# Patient Record
Sex: FEMALE | Race: BLACK OR AFRICAN AMERICAN | NOT HISPANIC OR LATINO | Employment: FULL TIME | URBAN - METROPOLITAN AREA
[De-identification: names, ages, dates, MRNs, and addresses within clinical notes are randomized per-mention and may not be internally consistent; named-entity substitution may affect disease eponyms.]

---

## 2018-03-22 ENCOUNTER — OFFICE VISIT (OUTPATIENT)
Dept: OBSTETRICS AND GYNECOLOGY | Facility: CLINIC | Age: 38
End: 2018-03-22
Attending: OBSTETRICS & GYNECOLOGY
Payer: COMMERCIAL

## 2018-03-22 VITALS
SYSTOLIC BLOOD PRESSURE: 120 MMHG | BODY MASS INDEX: 28.15 KG/M2 | WEIGHT: 164.88 LBS | DIASTOLIC BLOOD PRESSURE: 86 MMHG | HEIGHT: 64 IN

## 2018-03-22 DIAGNOSIS — Z01.419 VISIT FOR GYNECOLOGIC EXAMINATION: Primary | ICD-10-CM

## 2018-03-22 DIAGNOSIS — Z30.41 ENCOUNTER FOR SURVEILLANCE OF CONTRACEPTIVE PILLS: ICD-10-CM

## 2018-03-22 PROCEDURE — 88175 CYTOPATH C/V AUTO FLUID REDO: CPT

## 2018-03-22 PROCEDURE — 99385 PREV VISIT NEW AGE 18-39: CPT | Mod: S$GLB,,, | Performed by: OBSTETRICS & GYNECOLOGY

## 2018-03-22 PROCEDURE — 99999 PR PBB SHADOW E&M-NEW PATIENT-LVL III: CPT | Mod: PBBFAC,,, | Performed by: OBSTETRICS & GYNECOLOGY

## 2018-03-22 PROCEDURE — 87624 HPV HI-RISK TYP POOLED RSLT: CPT

## 2018-03-22 RX ORDER — SPIRONOLACTONE 25 MG/1
50 TABLET ORAL DAILY
Refills: 2 | COMMUNITY
Start: 2018-01-08

## 2018-03-22 RX ORDER — NORETHINDRONE ACETATE AND ETHINYL ESTRADIOL 1MG-20(24)
1 KIT ORAL DAILY
Qty: 30 TABLET | Refills: 12 | Status: SHIPPED | OUTPATIENT
Start: 2018-03-22

## 2018-03-22 RX ORDER — CLOTRIMAZOLE AND BETAMETHASONE DIPROPIONATE 10; .64 MG/G; MG/G
CREAM TOPICAL
COMMUNITY
Start: 2018-03-22

## 2018-03-22 RX ORDER — NORETHINDRONE ACETATE AND ETHINYL ESTRADIOL 1MG-20(24)
1 KIT ORAL DAILY
Refills: 0 | COMMUNITY
Start: 2018-01-25 | End: 2018-03-22 | Stop reason: SDUPTHER

## 2018-03-22 RX ORDER — GABAPENTIN 300 MG/1
CAPSULE ORAL
Refills: 6 | COMMUNITY
Start: 2018-01-06

## 2018-03-22 RX ORDER — DOXYCYCLINE HYCLATE 100 MG
TABLET ORAL
COMMUNITY
Start: 2018-03-22

## 2018-03-22 NOTE — PROGRESS NOTES
"CC: Well woman exam    Yanira Lerma is a 37 y.o. female  presents for a well woman exam.  She has no issues, problems, or complaints.    Past Medical History:   Diagnosis Date    Abnormal Pap smear 10 years ago    colpo normal    Hidradenitis        Past Surgical History:   Procedure Laterality Date    BREAST SURGERY      Breast Reduction     SECTION  '02, '09    x4    DILATION AND CURETTAGE OF UTERUS  '01    after SAB       OB History    Para Term  AB Living   5 3 3   1 3   SAB TAB Ectopic Multiple Live Births   1       3      # Outcome Date GA Lbr Gerard/2nd Weight Sex Delivery Anes PTL Lv   5 Term    2.863 kg (6 lb 5 oz) M CS-LTranv Spinal N IVY   4 Term    3.289 kg (7 lb 4 oz) F CS-LTranv EPI Y IVY      Birth Comments: FTP, fetal distress, polyhydramnios   3 SAB               Birth Comments: with D&C   2 Term      CS-LTranv   IVY   1       CS-LTranv             Family History   Problem Relation Age of Onset    Hypertension Father     Breast cancer Neg Hx     Colon cancer Neg Hx     Ovarian cancer Neg Hx        Social History   Substance Use Topics    Smoking status: Never Smoker    Smokeless tobacco: Never Used    Alcohol use No       /86   Ht 5' 4" (1.626 m)   Wt 74.8 kg (164 lb 14.5 oz)   LMP  (LMP Unknown)   Breastfeeding? No Comment: Birth control  BMI 28.31 kg/m²     ROS:  GENERAL: Denies weight gain or weight loss. Feeling well overall.   SKIN: Denies rash or lesions.   HEAD: Denies head injury or headache.   NODES: Denies enlarged lymph nodes.   CHEST: Denies chest pain or shortness of breath.   CARDIOVASCULAR: Denies palpitations or left sided chest pain.   ABDOMEN: No abdominal pain, constipation, diarrhea, nausea, vomiting or rectal bleeding.   URINARY: No frequency, dysuria, hematuria, or burning on urination.  REPRODUCTIVE: See HPI.   BREASTS: The patient performs breast self-examination and denies pain, lumps, or nipple discharge. "   HEMATOLOGIC: No easy bruisability or excessive bleeding.  MUSCULOSKELETAL: Denies joint pain or swelling.   NEUROLOGIC: Denies syncope or weakness.   PSYCHIATRIC: Denies depression, anxiety or mood swings.    Physical Exam:    APPEARANCE: Well nourished, well developed, in no acute distress.  AFFECT: WNL, alert and oriented x 3  SKIN: No acne or hirsutism  NECK: Neck symmetric without masses or thyromegaly  NODES: No inguinal, cervical, axillary, or femoral lymph node enlargement  CHEST: Good respiratory effect  ABDOMEN: Soft.  No tenderness or masses.  No hepatosplenomegaly.  No hernias.  BREASTS: Symmetrical, no skin changes or visible lesions.  No palpable masses, nipple discharge bilaterally.  PELVIC: Normal external genitalia without lesions.  Normal hair distribution.  Adequate perineal body, normal urethral meatus.  Vagina moist and well rugated without lesions or discharge.  Cervix pink, without lesions, discharge or tenderness.  No significant cystocele or rectocele.  Bimanual exam shows uterus to be normal size, regular, mobile and nontender.  Adnexa without masses or tenderness.    EXTREMITIES: No edema.    ASSESSMENT AND PLAN  1. Visit for gynecologic examination  Liquid-based pap smear, screening    HPV High Risk Genotypes, PCR   2. Encounter for surveillance of contraceptive pills  BLISOVI 24 FE 1 mg-20 mcg (24)/75 mg (4) per tablet       Patient was counseled today on A.C.S. Pap guidelines and recommendations for yearly pelvic exams, pap smears every 5 years with HPV co-testing, and monthly self breast exams; to see her PCP for other health maintenance.     Follow-up in about 1 year (around 3/22/2019).

## 2018-03-27 LAB
HPV16 AG SPEC QL: NEGATIVE
HPV16+18+H RISK 12 DNA CVX-IMP: NEGATIVE
HPV18 DNA SPEC QL NAA+PROBE: NEGATIVE

## 2019-05-21 ENCOUNTER — TELEPHONE (OUTPATIENT)
Dept: OBSTETRICS AND GYNECOLOGY | Facility: CLINIC | Age: 39
End: 2019-05-21

## 2019-05-21 NOTE — TELEPHONE ENCOUNTER
----- Message from Holly Umanzor sent at 5/21/2019 10:26 AM CDT -----  Name of Who is Calling:CRISTÓBAL COLLINS [[id number]]      What is the request in detail: Pt states she will be 30 weeks on Friday and wants to transfer care.      Can the clinic reply by [facility]:  Yes       What Number to Call Back if not in [facility]: [phone]

## 2019-05-21 NOTE — TELEPHONE ENCOUNTER
I spoke to the pt and informed her that we do not accept patient 's after 28 weeks of pregnancy. Pt was given the number to call the [facility] to see if she can be seen. Pt lives in [facility] and is normally seen by a [facility] doctor . 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  Patient will call and see if she can be seen by [facility].

## 2019-05-22 ENCOUNTER — TELEPHONE (OUTPATIENT)
Dept: OBSTETRICS AND GYNECOLOGY | Facility: CLINIC | Age: 39
End: 2019-05-22

## 2019-05-22 NOTE — TELEPHONE ENCOUNTER
I spoke to the pt and scheduled her an appointment to come in and be seen for her transfer care from [place].

## 2019-05-22 NOTE — TELEPHONE ENCOUNTER
I left a message for the pt to call the office back at [phone] to schedule her an appt for ob transfer of care.

## 2019-05-22 NOTE — TELEPHONE ENCOUNTER
----- Message from Shyann Schulz sent at 5/22/2019 12:15 PM CDT -----  Contact: CRISTÓBAL COLLINS [[id number]]  Name of Who is Calling:CRISTÓBAL COLLINS [[id number]]    What is the request in detail: Patient returned a call from the office, please call her back.       Can the clinic reply by [facility]:no      What [facility]: [phone]

## 2020-02-01 ENCOUNTER — HOSPITAL ENCOUNTER (EMERGENCY)
Facility: HOSPITAL | Age: 40
Discharge: HOME | End: 2020-02-01
Attending: EMERGENCY MEDICINE
Payer: MEDICAID

## 2020-02-01 VITALS
BODY MASS INDEX: 28.12 KG/M2 | HEART RATE: 64 BPM | TEMPERATURE: 98 F | OXYGEN SATURATION: 99 % | RESPIRATION RATE: 16 BRPM | SYSTOLIC BLOOD PRESSURE: 142 MMHG | WEIGHT: 175 LBS | DIASTOLIC BLOOD PRESSURE: 85 MMHG | HEIGHT: 66 IN

## 2020-02-01 DIAGNOSIS — S62.656A CLOSED NONDISPLACED FRACTURE OF MIDDLE PHALANX OF RIGHT LITTLE FINGER, INITIAL ENCOUNTER: Primary | ICD-10-CM

## 2020-02-01 LAB
B-HCG UR QL: NEGATIVE
CTP QC/QA: YES

## 2020-02-01 PROCEDURE — 99283 EMERGENCY DEPT VISIT LOW MDM: CPT | Mod: 25

## 2020-02-01 PROCEDURE — 81025 URINE PREGNANCY TEST: CPT | Performed by: NURSE PRACTITIONER

## 2020-02-02 NOTE — ED PROVIDER NOTES
"Encounter Date: 2020       History     Chief Complaint   Patient presents with    Finger Injury     reports caught a football on Thursday and her small finger was "dislocated".  She moved it back into place but finger remains painful today.   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 Patient states that she was playing football with her kids, and a football hit her hand while she was playing.  States she had constant pain since that time, states that she also notices small deformity to her finger, and that she thinks that she reduce it herself when this occurred 2 days prior.  States that she has been having difficulty moving her finger since this occurred, however has been able to still move the finger despite the pain. Taking ibuprofen for the pain, has been helping her pain.    Review of patient's allergies indicates:   Allergen Reactions    Diflucan [fluconazole] Hives    Amoxicillin      Past Medical History:   Diagnosis Date    Abnormal Pap smear 10 years ago    colpo normal    Hidradenitis      Past Surgical History:   Procedure Laterality Date    BREAST SURGERY      Breast Reduction     SECTION  '02, '09    x4    DILATION AND CURETTAGE OF UTERUS  '01    after SAB     Family History   Problem Relation Age of Onset    Hypertension Father     Breast cancer Neg Hx     Colon cancer Neg Hx     Ovarian cancer Neg Hx      Social History     Tobacco Use    Smoking status: Never Smoker    Smokeless tobacco: Never Used   Substance Use Topics    Alcohol use: No    Drug use: No     Review of Systems   Constitutional: Negative for chills and fever.   Cardiovascular: Negative for chest pain and palpitations.   Gastrointestinal: Negative for diarrhea and vomiting.       Physical Exam     Initial Vitals [20]   BP Pulse Resp Temp SpO2   (!) 142/85 64 16 " 98.1 °F (36.7 °C) 99 %      MAP       --         Physical Exam    Nursing note and vitals reviewed.  Constitutional: She appears well-developed and well-nourished. No distress.   Nontoxic, sitting comfortably in bed   HENT:   Head: Normocephalic and atraumatic.   Eyes: EOM are normal. Pupils are equal, round, and reactive to light.   Neck: Normal range of motion.   Cardiovascular: Normal rate, regular rhythm and normal heart sounds. Exam reveals no gallop and no friction rub.    No murmur heard.  Pulmonary/Chest: Breath sounds normal. No respiratory distress. She has no wheezes. She has no rhonchi. She has no rales.   Musculoskeletal:   Slight medial deviation of the right small finger, soft tissue swelling noted over the lateral aspect of the right small finger, mildly tender to palpation, slightly decreased range of motion secondary to pain with extension and flexion, decreased strength with abduction, preserved adduction   Neurological: She is alert and oriented to person, place, and time.   Ambulates with a steady gait, no focal deficits   Skin: Skin is warm and dry.         ED Course   Procedures  Labs Reviewed   POCT URINE PREGNANCY          Imaging Results          X-Ray Finger 2 or More Views Right (In process)                  Medical Decision Making:   Initial Assessment:   Assessment:  39-year-old female with right finger pain    Ddx includes but is not limited to:  Fracture, dislocation, subluxation, neurovascular injury, tendon injury    Plan:  Emergent evaluation of a 39-year-old female for right finger pain. Patient hemodynamically stable, afebrile.  On exam, patient demonstrates medial deviation of the right 5th digit, and also soft tissue swelling.  X-ray demonstrated a transverse fracture of the right 5th digit, no other fracture was identified.  No evidence of subluxation or dislocation.  She is neurovascularly intact.  Could also represent tendon injury as well. We will refer the patient  Orthopedic surgery.  States she is comfortable taking Motrin at home for pain. Did place the patient in a splint, and she was neurovascularly intact with normal cap refill after placement of the splint.  Voiced understanding for return precautions, stable for discharge.      Juan Carlos Neal, HO-3  2/1/2020 10:02 PM                                   Clinical Impression:       ICD-10-CM ICD-9-CM   1. Closed nondisplaced fracture of middle phalanx of right little finger, initial encounter S62.656A 816.01                             Juan Carlos Neal MD  Resident  02/01/20 [id number]

## 2020-06-26 ENCOUNTER — HISTORICAL (OUTPATIENT)
Dept: ADMINISTRATIVE | Facility: HOSPITAL | Age: 40
End: 2020-06-26

## 2020-07-08 ENCOUNTER — HISTORICAL (OUTPATIENT)
Dept: ADMINISTRATIVE | Facility: HOSPITAL | Age: 40
End: 2020-07-08

## 2020-12-04 ENCOUNTER — TELEPHONE (OUTPATIENT)
Dept: OBSTETRICS AND GYNECOLOGY | Facility: CLINIC | Age: 40
End: 2020-12-04

## 2020-12-04 NOTE — TELEPHONE ENCOUNTER
Called patient, no answer, left message to call back. I was returning patient's call. Provider is completely booked till the end of the year.

## 2020-12-04 NOTE — TELEPHONE ENCOUNTER
----- Message from Mary Lui LPN sent at 12/4/2020 11:50 AM CST -----  Contact: CRISTÓBAL COLLINS [[id number]]  Can you call her, I know her personally. Just let her know that you Dr. Scott is booked through the end of the year ? Do you minsd ?  ----- Message -----  From: Francoise Palacio  Sent: 12/3/2020   2:54 PM CST  To: Sonia [facility]AI[facility] Staff    Type: Patient Call Back    Who called:CRISTÓBAL COLLINS [[id number]]    What is the request in detail: Patient is requesting a call back. CRISTÓBAL COLLINS [[id number]]  states she would like to be seen before December 14. She requests a wwe and she states her menstrual cycles have become heavier.   Please advise.    Can the clinic reply by [facility]? No    Best call back number: ..[phone]    Additional Information: N/A

## 2021-02-10 ENCOUNTER — HISTORICAL (OUTPATIENT)
Dept: ADMINISTRATIVE | Facility: HOSPITAL | Age: 41
End: 2021-02-10

## 2021-02-12 LAB
DSDNA IGG SERPL IA-ACNC: 33 IU (ref 0–24)
DSDNA IGG SERPL IA-ACNC: ABNORMAL [IU]/ML
ENA AB SER QL IA: 9.2 EUS (ref 0–19.9)
ENA AB SER QL IA: NEGATIVE

## 2021-04-19 ENCOUNTER — OFFICE VISIT (OUTPATIENT)
Dept: FAMILY MEDICINE | Facility: CLINIC | Age: 41
End: 2021-04-19
Payer: MEDICAID

## 2021-04-19 VITALS
DIASTOLIC BLOOD PRESSURE: 90 MMHG | BODY MASS INDEX: 49.5 KG/M2 | HEIGHT: 62 IN | SYSTOLIC BLOOD PRESSURE: 130 MMHG | RESPIRATION RATE: 20 BRPM | WEIGHT: 269 LBS

## 2021-04-19 DIAGNOSIS — I10 HYPERTENSION, UNSPECIFIED TYPE: ICD-10-CM

## 2021-04-19 DIAGNOSIS — E66.9 OBESITY, UNSPECIFIED CLASSIFICATION, UNSPECIFIED OBESITY TYPE, UNSPECIFIED WHETHER SERIOUS COMORBIDITY PRESENT: ICD-10-CM

## 2021-04-19 DIAGNOSIS — K21.9 GASTROESOPHAGEAL REFLUX DISEASE, UNSPECIFIED WHETHER ESOPHAGITIS PRESENT: ICD-10-CM

## 2021-04-19 DIAGNOSIS — E07.81 SICK-EUTHYROID SYNDROME: ICD-10-CM

## 2021-04-19 DIAGNOSIS — E13.9 DIABETES 1.5, MANAGED AS TYPE 2: Primary | ICD-10-CM

## 2021-04-19 LAB
BASOPHILS # BLD AUTO: 0.06 K/UL (ref 0–0.2)
BASOPHILS NFR BLD AUTO: 0.9 % (ref 0–1)
DIFFERENTIAL METHOD BLD: ABNORMAL
EOSINOPHIL # BLD AUTO: 0.22 K/UL (ref 0–0.5)
EOSINOPHIL NFR BLD AUTO: 3.4 % (ref 1–4)
ERYTHROCYTE [DISTWIDTH] IN BLOOD BY AUTOMATED COUNT: 14.2 % (ref 11.5–14.5)
EST. AVERAGE GLUCOSE BLD GHB EST-MCNC: 120 MG/DL
HBA1C MFR BLD HPLC: 6.2 % (ref 4.5–6.6)
HCT VFR BLD AUTO: 42.8 % (ref 38–47)
HGB BLD-MCNC: 12.7 G/DL (ref 12–16)
IMM GRANULOCYTES # BLD AUTO: 0.02 K/UL (ref 0–0.04)
IMM GRANULOCYTES NFR BLD: 0.3 % (ref 0–0.4)
LYMPHOCYTES # BLD AUTO: 2.42 K/UL (ref 1–4.8)
LYMPHOCYTES NFR BLD AUTO: 37.8 % (ref 27–41)
MCH RBC QN AUTO: 22.4 PG (ref 27–31)
MCHC RBC AUTO-ENTMCNC: 29.7 G/DL (ref 32–36)
MCV RBC AUTO: 75.5 FL (ref 80–96)
MONOCYTES # BLD AUTO: 0.42 K/UL (ref 0–0.8)
MONOCYTES NFR BLD AUTO: 6.6 % (ref 2–6)
MPC BLD CALC-MCNC: 11.4 FL (ref 9.4–12.4)
NEUTROPHILS # BLD AUTO: 3.26 K/UL (ref 1.8–7.7)
NEUTROPHILS NFR BLD AUTO: 51 % (ref 53–65)
NRBC # BLD AUTO: 0 X10E3/UL
NRBC, AUTO (.00): 0 %
PLATELET # BLD AUTO: 275 K/UL (ref 150–400)
RBC # BLD AUTO: 5.67 M/UL (ref 4.2–5.4)
WBC # BLD AUTO: 6.4 K/UL (ref 4.5–11)

## 2021-04-19 PROCEDURE — 80053 COMPREHEN METABOLIC PANEL: CPT | Mod: ,,, | Performed by: CLINICAL MEDICAL LABORATORY

## 2021-04-19 PROCEDURE — 99214 PR OFFICE/OUTPT VISIT, EST, LEVL IV, 30-39 MIN: ICD-10-PCS | Mod: ,,, | Performed by: FAMILY MEDICINE

## 2021-04-19 PROCEDURE — 83036 HEMOGLOBIN A1C: ICD-10-PCS | Mod: ,,, | Performed by: CLINICAL MEDICAL LABORATORY

## 2021-04-19 PROCEDURE — 99214 OFFICE O/P EST MOD 30 MIN: CPT | Mod: ,,, | Performed by: FAMILY MEDICINE

## 2021-04-19 PROCEDURE — 83036 HEMOGLOBIN GLYCOSYLATED A1C: CPT | Mod: ,,, | Performed by: CLINICAL MEDICAL LABORATORY

## 2021-04-19 PROCEDURE — 85025 COMPLETE CBC W/AUTO DIFF WBC: CPT | Mod: ,,, | Performed by: CLINICAL MEDICAL LABORATORY

## 2021-04-19 PROCEDURE — 80053 COMPREHENSIVE METABOLIC PANEL: ICD-10-PCS | Mod: ,,, | Performed by: CLINICAL MEDICAL LABORATORY

## 2021-04-19 PROCEDURE — 85025 CBC WITH DIFFERENTIAL: ICD-10-PCS | Mod: ,,, | Performed by: CLINICAL MEDICAL LABORATORY

## 2021-04-19 PROCEDURE — 84443 TSH: ICD-10-PCS | Mod: ,,, | Performed by: CLINICAL MEDICAL LABORATORY

## 2021-04-19 PROCEDURE — 84443 ASSAY THYROID STIM HORMONE: CPT | Mod: ,,, | Performed by: CLINICAL MEDICAL LABORATORY

## 2021-04-19 RX ORDER — PANTOPRAZOLE SODIUM 40 MG/1
40 TABLET, DELAYED RELEASE ORAL DAILY
COMMUNITY
Start: 2021-01-19 | End: 2021-04-19

## 2021-04-19 RX ORDER — OMEPRAZOLE 20 MG/1
20 CAPSULE, DELAYED RELEASE ORAL DAILY
Qty: 30 CAPSULE | Refills: 1 | Status: CANCELLED | OUTPATIENT
Start: 2021-04-19

## 2021-04-19 RX ORDER — ASPIRIN 81 MG/1
81 TABLET ORAL DAILY
COMMUNITY
Start: 2021-03-26 | End: 2021-08-09

## 2021-04-19 RX ORDER — OMEPRAZOLE 20 MG/1
20 CAPSULE, DELAYED RELEASE ORAL DAILY
COMMUNITY
End: 2021-04-19

## 2021-04-19 RX ORDER — LIRAGLUTIDE 6 MG/ML
INJECTION SUBCUTANEOUS
Qty: 6 ML | Refills: 5 | Status: SHIPPED | OUTPATIENT
Start: 2021-04-19 | End: 2021-10-11

## 2021-04-19 RX ORDER — METOPROLOL TARTRATE 25 MG/1
25 TABLET, FILM COATED ORAL 2 TIMES DAILY
Qty: 60 TABLET | Refills: 5 | Status: SHIPPED | OUTPATIENT
Start: 2021-04-19 | End: 2022-06-03 | Stop reason: SDUPTHER

## 2021-04-19 RX ORDER — METOPROLOL TARTRATE 25 MG/1
25 TABLET, FILM COATED ORAL 2 TIMES DAILY
COMMUNITY
Start: 2021-03-26 | End: 2021-04-19 | Stop reason: SDUPTHER

## 2021-04-19 RX ORDER — LIRAGLUTIDE 6 MG/ML
INJECTION SUBCUTANEOUS
COMMUNITY
Start: 2021-03-26 | End: 2021-04-19 | Stop reason: SDUPTHER

## 2021-04-19 RX ORDER — PANTOPRAZOLE SODIUM 40 MG/1
40 TABLET, DELAYED RELEASE ORAL DAILY
Qty: 30 TABLET | Refills: 5 | Status: CANCELLED | OUTPATIENT
Start: 2021-04-19

## 2021-04-20 LAB
ALBUMIN SERPL BCP-MCNC: 3.7 G/DL (ref 3.5–5)
ALBUMIN/GLOB SERPL: 0.9 {RATIO}
ALP SERPL-CCNC: 83 U/L (ref 37–98)
ALT SERPL W P-5'-P-CCNC: 24 U/L (ref 13–56)
ANION GAP SERPL CALCULATED.3IONS-SCNC: 14 MMOL/L (ref 7–16)
AST SERPL W P-5'-P-CCNC: 27 U/L (ref 15–37)
BILIRUB SERPL-MCNC: 0.3 MG/DL (ref 0–1.2)
BUN SERPL-MCNC: 12 MG/DL (ref 7–18)
BUN/CREAT SERPL: 11 (ref 6–20)
CALCIUM SERPL-MCNC: 9.1 MG/DL (ref 8.5–10.1)
CHLORIDE SERPL-SCNC: 106 MMOL/L (ref 98–107)
CO2 SERPL-SCNC: 24 MMOL/L (ref 21–32)
CREAT SERPL-MCNC: 1.06 MG/DL (ref 0.55–1.02)
GLOBULIN SER-MCNC: 4.1 G/DL (ref 2–4)
GLUCOSE SERPL-MCNC: 102 MG/DL (ref 74–106)
POTASSIUM SERPL-SCNC: 4.7 MMOL/L (ref 3.5–5.1)
PROT SERPL-MCNC: 7.8 G/DL (ref 6.4–8.2)
SODIUM SERPL-SCNC: 139 MMOL/L (ref 136–145)
TSH SERPL DL<=0.005 MIU/L-ACNC: 6.69 UIU/ML (ref 3.58–3.74)

## 2021-04-21 ENCOUNTER — TELEPHONE (OUTPATIENT)
Dept: FAMILY MEDICINE | Facility: CLINIC | Age: 41
End: 2021-04-21

## 2021-05-06 ENCOUNTER — OFFICE VISIT (OUTPATIENT)
Dept: FAMILY MEDICINE | Facility: CLINIC | Age: 41
End: 2021-05-06
Payer: MEDICAID

## 2021-05-06 VITALS
HEART RATE: 84 BPM | DIASTOLIC BLOOD PRESSURE: 72 MMHG | RESPIRATION RATE: 18 BRPM | WEIGHT: 265 LBS | SYSTOLIC BLOOD PRESSURE: 120 MMHG | HEIGHT: 62 IN | BODY MASS INDEX: 48.76 KG/M2

## 2021-05-06 DIAGNOSIS — E13.9 DIABETES 1.5, MANAGED AS TYPE 2: ICD-10-CM

## 2021-05-06 DIAGNOSIS — E03.9 HYPOTHYROIDISM, UNSPECIFIED TYPE: Primary | ICD-10-CM

## 2021-05-06 DIAGNOSIS — R77.1 ELEVATED SERUM GLOBULIN LEVEL: ICD-10-CM

## 2021-05-06 DIAGNOSIS — E04.9 GOITER: ICD-10-CM

## 2021-05-06 PROCEDURE — 99213 OFFICE O/P EST LOW 20 MIN: CPT | Mod: ,,, | Performed by: FAMILY MEDICINE

## 2021-05-06 PROCEDURE — 99213 PR OFFICE/OUTPT VISIT, EST, LEVL III, 20-29 MIN: ICD-10-PCS | Mod: ,,, | Performed by: FAMILY MEDICINE

## 2021-05-06 RX ORDER — LEVOTHYROXINE SODIUM 25 UG/1
25 TABLET ORAL
Qty: 30 TABLET | Refills: 3 | Status: SHIPPED | OUTPATIENT
Start: 2021-05-06 | End: 2021-07-08 | Stop reason: DRUGHIGH

## 2021-05-10 ENCOUNTER — PATIENT MESSAGE (OUTPATIENT)
Dept: RESEARCH | Facility: HOSPITAL | Age: 41
End: 2021-05-10

## 2021-06-07 ENCOUNTER — HOSPITAL ENCOUNTER (EMERGENCY)
Facility: HOSPITAL | Age: 41
Discharge: HOME | End: 2021-06-08
Attending: EMERGENCY MEDICINE
Payer: MEDICAID

## 2021-06-07 DIAGNOSIS — E03.9 HYPOTHYROIDISM: ICD-10-CM

## 2021-06-07 DIAGNOSIS — R07.9 CHEST PAIN: ICD-10-CM

## 2021-06-07 DIAGNOSIS — E07.81 SICK-EUTHYROID SYNDROME: ICD-10-CM

## 2021-06-07 DIAGNOSIS — E13.9 DIABETES 1.5, MANAGED AS TYPE 2: ICD-10-CM

## 2021-06-07 DIAGNOSIS — I10 HYPERTENSION: ICD-10-CM

## 2021-06-07 DIAGNOSIS — E04.9 GOITER: ICD-10-CM

## 2021-06-07 DIAGNOSIS — K21.9 GASTROESOPHAGEAL REFLUX DISEASE: ICD-10-CM

## 2021-06-07 DIAGNOSIS — R53.1 WEAKNESS: Primary | ICD-10-CM

## 2021-06-07 DIAGNOSIS — E66.9 OBESITY: ICD-10-CM

## 2021-06-07 PROCEDURE — 99283 EMERGENCY DEPT VISIT LOW MDM: CPT | Mod: ,,, | Performed by: EMERGENCY MEDICINE

## 2021-06-07 PROCEDURE — 85730 THROMBOPLASTIN TIME PARTIAL: CPT | Performed by: EMERGENCY MEDICINE

## 2021-06-07 PROCEDURE — 93005 ELECTROCARDIOGRAM TRACING: CPT

## 2021-06-07 PROCEDURE — 99285 EMERGENCY DEPT VISIT HI MDM: CPT | Mod: 25

## 2021-06-07 PROCEDURE — 84484 ASSAY OF TROPONIN QUANT: CPT | Performed by: EMERGENCY MEDICINE

## 2021-06-07 PROCEDURE — 80307 DRUG TEST PRSMV CHEM ANLYZR: CPT | Performed by: EMERGENCY MEDICINE

## 2021-06-07 PROCEDURE — 83880 ASSAY OF NATRIURETIC PEPTIDE: CPT | Performed by: EMERGENCY MEDICINE

## 2021-06-07 PROCEDURE — 93010 EKG 12-LEAD: ICD-10-PCS | Mod: ,,, | Performed by: INTERNAL MEDICINE

## 2021-06-07 PROCEDURE — 85025 COMPLETE CBC W/AUTO DIFF WBC: CPT | Performed by: EMERGENCY MEDICINE

## 2021-06-07 PROCEDURE — 83735 ASSAY OF MAGNESIUM: CPT | Performed by: EMERGENCY MEDICINE

## 2021-06-07 PROCEDURE — 80053 COMPREHEN METABOLIC PANEL: CPT | Performed by: EMERGENCY MEDICINE

## 2021-06-07 PROCEDURE — 99283 PR EMERGENCY DEPT VISIT,LEVEL III: ICD-10-PCS | Mod: ,,, | Performed by: EMERGENCY MEDICINE

## 2021-06-07 PROCEDURE — 36415 COLL VENOUS BLD VENIPUNCTURE: CPT | Performed by: EMERGENCY MEDICINE

## 2021-06-07 PROCEDURE — 85610 PROTHROMBIN TIME: CPT | Performed by: EMERGENCY MEDICINE

## 2021-06-07 PROCEDURE — 93010 ELECTROCARDIOGRAM REPORT: CPT | Mod: ,,, | Performed by: INTERNAL MEDICINE

## 2021-06-08 VITALS
DIASTOLIC BLOOD PRESSURE: 96 MMHG | WEIGHT: 255.75 LBS | SYSTOLIC BLOOD PRESSURE: 153 MMHG | RESPIRATION RATE: 22 BRPM | OXYGEN SATURATION: 99 % | HEIGHT: 64 IN | BODY MASS INDEX: 43.66 KG/M2 | HEART RATE: 86 BPM | TEMPERATURE: 99 F

## 2021-06-08 PROBLEM — R07.9 CHEST PAIN: Status: ACTIVE | Noted: 2021-06-08

## 2021-06-08 PROBLEM — R53.1 WEAKNESS: Status: ACTIVE | Noted: 2021-06-08

## 2021-06-08 LAB
ALBUMIN SERPL BCP-MCNC: 3.5 G/DL (ref 3.5–5)
ALBUMIN/GLOB SERPL: 0.9 {RATIO}
ALP SERPL-CCNC: 114 U/L (ref 37–98)
ALT SERPL W P-5'-P-CCNC: 47 U/L (ref 13–56)
AMPHET UR QL SCN: NEGATIVE
ANION GAP SERPL CALCULATED.3IONS-SCNC: 17 MMOL/L (ref 7–16)
ANISOCYTOSIS BLD QL SMEAR: ABNORMAL
APTT PPP: 34.9 SECONDS (ref 25.2–37.3)
AST SERPL W P-5'-P-CCNC: 38 U/L (ref 15–37)
BARBITURATES UR QL SCN: NEGATIVE
BASOPHILS # BLD AUTO: 0.06 K/UL (ref 0–0.2)
BASOPHILS NFR BLD AUTO: 0.7 % (ref 0–1)
BENZODIAZ METAB UR QL SCN: NEGATIVE
BILIRUB SERPL-MCNC: 0.3 MG/DL (ref 0–1.2)
BUN SERPL-MCNC: 10 MG/DL (ref 7–18)
BUN/CREAT SERPL: 9 (ref 6–20)
CALCIUM SERPL-MCNC: 9.3 MG/DL (ref 8.5–10.1)
CANNABINOIDS UR QL SCN: NEGATIVE
CHLORIDE SERPL-SCNC: 106 MMOL/L (ref 98–107)
CO2 SERPL-SCNC: 27 MMOL/L (ref 21–32)
COCAINE UR QL SCN: NEGATIVE
CREAT SERPL-MCNC: 1.12 MG/DL (ref 0.55–1.02)
DIFFERENTIAL METHOD BLD: ABNORMAL
EOSINOPHIL # BLD AUTO: 0.42 K/UL (ref 0–0.5)
EOSINOPHIL NFR BLD AUTO: 4.9 % (ref 1–4)
ERYTHROCYTE [DISTWIDTH] IN BLOOD BY AUTOMATED COUNT: 13.5 % (ref 11.5–14.5)
GLOBULIN SER-MCNC: 3.9 G/DL (ref 2–4)
GLUCOSE SERPL-MCNC: 94 MG/DL (ref 74–106)
HCT VFR BLD AUTO: 41.5 % (ref 38–47)
HGB BLD-MCNC: 12.5 G/DL (ref 12–16)
HYPOCHROMIA BLD QL SMEAR: ABNORMAL
IMM GRANULOCYTES # BLD AUTO: 0.02 K/UL (ref 0–0.04)
IMM GRANULOCYTES NFR BLD: 0.2 % (ref 0–0.4)
INR BLD: 1.01 (ref 0.9–1.1)
LYMPHOCYTES # BLD AUTO: 3.81 K/UL (ref 1–4.8)
LYMPHOCYTES NFR BLD AUTO: 44.6 % (ref 27–41)
MAGNESIUM SERPL-MCNC: 1.8 MG/DL (ref 1.7–2.3)
MCH RBC QN AUTO: 22.3 PG (ref 27–31)
MCHC RBC AUTO-ENTMCNC: 30.1 G/DL (ref 32–36)
MCV RBC AUTO: 74 FL (ref 80–96)
MICROCYTES BLD QL SMEAR: ABNORMAL
MONOCYTES # BLD AUTO: 0.55 K/UL (ref 0–0.8)
MONOCYTES NFR BLD AUTO: 6.4 % (ref 2–6)
MPC BLD CALC-MCNC: 10.4 FL (ref 9.4–12.4)
NEUTROPHILS # BLD AUTO: 3.68 K/UL (ref 1.8–7.7)
NEUTROPHILS NFR BLD AUTO: 43.2 % (ref 53–65)
NRBC # BLD AUTO: 0 X10E3/UL
NRBC, AUTO (.00): 0 %
NT-PROBNP SERPL-MCNC: 53 PG/ML (ref 1–125)
OPIATES UR QL SCN: NEGATIVE
PCP UR QL SCN: NEGATIVE
PLATELET # BLD AUTO: 281 K/UL (ref 150–400)
PLATELET MORPHOLOGY: ABNORMAL
POTASSIUM SERPL-SCNC: 4.6 MMOL/L (ref 3.5–5.1)
PROT SERPL-MCNC: 7.4 G/DL (ref 6.4–8.2)
PROTHROMBIN TIME: 12.8 SECONDS (ref 11.7–14.7)
RBC # BLD AUTO: 5.61 M/UL (ref 4.2–5.4)
SODIUM SERPL-SCNC: 145 MMOL/L (ref 136–145)
TROPONIN I SERPL-MCNC: <0.017 NG/ML
WBC # BLD AUTO: 8.54 K/UL (ref 4.5–11)

## 2021-06-28 ENCOUNTER — HOSPITAL ENCOUNTER (OUTPATIENT)
Dept: RADIOLOGY | Facility: HOSPITAL | Age: 41
Discharge: HOME | End: 2021-06-28
Attending: FAMILY MEDICINE
Payer: MEDICAID

## 2021-06-28 DIAGNOSIS — E03.9 HYPOTHYROIDISM, UNSPECIFIED TYPE: ICD-10-CM

## 2021-06-28 DIAGNOSIS — E04.9 GOITER: ICD-10-CM

## 2021-06-28 PROCEDURE — 76536 US SOFT TISSUE HEAD NECK THYROID: ICD-10-PCS | Mod: 26,,,

## 2021-06-28 PROCEDURE — 76536 US EXAM OF HEAD AND NECK: CPT | Mod: TC

## 2021-06-28 PROCEDURE — 76536 US EXAM OF HEAD AND NECK: CPT | Mod: 26,,,

## 2021-06-29 ENCOUNTER — TELEPHONE (OUTPATIENT)
Dept: FAMILY MEDICINE | Facility: CLINIC | Age: 41
End: 2021-06-29

## 2021-07-01 ENCOUNTER — PATIENT MESSAGE (OUTPATIENT)
Dept: ADMINISTRATIVE | Facility: OTHER | Age: 41
End: 2021-07-01

## 2021-07-07 ENCOUNTER — OFFICE VISIT (OUTPATIENT)
Dept: FAMILY MEDICINE | Facility: CLINIC | Age: 41
End: 2021-07-07
Payer: MEDICAID

## 2021-07-07 VITALS
WEIGHT: 255 LBS | HEART RATE: 83 BPM | OXYGEN SATURATION: 99 % | HEIGHT: 64 IN | TEMPERATURE: 99 F | BODY MASS INDEX: 43.54 KG/M2 | DIASTOLIC BLOOD PRESSURE: 105 MMHG | RESPIRATION RATE: 18 BRPM | SYSTOLIC BLOOD PRESSURE: 158 MMHG

## 2021-07-07 DIAGNOSIS — E03.9 HYPOTHYROIDISM, UNSPECIFIED TYPE: Primary | ICD-10-CM

## 2021-07-07 DIAGNOSIS — F41.9 ANXIETY: ICD-10-CM

## 2021-07-07 DIAGNOSIS — E04.9 GOITER: ICD-10-CM

## 2021-07-07 LAB
T3FREE SERPL-MCNC: 1.95 PG/ML (ref 2.18–3.98)
T4 FREE SERPL-MCNC: 0.89 NG/DL (ref 0.76–1.46)
TSH SERPL DL<=0.005 MIU/L-ACNC: 3.42 UIU/ML (ref 0.36–3.74)

## 2021-07-07 PROCEDURE — 84481 T3, FREE: ICD-10-PCS | Mod: ,,, | Performed by: CLINICAL MEDICAL LABORATORY

## 2021-07-07 PROCEDURE — 99213 PR OFFICE/OUTPT VISIT, EST, LEVL III, 20-29 MIN: ICD-10-PCS | Mod: ,,, | Performed by: FAMILY MEDICINE

## 2021-07-07 PROCEDURE — 84439 ASSAY OF FREE THYROXINE: CPT | Mod: ,,, | Performed by: CLINICAL MEDICAL LABORATORY

## 2021-07-07 PROCEDURE — 84443 TSH: ICD-10-PCS | Mod: ,,, | Performed by: CLINICAL MEDICAL LABORATORY

## 2021-07-07 PROCEDURE — 84481 FREE ASSAY (FT-3): CPT | Mod: ,,, | Performed by: CLINICAL MEDICAL LABORATORY

## 2021-07-07 PROCEDURE — 84443 ASSAY THYROID STIM HORMONE: CPT | Mod: ,,, | Performed by: CLINICAL MEDICAL LABORATORY

## 2021-07-07 PROCEDURE — 99213 OFFICE O/P EST LOW 20 MIN: CPT | Mod: ,,, | Performed by: FAMILY MEDICINE

## 2021-07-07 PROCEDURE — 84439 T4, FREE: ICD-10-PCS | Mod: ,,, | Performed by: CLINICAL MEDICAL LABORATORY

## 2021-07-07 RX ORDER — BUSPIRONE HYDROCHLORIDE 7.5 MG/1
7.5 TABLET ORAL 3 TIMES DAILY
Qty: 90 TABLET | Refills: 1 | Status: SHIPPED | OUTPATIENT
Start: 2021-07-07 | End: 2021-09-23

## 2021-07-07 RX ORDER — GLUCOSAM/CHON-MSM1/C/MANG/BOSW 500-416.6
TABLET ORAL
COMMUNITY
Start: 2021-06-18

## 2021-07-08 ENCOUNTER — TELEPHONE (OUTPATIENT)
Dept: FAMILY MEDICINE | Facility: CLINIC | Age: 41
End: 2021-07-08

## 2021-07-08 RX ORDER — LEVOTHYROXINE SODIUM 50 UG/1
50 TABLET ORAL
COMMUNITY
End: 2021-07-08 | Stop reason: SDUPTHER

## 2021-07-08 RX ORDER — LEVOTHYROXINE SODIUM 50 UG/1
50 TABLET ORAL
Qty: 90 TABLET | Refills: 1 | Status: SHIPPED | OUTPATIENT
Start: 2021-07-08 | End: 2021-10-11

## 2021-09-23 ENCOUNTER — OFFICE VISIT (OUTPATIENT)
Dept: FAMILY MEDICINE | Facility: CLINIC | Age: 41
End: 2021-09-23
Payer: MEDICAID

## 2021-09-23 VITALS
OXYGEN SATURATION: 92 % | WEIGHT: 242 LBS | BODY MASS INDEX: 44.53 KG/M2 | HEART RATE: 80 BPM | HEIGHT: 62 IN | TEMPERATURE: 99 F | SYSTOLIC BLOOD PRESSURE: 114 MMHG | RESPIRATION RATE: 18 BRPM | DIASTOLIC BLOOD PRESSURE: 84 MMHG

## 2021-09-23 DIAGNOSIS — E03.9 HYPOTHYROIDISM, UNSPECIFIED TYPE: Primary | ICD-10-CM

## 2021-09-23 DIAGNOSIS — G47.00 INSOMNIA, UNSPECIFIED TYPE: ICD-10-CM

## 2021-09-23 DIAGNOSIS — F32.A DEPRESSION, UNSPECIFIED DEPRESSION TYPE: ICD-10-CM

## 2021-09-23 DIAGNOSIS — M51.9 LUMBAR DISC DISEASE: ICD-10-CM

## 2021-09-23 DIAGNOSIS — E11.9 DIABETES MELLITUS WITHOUT COMPLICATION: ICD-10-CM

## 2021-09-23 PROCEDURE — 83036 HEMOGLOBIN A1C: ICD-10-PCS | Mod: ,,, | Performed by: CLINICAL MEDICAL LABORATORY

## 2021-09-23 PROCEDURE — 80053 COMPREHEN METABOLIC PANEL: CPT | Mod: ,,, | Performed by: CLINICAL MEDICAL LABORATORY

## 2021-09-23 PROCEDURE — 85025 CBC WITH DIFFERENTIAL: ICD-10-PCS | Mod: ,,, | Performed by: CLINICAL MEDICAL LABORATORY

## 2021-09-23 PROCEDURE — 84443 ASSAY THYROID STIM HORMONE: CPT | Mod: ,,, | Performed by: CLINICAL MEDICAL LABORATORY

## 2021-09-23 PROCEDURE — 80053 COMPREHENSIVE METABOLIC PANEL: ICD-10-PCS | Mod: ,,, | Performed by: CLINICAL MEDICAL LABORATORY

## 2021-09-23 PROCEDURE — 84443 TSH: ICD-10-PCS | Mod: ,,, | Performed by: CLINICAL MEDICAL LABORATORY

## 2021-09-23 PROCEDURE — 99214 OFFICE O/P EST MOD 30 MIN: CPT | Mod: ,,, | Performed by: FAMILY MEDICINE

## 2021-09-23 PROCEDURE — 99214 PR OFFICE/OUTPT VISIT, EST, LEVL IV, 30-39 MIN: ICD-10-PCS | Mod: ,,, | Performed by: FAMILY MEDICINE

## 2021-09-23 PROCEDURE — 85025 COMPLETE CBC W/AUTO DIFF WBC: CPT | Mod: ,,, | Performed by: CLINICAL MEDICAL LABORATORY

## 2021-09-23 PROCEDURE — 83036 HEMOGLOBIN GLYCOSYLATED A1C: CPT | Mod: ,,, | Performed by: CLINICAL MEDICAL LABORATORY

## 2021-09-23 RX ORDER — ESCITALOPRAM OXALATE 10 MG/1
10 TABLET ORAL DAILY
Qty: 30 TABLET | Refills: 11 | Status: SHIPPED | OUTPATIENT
Start: 2021-09-23 | End: 2022-06-03 | Stop reason: SDUPTHER

## 2021-09-23 RX ORDER — TRAZODONE HYDROCHLORIDE 50 MG/1
50 TABLET ORAL NIGHTLY
Qty: 30 TABLET | Refills: 11 | Status: SHIPPED | OUTPATIENT
Start: 2021-09-23 | End: 2022-06-03 | Stop reason: SDUPTHER

## 2021-09-24 LAB
ALBUMIN SERPL BCP-MCNC: 3.4 G/DL (ref 3.5–5)
ALBUMIN/GLOB SERPL: 0.8 {RATIO}
ALP SERPL-CCNC: 89 U/L (ref 37–98)
ALT SERPL W P-5'-P-CCNC: 32 U/L (ref 13–56)
ANION GAP SERPL CALCULATED.3IONS-SCNC: 9 MMOL/L (ref 7–16)
AST SERPL W P-5'-P-CCNC: 33 U/L (ref 15–37)
BASOPHILS # BLD AUTO: 0.07 K/UL (ref 0–0.2)
BASOPHILS NFR BLD AUTO: 0.9 % (ref 0–1)
BILIRUB SERPL-MCNC: 0.2 MG/DL (ref 0–1.2)
BUN SERPL-MCNC: 11 MG/DL (ref 7–18)
BUN/CREAT SERPL: 9 (ref 6–20)
CALCIUM SERPL-MCNC: 9.5 MG/DL (ref 8.5–10.1)
CHLORIDE SERPL-SCNC: 107 MMOL/L (ref 98–107)
CO2 SERPL-SCNC: 28 MMOL/L (ref 21–32)
CREAT SERPL-MCNC: 1.19 MG/DL (ref 0.55–1.02)
DIFFERENTIAL METHOD BLD: ABNORMAL
EOSINOPHIL # BLD AUTO: 0.47 K/UL (ref 0–0.5)
EOSINOPHIL NFR BLD AUTO: 6.1 % (ref 1–4)
ERYTHROCYTE [DISTWIDTH] IN BLOOD BY AUTOMATED COUNT: 14.5 % (ref 11.5–14.5)
EST. AVERAGE GLUCOSE BLD GHB EST-MCNC: 94 MG/DL
GLOBULIN SER-MCNC: 4.3 G/DL (ref 2–4)
GLUCOSE SERPL-MCNC: 97 MG/DL (ref 74–106)
HBA1C MFR BLD HPLC: 5.4 % (ref 4.5–6.6)
HCT VFR BLD AUTO: 41.6 % (ref 38–47)
HGB BLD-MCNC: 12.7 G/DL (ref 12–16)
IMM GRANULOCYTES # BLD AUTO: 0.02 K/UL (ref 0–0.04)
IMM GRANULOCYTES NFR BLD: 0.3 % (ref 0–0.4)
LYMPHOCYTES # BLD AUTO: 2.77 K/UL (ref 1–4.8)
LYMPHOCYTES NFR BLD AUTO: 35.9 % (ref 27–41)
MCH RBC QN AUTO: 22.3 PG (ref 27–31)
MCHC RBC AUTO-ENTMCNC: 30.5 G/DL (ref 32–36)
MCV RBC AUTO: 73.1 FL (ref 80–96)
MICROCYTES BLD QL SMEAR: ABNORMAL
MONOCYTES # BLD AUTO: 0.59 K/UL (ref 0–0.8)
MONOCYTES NFR BLD AUTO: 7.6 % (ref 2–6)
MPC BLD CALC-MCNC: 11.8 FL (ref 9.4–12.4)
NEUTROPHILS # BLD AUTO: 3.8 K/UL (ref 1.8–7.7)
NEUTROPHILS NFR BLD AUTO: 49.2 % (ref 53–65)
NRBC # BLD AUTO: 0 X10E3/UL
NRBC, AUTO (.00): 0 %
PLATELET # BLD AUTO: 287 K/UL (ref 150–400)
PLATELET MORPHOLOGY: ABNORMAL
POTASSIUM SERPL-SCNC: 4.8 MMOL/L (ref 3.5–5.1)
PROT SERPL-MCNC: 7.7 G/DL (ref 6.4–8.2)
RBC # BLD AUTO: 5.69 M/UL (ref 4.2–5.4)
SODIUM SERPL-SCNC: 139 MMOL/L (ref 136–145)
TSH SERPL DL<=0.005 MIU/L-ACNC: 1.93 UIU/ML (ref 0.36–3.74)
WBC # BLD AUTO: 7.72 K/UL (ref 4.5–11)

## 2021-09-27 ENCOUNTER — TELEPHONE (OUTPATIENT)
Dept: FAMILY MEDICINE | Facility: CLINIC | Age: 41
End: 2021-09-27

## 2021-10-11 ENCOUNTER — OFFICE VISIT (OUTPATIENT)
Dept: FAMILY MEDICINE | Facility: CLINIC | Age: 41
End: 2021-10-11
Payer: MEDICAID

## 2021-10-11 VITALS
SYSTOLIC BLOOD PRESSURE: 124 MMHG | DIASTOLIC BLOOD PRESSURE: 88 MMHG | RESPIRATION RATE: 17 BRPM | TEMPERATURE: 99 F | BODY MASS INDEX: 44.53 KG/M2 | WEIGHT: 242 LBS | HEIGHT: 62 IN

## 2021-10-11 DIAGNOSIS — R53.83 FATIGUE, UNSPECIFIED TYPE: ICD-10-CM

## 2021-10-11 DIAGNOSIS — E11.9 DIABETES MELLITUS WITHOUT COMPLICATION: Primary | ICD-10-CM

## 2021-10-11 DIAGNOSIS — R79.89 ELEVATED SERUM CREATININE: ICD-10-CM

## 2021-10-11 LAB
CREAT UR-MCNC: 177 MG/DL (ref 28–219)
MICROALBUMIN UR-MCNC: <0.5 MG/DL (ref 0–2.8)
MICROALBUMIN/CREAT RATIO PNL UR: <2.8 MG/G (ref 0–30)

## 2021-10-11 PROCEDURE — 99212 PR OFFICE/OUTPT VISIT, EST, LEVL II, 10-19 MIN: ICD-10-PCS | Mod: ,,, | Performed by: FAMILY MEDICINE

## 2021-10-11 PROCEDURE — 82570 ASSAY OF URINE CREATININE: CPT | Mod: ,,, | Performed by: CLINICAL MEDICAL LABORATORY

## 2021-10-11 PROCEDURE — 82043 UR ALBUMIN QUANTITATIVE: CPT | Mod: ,,, | Performed by: CLINICAL MEDICAL LABORATORY

## 2021-10-11 PROCEDURE — 82570 MICROALBUMIN / CREATININE RATIO URINE: ICD-10-PCS | Mod: ,,, | Performed by: CLINICAL MEDICAL LABORATORY

## 2021-10-11 PROCEDURE — 82043 MICROALBUMIN / CREATININE RATIO URINE: ICD-10-PCS | Mod: ,,, | Performed by: CLINICAL MEDICAL LABORATORY

## 2021-10-11 PROCEDURE — 99212 OFFICE O/P EST SF 10 MIN: CPT | Mod: ,,, | Performed by: FAMILY MEDICINE

## 2021-10-13 ENCOUNTER — TELEPHONE (OUTPATIENT)
Dept: FAMILY MEDICINE | Facility: CLINIC | Age: 41
End: 2021-10-13

## 2021-10-28 ENCOUNTER — TELEPHONE (OUTPATIENT)
Dept: OBSTETRICS AND GYNECOLOGY | Facility: CLINIC | Age: 41
End: 2021-10-28

## 2022-06-03 ENCOUNTER — OFFICE VISIT (OUTPATIENT)
Dept: FAMILY MEDICINE | Facility: CLINIC | Age: 42
End: 2022-06-03
Payer: MEDICAID

## 2022-06-03 VITALS
HEART RATE: 96 BPM | BODY MASS INDEX: 44.26 KG/M2 | DIASTOLIC BLOOD PRESSURE: 87 MMHG | SYSTOLIC BLOOD PRESSURE: 136 MMHG | RESPIRATION RATE: 18 BRPM | HEIGHT: 62 IN | TEMPERATURE: 99 F | OXYGEN SATURATION: 99 %

## 2022-06-03 DIAGNOSIS — F32.A DEPRESSION, UNSPECIFIED DEPRESSION TYPE: ICD-10-CM

## 2022-06-03 DIAGNOSIS — E03.9 HYPOTHYROIDISM, UNSPECIFIED TYPE: Primary | ICD-10-CM

## 2022-06-03 DIAGNOSIS — I10 HYPERTENSION, UNSPECIFIED TYPE: ICD-10-CM

## 2022-06-03 DIAGNOSIS — E13.9 DIABETES 1.5, MANAGED AS TYPE 2: ICD-10-CM

## 2022-06-03 DIAGNOSIS — M51.9 LUMBAR DISC DISEASE: ICD-10-CM

## 2022-06-03 DIAGNOSIS — G47.00 INSOMNIA, UNSPECIFIED TYPE: ICD-10-CM

## 2022-06-03 LAB
ALBUMIN SERPL BCP-MCNC: 3.5 G/DL (ref 3.5–5)
ALBUMIN/GLOB SERPL: 0.9 {RATIO}
ALP SERPL-CCNC: 91 U/L (ref 37–98)
ALT SERPL W P-5'-P-CCNC: 23 U/L (ref 13–56)
ANION GAP SERPL CALCULATED.3IONS-SCNC: 14 MMOL/L (ref 7–16)
ANISOCYTOSIS BLD QL SMEAR: NORMAL
AST SERPL W P-5'-P-CCNC: 19 U/L (ref 15–37)
BASOPHILS # BLD AUTO: 0.05 K/UL (ref 0–0.2)
BASOPHILS NFR BLD AUTO: 0.7 % (ref 0–1)
BILIRUB SERPL-MCNC: 0.3 MG/DL (ref 0–1.2)
BUN SERPL-MCNC: 12 MG/DL (ref 7–18)
BUN/CREAT SERPL: 11 (ref 6–20)
CALCIUM SERPL-MCNC: 8.3 MG/DL (ref 8.5–10.1)
CHLORIDE SERPL-SCNC: 106 MMOL/L (ref 98–107)
CHOLEST SERPL-MCNC: 152 MG/DL (ref 0–200)
CHOLEST/HDLC SERPL: 3.3 {RATIO}
CO2 SERPL-SCNC: 23 MMOL/L (ref 21–32)
CREAT SERPL-MCNC: 1.07 MG/DL (ref 0.55–1.02)
DIFFERENTIAL METHOD BLD: ABNORMAL
EOSINOPHIL # BLD AUTO: 0.39 K/UL (ref 0–0.5)
EOSINOPHIL NFR BLD AUTO: 5.8 % (ref 1–4)
ERYTHROCYTE [DISTWIDTH] IN BLOOD BY AUTOMATED COUNT: 16.8 % (ref 11.5–14.5)
EST. AVERAGE GLUCOSE BLD GHB EST-MCNC: 97 MG/DL
GLOBULIN SER-MCNC: 3.8 G/DL (ref 2–4)
GLUCOSE SERPL-MCNC: 94 MG/DL (ref 74–106)
HBA1C MFR BLD HPLC: 5.5 % (ref 4.5–6.6)
HCT VFR BLD AUTO: 48.3 % (ref 38–47)
HDLC SERPL-MCNC: 46 MG/DL (ref 40–60)
HGB BLD-MCNC: 14.5 G/DL (ref 12–16)
IMM GRANULOCYTES # BLD AUTO: 0.01 K/UL (ref 0–0.04)
IMM GRANULOCYTES NFR BLD: 0.1 % (ref 0–0.4)
LDLC SERPL CALC-MCNC: 91 MG/DL
LDLC/HDLC SERPL: 2 {RATIO}
LYMPHOCYTES # BLD AUTO: 2.61 K/UL (ref 1–4.8)
LYMPHOCYTES NFR BLD AUTO: 38.8 % (ref 27–41)
MCH RBC QN AUTO: 21.8 PG (ref 27–31)
MCHC RBC AUTO-ENTMCNC: 30 G/DL (ref 32–36)
MCV RBC AUTO: 72.7 FL (ref 80–96)
MICROCYTES BLD QL SMEAR: NORMAL
MONOCYTES # BLD AUTO: 0.42 K/UL (ref 0–0.8)
MONOCYTES NFR BLD AUTO: 6.3 % (ref 2–6)
MPC BLD CALC-MCNC: 11.2 FL (ref 9.4–12.4)
NEUTROPHILS # BLD AUTO: 3.24 K/UL (ref 1.8–7.7)
NEUTROPHILS NFR BLD AUTO: 48.3 % (ref 53–65)
NONHDLC SERPL-MCNC: 106 MG/DL
NRBC # BLD AUTO: 0 X10E3/UL
NRBC, AUTO (.00): 0 %
PLATELET # BLD AUTO: 211 K/UL (ref 150–400)
PLATELET MORPHOLOGY: NORMAL
POLYCHROMASIA BLD QL SMEAR: NORMAL
POTASSIUM SERPL-SCNC: 4.4 MMOL/L (ref 3.5–5.1)
PROT SERPL-MCNC: 7.3 G/DL (ref 6.4–8.2)
RBC # BLD AUTO: 6.64 M/UL (ref 4.2–5.4)
SODIUM SERPL-SCNC: 139 MMOL/L (ref 136–145)
TRIGL SERPL-MCNC: 73 MG/DL (ref 35–150)
TSH SERPL DL<=0.005 MIU/L-ACNC: 16.6 UIU/ML (ref 0.36–3.74)
VLDLC SERPL-MCNC: 15 MG/DL
WBC # BLD AUTO: 6.72 K/UL (ref 4.5–11)

## 2022-06-03 PROCEDURE — 80053 COMPREHENSIVE METABOLIC PANEL: ICD-10-PCS | Mod: ,,, | Performed by: CLINICAL MEDICAL LABORATORY

## 2022-06-03 PROCEDURE — 84443 TSH: ICD-10-PCS | Mod: ,,, | Performed by: CLINICAL MEDICAL LABORATORY

## 2022-06-03 PROCEDURE — 80061 LIPID PANEL: ICD-10-PCS | Mod: ,,, | Performed by: CLINICAL MEDICAL LABORATORY

## 2022-06-03 PROCEDURE — 3008F BODY MASS INDEX DOCD: CPT | Mod: CPTII,,, | Performed by: FAMILY MEDICINE

## 2022-06-03 PROCEDURE — 85025 CBC WITH DIFFERENTIAL: ICD-10-PCS | Mod: ,,, | Performed by: CLINICAL MEDICAL LABORATORY

## 2022-06-03 PROCEDURE — 99214 OFFICE O/P EST MOD 30 MIN: CPT | Mod: ,,, | Performed by: FAMILY MEDICINE

## 2022-06-03 PROCEDURE — 3079F PR MOST RECENT DIASTOLIC BLOOD PRESSURE 80-89 MM HG: ICD-10-PCS | Mod: CPTII,,, | Performed by: FAMILY MEDICINE

## 2022-06-03 PROCEDURE — 3075F SYST BP GE 130 - 139MM HG: CPT | Mod: CPTII,,, | Performed by: FAMILY MEDICINE

## 2022-06-03 PROCEDURE — 3075F PR MOST RECENT SYSTOLIC BLOOD PRESS GE 130-139MM HG: ICD-10-PCS | Mod: CPTII,,, | Performed by: FAMILY MEDICINE

## 2022-06-03 PROCEDURE — 3079F DIAST BP 80-89 MM HG: CPT | Mod: CPTII,,, | Performed by: FAMILY MEDICINE

## 2022-06-03 PROCEDURE — 1160F RVW MEDS BY RX/DR IN RCRD: CPT | Mod: CPTII,,, | Performed by: FAMILY MEDICINE

## 2022-06-03 PROCEDURE — 80061 LIPID PANEL: CPT | Mod: ,,, | Performed by: CLINICAL MEDICAL LABORATORY

## 2022-06-03 PROCEDURE — 1159F MED LIST DOCD IN RCRD: CPT | Mod: CPTII,,, | Performed by: FAMILY MEDICINE

## 2022-06-03 PROCEDURE — 83036 HEMOGLOBIN A1C: ICD-10-PCS | Mod: ,,, | Performed by: CLINICAL MEDICAL LABORATORY

## 2022-06-03 PROCEDURE — 80053 COMPREHEN METABOLIC PANEL: CPT | Mod: ,,, | Performed by: CLINICAL MEDICAL LABORATORY

## 2022-06-03 PROCEDURE — 99214 PR OFFICE/OUTPT VISIT, EST, LEVL IV, 30-39 MIN: ICD-10-PCS | Mod: ,,, | Performed by: FAMILY MEDICINE

## 2022-06-03 PROCEDURE — 1160F PR REVIEW ALL MEDS BY PRESCRIBER/CLIN PHARMACIST DOCUMENTED: ICD-10-PCS | Mod: CPTII,,, | Performed by: FAMILY MEDICINE

## 2022-06-03 PROCEDURE — 3008F PR BODY MASS INDEX (BMI) DOCUMENTED: ICD-10-PCS | Mod: CPTII,,, | Performed by: FAMILY MEDICINE

## 2022-06-03 PROCEDURE — 1159F PR MEDICATION LIST DOCUMENTED IN MEDICAL RECORD: ICD-10-PCS | Mod: CPTII,,, | Performed by: FAMILY MEDICINE

## 2022-06-03 PROCEDURE — 85025 COMPLETE CBC W/AUTO DIFF WBC: CPT | Mod: ,,, | Performed by: CLINICAL MEDICAL LABORATORY

## 2022-06-03 PROCEDURE — 83036 HEMOGLOBIN GLYCOSYLATED A1C: CPT | Mod: ,,, | Performed by: CLINICAL MEDICAL LABORATORY

## 2022-06-03 PROCEDURE — 84443 ASSAY THYROID STIM HORMONE: CPT | Mod: ,,, | Performed by: CLINICAL MEDICAL LABORATORY

## 2022-06-03 RX ORDER — LEVOTHYROXINE SODIUM 50 UG/1
TABLET ORAL
Qty: 90 TABLET | Refills: 1 | Status: SHIPPED | OUTPATIENT
Start: 2022-06-03 | End: 2022-09-16 | Stop reason: SDUPTHER

## 2022-06-03 RX ORDER — ESCITALOPRAM OXALATE 10 MG/1
10 TABLET ORAL DAILY
Qty: 30 TABLET | Refills: 11 | Status: SHIPPED | OUTPATIENT
Start: 2022-06-03 | End: 2022-09-16

## 2022-06-03 RX ORDER — METOPROLOL TARTRATE 25 MG/1
25 TABLET, FILM COATED ORAL 2 TIMES DAILY
Qty: 60 TABLET | Refills: 5 | Status: SHIPPED | OUTPATIENT
Start: 2022-06-03 | End: 2022-09-16 | Stop reason: SDUPTHER

## 2022-06-03 RX ORDER — TRAZODONE HYDROCHLORIDE 50 MG/1
50 TABLET ORAL NIGHTLY
Qty: 30 TABLET | Refills: 11 | Status: SHIPPED | OUTPATIENT
Start: 2022-06-03 | End: 2022-09-16

## 2022-06-03 RX ORDER — LIRAGLUTIDE 6 MG/ML
INJECTION SUBCUTANEOUS
Qty: 6 ML | Refills: 5 | Status: SHIPPED | OUTPATIENT
Start: 2022-06-03 | End: 2022-07-05

## 2022-06-03 NOTE — PROGRESS NOTES
Mary Magana is a 41 y.o. female seen today for follow-up on her hypertension hypothyroidism and diabetes.  Patient is fasting today for follow-up lab work.  She continues to momin low back pain secondary to lumbar disc disease and is requesting a referral to Pain Management.  Patient recently lost her  and is dealing with grief and depression and is requesting a mental health evaluation despite the use of Lexapro.  She reports her blood pressures have been elevated.    Past Medical History:   Diagnosis Date    Diabetes mellitus     Hypertension     Thyroid disease     WPW (Ilana-Parkinson-White syndrome)      Family History   Problem Relation Age of Onset    Hypertension Mother     Diabetes Mother     Diabetes Father      Current Outpatient Medications on File Prior to Visit   Medication Sig Dispense Refill    aspirin (ECOTRIN) 81 MG EC tablet TAKE 1 TABLET BY MOUTH DAILY WITH FOOD 30 tablet 5    TRUEPLUS LANCETS 30 gauge Misc AS DIRECTED      [DISCONTINUED] EScitalopram oxalate (LEXAPRO) 10 MG tablet Take 1 tablet (10 mg total) by mouth once daily. 30 tablet 11    [DISCONTINUED] levothyroxine (SYNTHROID) 50 MCG tablet TAKE 1 TABLET BY MOUTH EACH MORNING BEFORE BREAKFAST ...TAKE ON EMPTY STOMACH (DRINK PLENTY OF WATER) 90 tablet 1    [DISCONTINUED] metoprolol tartrate (LOPRESSOR) 25 MG tablet Take 1 tablet (25 mg total) by mouth 2 (two) times daily. 60 tablet 5    [DISCONTINUED] traZODone (DESYREL) 50 MG tablet Take 1 tablet (50 mg total) by mouth every evening. 30 tablet 11    [DISCONTINUED] VICTOZA 2-JAMESON 0.6 mg/0.1 mL (18 mg/3 mL) PnIj pen INJECT 1.2 UNITS SUB-Q DAILY ...KEEP IN REFRIGERATOR 6 mL 5     No current facility-administered medications on file prior to visit.       There is no immunization history on file for this patient.    Review of Systems   Constitutional: Negative for fever, malaise/fatigue and weight loss.   Respiratory: Negative for shortness of breath.     Cardiovascular: Negative for chest pain and palpitations.   Gastrointestinal: Negative for nausea and vomiting.   Musculoskeletal: Positive for back pain and myalgias.   Psychiatric/Behavioral: Positive for depression. The patient is nervous/anxious and has insomnia.         Vitals:    06/03/22 1353   BP: 136/87   Pulse:    Resp:    Temp:        Physical Exam  Vitals reviewed.   Constitutional:       Appearance: Normal appearance.   HENT:      Head: Normocephalic.   Eyes:      Extraocular Movements: Extraocular movements intact.      Conjunctiva/sclera: Conjunctivae normal.      Pupils: Pupils are equal, round, and reactive to light.   Neck:      Thyroid: No thyroid mass or thyromegaly.   Cardiovascular:      Rate and Rhythm: Normal rate and regular rhythm.      Heart sounds: Normal heart sounds. No murmur heard.    No gallop.   Pulmonary:      Effort: Pulmonary effort is normal. No respiratory distress.      Breath sounds: Normal breath sounds. No wheezing or rales.   Musculoskeletal:      Lumbar back: Decreased range of motion.        Back:    Skin:     General: Skin is warm and dry.      Coloration: Skin is not jaundiced or pale.   Neurological:      Mental Status: She is alert.   Psychiatric:         Mood and Affect: Mood normal.         Behavior: Behavior normal.         Thought Content: Thought content normal.         Judgment: Judgment normal.          Assessment and Plan  Hypothyroidism, unspecified type  -     levothyroxine (SYNTHROID) 50 MCG tablet; TAKE 1 TABLET BY MOUTH EACH MORNING BEFORE BREAKFAST ...TAKE ON EMPTY STOMACH (DRINK PLENTY OF WATER)  Dispense: 90 tablet; Refill: 1  -     TSH; Future; Expected date: 06/03/2022    Depression, unspecified depression type  -     EScitalopram oxalate (LEXAPRO) 10 MG tablet; Take 1 tablet (10 mg total) by mouth once daily.  Dispense: 30 tablet; Refill: 11  -     Ambulatory referral/consult to Psychiatry; Future; Expected date: 06/10/2022    Hypertension,  unspecified type  -     metoprolol tartrate (LOPRESSOR) 25 MG tablet; Take 1 tablet (25 mg total) by mouth 2 (two) times daily.  Dispense: 60 tablet; Refill: 5  -     CBC Auto Differential; Future; Expected date: 06/03/2022  -     Comprehensive Metabolic Panel; Future; Expected date: 06/03/2022  -     Lipid Panel; Future; Expected date: 06/03/2022    Insomnia, unspecified type  -     traZODone (DESYREL) 50 MG tablet; Take 1 tablet (50 mg total) by mouth every evening.  Dispense: 30 tablet; Refill: 11    Diabetes 1.5, managed as type 2  -     VICTOZA 2-JAMESON 0.6 mg/0.1 mL (18 mg/3 mL) PnIj pen; INJECT 1.2 UNITS SUB-Q DAILY ...KEEP IN REFRIGERATOR  Dispense: 6 mL; Refill: 5  -     Hemoglobin A1C; Future; Expected date: 06/03/2022    Lumbar disc disease  -     Ambulatory referral/consult to Pain Clinic; Future; Expected date: 06/10/2022            Return to clinic in 2 weeks with home blood pressure readings.    Health Maintenance Topics with due status: Not Due       Topic Last Completion Date    Diabetes Urine Screening 10/11/2021    Influenza Vaccine Not Due

## 2022-06-08 ENCOUNTER — TELEPHONE (OUTPATIENT)
Dept: FAMILY MEDICINE | Facility: CLINIC | Age: 42
End: 2022-06-08
Payer: MEDICAID

## 2022-06-08 NOTE — TELEPHONE ENCOUNTER
----- Message from Huber Clayton MD sent at 6/6/2022  7:49 AM CDT -----  This visit for TSH, hematocrit, LDL.

## 2022-06-16 ENCOUNTER — OFFICE VISIT (OUTPATIENT)
Dept: FAMILY MEDICINE | Facility: CLINIC | Age: 42
End: 2022-06-16
Payer: MEDICAID

## 2022-06-16 VITALS
SYSTOLIC BLOOD PRESSURE: 114 MMHG | OXYGEN SATURATION: 96 % | DIASTOLIC BLOOD PRESSURE: 80 MMHG | WEIGHT: 253.81 LBS | BODY MASS INDEX: 46.71 KG/M2 | HEIGHT: 62 IN | HEART RATE: 90 BPM | RESPIRATION RATE: 18 BRPM | TEMPERATURE: 99 F

## 2022-06-16 DIAGNOSIS — Z11.59 NEED FOR HEPATITIS C SCREENING TEST: ICD-10-CM

## 2022-06-16 DIAGNOSIS — E11.9 DIABETIC EYE EXAM: Primary | ICD-10-CM

## 2022-06-16 DIAGNOSIS — Z11.4 SCREENING FOR HIV (HUMAN IMMUNODEFICIENCY VIRUS): ICD-10-CM

## 2022-06-16 DIAGNOSIS — Z12.31 ENCOUNTER FOR SCREENING MAMMOGRAM FOR MALIGNANT NEOPLASM OF BREAST: ICD-10-CM

## 2022-06-16 DIAGNOSIS — Z01.00 DIABETIC EYE EXAM: Primary | ICD-10-CM

## 2022-06-16 LAB
HCV AB SER QL: NORMAL
HIV 1+O+2 AB SERPL QL: NORMAL

## 2022-06-16 PROCEDURE — 99213 OFFICE O/P EST LOW 20 MIN: CPT | Mod: ,,, | Performed by: FAMILY MEDICINE

## 2022-06-16 PROCEDURE — 3074F SYST BP LT 130 MM HG: CPT | Mod: CPTII,,, | Performed by: FAMILY MEDICINE

## 2022-06-16 PROCEDURE — 3008F PR BODY MASS INDEX (BMI) DOCUMENTED: ICD-10-PCS | Mod: CPTII,,, | Performed by: FAMILY MEDICINE

## 2022-06-16 PROCEDURE — 3044F HG A1C LEVEL LT 7.0%: CPT | Mod: CPTII,,, | Performed by: FAMILY MEDICINE

## 2022-06-16 PROCEDURE — 1159F MED LIST DOCD IN RCRD: CPT | Mod: CPTII,,, | Performed by: FAMILY MEDICINE

## 2022-06-16 PROCEDURE — 1159F PR MEDICATION LIST DOCUMENTED IN MEDICAL RECORD: ICD-10-PCS | Mod: CPTII,,, | Performed by: FAMILY MEDICINE

## 2022-06-16 PROCEDURE — 3079F PR MOST RECENT DIASTOLIC BLOOD PRESSURE 80-89 MM HG: ICD-10-PCS | Mod: CPTII,,, | Performed by: FAMILY MEDICINE

## 2022-06-16 PROCEDURE — 99213 PR OFFICE/OUTPT VISIT, EST, LEVL III, 20-29 MIN: ICD-10-PCS | Mod: ,,, | Performed by: FAMILY MEDICINE

## 2022-06-16 PROCEDURE — 87389 HIV 1 / 2 ANTIBODY: ICD-10-PCS | Mod: ,,, | Performed by: CLINICAL MEDICAL LABORATORY

## 2022-06-16 PROCEDURE — 1160F RVW MEDS BY RX/DR IN RCRD: CPT | Mod: CPTII,,, | Performed by: FAMILY MEDICINE

## 2022-06-16 PROCEDURE — 87389 HIV-1 AG W/HIV-1&-2 AB AG IA: CPT | Mod: ,,, | Performed by: CLINICAL MEDICAL LABORATORY

## 2022-06-16 PROCEDURE — 86803 HEPATITIS C AB TEST: CPT | Mod: ,,, | Performed by: CLINICAL MEDICAL LABORATORY

## 2022-06-16 PROCEDURE — 3008F BODY MASS INDEX DOCD: CPT | Mod: CPTII,,, | Performed by: FAMILY MEDICINE

## 2022-06-16 PROCEDURE — 1160F PR REVIEW ALL MEDS BY PRESCRIBER/CLIN PHARMACIST DOCUMENTED: ICD-10-PCS | Mod: CPTII,,, | Performed by: FAMILY MEDICINE

## 2022-06-16 PROCEDURE — 3044F PR MOST RECENT HEMOGLOBIN A1C LEVEL <7.0%: ICD-10-PCS | Mod: CPTII,,, | Performed by: FAMILY MEDICINE

## 2022-06-16 PROCEDURE — 3074F PR MOST RECENT SYSTOLIC BLOOD PRESSURE < 130 MM HG: ICD-10-PCS | Mod: CPTII,,, | Performed by: FAMILY MEDICINE

## 2022-06-16 PROCEDURE — 86803 HEPATITIS C ANTIBODY: ICD-10-PCS | Mod: ,,, | Performed by: CLINICAL MEDICAL LABORATORY

## 2022-06-16 PROCEDURE — 3079F DIAST BP 80-89 MM HG: CPT | Mod: CPTII,,, | Performed by: FAMILY MEDICINE

## 2022-06-16 RX ORDER — BUPROPION HYDROCHLORIDE 150 MG/1
150 TABLET ORAL EVERY MORNING
COMMUNITY
Start: 2022-06-06 | End: 2023-03-16

## 2022-06-16 RX ORDER — CLONAZEPAM 1 MG/1
1 TABLET ORAL NIGHTLY
COMMUNITY
Start: 2022-06-06 | End: 2023-08-12 | Stop reason: CLARIF

## 2022-06-16 NOTE — PROGRESS NOTES
Mary Magana is a 41 y.o. female seen today for follow-up on her hypertension.  Her blood pressures have been to goal and her blood sugars are markedly improved ranging between .  Patient has been set up with pain management and has been seen by mental health and is doing much better.      Past Medical History:   Diagnosis Date    Diabetes mellitus     Hypertension     Thyroid disease     WPW (Ilana-Parkinson-White syndrome)      Family History   Problem Relation Age of Onset    Hypertension Mother     Diabetes Mother     Diabetes Father      Current Outpatient Medications on File Prior to Visit   Medication Sig Dispense Refill    aspirin (ECOTRIN) 81 MG EC tablet TAKE 1 TABLET BY MOUTH DAILY WITH FOOD 30 tablet 5    buPROPion (WELLBUTRIN XL) 150 MG TB24 tablet Take 150 mg by mouth every morning.      clonazePAM (KLONOPIN) 1 MG tablet Take 1 mg by mouth nightly.      EScitalopram oxalate (LEXAPRO) 10 MG tablet Take 1 tablet (10 mg total) by mouth once daily. 30 tablet 11    levothyroxine (SYNTHROID) 50 MCG tablet TAKE 1 TABLET BY MOUTH EACH MORNING BEFORE BREAKFAST ...TAKE ON EMPTY STOMACH (DRINK PLENTY OF WATER) 90 tablet 1    metoprolol tartrate (LOPRESSOR) 25 MG tablet Take 1 tablet (25 mg total) by mouth 2 (two) times daily. 60 tablet 5    traZODone (DESYREL) 50 MG tablet Take 1 tablet (50 mg total) by mouth every evening. 30 tablet 11    TRUEPLUS LANCETS 30 gauge Misc AS DIRECTED      VICTOZA 2-JAMESON 0.6 mg/0.1 mL (18 mg/3 mL) PnIj pen INJECT 1.2 UNITS SUB-Q DAILY ...KEEP IN REFRIGERATOR 6 mL 5     No current facility-administered medications on file prior to visit.       There is no immunization history on file for this patient.    Review of Systems   Constitutional: Negative for fever, malaise/fatigue and weight loss.   Respiratory: Negative for shortness of breath.    Cardiovascular: Negative for chest pain and palpitations.   Gastrointestinal: Negative for nausea and vomiting.    Psychiatric/Behavioral: Positive for depression.        Vitals:    06/16/22 1442   BP: 114/80   Pulse: 90   Resp: 18   Temp: 98.7 °F (37.1 °C)       Physical Exam  Vitals reviewed.   Constitutional:       Appearance: Normal appearance.   HENT:      Head: Normocephalic.   Eyes:      Extraocular Movements: Extraocular movements intact.      Conjunctiva/sclera: Conjunctivae normal.      Pupils: Pupils are equal, round, and reactive to light.   Neck:      Thyroid: No thyroid mass or thyromegaly.   Cardiovascular:      Rate and Rhythm: Normal rate and regular rhythm.      Heart sounds: Normal heart sounds. No murmur heard.    No gallop.   Pulmonary:      Effort: Pulmonary effort is normal. No respiratory distress.      Breath sounds: Normal breath sounds. No wheezing or rales.   Feet:      Right foot:      Protective Sensation: 4 sites tested. 4 sites sensed.      Skin integrity: No ulcer or blister.      Left foot:      Protective Sensation: 4 sites tested. 4 sites sensed.      Skin integrity: No ulcer or blister.   Skin:     General: Skin is warm and dry.      Coloration: Skin is not jaundiced or pale.   Neurological:      Mental Status: She is alert.   Psychiatric:         Mood and Affect: Mood normal.         Behavior: Behavior normal.         Thought Content: Thought content normal.         Judgment: Judgment normal.          Assessment and Plan  Diabetic eye exam  -     Ambulatory referral/consult to Ophthalmology; Future; Expected date: 06/23/2022    Screening for HIV (human immunodeficiency virus)  -     HIV 1/2 Ag/Ab (4th Gen); Future; Expected date: 06/16/2022    Need for hepatitis C screening test  -     Hepatitis C Antibody; Future; Expected date: 06/16/2022    Encounter for screening mammogram for malignant neoplasm of breast  -     Mammo Digital Screening Bilat; Future; Expected date: 06/16/2022            Return to clinic in 3 months for follow-up on her diabetes or as needed.    Health Maintenance  Topics with due status: Not Due       Topic Last Completion Date    Diabetes Urine Screening 10/11/2021    Lipid Panel 06/03/2022    Hemoglobin A1c 06/03/2022    Foot Exam 06/16/2022    Influenza Vaccine Not Due

## 2022-06-17 ENCOUNTER — TELEPHONE (OUTPATIENT)
Dept: FAMILY MEDICINE | Facility: CLINIC | Age: 42
End: 2022-06-17
Payer: MEDICAID

## 2022-06-24 ENCOUNTER — HOSPITAL ENCOUNTER (OUTPATIENT)
Dept: RADIOLOGY | Facility: HOSPITAL | Age: 42
Discharge: HOME | End: 2022-06-24
Attending: FAMILY MEDICINE
Payer: MEDICAID

## 2022-06-24 DIAGNOSIS — Z12.31 ENCOUNTER FOR SCREENING MAMMOGRAM FOR MALIGNANT NEOPLASM OF BREAST: ICD-10-CM

## 2022-06-24 PROCEDURE — 77067 SCR MAMMO BI INCL CAD: CPT | Mod: TC

## 2022-06-27 ENCOUNTER — TELEPHONE (OUTPATIENT)
Dept: FAMILY MEDICINE | Facility: CLINIC | Age: 42
End: 2022-06-27
Payer: MEDICAID

## 2022-06-27 NOTE — TELEPHONE ENCOUNTER
----- Message from Huber Clayton MD sent at 6/24/2022 11:42 AM CDT -----  Her mammogram is normal, repeat in 1 year.

## 2022-07-20 ENCOUNTER — OFFICE VISIT (OUTPATIENT)
Dept: PAIN MEDICINE | Facility: CLINIC | Age: 42
End: 2022-07-20
Payer: MEDICAID

## 2022-07-20 ENCOUNTER — HOSPITAL ENCOUNTER (OUTPATIENT)
Dept: RADIOLOGY | Facility: HOSPITAL | Age: 42
Discharge: HOME | End: 2022-07-20
Attending: PAIN MEDICINE
Payer: MEDICAID

## 2022-07-20 VITALS
DIASTOLIC BLOOD PRESSURE: 79 MMHG | HEART RATE: 83 BPM | BODY MASS INDEX: 47.66 KG/M2 | WEIGHT: 259 LBS | SYSTOLIC BLOOD PRESSURE: 165 MMHG | HEIGHT: 62 IN

## 2022-07-20 DIAGNOSIS — Z79.899 ENCOUNTER FOR LONG-TERM (CURRENT) USE OF OTHER MEDICATIONS: Primary | ICD-10-CM

## 2022-07-20 DIAGNOSIS — M46.1 SACROILIITIS: ICD-10-CM

## 2022-07-20 DIAGNOSIS — M54.50 CHRONIC BILATERAL LOW BACK PAIN WITHOUT SCIATICA: ICD-10-CM

## 2022-07-20 DIAGNOSIS — G89.29 CHRONIC BILATERAL LOW BACK PAIN WITHOUT SCIATICA: ICD-10-CM

## 2022-07-20 DIAGNOSIS — M47.816 LUMBAR SPONDYLOSIS: ICD-10-CM

## 2022-07-20 LAB
CTP QC/QA: YES
POC (AMP) AMPHETAMINE: NEGATIVE
POC (BAR) BARBITURATES: NEGATIVE
POC (BUP) BUPRENORPHINE: NEGATIVE
POC (BZO) BENZODIAZEPINES: NEGATIVE
POC (COC) COCAINE: NEGATIVE
POC (MDMA) METHYLENEDIOXYMETHAMPHETAMINE 3,4: NEGATIVE
POC (MET) METHAMPHETAMINE: NEGATIVE
POC (MOP) OPIATES: NEGATIVE
POC (MTD) METHADONE: NEGATIVE
POC (OXY) OXYCODONE: NEGATIVE
POC (PCP) PHENCYCLIDINE: NEGATIVE
POC (TCA) TRICYCLIC ANTIDEPRESSANTS: NEGATIVE
POC TEMPERATURE (URINE): 90
POC THC: NEGATIVE

## 2022-07-20 PROCEDURE — 3077F SYST BP >= 140 MM HG: CPT | Mod: CPTII,,, | Performed by: PAIN MEDICINE

## 2022-07-20 PROCEDURE — 72202 X-RAY EXAM SI JOINTS 3/> VWS: CPT | Mod: TC

## 2022-07-20 PROCEDURE — G0481 DRUG TEST DEF 8-14 CLASSES: HCPCS | Mod: ,,, | Performed by: CLINICAL MEDICAL LABORATORY

## 2022-07-20 PROCEDURE — 99215 OFFICE O/P EST HI 40 MIN: CPT | Mod: PBBFAC | Performed by: PAIN MEDICINE

## 2022-07-20 PROCEDURE — 1159F PR MEDICATION LIST DOCUMENTED IN MEDICAL RECORD: ICD-10-PCS | Mod: CPTII,,, | Performed by: PAIN MEDICINE

## 2022-07-20 PROCEDURE — 3077F PR MOST RECENT SYSTOLIC BLOOD PRESSURE >= 140 MM HG: ICD-10-PCS | Mod: CPTII,,, | Performed by: PAIN MEDICINE

## 2022-07-20 PROCEDURE — 3078F DIAST BP <80 MM HG: CPT | Mod: CPTII,,, | Performed by: PAIN MEDICINE

## 2022-07-20 PROCEDURE — 3044F HG A1C LEVEL LT 7.0%: CPT | Mod: CPTII,,, | Performed by: PAIN MEDICINE

## 2022-07-20 PROCEDURE — 72100 XR LUMBAR SPINE AP AND LATERAL: ICD-10-PCS | Mod: 26,,, | Performed by: RADIOLOGY

## 2022-07-20 PROCEDURE — G0481 PR DRUG TEST DEF 8-14 CLASSES: ICD-10-PCS | Mod: ,,, | Performed by: CLINICAL MEDICAL LABORATORY

## 2022-07-20 PROCEDURE — 72202 XR SACROILIAC JOINTS COMPLETE: ICD-10-PCS | Mod: 26,,, | Performed by: RADIOLOGY

## 2022-07-20 PROCEDURE — 80305 DRUG TEST PRSMV DIR OPT OBS: CPT | Mod: PBBFAC | Performed by: PAIN MEDICINE

## 2022-07-20 PROCEDURE — 72202 X-RAY EXAM SI JOINTS 3/> VWS: CPT | Mod: 26,,, | Performed by: RADIOLOGY

## 2022-07-20 PROCEDURE — 72100 X-RAY EXAM L-S SPINE 2/3 VWS: CPT | Mod: 26,,, | Performed by: RADIOLOGY

## 2022-07-20 PROCEDURE — 3078F PR MOST RECENT DIASTOLIC BLOOD PRESSURE < 80 MM HG: ICD-10-PCS | Mod: CPTII,,, | Performed by: PAIN MEDICINE

## 2022-07-20 PROCEDURE — 99204 PR OFFICE/OUTPT VISIT, NEW, LEVL IV, 45-59 MIN: ICD-10-PCS | Mod: S$PBB,,, | Performed by: PAIN MEDICINE

## 2022-07-20 PROCEDURE — 3044F PR MOST RECENT HEMOGLOBIN A1C LEVEL <7.0%: ICD-10-PCS | Mod: CPTII,,, | Performed by: PAIN MEDICINE

## 2022-07-20 PROCEDURE — 1159F MED LIST DOCD IN RCRD: CPT | Mod: CPTII,,, | Performed by: PAIN MEDICINE

## 2022-07-20 PROCEDURE — 3008F PR BODY MASS INDEX (BMI) DOCUMENTED: ICD-10-PCS | Mod: CPTII,,, | Performed by: PAIN MEDICINE

## 2022-07-20 PROCEDURE — 99204 OFFICE O/P NEW MOD 45 MIN: CPT | Mod: S$PBB,,, | Performed by: PAIN MEDICINE

## 2022-07-20 PROCEDURE — 3008F BODY MASS INDEX DOCD: CPT | Mod: CPTII,,, | Performed by: PAIN MEDICINE

## 2022-07-20 PROCEDURE — 72100 X-RAY EXAM L-S SPINE 2/3 VWS: CPT | Mod: TC

## 2022-07-20 RX ORDER — TIZANIDINE 4 MG/1
4 TABLET ORAL 3 TIMES DAILY
Qty: 90 TABLET | Refills: 0 | Status: SHIPPED | OUTPATIENT
Start: 2022-07-20 | End: 2022-08-16 | Stop reason: SDUPTHER

## 2022-07-20 RX ORDER — NAPROXEN 500 MG/1
500 TABLET ORAL 2 TIMES DAILY
Qty: 60 TABLET | Refills: 0 | Status: SHIPPED | OUTPATIENT
Start: 2022-07-20 | End: 2022-08-16 | Stop reason: SDUPTHER

## 2022-07-20 NOTE — PROGRESS NOTES
"Chronic Pain - New Consult    Referring Physician: Fatoumata Jimenez MD       SUBJECTIVE: Disclaimer: This note has been generated using voice-recognition software. There may be typographical errors that have been missed during proof-reading      Initial encounter:    Mary Magana presents to the clinic for the evaluation of lower back pain.       41-year-old female presents for new patient evaluation and consultation from Dr. Clayton.  Patient reports a long history of lower back pain.  She was treated in the Pain Clinic many years ago and  received an epidural injection and experienced  pain exacerbation.  Her pain improved over time and recently worsened. She  was referred back for treatment of intractable lower back and cervical pain.  Pain is axial without radicular component.  She is currently receiving clonazepam and is not a candidate for oral opiates.  She has not received NSAIDs,  Gabapentin, Lyrica or muscle relaxants.  Her last physical therapy was several  years ago with some improvement.  She has not received a surgical evaluation.        Pain Assessment  Pain Score:   8  Pain Location: Other (Comment) (lower back)  Pain Descriptors: Burning, Dull  Pain Frequency: Intermittent  Pain Onset: Awakened from sleep  Clinical Progression: Gradually worsening  Aggravating Factors: Standing, Walking, Other (Comment) (sitting)      Physical Therapy/Home Exercise: yes, years ago with some relief      Pain Medications:  has a current medication list which includes the following prescription(s): aspirin, bupropion, clonazepam, escitalopram oxalate, levothyroxine, metoprolol tartrate, trazodone, trueplus lancets, victoza 2-christen, naproxen, and tizanidine.      Tried in Past:  NSAIDS-no  TCA-no  SNRI-no  Anti-convulsants-no  Muscle Relaxants-years ago with some relie  Opioids-in the past  Benzodiazepines-yes     4A"s of Opioid Risk Assessment  Activity: Patient can not perform  ADL  Analgesia:  Patient's " "pain is partially controlled by current medication.   Aberrant Behavior:  reviewed with no aberrant drug seeking/taking behavior     report:  Reviewed and consistent with medication use as prescribed.    Patient denies suicidal or homicidal ideations    Pain interventional therapy-yes, years ago with pain exacerbation    Chiropractor -yes, years ago  Acupuncture - no  TENS unit -no  Spinal decompression -no  Joint replacement -no     Review of Systems   Constitutional: Negative.    HENT: Negative.    Eyes: Negative.    Respiratory: Negative.    Cardiovascular: Negative.    Gastrointestinal: Negative.    Endocrine: Negative.    Genitourinary: Negative.    Musculoskeletal: Positive for arthralgias and back pain.   Integumentary:  Negative.   Neurological: Negative.    Hematological: Negative.    Psychiatric/Behavioral: Negative.                     Past Medical History:   Diagnosis Date    Diabetes mellitus     Hypertension     Thyroid disease     WPW (Ilana-Parkinson-White syndrome)      Past Surgical History:   Procedure Laterality Date    CHOLECYSTECTOMY      TUBAL LIGATION       Social History     Socioeconomic History    Marital status: Single   Tobacco Use    Smoking status: Former Smoker    Smokeless tobacco: Never Used   Substance and Sexual Activity    Alcohol use: Not Currently    Drug use: Not Currently    Sexual activity: Not Currently     Family History   Problem Relation Age of Onset    Hypertension Mother     Diabetes Mother     Diabetes Father      Review of patient's allergies indicates:  No Known Allergies      OBJECTIVE:  Vitals:    07/20/22 1441   BP: (!) 165/79   Pulse: 83     BP (!) 165/79   Pulse 83   Ht 5' 2.4" (1.585 m)   Wt 117.5 kg (259 lb)   BMI 46.77 kg/m²   Physical Exam  Vitals and nursing note reviewed.   Constitutional:       General: She is not in acute distress.     Appearance: Normal appearance. She is not ill-appearing, toxic-appearing or diaphoretic. "   HENT:      Head: Normocephalic and atraumatic.      Nose: Nose normal.      Mouth/Throat:      Mouth: Mucous membranes are moist.   Eyes:      Extraocular Movements: Extraocular movements intact.      Pupils: Pupils are equal, round, and reactive to light.   Cardiovascular:      Rate and Rhythm: Normal rate and regular rhythm.      Heart sounds: Normal heart sounds.   Pulmonary:      Effort: Pulmonary effort is normal. No respiratory distress.      Breath sounds: Normal breath sounds. No stridor. No wheezing or rhonchi.   Abdominal:      General: Bowel sounds are normal.      Palpations: Abdomen is soft.   Musculoskeletal:         General: No swelling or deformity.      Cervical back: Normal and normal range of motion. No spasms or tenderness. No pain with movement. Normal range of motion.      Thoracic back: Normal.      Lumbar back: Tenderness and bony tenderness present. No spasms. Decreased range of motion. Negative right straight leg raise test and negative left straight leg raise test. No scoliosis.      Right lower leg: No edema.      Left lower leg: No edema.      Comments: Lumbar pain with flexion and lateral rotation   Skin:     General: Skin is warm.   Neurological:      General: No focal deficit present.      Mental Status: She is alert and oriented to person, place, and time. Mental status is at baseline.      Cranial Nerves: No cranial nerve deficit.      Sensory: Sensation is intact. No sensory deficit.      Motor: No weakness.      Coordination: Coordination normal.      Gait: Gait normal.      Deep Tendon Reflexes: Reflexes are normal and symmetric.   Psychiatric:         Mood and Affect: Mood normal.         Behavior: Behavior normal.            ASSESSMENT: 41 y.o. year old female with pain, consistent with     Encounter Diagnoses   Name Primary?    Encounter for long-term (current) use of other medications Yes    Lumbar spondylosis     Chronic bilateral low back pain without sciatica      Sacroiliitis         PLAN:   1. reviewed  2..Addiction, Dependency, Tolerance, Opioid abuse-misuse, Death, Diversion Discussed. Overdose reversal drug Naloxone discussed  2.UDS point of care obtained for new patient evaluation and consultation. We will obtain a definitve UDS for confirmation.  3. Opioid contract signed today  4.Refill/ Continue medications for pain control and function       Requested Prescriptions     Signed Prescriptions Disp Refills    tiZANidine (ZANAFLEX) 4 MG tablet 90 tablet 0     Sig: Take 1 tablet (4 mg total) by mouth 3 (three) times daily.    naproxen (NAPROSYN) 500 MG tablet 60 tablet 0     Sig: Take 1 tablet (500 mg total) by mouth 2 (two) times daily.     5. Obtain x-ray of the lumbar spine and SI joints  6.Urine drug screen and confirmation testing was ordered as documented on the requisition form in order to verify medication compliance, test for illicit substances.  7. Start physical therapy 2 to 3 times a week x6 weeks for intractable cervical and lumbar pain  Orders Placed This Encounter   Procedures    X-Ray Lumbar Complete Including Flex And Ext     Standing Status:   Future     Standing Expiration Date:   7/20/2023     Order Specific Question:   May the Radiologist modify the order per protocol to meet the clinical needs of the patient?     Answer:   Yes    X-Ray Sacroiliac Joints Complete     Standing Status:   Future     Standing Expiration Date:   7/20/2023     Order Specific Question:   May the Radiologist modify the order per protocol to meet the clinical needs of the patient?     Answer:   Yes     Order Specific Question:   Release to patient     Answer:   Immediate    Drug Screen Definitive 14, Urine     Standing Status:   Future     Number of Occurrences:   1     Standing Expiration Date:   9/18/2023     Order Specific Question:   Specimen Source     Answer:   Urine    Ambulatory referral/consult to Physical/Occupational Therapy     Standing Status:   Future      Standing Expiration Date:   8/20/2023     Referral Priority:   Routine     Referral Type:   Physical Medicine     Referral Reason:   Specialty Services Required     Number of Visits Requested:   1    POCT Urine Drug Screen Presump     Interpretive Information:     Negative:  No drug detected at the cut off level.   Positive:  This result represents presumptive positive for the   tested drug, other substances may yield a positive response other   than the analyte of interest. This result should be utilized for   diagnostic purpose only. Confirmation testing will be performed upon physician request only.         8. Consider MRI of the cervical and lumbar spine of completion of physical therapy  9.Follow with ANNELISE Spears in 1 month  for re-evaluation and medication refill        The total time spent for evaluation and management on 07/20/2022 including reviewing separately obtained history, performing a medically appropriate exam and evaluation, documenting clinical information in the health record, independently interpreting results and communicating them to the patient/family/caregiver, and ordering medications/tests/procedures was between 15-29 minutes.    The above plan and management options were discussed at length with patient. Patient is in agreement with the above and verbalized understanding. It will be communicated with the referring physician via electronic record, fax, or mail.    Fatoumata Jimenez  07/20/2022

## 2022-07-22 LAB
6-ACETYLMORPHINE, URINE (RUSH): NEGATIVE 10 NG/ML
7-AMINOCLONAZEPAM, URINE (RUSH): NEGATIVE 25 NG/ML
A-HYDROXYALPRAZOLAM, URINE (RUSH): NEGATIVE 25 NG/ML
ACETYL FENTANYL, URINE (RUSH): NEGATIVE 2.5 NG/ML
ACETYL NORFENTANYL OXALATE, URINE (RUSH): NEGATIVE 5 NG/ML
AMPHET UR QL SCN: NEGATIVE
BENZOYLECGONINE, URINE (RUSH): NEGATIVE 100 NG/ML
BUPRENORPHINE UR QL SCN: NEGATIVE 25 NG/ML
CODEINE, URINE (RUSH): NEGATIVE 25 NG/ML
CREAT UR-MCNC: 285 MG/DL (ref 28–219)
EDDP, URINE (RUSH): NEGATIVE 25 NG/ML
FENTANYL, URINE (RUSH): NEGATIVE 2.5 NG/ML
HYDROCODONE, URINE (RUSH): NEGATIVE 25 NG/ML
HYDROMORPHONE, URINE (RUSH): NEGATIVE 25 NG/ML
LORAZEPAM, URINE (RUSH): NEGATIVE 25 NG/ML
METHADONE UR QL SCN: NEGATIVE 25 NG/ML
METHAMPHET UR QL SCN: NEGATIVE
MORPHINE, URINE (RUSH): NEGATIVE 25 NG/ML
NORBUPRENORPHINE, URINE (RUSH): NEGATIVE 25 NG/ML
NORDIAZEPAM, URINE (RUSH): NEGATIVE 25 NG/ML
NORFENTANYL OXALATE, URINE (RUSH): NEGATIVE 5 NG/ML
NORHYDROCODONE, URINE (RUSH): NEGATIVE 50 NG/ML
NOROXYCODONE HCL, URINE (RUSH): NEGATIVE 50 NG/ML
OXAZEPAM, URINE (RUSH): NEGATIVE 25 NG/ML
OXYCODONE UR QL SCN: NEGATIVE 25 NG/ML
OXYMORPHONE, URINE (RUSH): NEGATIVE 25 NG/ML
PH UR STRIP: 6.5 PH UNITS
SP GR UR STRIP: 1.03
TAPENTADOL, URINE (RUSH): NEGATIVE 25 NG/ML
TEMAZEPAM, URINE (RUSH): NEGATIVE 25 NG/ML
THC-COOH, URINE (RUSH): NEGATIVE 25 NG/ML
TRAMADOL, URINE (RUSH): NEGATIVE 100 NG/ML

## 2022-07-27 ENCOUNTER — CLINICAL SUPPORT (OUTPATIENT)
Dept: REHABILITATION | Facility: HOSPITAL | Age: 42
End: 2022-07-27
Attending: PAIN MEDICINE
Payer: MEDICAID

## 2022-07-27 DIAGNOSIS — M47.816 LUMBAR SPONDYLOSIS: Primary | ICD-10-CM

## 2022-07-27 PROCEDURE — 97161 PT EVAL LOW COMPLEX 20 MIN: CPT

## 2022-07-27 NOTE — PLAN OF CARE
[facility]  Physical Therapy Initial Evaluation       Name: Mary Magana  Clinic Number: [id number]    Therapy Diagnosis:   Encounter Diagnosis   Name Primary?    Lumbar spondylosis Yes     Physician: Fatoumata Jimenez MD    Physician Orders: PT Eval and Treat  Medical Diagnosis from Referral: M47.816 (ICD-10-CM) - Lumbar spondylosis  Evaluation Date: 7/27/2022  Authorization Period Expiration: Only initial evaluation approved by Medicaid. Subsequent visits require prior authorization.  Plan of Care Expiration: 9/9/2022  Updated Plan of Care Due: 8/26/2022  Visit # / Visits authorized: 1/Only initial evaluation approved by Medicaid. Subsequent visits require prior authorization.    Time In: 1500  Time Out: 1535  Total Billable Time: 35 minutes    Precautions: Standard and Diabetes    Subjective     Date of onset: ~8 years ago    History of current condition - Mary reports: Patient reports a long history of low back pain. 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 Patient has an epidural injection ~8 years ago which actually exacerbated her symptoms. Patient reports her pain gradually improved over time but recently worsened when she returned to working as a CNA. Patient reports no radicular symptoms. Patient reports her pain is fairly constant but hurts worse with prolonged standing, walking, or sitting. Patient reports a current inability to perform heavy household chores such as sweeping/mopping due to her low back pain. Patient was recently prescribed a muscle relaxer and Naproxen which she reports are not helping her back pain. Patient participated in physical therapy treatment several years ago to address her low back pain and reports she had a slight reduction in her symptoms during that episode of care. Per Dr. Jimenez' note cervical and lumbar MRIs may be ordered following and pending the results of six weeks of physical therapy  "intervention.      Medical History:   Past Medical History:   Diagnosis Date    Diabetes mellitus     Hypertension     Thyroid disease     WPW (Ilana-Parkinson-White syndrome)        Surgical History:   Mary Magana  has a past surgical history that includes Cholecystectomy and Tubal ligation.    Medications:   Mary has a current medication list which includes the following prescription(s): aspirin, bupropion, clonazepam, escitalopram oxalate, levothyroxine, metoprolol tartrate, naproxen, tizanidine, trazodone, trueplus lancets, and victoza 2-christen.    Allergies:   Review of patient's allergies indicates:  No Known Allergies     Imaging:     X-ray of lumbar spine from 7/20/2022: "Mild degenerative changes."    X-rays of bilateral sacroiliac joints from 7/20/2022: "Mild degenerative changes of both sacroiliac joints."    Prior Therapy: one episode of physical therapy treatment for low back pain several years ago with some improvement in symptoms reported  Prior Level of Function: independent with all functional mobility without assistive device  Current Level of Function: independent with all functional mobility without assistive device; unable to perform household tasks such as sweeping/mopping  History of Falls: none  Social History: lives with their family  Occupation: unemployed  Driving: Yes  Durable Medical Equipment: none     Pain:  Current 8/10, worst 10/10, best 5/10   Location: bilateral low back; left cervical (upper trap) region   Description: Dull and Burning  Aggravating Factors: prolonged sitting/standing/walking  Easing Factors: no known relieving factors; previous relieving factors (hot shower/bath) are no longer helping    Pts goals: Patient wishes to decrease low back pain and left cervical pain.     Objective     Range of Motion/Strength:     CERVICAL AROM Pain/Dysfunction with Movement   Flexion WFL    Extension WFL    Right side bending WFL  Pain in left upper trap   Left side " bending WFL    Right rotation WFL    Left rotation WFL      Thoracolumbar AROM Pain/Dysfunction with Movement   Flexion 50*    Extension 35*    Right side bending 30*    Left side bending 30*    Right rotation WFL    Left rotation WFL      Hip Right Left Pain/Dysfunction with Movement   AROM/PROM      flexion  WFL  WFL    extension  WFL  WFL    abduction  WFL  WFL    adduction  WFL  WFL    Internal rotation  WFL  WFL    External rotation  WFL  WFL      Knee Right Left Pain/Dysfunction with Movement   AROM/PROM      flexion  WFL  WFL    extension  WFL  WFL      Ankle Right Left Pain/Dysfunction with Movement   AROM/PROM      dorsiflexion  WFL  WFL    plantarflexion  WFL  WFL      L/E MMT Right Left Pain/Dysfunction with Movement   Hip Flexion 4+/5 4+/5    Hip Abduction 4+/5 4+/5    Hip Adduction 4+/5 4+/5    Knee Flexion 4+/5 4+/5    Knee Extension 4+/5 4+/5    Ankle DF 4+/5 4+/5    Ankle PF 4+/5 4+/5       Posture: mild rounded shoulders; mild forward head; decreased lumbar lordosis  Palpation: tenderness on palpation of bilateral upper traps (left worse than right), lumbar spinous processes (L3-L5 worst), and sacrum  Sensation: WFL for light touch, pain, and temperature; no numbness/tingling    Flexibility: no significant muscular restrictions noted    Gait Analysis: WFL    Balance: WFL    Transfers: independent    CMS Impairment/Limitation/Restriction for FOTO Orthopedic Survey    Therapist reviewed FOTO scores for Mary Magana on 7/27/2022.   FOTO documents entered into EPIC - see Media section.    Functional Score: 36%  Category: Body Position     TREATMENT     Home Exercises and Patient Education Provided    Education provided: Patient educated on the importance of completing skilled physical therapy treatment and home exercise program as prescribed to maximize functional gains.     Written Home Exercises Provided: yes. Exercises were reviewed and Mary was able to demonstrate them prior to the end  of the session. Mary demonstrated good  understanding of the education provided.     See EMR under Patient Instructions for exercises provided 7/27/2022.    Assessment     Mary is a 41 y.o. female referred to outpatient physical therapy with a medical diagnosis of M47.816 (ICD-10-CM) - Lumbar spondylosis. Pt presents to PT with low back and bilateral upper trap pain, tenderness on palpation of lumbar spinous processes and sacrum, mild bilateral lower extremity weakness, and core weakness/instability.    Pt prognosis is Good.   Pt will benefit from skilled outpatient Physical Therapy to address the deficits stated above and in the chart below, provide pt/family education, and to maximize pt's level of independence.     Plan of care discussed with patient: Yes  Pt's spiritual, cultural and educational needs considered and pt agreeable to plan of care and goals as stated below:     Anticipated Barriers for therapy: compliance with home exercise program; chronicity of patient's condition    Decision Making/ Complexity Score: low    Goals:    Short Term Goals:  1. Patient will demonstrate independence with home exercise program to ensure carryover of treatment.  2. Patient will improve bilateral lower extremity strength to 5/5 to improve independence and safety with bed mobility, transfers, and gait.  3. Patient will report ability to stand/walk for 30 minutes without needing to sit secondary to low back pain.  4. Patient will report a reduction in current low back pain from 8/10 to 6/10 to improve quality of life.  5. Patient will report ability to sweep/mop half of her house with 2 sitting rest breaks secondary to low back pain.    Long Term Goals:  1. Patient will report ability to stand/walk for 1 hour without needing to sit secondary to low back pain.  2. Patient will report a reduction in current low back pain from 8/10 to 4/10 to improve quality of life.  3. Patient will demonstrate ability to complete all  therapeutic exercise during a 45 minute physical therapy treatment session with proper body mechanics to ensure safety and prevent further injury.  4. Patient will report ability to sweep/mob her entire house with only one sitting rest break secondary to low back pain.    Plan     Plan of care Certification: 7/27/2022 to 9/9/2022.    Outpatient Physical Therapy 2 times weekly for 6 weeks to include the following interventions: Aquatic Therapy, Cervical/Lumbar Traction, Electrical Stimulation (IFC/premodulated IFC), Manual Therapy, Moist Heat/ Ice, Neuromuscular Re-ed, Patient Education, Therapeutic Activities, Therapeutic Exercise and Ultrasound.     Additional Information for [employer]     Dates of Services: 7/27/2022 to 9/9/2022.  Discharge Plan: Patient will be discharged from skilled PT treatment once all goals have been met, patient has plateaued, or physician/insurance requests discontinuation of care. Pt will be discharged with a home exercise program.   Last Face-to-Face Visit with Prescribing Physician: 7/20/2022  CPT Codes and Number of Units Requested:   24563 [therapeutic exercise]  o Total units: 48  - 4 units/visit x 12 visits   37048 [neuromuscular re-education]  o Total units: 24  - 2 units/visit x 12 visits   23217 [manual therapy]  o Total units: 12  - 1 unit/visit x 12 visits   31969 [therapeutic activities]  o Total units: 12  - 1 unit/visit x 12 visits   29019 [aquatic therapy]  o Total units: 48  - 4 units/week x 12 visits   53221 [ultrasound]  o Total units: 12  - 1 unit/visit x 12 visits   25244 [mechanical traction]  o Total units: 12  - 1 unit/visit x 12 visits   69645 [unattended electrical stimulation]  o Total units: 12  - 1 unit/visit x 12 visits      Ronn Elizalde, PT, DPT  7/27/2022    I CERTIFY THE NEED FOR THESE SERVICES FURNISHED UNDER THIS PLAN OF TREATMENT AND WHILE UNDER MY CARE.    Physician's comments:

## 2022-08-08 ENCOUNTER — CLINICAL SUPPORT (OUTPATIENT)
Dept: REHABILITATION | Facility: HOSPITAL | Age: 42
End: 2022-08-08
Attending: PAIN MEDICINE
Payer: MEDICAID

## 2022-08-08 DIAGNOSIS — M47.816 LUMBAR SPONDYLOSIS: Primary | ICD-10-CM

## 2022-08-08 PROCEDURE — 97140 MANUAL THERAPY 1/> REGIONS: CPT | Mod: CQ

## 2022-08-08 PROCEDURE — 97110 THERAPEUTIC EXERCISES: CPT | Mod: CQ

## 2022-08-08 NOTE — PROGRESS NOTES
[facility]   Physical Therapy Treatment Note     Name: Mary Magana  Clinic Number: [id number]  Physician: Fatoumata Jimenez MD  Visit Date: 8/8/2022  Therapy Diagnosis:        Encounter Diagnosis   Name Primary?    Lumbar spondylosis Yes      Physician Orders: PT Eval and Treat  Medical Diagnosis from Referral: M47.816 (ICD-10-CM) - Lumbar spondylosis  Evaluation Date: 7/27/2022  Authorization Period Expiration: Only initial evaluation approved by Medicaid. Subsequent visits require prior authorization.  Plan of Care Expiration: 9/9/2022  Updated Plan of Care Due: 8/26/2022  Visit # / Visits authorized: 1/12     PTA Visit #: 1/5     Time In: 10:42 am  Time Out: 11:26 am  Total Billable Time: 44 minutes    SUBJECTIVE     Pt reports: pain across low back upon arrival. She reports her pain is worse in AM than PM. She has been doing her stretches given at evaluation as part of home exercise program.  She was compliant with home exercise program.  Response to previous treatment: first visit since evaluation   Functional change: n/a    Pain: 8/10  Location: bilateral back      OBJECTIVE     Objective Measures updated at progress report unless specified.     Treatment     Mary received the treatments listed below:      therapeutic exercises to develop strength, endurance, ROM, flexibility, posture and core stabilization for 30 minutes including:    Back Exercises    Bike/NuStep              nustep 5 minutes    Calf Stretch slantboard 4 x 20 second hold    Hamstring Stretch Bilateral at step 4 x 20 second hold    Pelvic Tilts With bridges   Bridging Over peanut with abdominal bracing/tilt 20x 2 second hold    hooklying abduction 20x with tilt   Bridging/Hooklying with Marching 20x bilateral silver tband   Bridging/Hooklying with Knee Ext Over peanut 20x with AP   Trunk Rotation Exercise 20x   Trunk Rotation Stretch    Ball - Trunk Rotation left-right and center 5x ea   Hamstring rolls  with peanut  20x with abdominal bracing                   manual therapy techniques: Myofacial release and Soft tissue Mobilization were applied to the: bilateral lower extremity and lumbar spine for 10 minutes, including:  Supine Piriformis stretch bilateral   SKTC  DKTC  LTR  Manual sciatic nerve floss with ankle pump bilateral     Patient Education and Home Exercises     Home Exercises Provided and Patient Education Provided     Education provided:   - neutral spine posture with emphasis on pelvic tilt and abdominal bracing during exercises.     Written Home Exercises Provided: Patient instructed to cont prior HEP. Exercises were reviewed and Mary was able to demonstrate them prior to the end of the session.  Mary demonstrated good  understanding of the education provided. See EMR under Patient Instructions for exercises provided during therapy sessions    ASSESSMENT   Supervisory visit with Ronn Elizalde, PT, DPT prior to initial PTA visit    Therapist initiated Plan of Care with focus of bilateral lower extremity and lumbar strengthening/stretching to further decrease p! Present upon arrival today. Pt denies p! Down lower extremity but does report central lumbar p! That is across both hips. Manual stretching to lower extremity and lumbar spine today with stiffness noted more so on RLE than left with stretches. Unable to tolerate swiss ball trunk rot/flex due to pain. Therapist educated pt on diligence to home exercise program and will progress as tolerated when pt returns 8/11.     PMH:  Mary is a 41 y.o. female referred to outpatient physical therapy with a medical diagnosis of M47.816 (ICD-10-CM) - Lumbar spondylosis. Pt presents to PT with low back and bilateral upper trap pain, tenderness on palpation of lumbar spinous processes and sacrum, mild bilateral lower extremity weakness, and core weakness/instability.       Mary Is progressing towards her goals.   Pt prognosis is Good.     Pt will  continue to benefit from skilled outpatient physical therapy to address the deficits listed in the problem list box on initial evaluation, provide pt/family education and to maximize pt's level of independence in the home and community environment.     Pt's spiritual, cultural and educational needs considered and pt agreeable to plan of care and goals.     Anticipated Barriers for therapy: compliance with home exercise program; chronicity of patient's condition     Goals:   Short Term Goals:  1. Patient will demonstrate independence with home exercise program to ensure carryover of treatment.  2. Patient will improve bilateral lower extremity strength to 5/5 to improve independence and safety with bed mobility, transfers, and gait.  3. Patient will report ability to stand/walk for 30 minutes without needing to sit secondary to low back pain.  4. Patient will report a reduction in current low back pain from 8/10 to 6/10 to improve quality of life.  5. Patient will report ability to sweep/mop half of her house with 2 sitting rest breaks secondary to low back pain.     Long Term Goals:  1. Patient will report ability to stand/walk for 1 hour without needing to sit secondary to low back pain.  2. Patient will report a reduction in current low back pain from 8/10 to 4/10 to improve quality of life.  3. Patient will demonstrate ability to complete all therapeutic exercise during a 45 minute physical therapy treatment session with proper body mechanics to ensure safety and prevent further injury.  4. Patient will report ability to sweep/mob her entire house with only one sitting rest break secondary to low back pain.       PLAN     Plan of care Certification: 7/27/2022 to 9/9/2022.     Outpatient Physical Therapy 2 times weekly for 6 weeks to include the following interventions: Aquatic Therapy, Cervical/Lumbar Traction, Electrical Stimulation (IFC/premodulated IFC), Manual Therapy, Moist Heat/ Ice, Neuromuscular Re-ed,  Patient Education, Therapeutic Activities, Therapeutic Exercise and Ultrasound.      Angel Spence, PTA      8/8/2022

## 2022-08-11 ENCOUNTER — CLINICAL SUPPORT (OUTPATIENT)
Dept: REHABILITATION | Facility: HOSPITAL | Age: 42
End: 2022-08-11
Attending: PAIN MEDICINE
Payer: MEDICAID

## 2022-08-11 DIAGNOSIS — M47.816 LUMBAR SPONDYLOSIS: Primary | ICD-10-CM

## 2022-08-11 PROCEDURE — 97140 MANUAL THERAPY 1/> REGIONS: CPT | Mod: CQ

## 2022-08-11 PROCEDURE — 97110 THERAPEUTIC EXERCISES: CPT | Mod: CQ

## 2022-08-11 NOTE — PROGRESS NOTES
[facility]   Physical Therapy Treatment Note     Name: Mary Magana  Clinic Number: [id number]  Physician: Fatoumata Jimenez MD  Visit Date: 8/11/2022  Therapy Diagnosis:        Encounter Diagnosis   Name Primary?    Lumbar spondylosis Yes      Physician Orders: PT Eval and Treat  Medical Diagnosis from Referral: M47.816 (ICD-10-CM) - Lumbar spondylosis  Evaluation Date: 7/27/2022  Authorization Period Expiration: Only initial evaluation approved by Medicaid. Subsequent visits require prior authorization.  Plan of Care Expiration: 9/9/2022  Updated Plan of Care Due: 8/26/2022  Visit # / Visits authorized: 2/12     PTA Visit #: 2/5     Time In: 11:30 am  Time Out: 12:08 pm  Total Billable Time: 38 minutes    SUBJECTIVE     Pt reports: pain across low back upon arrival. She reports her pain is worse in AM than PM. She has been doing her stretches given at evaluation as part of home exercise program.  She was compliant with home exercise program.  Response to previous treatment: soreness, had to take a muscle relaxer.  Functional change: n/a    Pain: 8/10  Location: bilateral back      OBJECTIVE     Objective Measures updated at progress report unless specified.     Treatment     Mary received the treatments listed below:      therapeutic exercises to develop strength, endurance, ROM, flexibility, posture and core stabilization for 30 minutes including:    Back Exercises    Bike/NuStep              nustep 5 minutes    Calf Stretch slantboard 4 x 20 second hold    Hamstring Stretch Bilateral at step 4 x 20 second hold    Seated piriformis stretch  4 x 15 second hold bilateral    cybex back extension  2 plates 3 x 10    cybex hip abduction  1 plate bilateral 2 x 10        Bridging Over peanut with abdominal bracing/tilt 20x 2 second hold    hooklying abduction 20x with tilt red band   Bridging/Hooklying with Marching 20x bilateral silver tband   Bridging/Hooklying with Knee Ext Over  peanut 20x with AP   Trunk Rotation Exercise 20x   Trunk Rotation Stretch    Ball - Trunk Rotation left-right and center 5x ea   Hamstring rolls with peanut  20x with abdominal bracing                   manual therapy techniques: Myofacial release and Soft tissue Mobilization were applied to the: bilateral lower extremity and lumbar spine for 8 minutes, including:  Supine Piriformis stretch bilateral   SKTC  DKTC  LTR  Manual sciatic nerve floss with ankle pump bilateral     Patient Education and Home Exercises     Home Exercises Provided and Patient Education Provided     Education provided:   - neutral spine posture with emphasis on pelvic tilt and abdominal bracing during exercises.     Written Home Exercises Provided: Patient instructed to cont prior HEP. Exercises were reviewed and Mary was able to demonstrate them prior to the end of the session.  Mary demonstrated good  understanding of the education provided. See EMR under Patient Instructions for exercises provided during therapy sessions    ASSESSMENT   Supervisory visit with Ronn Elizalde, PT, DPT prior to initial PTA visit  Pt arrived with soreness in lumbar and thoracic area today, she was able to tolerate trunk flexion with rot today, at previous visit she was unable to tolerate swiss ball trunk rot/flex due to increase in pain. Therapist continued with Plan of Care with focus of bilateral lower extremity and lumbar strengthening/stretching to further decrease p! Present upon arrival today across lumbar spine. Pt reports compliance with home exercise program given at previous visit.  Pt denies p! Down lower extremity but does report central lumbar p! That is across both hips. Manual stretching to lower extremity and lumbar spine today with stiffness noted more so on RLE than left with stretches.  Therapist educated pt on diligence to home exercise program and will progress as tolerated when pt returns.    PMH:  Mary is a 41 y.o. female  referred to outpatient physical therapy with a medical diagnosis of M47.816 (ICD-10-CM) - Lumbar spondylosis. Pt presents to PT with low back and bilateral upper trap pain, tenderness on palpation of lumbar spinous processes and sacrum, mild bilateral lower extremity weakness, and core weakness/instability.       Mary Is progressing towards her goals.   Pt prognosis is Good.     Pt will continue to benefit from skilled outpatient physical therapy to address the deficits listed in the problem list box on initial evaluation, provide pt/family education and to maximize pt's level of independence in the home and community environment.     Pt's spiritual, cultural and educational needs considered and pt agreeable to plan of care and goals.     Anticipated Barriers for therapy: compliance with home exercise program; chronicity of patient's condition     Goals:   Short Term Goals:  1. Patient will demonstrate independence with home exercise program to ensure carryover of treatment.  2. Patient will improve bilateral lower extremity strength to 5/5 to improve independence and safety with bed mobility, transfers, and gait.  3. Patient will report ability to stand/walk for 30 minutes without needing to sit secondary to low back pain.  4. Patient will report a reduction in current low back pain from 8/10 to 6/10 to improve quality of life.  5. Patient will report ability to sweep/mop half of her house with 2 sitting rest breaks secondary to low back pain.     Long Term Goals:  1. Patient will report ability to stand/walk for 1 hour without needing to sit secondary to low back pain.  2. Patient will report a reduction in current low back pain from 8/10 to 4/10 to improve quality of life.  3. Patient will demonstrate ability to complete all therapeutic exercise during a 45 minute physical therapy treatment session with proper body mechanics to ensure safety and prevent further injury.  4. Patient will report ability to  sweep/mob her entire house with only one sitting rest break secondary to low back pain.       PLAN     Plan of care Certification: 7/27/2022 to 9/9/2022.     Outpatient Physical Therapy 2 times weekly for 6 weeks to include the following interventions: Aquatic Therapy, Cervical/Lumbar Traction, Electrical Stimulation (IFC/premodulated IFC), Manual Therapy, Moist Heat/ Ice, Neuromuscular Re-ed, Patient Education, Therapeutic Activities, Therapeutic Exercise and Ultrasound.      Angel Spence, PTA      8/11/2022

## 2022-08-16 ENCOUNTER — CLINICAL SUPPORT (OUTPATIENT)
Dept: REHABILITATION | Facility: HOSPITAL | Age: 42
End: 2022-08-16
Payer: MEDICAID

## 2022-08-16 DIAGNOSIS — M47.816 LUMBAR SPONDYLOSIS: Primary | ICD-10-CM

## 2022-08-16 PROCEDURE — 97140 MANUAL THERAPY 1/> REGIONS: CPT | Mod: CQ

## 2022-08-16 PROCEDURE — 97110 THERAPEUTIC EXERCISES: CPT | Mod: CQ

## 2022-08-16 NOTE — PROGRESS NOTES
"  Subjective:         Patient ID: Mary Magana is a 41 y.o. female.    Chief Complaint: Back Pain      Pain  This is a chronic problem. The current episode started more than 1 year ago. The problem occurs daily. The problem has been waxing and waning. Associated symptoms include arthralgias. Pertinent negatives include no abdominal pain, chest pain, coughing, diaphoresis, fatigue, fever, numbness, sore throat, swollen glands, urinary symptoms or vertigo.     Review of Systems   Constitutional: Negative for diaphoresis, fatigue and fever.   HENT: Negative for sore throat.    Respiratory: Negative for cough.    Cardiovascular: Negative for chest pain.   Gastrointestinal: Negative for abdominal pain.   Musculoskeletal: Positive for arthralgias.   Neurological: Negative for vertigo and numbness.           Past Medical History:   Diagnosis Date    Diabetes mellitus     Hypertension     Thyroid disease     WPW (Ilana-Parkinson-White syndrome)      Past Surgical History:   Procedure Laterality Date    CHOLECYSTECTOMY      TUBAL LIGATION       Social History     Socioeconomic History    Marital status: Single   Tobacco Use    Smoking status: Former Smoker    Smokeless tobacco: Never Used   Substance and Sexual Activity    Alcohol use: Not Currently    Drug use: Not Currently    Sexual activity: Not Currently     Family History   Problem Relation Age of Onset    Hypertension Mother     Diabetes Mother     Diabetes Father      Review of patient's allergies indicates:  No Known Allergies     Objective:  Vitals:    08/17/22 1139   BP: (!) 148/76   Pulse: 80   Resp: 18   SpO2: 99%   Weight: 116.1 kg (256 lb)   Height: 5' 2" (1.575 m)   PainSc:   7         Physical Exam      X-Ray Lumbar Spine AP And Lateral  Narrative: EXAMINATION:  XR LUMBAR SPINE AP AND LATERAL    CLINICAL HISTORY:  Spondylosis without myelopathy or radiculopathy, lumbar region    TECHNIQUE:  AP and lateral images were performed of the " lumbar spine.    COMPARISON:  07/08/2020    FINDINGS:  The vertebral body heights and alignment are maintained.  Mild degenerative facet change L4-5 and L5-S1.  Impression: Mild degenerative changes.    Electronically signed by: Rogerio Dejesus  Date:    07/20/2022  Time:    16:46  X-Ray Sacroiliac Joints Complete  Narrative: EXAMINATION:  XR SACROILIAC JOINTS COMPLETE    CLINICAL HISTORY:  Sacroiliitis, not elsewhere classified    TECHNIQUE:  AP and oblique views of the right and left sacroiliac joints.    COMPARISON:  None    FINDINGS:  No fracture.  Mild degenerative change seen of both sacroiliac joints.  Impression: Mild degenerative changes of both sacroiliac joints.    Electronically signed by: Rogerio Dejesus  Date:    07/20/2022  Time:    16:46       Office Visit on 07/20/2022   Component Date Value Ref Range Status    POC Amphetamines 07/20/2022 Negative  Negative, Inconclusive Final    POC Barbiturates 07/20/2022 Negative  Negative, Inconclusive Final    POC Benzodiazepines 07/20/2022 Negative  Negative, Inconclusive Final    POC Cocaine 07/20/2022 Negative  Negative, Inconclusive Final    POC THC 07/20/2022 Negative  Negative, Inconclusive Final    POC Methadone 07/20/2022 Negative  Negative, Inconclusive Final    POC Methamphetamine 07/20/2022 Negative  Negative, Inconclusive Final    POC Opiates 07/20/2022 Negative  Negative, Inconclusive Final    POC Oxycodone 07/20/2022 Negative  Negative, Inconclusive Final    POC Phencyclidine 07/20/2022 Negative  Negative, Inconclusive Final    POC Methylenedioxymethamphetamine * 07/20/2022 Negative  Negative, Inconclusive Final    POC Tricyclic Antidepressants 07/20/2022 Negative  Negative, Inconclusive Final    POC Buprenorphine 07/20/2022 Negative   Final     Acceptable 07/20/2022 Yes   Final    POC Temperature (Urine) 07/20/2022 90   Final    pH, UA 07/20/2022 6.5  5.0 to 8.0 pH Units Final    Creatinine, Urine 07/20/2022 285 (A) 28 - 219  mg/dL Final    6-Acetylmorphine 07/20/2022 Negative  10 ng/mL Final    7-Aminoclonazepam 07/20/2022 Negative  Negative 25 ng/mL Final    a-Hydroxyalprazolam 07/20/2022 Negative  Negative 25 ng/mL Final    Acetyl Fentanyl 07/20/2022 Negative  2.5 ng/mL Final    Acetyl Norfentanyl Oxalate 07/20/2022 Negative  5 ng/mL Final    Benzoylecgonine 07/20/2022 Negative  100 ng/mL Final    Buprenorphine 07/20/2022 Negative  25 ng/mL Final    Codeine 07/20/2022 Negative  25 ng/mL Final    EDDP 07/20/2022 Negative  25 ng/mL Final    Fentanyl 07/20/2022 Negative  2.5 ng/mL Final    Hydrocodone 07/20/2022 Negative  25 ng/mL Final    Hydromorphone 07/20/2022 Negative  25 ng/mL Final    Lorazepam 07/20/2022 Negative  25 ng/mL Final    Morphine 07/20/2022 Negative  25 ng/mL Final    Norbuprenorphine 07/20/2022 Negative  25 ng/mL Final    Nordiazepam 07/20/2022 Negative  25 ng/mL Final    Norfentanyl Oxalate 07/20/2022 Negative  5 ng/mL Final    Norhydrocodone 07/20/2022 Negative  50 ng/mL Final    Noroxycodone HCL 07/20/2022 Negative  50 ng/mL Final    Oxazepam 07/20/2022 Negative  25 ng/mL Final    Oxymorphone 07/20/2022 Negative  25 ng/mL Final    Tapentadol 07/20/2022 Negative  25 ng/mL Final    Temazepam 07/20/2022 Negative  25 ng/mL Final    THC-COOH 07/20/2022 Negative  25 ng/mL Final    Tramadol 07/20/2022 Negative  100 ng/mL Final    Amphetamine, Urine 07/20/2022 Negative  Negative Final    Methamphetamines, Urine 07/20/2022 Negative  Negative Final    Methadone, Urine 07/20/2022 Negative  Negative 25 ng/mL Final    Oxycodone, Urine 07/20/2022 Negative  Negative 25 ng/mL Final    Specific Gravity, UA 07/20/2022 1.029  <=1.030 Final   Office Visit on 06/16/2022   Component Date Value Ref Range Status    HIV 1/2 06/16/2022 Non-Reactive  Non-Reactive Final    Hepatitis C Ab 06/16/2022 Non-Reactive  Non-Reactive Final   Office Visit on 06/03/2022   Component Date Value Ref Range Status     Hemoglobin A1C 06/03/2022 5.5  4.5 - 6.6 % Final    Estimated Average Glucose 06/03/2022 97  mg/dL Final    TSH 06/03/2022 16.600 (A) 0.358 - 3.740 uIU/mL Final    Triglycerides 06/03/2022 73  35 - 150 mg/dL Final    Cholesterol 06/03/2022 152  0 - 200 mg/dL Final    HDL Cholesterol 06/03/2022 46  40 - 60 mg/dL Final    Cholesterol/HDL Ratio (Risk Factor) 06/03/2022 3.3   Final    Non-HDL 06/03/2022 106  mg/dL Final    LDL Calculated 06/03/2022 91  mg/dL Final    LDL/HDL 06/03/2022 2.0   Final    VLDL 06/03/2022 15  mg/dL Final    Sodium 06/03/2022 139  136 - 145 mmol/L Final    Potassium 06/03/2022 4.4  3.5 - 5.1 mmol/L Final    Chloride 06/03/2022 106  98 - 107 mmol/L Final    CO2 06/03/2022 23  21 - 32 mmol/L Final    Anion Gap 06/03/2022 14  7 - 16 mmol/L Final    Glucose 06/03/2022 94  74 - 106 mg/dL Final    BUN 06/03/2022 12  7 - 18 mg/dL Final    Creatinine 06/03/2022 1.07 (A) 0.55 - 1.02 mg/dL Final    BUN/Creatinine Ratio 06/03/2022 11  6 - 20 Final    Calcium 06/03/2022 8.3 (A) 8.5 - 10.1 mg/dL Final    Total Protein 06/03/2022 7.3  6.4 - 8.2 g/dL Final    Albumin 06/03/2022 3.5  3.5 - 5.0 g/dL Final    Globulin 06/03/2022 3.8  2.0 - 4.0 g/dL Final    A/G Ratio 06/03/2022 0.9   Final    Bilirubin, Total 06/03/2022 0.3  0.0 - 1.2 mg/dL Final    Alk Phos 06/03/2022 91  37 - 98 U/L Final    ALT 06/03/2022 23  13 - 56 U/L Final    AST 06/03/2022 19  15 - 37 U/L Final    eGFR 06/03/2022 60  >=60 mL/min/1.73m² Final    WBC 06/03/2022 6.72  4.50 - 11.00 K/uL Final    RBC 06/03/2022 6.64 (A) 4.20 - 5.40 M/uL Final    Hemoglobin 06/03/2022 14.5  12.0 - 16.0 g/dL Final    Hematocrit 06/03/2022 48.3 (A) 38.0 - 47.0 % Final    MCV 06/03/2022 72.7 (A) 80.0 - 96.0 fL Final    MCH 06/03/2022 21.8 (A) 27.0 - 31.0 pg Final    MCHC 06/03/2022 30.0 (A) 32.0 - 36.0 g/dL Final    RDW 06/03/2022 16.8 (A) 11.5 - 14.5 % Final    Platelet Count 06/03/2022 211  150 - 400 K/uL Final    MPV  06/03/2022 11.2  9.4 - 12.4 fL Final    Neutrophils % 06/03/2022 48.3 (A) 53.0 - 65.0 % Final    Lymphocytes % 06/03/2022 38.8  27.0 - 41.0 % Final    Monocytes % 06/03/2022 6.3 (A) 2.0 - 6.0 % Final    Eosinophils % 06/03/2022 5.8 (A) 1.0 - 4.0 % Final    Basophils % 06/03/2022 0.7  0.0 - 1.0 % Final    Immature Granulocytes % 06/03/2022 0.1  0.0 - 0.4 % Final    nRBC, Auto 06/03/2022 0.0  <=0.0 % Final    Neutrophils, Abs 06/03/2022 3.24  1.80 - 7.70 K/uL Final    Lymphocytes, Absolute 06/03/2022 2.61  1.00 - 4.80 K/uL Final    Monocytes, Absolute 06/03/2022 0.42  0.00 - 0.80 K/uL Final    Eosinophils, Absolute 06/03/2022 0.39  0.00 - 0.50 K/uL Final    Basophils, Absolute 06/03/2022 0.05  0.00 - 0.20 K/uL Final    Immature Granulocytes, Absolute 06/03/2022 0.01  0.00 - 0.04 K/uL Final    nRBC, Absolute 06/03/2022 0.00  <=0.00 x10e3/uL Final    Diff Type 06/03/2022 Scan Smear   Final    Platelet Morphology 06/03/2022 Normal  Normal Final    Anisocytosis 06/03/2022 1+   Final    Microcytosis 06/03/2022 2+   Final    Polychromasia 06/03/2022 Few   Final         No orders of the defined types were placed in this encounter.      Requested Prescriptions     Pending Prescriptions Disp Refills    tiZANidine (ZANAFLEX) 4 MG tablet 90 tablet 0     Sig: Take 1 tablet (4 mg total) by mouth 3 (three) times daily.    naproxen (NAPROSYN) 500 MG tablet 60 tablet 0     Sig: Take 1 tablet (500 mg total) by mouth 2 (two) times daily.       Assessment:     1. Lumbar spondylosis    2. Sacroiliitis         A's of Opioid Risk Assessment  Activity:Patient can perform ADL.   Analgesia:Patients pain is partially controlled by current medication. Patient has tried OTC medications such as Tylenol and Ibuprofen with out relief.   Adverse Effects: Patient denies constipation or sedation.  Aberrant Behavior:  reviewed with no aberrant drug seeking/taking behavior.  Overdose reversal drug naloxone discussed    Drug  screen reviewed      Plan:    Daily benzodiazepine use    Follow-up after physical therapy x-rays    As of August 17, 2022 she has completed 3 of 12 physical therapy visits    Definitive drug screen July 20, 2022    X-ray lumbar spine July 20, 2022 degenerative changes    X-ray sacroiliac joint July 20, 2022 degenerative changes no fracture noted    Consider MRI lumbar spine if indicated    She would like to try gabapentin for her chronic pain    Gabapentin 100 mg 1 p.o. q.8 hours    Continue nonnarcotic medication    Continue physical therapy    Follow-up 2 months sooner if complete physical therapy    Dr. Jimenez, July 2023    Bring original prescription medication bottles/container/box with labels to each visit    Pill count    Physical therapy [facility] July in August 2022

## 2022-08-16 NOTE — PROGRESS NOTES
[facility]   Physical Therapy Treatment Note     Name: Mary Magana  Clinic Number: [id number]  Physician: Fatoumata Jimenez MD  Visit Date: 8/16/2022  Therapy Diagnosis:        Encounter Diagnosis   Name Primary?    Lumbar spondylosis Yes      Physician Orders: PT Eval and Treat  Medical Diagnosis from Referral: M47.816 (ICD-10-CM) - Lumbar spondylosis  Evaluation Date: 7/27/2022  Authorization Period Expiration: Only initial evaluation approved by Medicaid. Subsequent visits require prior authorization.  Plan of Care Expiration: 9/9/2022  Updated Plan of Care Due: 8/26/2022  Visit # / Visits authorized: 3/12     PTA Visit #: 3/5     Time In: 10:00 am  Time Out: 10:39 am  Total Billable Time: 39 minutes    SUBJECTIVE     Pt reports: pain across low back upon arrival and in right thigh.  She has been doing her stretches given at evaluation as part of home exercise program. Reports no change in pain since starting therapy  She was compliant with home exercise program.  Response to previous treatment: soreness, had to take a muscle relaxer.  Functional change: n/a    Pain: 7/10  Location: bilateral back      OBJECTIVE     Objective Measures updated at progress report unless specified.     Treatment     Mary received the treatments listed below:      therapeutic exercises to develop strength, endurance, ROM, flexibility, posture and core stabilization for 25 minutes including:    Back Exercises    Bike/NuStep              bike 5 minutes    Calf Stretch slantboard 4 x 20 second hold    Hamstring Stretch Bilateral at step 4 x 20 second hold    Seated piriformis stretch  4 x 15 second hold bilateral    Heel raises off step  2 x 10   cybex back extension  3 plates 3 x 10    cybex hip abduction  1 plate bilateral 2 x 10 NTV    Prone quad stretch with strap  Bilateral 4 x 20 second hold    Bridging Over peanut with abdominal bracing/tilt 20x 2 second hold    hooklying abduction 20x with  tilt blue band   Bridging/Hooklying with Marching 20x bilateral silver tband   Bridging/Hooklying with Knee Ext Over peanut 20x with AP   Trunk Rotation Exercise 20x   Trunk Rotation Stretch    Ball - Trunk Rotation left-right and center 5x ea   Hamstring rolls with peanut  20x with abdominal bracing                   manual therapy techniques: Myofacial release and Soft tissue Mobilization were applied to the: bilateral lower extremity and lumbar spine for 14 minutes, including:  Supine Piriformis stretch bilateral   SKTC  DKTC  LTR  Manual sciatic nerve floss with ankle pump bilateral     Patient Education and Home Exercises     Home Exercises Provided and Patient Education Provided     Education provided:   - neutral spine posture with emphasis on pelvic tilt and abdominal bracing during exercises.     Written Home Exercises Provided: Patient instructed to cont prior HEP. Exercises were reviewed and Mary was able to demonstrate them prior to the end of the session.  Mary demonstrated good  understanding of the education provided. See EMR under Patient Instructions for exercises provided during therapy sessions    ASSESSMENT   Supervisory visit with Ronn Elizalde, PT, DPT prior to initial PTA visit    Pt arrived with soreness in lumbar and RLE today. She was able to tolerate trunk flexion with rot today, at previous visit she was unable to tolerate swiss ball trunk rot/flex due to increase in pain. Therapist continued with Plan of Care with focus of bilateral lower extremity and lumbar strengthening/stretching to further decrease p! Present upon arrival today across lumbar spine and RLE. Pt reports compliance with home exercise program given at previous visit.  Pt denies p! Down lower extremity but does report central lumbar p! That is across both hips upon end of tx today. Manual stretching to lower extremity and lumbar spine today with stiffness noted more so on RLE than left with stretches.  Therapist  educated pt on diligence to home exercise program and will progress as tolerated when pt returns.    PMH:  Mary is a 41 y.o. female referred to outpatient physical therapy with a medical diagnosis of M47.816 (ICD-10-CM) - Lumbar spondylosis. Pt presents to PT with low back and bilateral upper trap pain, tenderness on palpation of lumbar spinous processes and sacrum, mild bilateral lower extremity weakness, and core weakness/instability.       Mary Is progressing towards her goals.   Pt prognosis is Good.     Pt will continue to benefit from skilled outpatient physical therapy to address the deficits listed in the problem list box on initial evaluation, provide pt/family education and to maximize pt's level of independence in the home and community environment.     Pt's spiritual, cultural and educational needs considered and pt agreeable to plan of care and goals.     Anticipated Barriers for therapy: compliance with home exercise program; chronicity of patient's condition     Goals:   Short Term Goals:  1. Patient will demonstrate independence with home exercise program to ensure carryover of treatment.  2. Patient will improve bilateral lower extremity strength to 5/5 to improve independence and safety with bed mobility, transfers, and gait.  3. Patient will report ability to stand/walk for 30 minutes without needing to sit secondary to low back pain.  4. Patient will report a reduction in current low back pain from 8/10 to 6/10 to improve quality of life.  5. Patient will report ability to sweep/mop half of her house with 2 sitting rest breaks secondary to low back pain.     Long Term Goals:  1. Patient will report ability to stand/walk for 1 hour without needing to sit secondary to low back pain.  2. Patient will report a reduction in current low back pain from 8/10 to 4/10 to improve quality of life.  3. Patient will demonstrate ability to complete all therapeutic exercise during a 45 minute physical  therapy treatment session with proper body mechanics to ensure safety and prevent further injury.  4. Patient will report ability to sweep/mob her entire house with only one sitting rest break secondary to low back pain.       PLAN     Plan of care Certification: 7/27/2022 to 9/9/2022.     Outpatient Physical Therapy 2 times weekly for 6 weeks to include the following interventions: Aquatic Therapy, Cervical/Lumbar Traction, Electrical Stimulation (IFC/premodulated IFC), Manual Therapy, Moist Heat/ Ice, Neuromuscular Re-ed, Patient Education, Therapeutic Activities, Therapeutic Exercise and Ultrasound.      Angel Spence, PTA      8/16/2022

## 2022-08-17 ENCOUNTER — OFFICE VISIT (OUTPATIENT)
Dept: PAIN MEDICINE | Facility: CLINIC | Age: 42
End: 2022-08-17
Payer: MEDICAID

## 2022-08-17 VITALS
HEART RATE: 80 BPM | WEIGHT: 256 LBS | SYSTOLIC BLOOD PRESSURE: 148 MMHG | OXYGEN SATURATION: 99 % | RESPIRATION RATE: 18 BRPM | DIASTOLIC BLOOD PRESSURE: 76 MMHG | BODY MASS INDEX: 47.11 KG/M2 | HEIGHT: 62 IN

## 2022-08-17 DIAGNOSIS — M46.1 SACROILIITIS: Chronic | ICD-10-CM

## 2022-08-17 DIAGNOSIS — M47.816 LUMBAR SPONDYLOSIS: Primary | Chronic | ICD-10-CM

## 2022-08-17 PROCEDURE — 3044F HG A1C LEVEL LT 7.0%: CPT | Mod: CPTII,,, | Performed by: PHYSICIAN ASSISTANT

## 2022-08-17 PROCEDURE — 99214 OFFICE O/P EST MOD 30 MIN: CPT | Mod: PBBFAC | Performed by: PHYSICIAN ASSISTANT

## 2022-08-17 PROCEDURE — 3008F PR BODY MASS INDEX (BMI) DOCUMENTED: ICD-10-PCS | Mod: CPTII,,, | Performed by: PHYSICIAN ASSISTANT

## 2022-08-17 PROCEDURE — 3078F DIAST BP <80 MM HG: CPT | Mod: CPTII,,, | Performed by: PHYSICIAN ASSISTANT

## 2022-08-17 PROCEDURE — 3008F BODY MASS INDEX DOCD: CPT | Mod: CPTII,,, | Performed by: PHYSICIAN ASSISTANT

## 2022-08-17 PROCEDURE — 3044F PR MOST RECENT HEMOGLOBIN A1C LEVEL <7.0%: ICD-10-PCS | Mod: CPTII,,, | Performed by: PHYSICIAN ASSISTANT

## 2022-08-17 PROCEDURE — 1159F PR MEDICATION LIST DOCUMENTED IN MEDICAL RECORD: ICD-10-PCS | Mod: CPTII,,, | Performed by: PHYSICIAN ASSISTANT

## 2022-08-17 PROCEDURE — 3077F SYST BP >= 140 MM HG: CPT | Mod: CPTII,,, | Performed by: PHYSICIAN ASSISTANT

## 2022-08-17 PROCEDURE — 1159F MED LIST DOCD IN RCRD: CPT | Mod: CPTII,,, | Performed by: PHYSICIAN ASSISTANT

## 2022-08-17 PROCEDURE — 3077F PR MOST RECENT SYSTOLIC BLOOD PRESSURE >= 140 MM HG: ICD-10-PCS | Mod: CPTII,,, | Performed by: PHYSICIAN ASSISTANT

## 2022-08-17 PROCEDURE — 99214 OFFICE O/P EST MOD 30 MIN: CPT | Mod: S$PBB,,, | Performed by: PHYSICIAN ASSISTANT

## 2022-08-17 PROCEDURE — 3078F PR MOST RECENT DIASTOLIC BLOOD PRESSURE < 80 MM HG: ICD-10-PCS | Mod: CPTII,,, | Performed by: PHYSICIAN ASSISTANT

## 2022-08-17 PROCEDURE — 99214 PR OFFICE/OUTPT VISIT, EST, LEVL IV, 30-39 MIN: ICD-10-PCS | Mod: S$PBB,,, | Performed by: PHYSICIAN ASSISTANT

## 2022-08-17 RX ORDER — TIZANIDINE 4 MG/1
4 TABLET ORAL 3 TIMES DAILY
Qty: 90 TABLET | Refills: 0 | Status: SHIPPED | OUTPATIENT
Start: 2022-08-17 | End: 2022-09-16 | Stop reason: SDUPTHER

## 2022-08-17 RX ORDER — NAPROXEN 500 MG/1
500 TABLET ORAL 2 TIMES DAILY
Qty: 60 TABLET | Refills: 0 | Status: SHIPPED | OUTPATIENT
Start: 2022-08-17 | End: 2022-09-29 | Stop reason: SDUPTHER

## 2022-08-17 RX ORDER — GABAPENTIN 100 MG/1
100 CAPSULE ORAL EVERY 8 HOURS
Qty: 90 CAPSULE | Refills: 0 | Status: SHIPPED | OUTPATIENT
Start: 2022-08-17 | End: 2022-09-16

## 2022-08-19 ENCOUNTER — CLINICAL SUPPORT (OUTPATIENT)
Dept: REHABILITATION | Facility: HOSPITAL | Age: 42
End: 2022-08-19
Attending: PAIN MEDICINE
Payer: MEDICAID

## 2022-08-19 DIAGNOSIS — M47.816 LUMBAR SPONDYLOSIS: Primary | ICD-10-CM

## 2022-08-19 PROCEDURE — 97140 MANUAL THERAPY 1/> REGIONS: CPT | Mod: CQ

## 2022-08-19 PROCEDURE — 97010 HOT OR COLD PACKS THERAPY: CPT | Mod: CQ

## 2022-08-19 PROCEDURE — 97110 THERAPEUTIC EXERCISES: CPT | Mod: CQ

## 2022-08-19 NOTE — PROGRESS NOTES
[facility]   Physical Therapy Treatment Note     Name: Mary Magana  Clinic Number: [id number]  Physician: Fatoumata Jimenez MD  Visit Date: 8/19/2022  Therapy Diagnosis:        Encounter Diagnosis   Name Primary?    Lumbar spondylosis Yes      Physician Orders: PT Eval and Treat  Medical Diagnosis from Referral: M47.816 (ICD-10-CM) - Lumbar spondylosis  Evaluation Date: 7/27/2022  Authorization Period Expiration: Only initial evaluation approved by Medicaid. Subsequent visits require prior authorization.  Plan of Care Expiration: 9/9/2022  Updated Plan of Care Due: 8/26/2022  Visit # / Visits authorized: 5/12     PTA Visit #: 4/5     Time In: 9:57 am  Time Out: 10:42 am  Total Billable Time: 45 minutes    SUBJECTIVE     Pt reports: 8/10 pain in low back with no radicular pain noted. Reports increased pain this AM with no change in pain after medicating     She was compliant with home exercise program.  Response to previous treatment: soreness, had to take a muscle relaxer.  Functional change: n/a    Pain: 7/10  Location: bilateral back      OBJECTIVE     Objective Measures updated at progress report unless specified.     Treatment     Mary received the treatments listed below:      therapeutic exercises to develop strength, endurance, ROM, flexibility, posture and core stabilization for 10 minutes including:    Back Exercises    Bike/NuStep bike 5 minutes    Calf Stretch slantboard 4 x 20 second hold    Hamstring Stretch Bilateral at step 4 x 20 second hold    Seated piriformis stretch  4 x 15 second hold bilateral    Heel raises off step  2 x 10   cybex back extension  3 plates 3 x 10 ntv   cybex hip abduction  1 plate bilateral 2 x 10 NTV    Prone quad stretch with strap  Bilateral 4 x 20 second hold ntv   Bridging Over peanut with abdominal bracing/tilt 20x 2 second hold    hooklying abduction 20x with tilt blue band ntv   Bridging/Hooklying with Marching 20x bilateral silver  tband ntv   Bridging/Hooklying with Knee Ext Over peanut 20x with AP ntv   Trunk Rotation Exercise 20x   Trunk Rotation Stretch    Ball - Trunk Rotation left-right and center 5x ea   Hamstring rolls with peanut  20x with abdominal bracing ntv       Seated Trunk Extension over Roll 2x20           manual therapy techniques: Myofacial release and Soft tissue Mobilization were applied to the: bilateral lower extremity and lumbar spine for 15 minutes, including:  Supine Piriformis stretch bilateral   SKTC  DKTC  LTR  Manual sciatic nerve floss with ankle pump bilateral   Manual P/A Lumbar Mobilizations       MHP Prone x10 minutes post-session    Patient Education and Home Exercises     Home Exercises Provided and Patient Education Provided     Education provided:   - neutral spine posture with emphasis on pelvic tilt and abdominal bracing during exercises.     Written Home Exercises Provided: Patient instructed to cont prior HEP. Exercises were reviewed and Mary was able to demonstrate them prior to the end of the session.  Mary demonstrated good  understanding of the education provided. See EMR under Patient Instructions for exercises provided during therapy sessions    ASSESSMENT     Pt has pain with seated hamstring stretch and piriformis stretching. Standing hamstring stretching felt better in pt's low back. Mid-range lumbar extension in seated position and lumbar roll caused a decreased/no better response. Progressed on this with P/A mobilizations in upper lumbar/lower thoracic spine with slight decrease in pain after multiple sets at 50-75% pressure. Pt shown how to perform trunk extension exercises at home and emphasized importance of safety during HEP.    PMH:  Mary is a 41 y.o. female referred to outpatient physical therapy with a medical diagnosis of M47.816 (ICD-10-CM) - Lumbar spondylosis. Pt presents to PT with low back and bilateral upper trap pain, tenderness on palpation of lumbar spinous processes  and sacrum, mild bilateral lower extremity weakness, and core weakness/instability.       Mary Is progressing towards her goals.   Pt prognosis is Good.     Pt will continue to benefit from skilled outpatient physical therapy to address the deficits listed in the problem list box on initial evaluation, provide pt/family education and to maximize pt's level of independence in the home and community environment.     Pt's spiritual, cultural and educational needs considered and pt agreeable to plan of care and goals.     Anticipated Barriers for therapy: compliance with home exercise program; chronicity of patient's condition     Goals:   Short Term Goals:  1. Patient will demonstrate independence with home exercise program to ensure carryover of treatment.  2. Patient will improve bilateral lower extremity strength to 5/5 to improve independence and safety with bed mobility, transfers, and gait.  3. Patient will report ability to stand/walk for 30 minutes without needing to sit secondary to low back pain.  4. Patient will report a reduction in current low back pain from 8/10 to 6/10 to improve quality of life.  5. Patient will report ability to sweep/mop half of her house with 2 sitting rest breaks secondary to low back pain.     Long Term Goals:  1. Patient will report ability to stand/walk for 1 hour without needing to sit secondary to low back pain.  2. Patient will report a reduction in current low back pain from 8/10 to 4/10 to improve quality of life.  3. Patient will demonstrate ability to complete all therapeutic exercise during a 45 minute physical therapy treatment session with proper body mechanics to ensure safety and prevent further injury.  4. Patient will report ability to sweep/mob her entire house with only one sitting rest break secondary to low back pain.       PLAN     Plan of care Certification: 7/27/2022 to 9/9/2022.     Outpatient Physical Therapy 2 times weekly for 6 weeks to include the  following interventions: Aquatic Therapy, Cervical/Lumbar Traction, Electrical Stimulation (IFC/premodulated IFC), Manual Therapy, Moist Heat/ Ice, Neuromuscular Re-ed, Patient Education, Therapeutic Activities, Therapeutic Exercise and Ultrasound.      Rich Baca, PTA      8/19/2022

## 2022-08-25 ENCOUNTER — CLINICAL SUPPORT (OUTPATIENT)
Dept: REHABILITATION | Facility: HOSPITAL | Age: 42
End: 2022-08-25
Attending: PAIN MEDICINE
Payer: MEDICAID

## 2022-08-25 DIAGNOSIS — M47.816 LUMBAR SPONDYLOSIS: Primary | ICD-10-CM

## 2022-08-25 PROCEDURE — 97110 THERAPEUTIC EXERCISES: CPT | Mod: CQ

## 2022-08-25 NOTE — PROGRESS NOTES
"[facility]   Physical Therapy Treatment Note     Name: Mary Magana  Clinic Number: [id number]  Physician: Fatoumata Jimenez MD  Visit Date: 8/25/2022  Therapy Diagnosis:        Encounter Diagnosis   Name Primary?    Lumbar spondylosis Yes      Physician Orders: PT Eval and Treat  Medical Diagnosis from Referral: M47.816 (ICD-10-CM) - Lumbar spondylosis  Evaluation Date: 7/27/2022  Authorization Period Expiration: Only initial evaluation approved by Medicaid. Subsequent visits require prior authorization.  Plan of Care Expiration: 9/9/2022  Updated Plan of Care Due: 8/26/2022  Visit # / Visits authorized: 6/12     PTA Visit #: 5/5     Time In: 10:47 am  Time Out: 11:15 am   Total Billable Time: 28 minutes (tx cut short today due to pt's p! Level upon arrival)    SUBJECTIVE     Pt reports: "I am hurting today, I've been hurting a lot lately" "My pain is all the way across my back"  Reports increased pain this AM with no change in pain after medicating Pt reports nothing relieves her pain the last couple days.    She was compliant with home exercise program.  Response to previous treatment: soreness, had to take a muscle relaxer.  Functional change: n/a    Pain: 8/10  Location: bilateral back      OBJECTIVE     Objective Measures updated at progress report unless specified.     Treatment     Mary received the treatments listed below:      therapeutic exercises to develop strength, endurance, ROM, flexibility, posture and core stabilization for 28 minutes including:    Back Exercises    Bike/NuStep bike 5 minutes    Calf Stretch slantboard 4 x 20 second hold    Hamstring Stretch Bilateral at step 4 x 20 second hold    Seated piriformis stretch  4 x 15 second hold bilateral    Heel raises off step  2 x 10   cybex back extension  3 plates 3 x 10 ntv   cybex hip abduction  1 plate bilateral 2 x 10 NTV    Prone quad stretch with strap  Bilateral 4 x 20 second hold ntv   Bridging Over " bolster with abdominal bracing/tilt 10x 2 second hold  (increase in p!, held exercise after 10 repetitions)   hooklying abduction 20x with tilt blue band ntv   Bridging/Hooklying with Marching 20x bilateral silver tband ntv   Bridging/Hooklying with Knee Ext Over peanut 20x with AP ntv   Trunk Rotation Exercise 20x   Trunk Rotation Stretch 4 x 15 second hold    Seated Ball rollouts- Trunk flexion 5x with abdominal bracing   Hamstring rolls with peanut  20x with abdominal bracing ntv       Seated Trunk Extension over Roll 2x20   SKTC/DKTC stretch  With towel 3 x 20 second hold        manual therapy techniques: Myofacial release and Soft tissue Mobilization were applied to the: bilateral lower extremity and lumbar spine for 0 minutes, including:  Supine Piriformis stretch bilateral   SKTC  DKTC  LTR  Manual sciatic nerve floss with ankle pump bilateral   Manual P/A Lumbar Mobilizations       Patient Education and Home Exercises     Home Exercises Provided and Patient Education Provided     Education provided:   - neutral spine posture with emphasis on pelvic tilt and abdominal bracing during exercises.     Written Home Exercises Provided: Patient instructed to cont prior HEP. Exercises were reviewed and Mary was able to demonstrate them prior to the end of the session.  Mary demonstrated good  understanding of the education provided. See EMR under Patient Instructions for exercises provided during therapy sessions    ASSESSMENT   Pt arrived to therapy today with significant pain reported across lumbar region 8/10. Pt Reports increase in p! At left lumbar region With hooklying LTR to right during exercises today. She reports no decrease in p! Since seeing MD taking medicine prescribed by referring MD. Case Conference today with evaluating therapist Ronn Elizalde, PT due to pt level of p! And no change in p! Since starting therapy. Ended tx early today due to decreased tolerance to exercises and stretches from pt  due to p!. Pt has pain with seated hamstring stretch and piriformis stretching. Standing hamstring stretching felt better in pt's low back. Mid-range lumbar extension in seated position and lumbar roll caused a decreased/no better response. Pt shown how to perform trunk extension exercises at home and emphasized importance of safety during HEP. Educated pt on importance of diligence with given home exercise program and to return next week where we will modify as needed due to level of p! Present.       PMH:  Mary is a 41 y.o. female referred to outpatient physical therapy with a medical diagnosis of M47.816 (ICD-10-CM) - Lumbar spondylosis. Pt presents to PT with low back and bilateral upper trap pain, tenderness on palpation of lumbar spinous processes and sacrum, mild bilateral lower extremity weakness, and core weakness/instability.       Mary Is progressing towards her goals.   Pt prognosis is Good.     Pt will continue to benefit from skilled outpatient physical therapy to address the deficits listed in the problem list box on initial evaluation, provide pt/family education and to maximize pt's level of independence in the home and community environment.     Pt's spiritual, cultural and educational needs considered and pt agreeable to plan of care and goals.     Anticipated Barriers for therapy: compliance with home exercise program; chronicity of patient's condition     Goals:   Short Term Goals:  1. Patient will demonstrate independence with home exercise program to ensure carryover of treatment.  2. Patient will improve bilateral lower extremity strength to 5/5 to improve independence and safety with bed mobility, transfers, and gait.  3. Patient will report ability to stand/walk for 30 minutes without needing to sit secondary to low back pain.  4. Patient will report a reduction in current low back pain from 8/10 to 6/10 to improve quality of life.  5. Patient will report ability to sweep/mop half of  her house with 2 sitting rest breaks secondary to low back pain.     Long Term Goals:  1. Patient will report ability to stand/walk for 1 hour without needing to sit secondary to low back pain.  2. Patient will report a reduction in current low back pain from 8/10 to 4/10 to improve quality of life.  3. Patient will demonstrate ability to complete all therapeutic exercise during a 45 minute physical therapy treatment session with proper body mechanics to ensure safety and prevent further injury.  4. Patient will report ability to sweep/mob her entire house with only one sitting rest break secondary to low back pain.       PLAN     Plan of care Certification: 7/27/2022 to 9/9/2022.     Outpatient Physical Therapy 2 times weekly for 6 weeks to include the following interventions: Aquatic Therapy, Cervical/Lumbar Traction, Electrical Stimulation (IFC/premodulated IFC), Manual Therapy, Moist Heat/ Ice, Neuromuscular Re-ed, Patient Education, Therapeutic Activities, Therapeutic Exercise and Ultrasound.      Angel Spence, PTA      8/25/2022

## 2022-08-30 ENCOUNTER — CLINICAL SUPPORT (OUTPATIENT)
Dept: REHABILITATION | Facility: HOSPITAL | Age: 42
End: 2022-08-30
Attending: PAIN MEDICINE
Payer: MEDICAID

## 2022-08-30 DIAGNOSIS — M47.816 LUMBAR SPONDYLOSIS: Primary | ICD-10-CM

## 2022-08-30 PROCEDURE — 97010 HOT OR COLD PACKS THERAPY: CPT

## 2022-08-30 PROCEDURE — 97112 NEUROMUSCULAR REEDUCATION: CPT

## 2022-08-30 PROCEDURE — 97140 MANUAL THERAPY 1/> REGIONS: CPT

## 2022-08-30 PROCEDURE — 97110 THERAPEUTIC EXERCISES: CPT

## 2022-08-30 PROCEDURE — 97014 ELECTRIC STIMULATION THERAPY: CPT

## 2022-08-30 NOTE — PROGRESS NOTES
"[facility]  Physical Therapy Updated Plan of Care    Name: Mary Magana  Clinic Number: [id number]  Physician: Fatoumata Jimenez MD  Visit Date: 8/30/2022  Therapy Diagnosis:        Encounter Diagnosis   Name Primary?    Lumbar spondylosis Yes      Physician Orders: PT Eval and Treat  Medical Diagnosis from Referral: M47.816 (ICD-10-CM) - Lumbar spondylosis  Evaluation Date: 7/27/2022  Authorization Period Expiration: Only initial evaluation approved by Medicaid. Subsequent visits require prior authorization. ***  Plan of Care Expiration: 9/9/2022  Updated Plan of Care Due: 8/26/2022  Visit # / Visits authorized: 6/12 ***    PTA Visit #: 0/5     Time In: ***  Time Out: ***   Total Billable Time: *** minutes    SUBJECTIVE     Pt reports: "I am hurting today, I've been hurting a lot lately" "My pain is all the way across my back"  Reports increased pain this AM with no change in pain after medicating Pt reports nothing relieves her pain the last couple days. ***    She was compliant with home exercise program.  Response to previous treatment: soreness, had to take a muscle relaxer. ***    Pain: ***/10  Location: bilateral back ***    OBJECTIVE     Objective Measures updated at progress report unless specified.     Treatment     Mary received the treatments listed below:      therapeutic exercises to develop strength, endurance, ROM, flexibility, posture and core stabilization for *** minutes including:    Bike: level 1; 5 minutes  Calf stretch on slant board: 4 x 20 second holds  Seated piriformis stretch: 4 x 15 second holds each  Heel raises on step: 2 x 10 reps  Cybex back extension: 3 plates; 3 x 10 reps  Cybex hip abduction: 1 plate; 2 x 10 reps each  Prone quad stretch with strap: 4 x 20 second holds each  Bridges: x 10 reps with 2 second holds  Hooklying hip abduction with posterior pelvic tilt: blue theraband; x 20 reps  Bridges with alternating marching: gray " theraband; x 20 reps  Bridges with alternating knee extension: x 20 reps  Lower trunk rotation: x 10 reps each with 10 second holds  Seated trunk flexion stretch with physioball (3-way): x 5 reps each with 10 second holds  Supine hamstring curls with physioball: x 20 reps  Seated trunk extension over roll: 2 x 20 reps  Single knee to chest stretch with towel:   Double knee to chest stretch with towel: 3 x 20 second holds    manual therapy techniques: Myofacial release and Soft tissue Mobilization were applied to the: bilateral lower extremity and lumbar spine for *** minutes, including:    Supine Piriformis stretch bilateral   SKTC  DKTC  LTR  Manual sciatic nerve floss with ankle pump bilateral   Manual P/A Lumbar Mobilizations     Patient Education and Home Exercises     Home Exercises Provided and Patient Education Provided     Education provided: Patient educated on the importance of completing skilled physical therapy treatment and home exercise program as prescribed to maximize functional gains.    Written Home Exercises Provided: Patient instructed to cont prior HEP. Exercises were reviewed and Mary was able to demonstrate them prior to the end of the session.  Mary demonstrated good  understanding of the education provided. See EMR under Patient Instructions for exercises provided during therapy sessions    ASSESSMENT     Pt arrived to therapy today with significant pain reported across lumbar region 8/10. Pt Reports increase in p! At left lumbar region With hooklying LTR to right during exercises today. She reports no decrease in p! Since seeing MD taking medicine prescribed by referring MD. Case Conference today with evaluating therapist Ronn Elizalde, PT due to pt level of p! And no change in p! Since starting therapy. Ended tx early today due to decreased tolerance to exercises and stretches from pt due to p!. Pt has pain with seated hamstring stretch and piriformis stretching. Standing hamstring  stretching felt better in pt's low back. Mid-range lumbar extension in seated position and lumbar roll caused a decreased/no better response. Pt shown how to perform trunk extension exercises at home and emphasized importance of safety during HEP. Educated pt on importance of diligence with given home exercise program and to return next week where we will modify as needed due to level of p! Present. ***    PMH:  Mary is a 41 y.o. female referred to outpatient physical therapy with a medical diagnosis of M47.816 (ICD-10-CM) - Lumbar spondylosis. Pt presents to PT with low back and bilateral upper trap pain, tenderness on palpation of lumbar spinous processes and sacrum, mild bilateral lower extremity weakness, and core weakness/instability.     Mary Is progressing towards her goals.   Pt prognosis is Good.     Pt will continue to benefit from skilled outpatient physical therapy to address the deficits listed in the problem list box on initial evaluation, provide pt/family education and to maximize pt's level of independence in the home and community environment.     Pt's spiritual, cultural and educational needs considered and pt agreeable to plan of care and goals.     Anticipated Barriers for therapy: compliance with home exercise program; chronicity of patient's condition     Goals:     Short Term Goals:  Patient will demonstrate independence with home exercise program to ensure carryover of treatment. [***]  Patient will improve bilateral lower extremity strength to 5/5 to improve independence and safety with bed mobility, transfers, and gait. [***]  Patient will report ability to stand/walk for 30 minutes without needing to sit secondary to low back pain. [***]  Patient will report a reduction in current low back pain from 8/10 to 6/10 to improve quality of life. [***]  Patient will report ability to sweep/mop half of her house with 2 sitting rest breaks secondary to low back pain. [***]     Long Term  Goals:  Patient will report ability to stand/walk for 1 hour without needing to sit secondary to low back pain. [***]  Patient will report a reduction in current low back pain from 8/10 to 4/10 to improve quality of life. [***]  Patient will demonstrate ability to complete all therapeutic exercise during a 45 minute physical therapy treatment session with proper body mechanics to ensure safety and prevent further injury. [***]  Patient will report ability to sweep/mob her entire house with only one sitting rest break secondary to low back pain. [***]     Reasons for Recertification of Therapy: ***    Plan     Updated Certification Period: 8/30/2022 to ***  Recommended Treatment Plan: *** times per week for *** weeks: {[place] PLAN:04710}  Other Recommendations: ***    Ronn Elizalde, PT, DPT  8/30/2022      I CERTIFY THE NEED FOR THESE SERVICES FURNISHED UNDER THIS PLAN OF TREATMENT AND WHILE UNDER MY CARE.    Physician's comments:

## 2022-08-30 NOTE — PLAN OF CARE
[facility]  Physical Therapy Updated Plan of Care    Name: Mary Magana  Clinic Number: [id number]  Physician: Fatoumata Jimenez MD  Visit Date: 8/30/2022  Therapy Diagnosis:        Encounter Diagnosis   Name Primary?    Lumbar spondylosis Yes      Physician Orders: PT Eval and Treat  Medical Diagnosis from Referral: M47.816 (ICD-10-CM) - Lumbar spondylosis  Evaluation Date: 7/27/2022  Authorization Period Expiration: 9/9/2022  Plan of Care Expiration: 9/9/2022  Updated Plan of Care Due: 9/9/2022  Visit # / Visits authorized: 7/12    PTA Visit #: 0/5    Time In: 1005  Time Out: 1100   Total Billable Time: 55 minutes    SUBJECTIVE     Pt reports: Her back continues to bother her. She has not seen a significant decrease in low back pain since beginning physical therapy intervention and often hurts a bit worse after leaving treatment. Patient reports the one thing that has been done during treatment that improved her symptoms enough to receive a good night's sleep was a combination of heat and manual pressure to her low back.    She was compliant with home exercise program.  Response to previous treatment: soreness    Pain: 7/10  Location: bilateral back    OBJECTIVE     Objective Measures updated at progress report unless specified.     Treatment     Mary received therapeutic exercises to develop strength, flexibility, posture, and core stabilization for 12 minutes including:    Bike: level 1; 5 minutes  Calf stretch on slant board: 2 minutes  Lower trunk rotation stretch: x 10 reps each with 10 second holds    Seated piriformis stretch: 4 x 15 second holds each (not performed)  Heel raises on step: 2 x 10 reps (not performed)  Cybex back extension: 3 plates; 3 x 10 reps (not performed)  Cybex hip abduction: 1 plate; 2 x 10 reps each (not performed)  Prone quad stretch with strap: 4 x 20 second holds each (not performed)  Hooklying hip abduction with posterior pelvic tilt:  blue theraband; x 20 reps (not performed)  Bridges with alternating marching: gray theraband; x 20 reps (not performed)  Bridges with alternating knee extension: x 20 reps (not performed)  Seated trunk flexion stretch with physioball (3-way): x 5 reps each with 10 second holds (not performed)  Supine hamstring curls with physioball: x 20 reps (not performed)  Seated trunk extension over roll: 2 x 20 reps (not performed)    Mary participated in neuromuscular re-education activities to improve: Coordination and Posture for 13 minutes. The following activities were included:    Posterior pelvic tilt: 15 x 5 second holds  Bridges with posterior pelvic tilt: x 15 reps    Mary received the following manual therapy techniques: Joint mobilizations, Myofacial release, and Soft tissue Mobilization were applied for 15 minutes, including:    Manual piriformis stretch: 3 x 30 second holds each  Manual single knee to chest stretch: 3 x 30 second holds each  Manual lumbar P/A mobilizations  Myofascial release of lumbar musculature    Manual double knee to chest stretch (not performed)  Manual sciatic nerve floss with ankle pump bilateral (not performed)    Mary received the following supervised modalities after being cleared for contradictions: premodulated IFC Electrical Stimulation: Mary received IFC Electrical Stimulation for pain control applied to the lumbar region. Pt received stimulation at a frequency for 15 minutes. Mary tolderated treatment well without any adverse effects.      Mary received hot pack for 15 minutes to the lumbar region in prone in conjunction with premodulated IFC electrical stimulation.    Patient Education and Home Exercises     Home Exercises Provided and Patient Education Provided     Education provided: Patient educated on the importance of completing skilled physical therapy treatment and home exercise program as prescribed to maximize functional gains.    Written Home Exercises  Provided: Patient instructed to cont prior HEP. Exercises were reviewed and Mary was able to demonstrate them prior to the end of the session.  Mary demonstrated good  understanding of the education provided. See EMR under Patient Instructions for exercises provided during therapy sessions    ASSESSMENT     Physical Therapist decreased the intensity of patient's physical therapy intervention this treatment due to the severity of patient's report of low back pain. Patient with good tolerance of all stretching and manual therapy. Patient reported a reduction in low back pain following physical therapy treatment including premodulated IFC electrical stimulation and moist heat. Patient has made very little progress towards her goals via skilled physical therapy intervention. Patient advised to call Dr. Jimenez' office following her last physical therapy treatment session on 9/8/2022 to schedule a follow-up appointment. Physical Therapist will continue to progress therapeutic exercise, neuromuscular re-education, and therapeutic activities with modalities and manual therapy utilized as needed.    PMH:  Mary is a 41 y.o. female referred to outpatient physical therapy with a medical diagnosis of M47.816 (ICD-10-CM) - Lumbar spondylosis. Pt presents to PT with low back and bilateral upper trap pain, tenderness on palpation of lumbar spinous processes and sacrum, mild bilateral lower extremity weakness, and core weakness/instability.     Mary is not progressing towards her goals.   Pt prognosis is Good.     Pt will continue to benefit from skilled outpatient physical therapy to address the deficits listed in the problem list box on initial evaluation, provide pt/family education and to maximize pt's level of independence in the home and community environment.     Pt's spiritual, cultural and educational needs considered and pt agreeable to plan of care and goals.     Anticipated Barriers for therapy: compliance with  home exercise program; chronicity of patient's condition     Goals:     Short Term Goals:  Patient will demonstrate independence with home exercise program to ensure carryover of treatment. [met]  Patient will improve bilateral lower extremity strength to 5/5 to improve independence and safety with bed mobility, transfers, and gait. [Not met: grossly 4+/5]  Patient will report ability to stand/walk for 30 minutes without needing to sit secondary to low back pain. [Not met: 15 minutes]  Patient will report a reduction in current low back pain from 8/10 to 6/10 to improve quality of life. [Not met: 7/10 on 8/30/2022]  Patient will report ability to sweep/mop half of her house with 2 sitting rest breaks secondary to low back pain. [Not met: unable to do at the present time]     Long Term Goals:  Patient will report ability to stand/walk for 1 hour without needing to sit secondary to low back pain. [Not met: 15 minutes]  Patient will report a reduction in current low back pain from 8/10 to 4/10 to improve quality of life. [Not met: 7/10 on 8/30/2022]  Patient will demonstrate ability to complete all therapeutic exercise during a 45 minute physical therapy treatment session with proper body mechanics to ensure safety and prevent further injury. [Not met]  Patient will report ability to sweep/mob her entire house with only one sitting rest break secondary to low back pain. [Not met: unable to do a the present time]     Reasons for Recertification of Therapy: Patient has not met all goals.    Plan     Updated Certification Period: 8/30/2022 to 9/9/2022  Recommended Treatment Plan: 2 times per week for 2 weeks (3 visits): Aquatic Therapy, Electrical Stimulation (IFC/premodulated IFC), Manual Therapy, Moist Heat/ Ice, Neuromuscular Re-ed, Patient Education, Therapeutic Activities, and Therapeutic Exercise  Other Recommendations: Patient would benefit from a follow-up appointment with her referring provider as skilled  physical therapy intervention has not decreased the severity of her symptoms.    Ronn Elizalde, PT, DPT  8/30/2022      I CERTIFY THE NEED FOR THESE SERVICES FURNISHED UNDER THIS PLAN OF TREATMENT AND WHILE UNDER MY CARE.    Physician's comments:

## 2022-09-01 ENCOUNTER — CLINICAL SUPPORT (OUTPATIENT)
Dept: REHABILITATION | Facility: HOSPITAL | Age: 42
End: 2022-09-01
Attending: PAIN MEDICINE
Payer: MEDICAID

## 2022-09-01 DIAGNOSIS — M47.816 LUMBAR SPONDYLOSIS: Primary | ICD-10-CM

## 2022-09-01 PROCEDURE — 97110 THERAPEUTIC EXERCISES: CPT | Mod: CQ

## 2022-09-01 PROCEDURE — 97140 MANUAL THERAPY 1/> REGIONS: CPT | Mod: CQ

## 2022-09-01 PROCEDURE — 97112 NEUROMUSCULAR REEDUCATION: CPT | Mod: CQ

## 2022-09-01 NOTE — PROGRESS NOTES
"[facility]   Physical Therapy Treatment Note     Name: Mary Magana  Clinic Number: [id number]  Physician: Fatoumata Jimenez MD  Visit Date: 9/1/2022  Encounter Diagnosis   Name Primary?    Lumbar spondylosis Yes      Physician Orders: PT Eval and Treat  Medical Diagnosis from Referral: M47.816 (ICD-10-CM) - Lumbar spondylosis  Evaluation Date: 7/27/2022  Authorization Period Expiration: 9/9/2022  Plan of Care Expiration: 9/9/2022  Updated Plan of Care Due: 9/9/2022  Visit # / Visits authorized: 8/12  PTA Visit #: 1/5     Time In: 10:00 am  Time Out: 10:46 am   Total Billable Time: 46 minutes     SUBJECTIVE     Pt reports: "I am feeling better today, I finally got some sleep"   She was compliant with home exercise program.  Response to previous treatment: soreness, had to take a muscle relaxer.  Functional change: n/a    Pain: 7/10  Location: bilateral back      OBJECTIVE     Objective Measures updated at progress report unless specified.     Treatment     Mary received the treatments listed below:      Mary received therapeutic exercises to develop strength, flexibility, posture, and core stabilization for 13 minutes including:     Bike: level 1; 5 minutes  Calf stretch on slant board: 2 minutes  Lower trunk rotation stretch: x 20 reps each with 10 second holds       Mary participated in neuromuscular re-education activities to improve: Coordination and Posture for 10 minutes. The following activities were included:     Posterior pelvic tilt: 15 x 5 second holds  Bridges with posterior pelvic tilt: x 15 reps     Mary received the following manual therapy techniques: Joint mobilizations, Myofacial release, and Soft tissue Mobilization were applied for 13 minutes, including:     Manual piriformis stretch: 3 x 30 second holds each  Manual single knee to chest stretch: 3 x 30 second holds each  Manual lumbar P/A mobilizations  Myofascial release of lumbar musculature     Manual " double knee to chest stretch  Manual sciatic nerve floss with ankle pump bilateral (not performed)     Mary received the following supervised modalities after being cleared for contradictions: premodulated IFC Electrical Stimulation: Mary received IFC Electrical Stimulation for pain control applied to the lumbar region. Pt received stimulation at a frequency for 10 minutes. Mary tolderated treatment well without any adverse effects.       Mary received hot pack for 10 minutes to the lumbar region in prone in conjunction with premodulated IFC electrical stimulation.    Patient Education and Home Exercises     Home Exercises Provided and Patient Education Provided     Education provided:   - Patient educated on the importance of completing skilled physical therapy treatment and home exercise program as prescribed to maximize functional gains     Written Home Exercises Provided: Patient instructed to cont prior HEP. Exercises were reviewed and Mary was able to demonstrate them prior to the end of the session.  Mary demonstrated good  understanding of the education provided. See EMR under Patient Instructions for exercises provided during therapy sessions    ASSESSMENT     Pt arrived with 7/10 pain today across bilateral lumbar region. This is an improvement form last tx where she rated her p! 8/10. Pt did report sleeping better which has also been an improvement. Patient with good tolerance of all stretching and manual therapy. Patient reported a reduction in low back pain following physical therapy treatment including premodulated IFC electrical stimulation and moist heat. Patient has made very little progress towards her goals via skilled physical therapy intervention. Patient advised to call Dr. Jimenez' office following her last physical therapy treatment session on 9/8/2022 to schedule a follow-up appointment.  Will continue to progress therapeutic exercise, neuromuscular re-education, and therapeutic  activities with modalities and manual therapy utilized as needed.     PMH:  Mary is a 41 y.o. female referred to outpatient physical therapy with a medical diagnosis of M47.816 (ICD-10-CM) - Lumbar spondylosis. Pt presents to PT with low back and bilateral upper trap pain, tenderness on palpation of lumbar spinous processes and sacrum, mild bilateral lower extremity weakness, and core weakness/instability.       Mary Is progressing towards her goals.   Pt prognosis is Good.     Pt will continue to benefit from skilled outpatient physical therapy to address the deficits listed in the problem list box on initial evaluation, provide pt/family education and to maximize pt's level of independence in the home and community environment.     Pt's spiritual, cultural and educational needs considered and pt agreeable to plan of care and goals.    Anticipated Barriers for therapy: compliance with home exercise program; chronicity of patient's condition     Goals:      Short Term Goals:  Patient will demonstrate independence with home exercise program to ensure carryover of treatment. [met]  Patient will improve bilateral lower extremity strength to 5/5 to improve independence and safety with bed mobility, transfers, and gait. [Not met: grossly 4+/5]  Patient will report ability to stand/walk for 30 minutes without needing to sit secondary to low back pain. [Not met: 15 minutes]  Patient will report a reduction in current low back pain from 8/10 to 6/10 to improve quality of life. [Not met: 7/10 on 8/30/2022]  Patient will report ability to sweep/mop half of her house with 2 sitting rest breaks secondary to low back pain. [Not met: unable to do at the present time]     Long Term Goals:  Patient will report ability to stand/walk for 1 hour without needing to sit secondary to low back pain. [Not met: 15 minutes]  Patient will report a reduction in current low back pain from 8/10 to 4/10 to improve quality of life. [Not met:  7/10 on 8/30/2022]  Patient will demonstrate ability to complete all therapeutic exercise during a 45 minute physical therapy treatment session with proper body mechanics to ensure safety and prevent further injury. [Not met]  Patient will report ability to sweep/mob her entire house with only one sitting rest break secondary to low back pain. [Not met: unable to do a the present time]  PLAN     Plan of Care Certification Period: 8/30/2022 to 9/9/2022  Recommended Treatment Plan: 2 times per week for 2 weeks (3 visits): Aquatic Therapy, Electrical Stimulation (IFC/premodulated IFC), Manual Therapy, Moist Heat/ Ice, Neuromuscular Re-ed, Patient Education, Therapeutic Activities, and Therapeutic Exercise  Other Recommendations: Patient would benefit from a follow-up appointment with her referring provider as skilled physical therapy intervention has not decreased the severity of her symptoms.    Angel Spence, PTA      9/1/2022

## 2022-09-06 ENCOUNTER — CLINICAL SUPPORT (OUTPATIENT)
Dept: REHABILITATION | Facility: HOSPITAL | Age: 42
End: 2022-09-06
Attending: PAIN MEDICINE
Payer: MEDICAID

## 2022-09-06 DIAGNOSIS — M47.816 LUMBAR SPONDYLOSIS: Primary | ICD-10-CM

## 2022-09-06 PROCEDURE — 97140 MANUAL THERAPY 1/> REGIONS: CPT | Mod: CQ

## 2022-09-06 PROCEDURE — 97112 NEUROMUSCULAR REEDUCATION: CPT | Mod: CQ

## 2022-09-06 PROCEDURE — 97110 THERAPEUTIC EXERCISES: CPT | Mod: CQ

## 2022-09-06 NOTE — PROGRESS NOTES
"[facility]   Physical Therapy Treatment Note     Name: Mary Magana  Clinic Number: [id number]  Physician: Fatoumata Jimenez MD  Visit Date: 9/6/2022  Encounter Diagnosis   Name Primary?    Lumbar spondylosis Yes      Physician Orders: PT Eval and Treat  Medical Diagnosis from Referral: M47.816 (ICD-10-CM) - Lumbar spondylosis  Evaluation Date: 7/27/2022  Authorization Period Expiration: 9/9/2022  Plan of Care Expiration: 9/9/2022  Updated Plan of Care Due: 9/9/2022  Visit # / Visits authorized: 9/12  PTA Visit #: 2/5     Time In: 10:02 am  Time Out: 10:47 am   Total Billable Time: 45  minutes     SUBJECTIVE     Pt reports: "I slept for two days after last time"   She was compliant with home exercise program.  Response to previous treatment: soreness, had to take a muscle relaxer.  Functional change: n/a    Pain: 6/10  Location: bilateral back      OBJECTIVE     Objective Measures updated at progress report unless specified.     Treatment     Mary received the treatments listed below:      Mary received therapeutic exercises to develop strength, flexibility, posture, and core stabilization for 10 minutes including:     Bike: level 1; 5 minutes  Calf stretch on slant board: 2 minutes  Lower trunk rotation stretch: x 20 reps each with 10 second holds  Bilateral hamstring stretch on stairs 3 x 15 second hold ea     Mary participated in neuromuscular re-education activities to improve: Coordination and Posture for 10 minutes. The following activities were included:     Posterior pelvic tilt: 15 x 5 second holds  Bridges with posterior pelvic tilt: x 15 reps     Mary received the following manual therapy techniques: Joint mobilizations, Myofacial release, and Soft tissue Mobilization were applied for 15 minutes, including:  manual LTR bilateral   Manual piriformis stretch: 3 x 30 second holds each  Manual single knee to chest stretch: 3 x 30 second holds each  Manual lumbar P/A " mobilizations NTV  Myofascial release of lumbar musculature NTV  Manual double knee to chest stretch  Manual sciatic nerve floss with ankle pump bilateral (not performed)     Mary received the following supervised modalities after being cleared for contradictions: premodulated IFC Electrical Stimulation: Mary received IFC Electrical Stimulation for pain control applied to the lumbar region. Pt received stimulation at a frequency for 10 minutes. Mary tolderated treatment well without any adverse effects.       Mary received hot pack for 10 minutes to the lumbar region in prone in conjunction with premodulated IFC electrical stimulation.    Patient Education and Home Exercises     Home Exercises Provided and Patient Education Provided     Education provided:   - Patient educated on the importance of completing skilled physical therapy treatment and home exercise program as prescribed to maximize functional gains     Written Home Exercises Provided: Patient instructed to cont prior HEP. Exercises were reviewed and Mary was able to demonstrate them prior to the end of the session.  Mary demonstrated good  understanding of the education provided. See EMR under Patient Instructions for exercises provided during therapy sessions    ASSESSMENT     Pt arrived with 6/10 pain today across bilateral lumbar region. This is an improvement form last tx where she rated her p! 7/10. Pt did report sleeping better which has also been an improvement. Patient with good tolerance of all stretching and manual therapy but has increased pain with forward flexion during seated piriformis stretch. Pt can tolerate this in supine but forward flexion increases p! Pt expresses she is unable to lean over the sink to wash her hair since her p! Increases with forward movement. Patient reported a reduction in low back pain following physical therapy treatment including premodulated IFC electrical stimulation and moist heat. Patient has  made very little progress towards her goals via skilled physical therapy intervention. Patient advised to call Dr. Jimenez' office following her last physical therapy treatment session on 9/8/2022 to schedule a follow-up appointment.  Will continue to progress therapeutic exercise, neuromuscular re-education, and therapeutic activities with modalities and manual therapy utilized as needed.     PMH:  Mary is a 41 y.o. female referred to outpatient physical therapy with a medical diagnosis of M47.816 (ICD-10-CM) - Lumbar spondylosis. Pt presents to PT with low back and bilateral upper trap pain, tenderness on palpation of lumbar spinous processes and sacrum, mild bilateral lower extremity weakness, and core weakness/instability.       Mary Is progressing towards her goals.   Pt prognosis is Good.     Pt will continue to benefit from skilled outpatient physical therapy to address the deficits listed in the problem list box on initial evaluation, provide pt/family education and to maximize pt's level of independence in the home and community environment.     Pt's spiritual, cultural and educational needs considered and pt agreeable to plan of care and goals.    Anticipated Barriers for therapy: compliance with home exercise program; chronicity of patient's condition     Goals:      Short Term Goals:  Patient will demonstrate independence with home exercise program to ensure carryover of treatment. [met]  Patient will improve bilateral lower extremity strength to 5/5 to improve independence and safety with bed mobility, transfers, and gait. [Not met: grossly 4+/5]  Patient will report ability to stand/walk for 30 minutes without needing to sit secondary to low back pain. [Not met: 15 minutes]  Patient will report a reduction in current low back pain from 8/10 to 6/10 to improve quality of life. [Not met: 7/10 on 8/30/2022]  Patient will report ability to sweep/mop half of her house with 2 sitting rest breaks secondary  to low back pain. [Not met: unable to do at the present time]     Long Term Goals:  Patient will report ability to stand/walk for 1 hour without needing to sit secondary to low back pain. [Not met: 15 minutes]  Patient will report a reduction in current low back pain from 8/10 to 4/10 to improve quality of life. [Not met: 7/10 on 8/30/2022]  Patient will demonstrate ability to complete all therapeutic exercise during a 45 minute physical therapy treatment session with proper body mechanics to ensure safety and prevent further injury. [Not met]  Patient will report ability to sweep/mob her entire house with only one sitting rest break secondary to low back pain. [Not met: unable to do a the present time]  PLAN     Plan of Care Certification Period: 8/30/2022 to 9/9/2022  Recommended Treatment Plan: 2 times per week for 2 weeks (3 visits): Aquatic Therapy, Electrical Stimulation (IFC/premodulated IFC), Manual Therapy, Moist Heat/ Ice, Neuromuscular Re-ed, Patient Education, Therapeutic Activities, and Therapeutic Exercise  Other Recommendations: Patient would benefit from a follow-up appointment with her referring provider as skilled physical therapy intervention has not decreased the severity of her symptoms.    Angel Spence, PTA      9/6/2022

## 2022-09-07 NOTE — PROGRESS NOTES
"[facility]   Physical Therapy Discharge Summary    Name: Mary Magana  Clinic Number: [id number]    Therapy Diagnosis:  Encounter Diagnosis   Name Primary?    Lumbar spondylosis Yes   Physician: Fatoumata iJmenez MD    Visit Date: 9/8/2022    Physician Orders: PT Eval and Treat  Medical Diagnosis from Referral: M47.816 (ICD-10-CM) - Lumbar spondylosis  Evaluation Date: 7/27/2022  Authorization Period Expiration: 9/9/2022  Plan of Care Expiration: 9/9/2022  Updated Plan of Care Due: 9/9/2022  Visit # / Visits authorized: 9/12 ***    Time In: ***  Time Out: ***  Total Billable Time: *** minutes     SUBJECTIVE     Pt reports: "I slept for two days after last time"  ***    She was compliant with home exercise program.  Response to previous treatment: soreness, had to take a muscle relaxer. ***    Pain: 6/10 ***  Location: bilateral back      OBJECTIVE     Objective Measures updated at progress report unless specified.     Treatment     Mary received the treatments listed below:      Mary received therapeutic exercises to develop strength, flexibility, posture, and core stabilization for *** minutes including:     Bike: level 1; 5 minutes  Calf stretch on slant board: 2 minutes  Lower trunk rotation stretch: x 20 reps each with 10 second holds  Bilateral hamstring stretch on stairs 3 x 15 second hold ea     Mary participated in neuromuscular re-education activities to improve: Coordination and Posture for *** minutes. The following activities were included:     Posterior pelvic tilt: 15 x 5 second holds  Bridges with posterior pelvic tilt: x 15 reps     Mary received the following manual therapy techniques: Joint mobilizations, Myofacial release, and Soft tissue Mobilization were applied for *** minutes, including:    manual LTR bilateral   Manual piriformis stretch: 3 x 30 second holds each  Manual single knee to chest stretch: 3 x 30 second holds each  Manual lumbar P/A " mobilizations NTV  Myofascial release of lumbar musculature NTV  Manual double knee to chest stretch  Manual sciatic nerve floss with ankle pump bilateral (not performed)     Mary received the following supervised modalities after being cleared for contradictions: premodulated IFC Electrical Stimulation: Mary received IFC Electrical Stimulation for pain control applied to the lumbar region. Pt received stimulation at a frequency for *** minutes. Mary tolderated treatment well without any adverse effects.       Mary received hot pack for *** minutes to the lumbar region in prone in conjunction with premodulated IFC electrical stimulation.    Patient Education and Home Exercises     Home Exercises Provided and Patient Education Provided     Education provided:   - Patient educated on the importance of completing skilled physical therapy treatment and home exercise program as prescribed to maximize functional gains ***    Written Home Exercises Provided: Patient instructed to cont prior HEP. Exercises were reviewed and Mary was able to demonstrate them prior to the end of the session.  Mary demonstrated good  understanding of the education provided. See EMR under Patient Instructions for exercises provided during therapy sessions. ***    ASSESSMENT     Pt arrived with 6/10 pain today across bilateral lumbar region. This is an improvement form last tx where she rated her p! 7/10. Pt did report sleeping better which has also been an improvement. Patient with good tolerance of all stretching and manual therapy but has increased pain with forward flexion during seated piriformis stretch. Pt can tolerate this in supine but forward flexion increases p! Pt expresses she is unable to lean over the sink to wash her hair since her p! Increases with forward movement. Patient reported a reduction in low back pain following physical therapy treatment including premodulated IFC electrical stimulation and moist heat.  Patient has made very little progress towards her goals via skilled physical therapy intervention. Patient advised to call Dr. Jimenez' office following her last physical therapy treatment session on 9/8/2022 to schedule a follow-up appointment.  Will continue to progress therapeutic exercise, neuromuscular re-education, and therapeutic activities with modalities and manual therapy utilized as needed. ***    PMH:  Mary is a 41 y.o. female referred to outpatient physical therapy with a medical diagnosis of M47.816 (ICD-10-CM) - Lumbar spondylosis. Pt presents to PT with low back and bilateral upper trap pain, tenderness on palpation of lumbar spinous processes and sacrum, mild bilateral lower extremity weakness, and core weakness/instability.       Mary Is progressing towards her goals.   Pt prognosis is Good.     Pt will continue to benefit from skilled outpatient physical therapy to address the deficits listed in the problem list box on initial evaluation, provide pt/family education and to maximize pt's level of independence in the home and community environment.     Pt's spiritual, cultural and educational needs considered and pt agreeable to plan of care and goals.    Anticipated Barriers for therapy: compliance with home exercise program; chronicity of patient's condition     Goals:      Short Term Goals:  Patient will demonstrate independence with home exercise program to ensure carryover of treatment. [met]  Patient will improve bilateral lower extremity strength to 5/5 to improve independence and safety with bed mobility, transfers, and gait. [Not met: grossly 4+/5]  Patient will report ability to stand/walk for 30 minutes without needing to sit secondary to low back pain. [Not met: 15 minutes]  Patient will report a reduction in current low back pain from 8/10 to 6/10 to improve quality of life. [Not met: 7/10 on 8/30/2022]  Patient will report ability to sweep/mop half of her house with 2 sitting rest  breaks secondary to low back pain. [Not met: unable to do at the present time]     Long Term Goals:  Patient will report ability to stand/walk for 1 hour without needing to sit secondary to low back pain. [Not met: 15 minutes]  Patient will report a reduction in current low back pain from 8/10 to 4/10 to improve quality of life. [Not met: 7/10 on 8/30/2022]  Patient will demonstrate ability to complete all therapeutic exercise during a 45 minute physical therapy treatment session with proper body mechanics to ensure safety and prevent further injury. [Not met]  Patient will report ability to sweep/mob her entire house with only one sitting rest break secondary to low back pain. [Not met: unable to do a the present time]    Discharge reason: {DESC THERAPY DISCHARGE:85029}    Plan     This patient is discharged from Physical Therapy.    Ronn Elizalde, PT, DPT  9/8/2022

## 2022-09-08 ENCOUNTER — CLINICAL SUPPORT (OUTPATIENT)
Dept: REHABILITATION | Facility: HOSPITAL | Age: 42
End: 2022-09-08
Attending: PAIN MEDICINE
Payer: MEDICAID

## 2022-09-08 DIAGNOSIS — M47.816 LUMBAR SPONDYLOSIS: Primary | ICD-10-CM

## 2022-09-08 PROCEDURE — 97010 HOT OR COLD PACKS THERAPY: CPT

## 2022-09-08 PROCEDURE — 97112 NEUROMUSCULAR REEDUCATION: CPT

## 2022-09-08 PROCEDURE — 97014 ELECTRIC STIMULATION THERAPY: CPT

## 2022-09-08 PROCEDURE — 97110 THERAPEUTIC EXERCISES: CPT

## 2022-09-08 NOTE — PLAN OF CARE
"[facility]   Physical Therapy Discharge Summary    Name: Mary Magana  Clinic Number: [id number]    Therapy Diagnosis:  Encounter Diagnosis   Name Primary?    Lumbar spondylosis Yes   Physician: Fatoumata Jimenez MD    Visit Date: 9/8/2022    Physician Orders: PT Eval and Treat  Medical Diagnosis from Referral: M47.816 (ICD-10-CM) - Lumbar spondylosis  Evaluation Date: 7/27/2022  Authorization Period Expiration: 9/9/2022  Plan of Care Expiration: 9/9/2022  Updated Plan of Care Due: 9/9/2022  Visit # / Visits authorized: 10/12    Time In: 1100  Time Out: 1151  Total Billable Time: 51 minutes     SUBJECTIVE     Pt reports: "I am having some pain today."    She was compliant with home exercise program.  Response to previous treatment: "I felt much better. I am always able to sleep a whole day."    Pain: 8/10  Location: bilateral back      OBJECTIVE     Objective Measures updated at progress report unless specified.     Treatment     Mary received the treatments listed below:      Mary received therapeutic exercises to develop strength, flexibility, posture, and core stabilization for 23 minutes including:     NuStep: level 5; 7 minutes  Calf stretch on slant board: 2 minutes  Hamstring stretch on steps: 3 x 30 second holds each  Lower trunk rotation stretch: x 20 reps each with 10 second holds  Piriformis stretch in supine (figure 4): 3 x 30 second holds each     Mary participated in neuromuscular re-education activities to improve: Coordination and Posture for 8 minutes. The following activities were included:     Posterior pelvic tilt: 15 x 5 second holds  Bridges with posterior pelvic tilt: x 15 reps     Mary received the following supervised modalities after being cleared for contradictions: premodulated IFC Electrical Stimulation: Mary received IFC Electrical Stimulation for pain control applied to the lumbar region. Pt received stimulation at a frequency for 20 minutes. " Mary tolderated treatment well without any adverse effects.       Mary received hot pack for 20 minutes to the lumbar region in prone in conjunction with premodulated IFC electrical stimulation.    Patient Education and Home Exercises     Home Exercises Provided and Patient Education Provided    Education provided: Patient educated on the importance of completing home exercise program daily to maintain adequate muscle length and assist with pain management.    Written Home Exercises Provided: Patient instructed to cont prior HEP. Exercises were reviewed and Mary was able to demonstrate them prior to the end of the session.  Mary demonstrated good  understanding of the education provided. See EMR under Patient Instructions for exercises provided during therapy sessions.    ASSESSMENT     Patient has completed 6 weeks of physical therapy intervention as prescribed by referring MD. Patient reports the same level of low back pain today (8/10) as she did on initial evaluation (7/27/2022). Patient has made very little progress with skilled physical therapy intervention and would benefit from further evaluation by referring MD. Patient will be discharged from skilled physical therapy treatment with a home exercise program for continued lumbar strengthening/stretching.    PMH:  Mary is a 41 y.o. female referred to outpatient physical therapy with a medical diagnosis of M47.816 (ICD-10-CM) - Lumbar spondylosis. Pt presents to PT with low back and bilateral upper trap pain, tenderness on palpation of lumbar spinous processes and sacrum, mild bilateral lower extremity weakness, and core weakness/instability.    Pt's spiritual, cultural and educational needs considered and pt agreeable to plan of care and goals.    Anticipated Barriers for therapy: compliance with home exercise program; chronicity of patient's condition     Goals:      Short Term Goals:  Patient will demonstrate independence with home exercise program  to ensure carryover of treatment. [met]  Patient will improve bilateral lower extremity strength to 5/5 to improve independence and safety with bed mobility, transfers, and gait. [Not met: grossly 4+/5]  Patient will report ability to stand/walk for 30 minutes without needing to sit secondary to low back pain. [Not met: 15 minutes]  Patient will report a reduction in current low back pain from 8/10 to 6/10 to improve quality of life. [Not met: 8/10 on 9/7/2022]  Patient will report ability to sweep/mop half of her house with 2 sitting rest breaks secondary to low back pain. [Not met: unable to do at the present time]     Long Term Goals:  Patient will report ability to stand/walk for 1 hour without needing to sit secondary to low back pain. [Not met: 15 minutes]  Patient will report a reduction in current low back pain from 8/10 to 4/10 to improve quality of life. [Not met: 8/10 on 9/7/2022]  Patient will demonstrate ability to complete all therapeutic exercise during a 45 minute physical therapy treatment session with proper body mechanics to ensure safety and prevent further injury. [Not met]  Patient will report ability to sweep/mob her entire house with only one sitting rest break secondary to low back pain. [Not met: unable to do a the present time]    Discharge reason: Patient has completed the physician's prescription.    Plan     This patient is discharged from Physical Therapy.    Ronn Elizalde, PT, DPT  9/8/2022

## 2022-09-16 ENCOUNTER — OFFICE VISIT (OUTPATIENT)
Dept: FAMILY MEDICINE | Facility: CLINIC | Age: 42
End: 2022-09-16
Payer: MEDICAID

## 2022-09-16 VITALS
OXYGEN SATURATION: 95 % | TEMPERATURE: 97 F | HEART RATE: 77 BPM | SYSTOLIC BLOOD PRESSURE: 128 MMHG | WEIGHT: 256 LBS | HEIGHT: 62 IN | RESPIRATION RATE: 18 BRPM | DIASTOLIC BLOOD PRESSURE: 82 MMHG | BODY MASS INDEX: 47.11 KG/M2

## 2022-09-16 DIAGNOSIS — Z12.4 CERVICAL CANCER SCREENING: ICD-10-CM

## 2022-09-16 DIAGNOSIS — R74.8 ELEVATED LIVER ENZYMES: ICD-10-CM

## 2022-09-16 DIAGNOSIS — E03.9 HYPOTHYROIDISM, UNSPECIFIED TYPE: ICD-10-CM

## 2022-09-16 DIAGNOSIS — E13.9 DIABETES 1.5, MANAGED AS TYPE 2: Primary | ICD-10-CM

## 2022-09-16 DIAGNOSIS — E78.5 HYPERLIPIDEMIA, UNSPECIFIED HYPERLIPIDEMIA TYPE: ICD-10-CM

## 2022-09-16 DIAGNOSIS — M46.1 SACROILIITIS: Chronic | ICD-10-CM

## 2022-09-16 DIAGNOSIS — I10 HYPERTENSION, UNSPECIFIED TYPE: ICD-10-CM

## 2022-09-16 DIAGNOSIS — M47.816 LUMBAR SPONDYLOSIS: Chronic | ICD-10-CM

## 2022-09-16 DIAGNOSIS — D64.9 ANEMIA, UNSPECIFIED TYPE: ICD-10-CM

## 2022-09-16 LAB
ALBUMIN SERPL BCP-MCNC: 3.5 G/DL (ref 3.5–5)
ALBUMIN/GLOB SERPL: 0.9 {RATIO}
ALP SERPL-CCNC: 140 U/L (ref 37–98)
ALT SERPL W P-5'-P-CCNC: 76 U/L (ref 13–56)
ANION GAP SERPL CALCULATED.3IONS-SCNC: 11 MMOL/L (ref 7–16)
AST SERPL W P-5'-P-CCNC: 41 U/L (ref 15–37)
BASOPHILS # BLD AUTO: 0.05 K/UL (ref 0–0.2)
BASOPHILS NFR BLD AUTO: 0.7 % (ref 0–1)
BILIRUB SERPL-MCNC: 0.5 MG/DL (ref ?–1.2)
BUN SERPL-MCNC: 8 MG/DL (ref 7–18)
BUN/CREAT SERPL: 8 (ref 6–20)
CALCIUM SERPL-MCNC: 8.7 MG/DL (ref 8.5–10.1)
CHLORIDE SERPL-SCNC: 107 MMOL/L (ref 98–107)
CHOLEST SERPL-MCNC: 116 MG/DL (ref 0–200)
CHOLEST/HDLC SERPL: 2.9 {RATIO}
CO2 SERPL-SCNC: 26 MMOL/L (ref 21–32)
CREAT SERPL-MCNC: 0.99 MG/DL (ref 0.55–1.02)
DIFFERENTIAL METHOD BLD: ABNORMAL
EGFR (NO RACE VARIABLE) (RUSH/TITUS): 74 ML/MIN/1.73M²
EOSINOPHIL # BLD AUTO: 0.25 K/UL (ref 0–0.5)
EOSINOPHIL NFR BLD AUTO: 3.7 % (ref 1–4)
ERYTHROCYTE [DISTWIDTH] IN BLOOD BY AUTOMATED COUNT: 14.6 % (ref 11.5–14.5)
EST. AVERAGE GLUCOSE BLD GHB EST-MCNC: 114 MG/DL
GLOBULIN SER-MCNC: 3.8 G/DL (ref 2–4)
GLUCOSE SERPL-MCNC: 94 MG/DL (ref 74–106)
HBA1C MFR BLD HPLC: 6 % (ref 4.5–6.6)
HCT VFR BLD AUTO: 38.4 % (ref 38–47)
HDLC SERPL-MCNC: 40 MG/DL (ref 40–60)
HGB BLD-MCNC: 11.5 G/DL (ref 12–16)
HYPOCHROMIA BLD QL SMEAR: NORMAL
IMM GRANULOCYTES # BLD AUTO: 0.02 K/UL (ref 0–0.04)
IMM GRANULOCYTES NFR BLD: 0.3 % (ref 0–0.4)
LDLC SERPL CALC-MCNC: 62 MG/DL
LDLC/HDLC SERPL: 1.6 {RATIO}
LYMPHOCYTES # BLD AUTO: 2.66 K/UL (ref 1–4.8)
LYMPHOCYTES NFR BLD AUTO: 39 % (ref 27–41)
MCH RBC QN AUTO: 21.7 PG (ref 27–31)
MCHC RBC AUTO-ENTMCNC: 29.9 G/DL (ref 32–36)
MCV RBC AUTO: 72.3 FL (ref 80–96)
MICROCYTES BLD QL SMEAR: NORMAL
MONOCYTES # BLD AUTO: 0.5 K/UL (ref 0–0.8)
MONOCYTES NFR BLD AUTO: 7.3 % (ref 2–6)
MPC BLD CALC-MCNC: 11.8 FL (ref 9.4–12.4)
NEUTROPHILS # BLD AUTO: 3.34 K/UL (ref 1.8–7.7)
NEUTROPHILS NFR BLD AUTO: 49 % (ref 53–65)
NONHDLC SERPL-MCNC: 76 MG/DL
NRBC # BLD AUTO: 0 X10E3/UL
NRBC, AUTO (.00): 0 %
PLATELET # BLD AUTO: 260 K/UL (ref 150–400)
PLATELET MORPHOLOGY: NORMAL
POTASSIUM SERPL-SCNC: 3.9 MMOL/L (ref 3.5–5.1)
PROT SERPL-MCNC: 7.3 G/DL (ref 6.4–8.2)
RBC # BLD AUTO: 5.31 M/UL (ref 4.2–5.4)
SODIUM SERPL-SCNC: 140 MMOL/L (ref 136–145)
T4 FREE SERPL-MCNC: 0.78 NG/DL (ref 0.76–1.46)
TRIGL SERPL-MCNC: 68 MG/DL (ref 35–150)
TSH SERPL DL<=0.005 MIU/L-ACNC: 11.8 UIU/ML (ref 0.36–3.74)
VLDLC SERPL-MCNC: 14 MG/DL
WBC # BLD AUTO: 6.82 K/UL (ref 4.5–11)

## 2022-09-16 PROCEDURE — 85025 COMPLETE CBC W/AUTO DIFF WBC: CPT | Mod: ,,, | Performed by: CLINICAL MEDICAL LABORATORY

## 2022-09-16 PROCEDURE — 85025 CBC WITH DIFFERENTIAL: ICD-10-PCS | Mod: ,,, | Performed by: CLINICAL MEDICAL LABORATORY

## 2022-09-16 PROCEDURE — 99214 OFFICE O/P EST MOD 30 MIN: CPT | Mod: ,,, | Performed by: FAMILY MEDICINE

## 2022-09-16 PROCEDURE — 80053 COMPREHEN METABOLIC PANEL: CPT | Mod: ,,, | Performed by: CLINICAL MEDICAL LABORATORY

## 2022-09-16 PROCEDURE — 80053 COMPREHENSIVE METABOLIC PANEL: ICD-10-PCS | Mod: ,,, | Performed by: CLINICAL MEDICAL LABORATORY

## 2022-09-16 PROCEDURE — 3074F SYST BP LT 130 MM HG: CPT | Mod: CPTII,,, | Performed by: FAMILY MEDICINE

## 2022-09-16 PROCEDURE — 84443 TSH: ICD-10-PCS | Mod: ,,, | Performed by: CLINICAL MEDICAL LABORATORY

## 2022-09-16 PROCEDURE — 3044F PR MOST RECENT HEMOGLOBIN A1C LEVEL <7.0%: ICD-10-PCS | Mod: CPTII,,, | Performed by: FAMILY MEDICINE

## 2022-09-16 PROCEDURE — 3008F BODY MASS INDEX DOCD: CPT | Mod: CPTII,,, | Performed by: FAMILY MEDICINE

## 2022-09-16 PROCEDURE — 3074F PR MOST RECENT SYSTOLIC BLOOD PRESSURE < 130 MM HG: ICD-10-PCS | Mod: CPTII,,, | Performed by: FAMILY MEDICINE

## 2022-09-16 PROCEDURE — 84439 T4, FREE: ICD-10-PCS | Mod: ,,, | Performed by: CLINICAL MEDICAL LABORATORY

## 2022-09-16 PROCEDURE — 84443 ASSAY THYROID STIM HORMONE: CPT | Mod: ,,, | Performed by: CLINICAL MEDICAL LABORATORY

## 2022-09-16 PROCEDURE — 3044F HG A1C LEVEL LT 7.0%: CPT | Mod: CPTII,,, | Performed by: FAMILY MEDICINE

## 2022-09-16 PROCEDURE — 99214 PR OFFICE/OUTPT VISIT, EST, LEVL IV, 30-39 MIN: ICD-10-PCS | Mod: ,,, | Performed by: FAMILY MEDICINE

## 2022-09-16 PROCEDURE — 3008F PR BODY MASS INDEX (BMI) DOCUMENTED: ICD-10-PCS | Mod: CPTII,,, | Performed by: FAMILY MEDICINE

## 2022-09-16 PROCEDURE — 80061 LIPID PANEL: CPT | Mod: ,,, | Performed by: CLINICAL MEDICAL LABORATORY

## 2022-09-16 PROCEDURE — 80061 LIPID PANEL: ICD-10-PCS | Mod: ,,, | Performed by: CLINICAL MEDICAL LABORATORY

## 2022-09-16 PROCEDURE — 1160F PR REVIEW ALL MEDS BY PRESCRIBER/CLIN PHARMACIST DOCUMENTED: ICD-10-PCS | Mod: CPTII,,, | Performed by: FAMILY MEDICINE

## 2022-09-16 PROCEDURE — 1160F RVW MEDS BY RX/DR IN RCRD: CPT | Mod: CPTII,,, | Performed by: FAMILY MEDICINE

## 2022-09-16 PROCEDURE — 83036 HEMOGLOBIN A1C: ICD-10-PCS | Mod: ,,, | Performed by: CLINICAL MEDICAL LABORATORY

## 2022-09-16 PROCEDURE — 1159F PR MEDICATION LIST DOCUMENTED IN MEDICAL RECORD: ICD-10-PCS | Mod: CPTII,,, | Performed by: FAMILY MEDICINE

## 2022-09-16 PROCEDURE — 84439 ASSAY OF FREE THYROXINE: CPT | Mod: ,,, | Performed by: CLINICAL MEDICAL LABORATORY

## 2022-09-16 PROCEDURE — 83036 HEMOGLOBIN GLYCOSYLATED A1C: CPT | Mod: ,,, | Performed by: CLINICAL MEDICAL LABORATORY

## 2022-09-16 PROCEDURE — 3079F DIAST BP 80-89 MM HG: CPT | Mod: CPTII,,, | Performed by: FAMILY MEDICINE

## 2022-09-16 PROCEDURE — 1159F MED LIST DOCD IN RCRD: CPT | Mod: CPTII,,, | Performed by: FAMILY MEDICINE

## 2022-09-16 PROCEDURE — 3079F PR MOST RECENT DIASTOLIC BLOOD PRESSURE 80-89 MM HG: ICD-10-PCS | Mod: CPTII,,, | Performed by: FAMILY MEDICINE

## 2022-09-16 RX ORDER — METOPROLOL TARTRATE 25 MG/1
25 TABLET, FILM COATED ORAL 2 TIMES DAILY
Qty: 60 TABLET | Refills: 5 | Status: SHIPPED | OUTPATIENT
Start: 2022-09-16 | End: 2022-12-16 | Stop reason: SDUPTHER

## 2022-09-16 RX ORDER — LEVOTHYROXINE SODIUM 50 UG/1
TABLET ORAL
Qty: 90 TABLET | Refills: 1 | Status: SHIPPED | OUTPATIENT
Start: 2022-09-16 | End: 2022-10-10

## 2022-09-16 RX ORDER — LIRAGLUTIDE 6 MG/ML
INJECTION SUBCUTANEOUS
Qty: 6 ML | Refills: 5 | Status: SHIPPED | OUTPATIENT
Start: 2022-09-16 | End: 2023-03-16 | Stop reason: SDUPTHER

## 2022-09-16 RX ORDER — TIZANIDINE 4 MG/1
4 TABLET ORAL 3 TIMES DAILY
Qty: 90 TABLET | Refills: 0 | Status: SHIPPED | OUTPATIENT
Start: 2022-09-16 | End: 2022-10-16

## 2022-09-16 NOTE — PROGRESS NOTES
Mary Magana is a 41 y.o. female seen today for follow-up on her diabetes hypertension and hypothyroidism.  Patient reports her blood sugars and blood pressures have been well controlled and she has had no chest pain or shortness of breath.  The patient's last TSH was elevated and she does  need follow-up lab work today.  She is up-to-date on her mammogram and diabetic eye exam.    Past Medical History:   Diagnosis Date    Diabetes mellitus     Hypertension     Thyroid disease     WPW (Ilana-Parkinson-White syndrome)      Family History   Problem Relation Age of Onset    Hypertension Mother     Diabetes Mother     Diabetes Father      Current Outpatient Medications on File Prior to Visit   Medication Sig Dispense Refill    aspirin (ECOTRIN) 81 MG EC tablet TAKE 1 TABLET BY MOUTH DAILY WITH FOOD 30 tablet 5    naproxen (NAPROSYN) 500 MG tablet Take 1 tablet (500 mg total) by mouth 2 (two) times daily. 60 tablet 0    TRUEPLUS LANCETS 30 gauge Misc AS DIRECTED      [DISCONTINUED] levothyroxine (SYNTHROID) 50 MCG tablet TAKE 1 TABLET BY MOUTH EACH MORNING BEFORE BREAKFAST ...TAKE ON EMPTY STOMACH (DRINK PLENTY OF WATER) 90 tablet 1    [DISCONTINUED] metoprolol tartrate (LOPRESSOR) 25 MG tablet Take 1 tablet (25 mg total) by mouth 2 (two) times daily. 60 tablet 5    [DISCONTINUED] tiZANidine (ZANAFLEX) 4 MG tablet Take 1 tablet (4 mg total) by mouth 3 (three) times daily. 90 tablet 0    [DISCONTINUED] VICTOZA 2-JAMESON 0.6 mg/0.1 mL (18 mg/3 mL) PnIj pen INJECT 1.2 MG SUB-Q EVERY DAY ...KEEP IN REFRIGERATOR 6 mL 5    buPROPion (WELLBUTRIN XL) 150 MG TB24 tablet Take 150 mg by mouth every morning.      clonazePAM (KLONOPIN) 1 MG tablet Take 1 mg by mouth nightly.      [DISCONTINUED] EScitalopram oxalate (LEXAPRO) 10 MG tablet Take 1 tablet (10 mg total) by mouth once daily. (Patient not taking: Reported on 9/16/2022) 30 tablet 11    [DISCONTINUED] gabapentin (NEURONTIN) 100 MG capsule Take 1 capsule (100 mg total)  by mouth every 8 (eight) hours. (Patient not taking: Reported on 9/16/2022) 90 capsule 0    [DISCONTINUED] traZODone (DESYREL) 50 MG tablet Take 1 tablet (50 mg total) by mouth every evening. (Patient not taking: Reported on 9/16/2022) 30 tablet 11     No current facility-administered medications on file prior to visit.       There is no immunization history on file for this patient.    Review of Systems   Constitutional:  Negative for fever, malaise/fatigue and weight loss.   Respiratory:  Negative for shortness of breath.    Cardiovascular:  Negative for chest pain and palpitations.   Gastrointestinal:  Negative for nausea and vomiting.   Psychiatric/Behavioral:  Negative for depression.       Vitals:    09/16/22 1425   BP: 128/82   Pulse: 77   Resp: 18   Temp: 97.3 °F (36.3 °C)       Physical Exam  Vitals reviewed.   Constitutional:       Appearance: Normal appearance.   HENT:      Head: Normocephalic.   Eyes:      Extraocular Movements: Extraocular movements intact.      Conjunctiva/sclera: Conjunctivae normal.      Pupils: Pupils are equal, round, and reactive to light.   Neck:      Thyroid: No thyroid mass or thyromegaly.   Cardiovascular:      Rate and Rhythm: Normal rate and regular rhythm.      Heart sounds: Normal heart sounds. No murmur heard.    No gallop.   Pulmonary:      Effort: Pulmonary effort is normal. No respiratory distress.      Breath sounds: Normal breath sounds. No wheezing or rales.   Skin:     General: Skin is warm and dry.      Coloration: Skin is not jaundiced or pale.   Neurological:      Mental Status: She is alert.   Psychiatric:         Mood and Affect: Mood normal.         Behavior: Behavior normal.         Thought Content: Thought content normal.         Judgment: Judgment normal.        Assessment and Plan  Diabetes 1.5, managed as type 2  -     VICTOZA 2-JAMESON 0.6 mg/0.1 mL (18 mg/3 mL) PnIj pen; INJECT 1.2 MG SUB-Q EVERY DAY ...KEEP IN REFRIGERATOR  Dispense: 6 mL; Refill: 5  -      Hemoglobin A1C; Future; Expected date: 09/16/2022    Lumbar spondylosis  -     tiZANidine (ZANAFLEX) 4 MG tablet; Take 1 tablet (4 mg total) by mouth 3 (three) times daily.  Dispense: 90 tablet; Refill: 0    Sacroiliitis  -     tiZANidine (ZANAFLEX) 4 MG tablet; Take 1 tablet (4 mg total) by mouth 3 (three) times daily.  Dispense: 90 tablet; Refill: 0    Hypothyroidism, unspecified type  -     levothyroxine (SYNTHROID) 50 MCG tablet; TAKE 1 TABLET BY MOUTH EACH MORNING BEFORE BREAKFAST ...TAKE ON EMPTY STOMACH (DRINK PLENTY OF WATER)  Dispense: 90 tablet; Refill: 1  -     TSH; Future; Expected date: 09/16/2022  -     T4, Free; Future; Expected date: 09/16/2022    Hypertension, unspecified type  -     metoprolol tartrate (LOPRESSOR) 25 MG tablet; Take 1 tablet (25 mg total) by mouth 2 (two) times daily.  Dispense: 60 tablet; Refill: 5  -     CBC Auto Differential; Future; Expected date: 09/16/2022  -     Comprehensive Metabolic Panel; Future; Expected date: 09/16/2022    Hyperlipidemia, unspecified hyperlipidemia type  -     Lipid Panel; Future; Expected date: 09/16/2022    Cervical cancer screening  -     Ambulatory referral/consult to Obstetrics / Gynecology; Future; Expected date: 09/23/2022          Return to clinic in 3 months or as needed once her blood work is in..    Health Maintenance Topics with due status: Not Due       Topic Last Completion Date    Lipid Panel 06/03/2022    Hemoglobin A1c 06/03/2022    Foot Exam 06/16/2022    Mammogram 06/24/2022

## 2022-09-19 ENCOUNTER — TELEPHONE (OUTPATIENT)
Dept: FAMILY MEDICINE | Facility: CLINIC | Age: 42
End: 2022-09-19
Payer: MEDICAID

## 2022-09-19 DIAGNOSIS — R74.8 ELEVATED LIVER ENZYMES: ICD-10-CM

## 2022-09-19 DIAGNOSIS — D64.9 ANEMIA, UNSPECIFIED TYPE: Primary | ICD-10-CM

## 2022-09-19 LAB
FERRITIN SERPL-MCNC: 12 NG/ML (ref 8–252)
GGT SERPL-CCNC: 147 U/L (ref 5–55)
IRON SATN MFR SERPL: 18 % (ref 14–50)
IRON SERPL-MCNC: 70 ΜG/DL (ref 50–170)
TIBC SERPL-MCNC: 381 ΜG/DL (ref 250–450)

## 2022-09-19 PROCEDURE — 82728 ASSAY OF FERRITIN: CPT | Mod: ,,, | Performed by: CLINICAL MEDICAL LABORATORY

## 2022-09-19 PROCEDURE — 82977 GAMMA GT: ICD-10-PCS | Mod: ,,, | Performed by: CLINICAL MEDICAL LABORATORY

## 2022-09-19 PROCEDURE — 82728 FERRITIN: ICD-10-PCS | Mod: ,,, | Performed by: CLINICAL MEDICAL LABORATORY

## 2022-09-19 PROCEDURE — 83540 ASSAY OF IRON: CPT | Mod: ,,, | Performed by: CLINICAL MEDICAL LABORATORY

## 2022-09-19 PROCEDURE — 83550 IRON BINDING TEST: CPT | Mod: ,,, | Performed by: CLINICAL MEDICAL LABORATORY

## 2022-09-19 PROCEDURE — 82977 ASSAY OF GGT: CPT | Mod: ,,, | Performed by: CLINICAL MEDICAL LABORATORY

## 2022-09-19 PROCEDURE — 83540 IRON AND TIBC: ICD-10-PCS | Mod: ,,, | Performed by: CLINICAL MEDICAL LABORATORY

## 2022-09-19 PROCEDURE — 83550 IRON AND TIBC: ICD-10-PCS | Mod: ,,, | Performed by: CLINICAL MEDICAL LABORATORY

## 2022-09-19 NOTE — TELEPHONE ENCOUNTER
Pt attempted, no answer, left voicemail to return call. Will add ggt and iron studies. Pt will need to rtc fasting for fract alp.

## 2022-09-19 NOTE — TELEPHONE ENCOUNTER
----- Message from Huber Clayton MD sent at 9/19/2022  7:57 AM CDT -----  Please add a GGT for elevated liver enzymes.  Please add iron studies for anemia.  I recommended fractionated alkaline phosphatase.  Office visit for A1c, TSH, elevated alkaline phosphatase.

## 2022-09-27 ENCOUNTER — TELEPHONE (OUTPATIENT)
Dept: FAMILY MEDICINE | Facility: CLINIC | Age: 42
End: 2022-09-27
Payer: MEDICAID

## 2022-09-27 DIAGNOSIS — R74.8 ELEVATED LIVER ENZYMES: Primary | ICD-10-CM

## 2022-09-27 NOTE — TELEPHONE ENCOUNTER
----- Message from Huber Clayton MD sent at 9/27/2022 10:46 AM CDT -----  Unfortunately, both her liver and intestine sub fractions are elevated and I recommended GI eval.

## 2022-09-29 NOTE — PROGRESS NOTES
Subjective:         Patient ID: Mary Magana is a 41 y.o. female.    Chief Complaint: Low-back Pain      Pain  This is a chronic problem. The current episode started more than 1 year ago. The problem occurs daily. The problem has been unchanged. Associated symptoms include arthralgias. Pertinent negatives include no abdominal pain, change in bowel habit, chest pain, chills, coughing, diaphoresis, fatigue, fever, neck pain, numbness, rash, sore throat, swollen glands, urinary symptoms, vertigo or vomiting.   Review of Systems   Constitutional:  Negative for activity change, appetite change, chills, diaphoresis, fatigue, fever and unexpected weight change.   HENT:  Negative for drooling, ear discharge, ear pain, facial swelling, nosebleeds, sore throat, trouble swallowing, voice change and goiter.    Eyes:  Negative for photophobia, pain, discharge, redness and visual disturbance.   Respiratory:  Negative for apnea, cough, choking, chest tightness, shortness of breath, wheezing and stridor.    Cardiovascular:  Negative for chest pain, palpitations and leg swelling.   Gastrointestinal:  Negative for abdominal distention, abdominal pain, change in bowel habit, diarrhea, rectal pain, vomiting, fecal incontinence and change in bowel habit.   Endocrine: Negative for cold intolerance, heat intolerance, polydipsia, polyphagia and polyuria.   Genitourinary:  Negative for bladder incontinence, dysuria, flank pain, frequency and hot flashes.   Musculoskeletal:  Positive for arthralgias, back pain and leg pain. Negative for neck pain.   Integumentary:  Negative for color change, pallor and rash.   Allergic/Immunologic: Negative for immunocompromised state.   Neurological:  Negative for dizziness, vertigo, seizures, syncope, facial asymmetry, speech difficulty, light-headedness, numbness, coordination difficulties, memory loss and coordination difficulties.   Hematological:  Negative for adenopathy. Does not  "bruise/bleed easily.   Psychiatric/Behavioral:  Negative for agitation, behavioral problems, confusion, decreased concentration, dysphoric mood, hallucinations, self-injury and suicidal ideas. The patient is not nervous/anxious and is not hyperactive.          Past Medical History:   Diagnosis Date    Diabetes mellitus     Hypertension     Thyroid disease     WPW (Ilana-Parkinson-White syndrome)      Past Surgical History:   Procedure Laterality Date    CHOLECYSTECTOMY      TUBAL LIGATION       Social History     Socioeconomic History    Marital status: Single   Tobacco Use    Smoking status: Former    Smokeless tobacco: Never   Substance and Sexual Activity    Alcohol use: Not Currently    Drug use: Not Currently    Sexual activity: Not Currently     Family History   Problem Relation Age of Onset    Hypertension Mother     Diabetes Mother     Diabetes Father      Review of patient's allergies indicates:  No Known Allergies     Objective:  Vitals:    10/03/22 0910   BP: (!) 157/100   Pulse: 81   Resp: 18   Weight: 119.3 kg (263 lb)   Height: 5' 2" (1.575 m)   PainSc:   7         Physical Exam  Vitals and nursing note reviewed. Exam conducted with a chaperone present.   Constitutional:       General: She is awake. She is not in acute distress.     Appearance: Normal appearance. She is not ill-appearing, toxic-appearing or diaphoretic.   HENT:      Head: Normocephalic and atraumatic.      Nose: Nose normal.      Mouth/Throat:      Mouth: Mucous membranes are moist.      Pharynx: Oropharynx is clear.   Eyes:      Conjunctiva/sclera: Conjunctivae normal.      Pupils: Pupils are equal, round, and reactive to light.   Cardiovascular:      Rate and Rhythm: Normal rate.   Pulmonary:      Effort: Pulmonary effort is normal. No respiratory distress.   Abdominal:      Palpations: Abdomen is soft.      Tenderness: There is no guarding.   Musculoskeletal:         General: Normal range of motion.      Cervical back: Normal range " of motion and neck supple. No rigidity.   Skin:     General: Skin is warm and dry.      Coloration: Skin is not jaundiced or pale.   Neurological:      General: No focal deficit present.      Mental Status: She is alert and oriented to person, place, and time. Mental status is at baseline.      Cranial Nerves: No cranial nerve deficit (II-XII).   Psychiatric:         Mood and Affect: Mood normal.         Behavior: Behavior normal. Behavior is cooperative.         Thought Content: Thought content normal.         X-Ray Lumbar Spine AP And Lateral  Narrative: EXAMINATION:  XR LUMBAR SPINE AP AND LATERAL    CLINICAL HISTORY:  Spondylosis without myelopathy or radiculopathy, lumbar region    TECHNIQUE:  AP and lateral images were performed of the lumbar spine.    COMPARISON:  07/08/2020    FINDINGS:  The vertebral body heights and alignment are maintained.  Mild degenerative facet change L4-5 and L5-S1.  Impression: Mild degenerative changes.    Electronically signed by: Rogerio Dejesus  Date:    07/20/2022  Time:    16:46  X-Ray Sacroiliac Joints Complete  Narrative: EXAMINATION:  XR SACROILIAC JOINTS COMPLETE    CLINICAL HISTORY:  Sacroiliitis, not elsewhere classified    TECHNIQUE:  AP and oblique views of the right and left sacroiliac joints.    COMPARISON:  None    FINDINGS:  No fracture.  Mild degenerative change seen of both sacroiliac joints.  Impression: Mild degenerative changes of both sacroiliac joints.    Electronically signed by: Rogerio Dejesus  Date:    07/20/2022  Time:    16:46       Lab Visit on 09/22/2022   Component Date Value Ref Range Status    Alkaline Phosphatase, S 09/22/2022 104  35 - 104 U/L Final    Liver 1 % 09/22/2022 57.4  27.8 - 76.3 % Final    Liver 1 09/22/2022 59.7  16.2 - 70.2 IU/L Final    Liver 2 % 09/22/2022 6.1  0.0 - 8.0 % Final    Liver 2 09/22/2022 6.3 (H)  0.0 - 5.8 IU/L Final    Bone % 09/22/2022 25.5  19.1 - 67.7 % Final    Bone 09/22/2022 26.5  12.1 - 42.7 IU/L Final    Intestine %  09/22/2022 11.0  0.0 - 20.6 % Final    Intestine 09/22/2022 11.4 (H)  0.0 - 11.0 IU/L Final    Placental 09/22/2022 Not Present  Not present Final   Office Visit on 09/16/2022   Component Date Value Ref Range Status    Free T4 09/16/2022 0.78  0.76 - 1.46 ng/dL Final    TSH 09/16/2022 11.800 (H)  0.358 - 3.740 uIU/mL Final    Hemoglobin A1C 09/16/2022 6.0  4.5 - 6.6 % Final    Estimated Average Glucose 09/16/2022 114  mg/dL Final    Triglycerides 09/16/2022 68  35 - 150 mg/dL Final    Cholesterol 09/16/2022 116  0 - 200 mg/dL Final    HDL Cholesterol 09/16/2022 40  40 - 60 mg/dL Final    Cholesterol/HDL Ratio (Risk Factor) 09/16/2022 2.9   Final    Non-HDL 09/16/2022 76  mg/dL Final    LDL Calculated 09/16/2022 62  mg/dL Final    LDL/HDL 09/16/2022 1.6   Final    VLDL 09/16/2022 14  mg/dL Final    Sodium 09/16/2022 140  136 - 145 mmol/L Final    Potassium 09/16/2022 3.9  3.5 - 5.1 mmol/L Final    Chloride 09/16/2022 107  98 - 107 mmol/L Final    CO2 09/16/2022 26  21 - 32 mmol/L Final    Anion Gap 09/16/2022 11  7 - 16 mmol/L Final    Glucose 09/16/2022 94  74 - 106 mg/dL Final    BUN 09/16/2022 8  7 - 18 mg/dL Final    Creatinine 09/16/2022 0.99  0.55 - 1.02 mg/dL Final    BUN/Creatinine Ratio 09/16/2022 8  6 - 20 Final    Calcium 09/16/2022 8.7  8.5 - 10.1 mg/dL Final    Total Protein 09/16/2022 7.3  6.4 - 8.2 g/dL Final    Albumin 09/16/2022 3.5  3.5 - 5.0 g/dL Final    Globulin 09/16/2022 3.8  2.0 - 4.0 g/dL Final    A/G Ratio 09/16/2022 0.9   Final    Bilirubin, Total 09/16/2022 0.5  >0.0 - 1.2 mg/dL Final    Alk Phos 09/16/2022 140 (H)  37 - 98 U/L Final    ALT 09/16/2022 76 (H)  13 - 56 U/L Final    AST 09/16/2022 41 (H)  15 - 37 U/L Final    eGFR 09/16/2022 74  >=60 mL/min/1.73m² Final    WBC 09/16/2022 6.82  4.50 - 11.00 K/uL Final    RBC 09/16/2022 5.31  4.20 - 5.40 M/uL Final    Hemoglobin 09/16/2022 11.5 (L)  12.0 - 16.0 g/dL Final    Hematocrit 09/16/2022 38.4  38.0 - 47.0 % Final    MCV 09/16/2022 72.3  (L)  80.0 - 96.0 fL Final    MCH 09/16/2022 21.7 (L)  27.0 - 31.0 pg Final    MCHC 09/16/2022 29.9 (L)  32.0 - 36.0 g/dL Final    RDW 09/16/2022 14.6 (H)  11.5 - 14.5 % Final    Platelet Count 09/16/2022 260  150 - 400 K/uL Final    MPV 09/16/2022 11.8  9.4 - 12.4 fL Final    Neutrophils % 09/16/2022 49.0 (L)  53.0 - 65.0 % Final    Lymphocytes % 09/16/2022 39.0  27.0 - 41.0 % Final    Monocytes % 09/16/2022 7.3 (H)  2.0 - 6.0 % Final    Eosinophils % 09/16/2022 3.7  1.0 - 4.0 % Final    Basophils % 09/16/2022 0.7  0.0 - 1.0 % Final    Immature Granulocytes % 09/16/2022 0.3  0.0 - 0.4 % Final    nRBC, Auto 09/16/2022 0.0  <=0.0 % Final    Neutrophils, Abs 09/16/2022 3.34  1.80 - 7.70 K/uL Final    Lymphocytes, Absolute 09/16/2022 2.66  1.00 - 4.80 K/uL Final    Monocytes, Absolute 09/16/2022 0.50  0.00 - 0.80 K/uL Final    Eosinophils, Absolute 09/16/2022 0.25  0.00 - 0.50 K/uL Final    Basophils, Absolute 09/16/2022 0.05  0.00 - 0.20 K/uL Final    Immature Granulocytes, Absolute 09/16/2022 0.02  0.00 - 0.04 K/uL Final    nRBC, Absolute 09/16/2022 0.00  <=0.00 x10e3/uL Final    Diff Type 09/16/2022 Scan Smear   Final    Platelet Morphology 09/16/2022 Normal  Normal Final    Microcytosis 09/16/2022 Few   Final    Hypochromic 09/16/2022 Few   Final    GGT 09/19/2022 147 (H)  5 - 55 U/L Final    Iron 09/19/2022 70  50 - 170 µg/dL Final    Iron Saturation 09/19/2022 18  14 - 50 % Final    TIBC 09/19/2022 381  250 - 450 µg/dL Final    Ferritin 09/19/2022 12  8 - 252 ng/mL Final   Office Visit on 07/20/2022   Component Date Value Ref Range Status    POC Amphetamines 07/20/2022 Negative  Negative, Inconclusive Final    POC Barbiturates 07/20/2022 Negative  Negative, Inconclusive Final    POC Benzodiazepines 07/20/2022 Negative  Negative, Inconclusive Final    POC Cocaine 07/20/2022 Negative  Negative, Inconclusive Final    POC THC 07/20/2022 Negative  Negative, Inconclusive Final    POC Methadone 07/20/2022 Negative   Negative, Inconclusive Final    POC Methamphetamine 07/20/2022 Negative  Negative, Inconclusive Final    POC Opiates 07/20/2022 Negative  Negative, Inconclusive Final    POC Oxycodone 07/20/2022 Negative  Negative, Inconclusive Final    POC Phencyclidine 07/20/2022 Negative  Negative, Inconclusive Final    POC Methylenedioxymethamphetamine * 07/20/2022 Negative  Negative, Inconclusive Final    POC Tricyclic Antidepressants 07/20/2022 Negative  Negative, Inconclusive Final    POC Buprenorphine 07/20/2022 Negative   Final     Acceptable 07/20/2022 Yes   Final    POC Temperature (Urine) 07/20/2022 90   Final    pH, UA 07/20/2022 6.5  5.0 to 8.0 pH Units Final    Creatinine, Urine 07/20/2022 285 (H)  28 - 219 mg/dL Final    6-Acetylmorphine 07/20/2022 Negative  10 ng/mL Final    7-Aminoclonazepam 07/20/2022 Negative  Negative 25 ng/mL Final    a-Hydroxyalprazolam 07/20/2022 Negative  Negative 25 ng/mL Final    Acetyl Fentanyl 07/20/2022 Negative  2.5 ng/mL Final    Acetyl Norfentanyl Oxalate 07/20/2022 Negative  5 ng/mL Final    Benzoylecgonine 07/20/2022 Negative  100 ng/mL Final    Buprenorphine 07/20/2022 Negative  25 ng/mL Final    Codeine 07/20/2022 Negative  25 ng/mL Final    EDDP 07/20/2022 Negative  25 ng/mL Final    Fentanyl 07/20/2022 Negative  2.5 ng/mL Final    Hydrocodone 07/20/2022 Negative  25 ng/mL Final    Hydromorphone 07/20/2022 Negative  25 ng/mL Final    Lorazepam 07/20/2022 Negative  25 ng/mL Final    Morphine 07/20/2022 Negative  25 ng/mL Final    Norbuprenorphine 07/20/2022 Negative  25 ng/mL Final    Nordiazepam 07/20/2022 Negative  25 ng/mL Final    Norfentanyl Oxalate 07/20/2022 Negative  5 ng/mL Final    Norhydrocodone 07/20/2022 Negative  50 ng/mL Final    Noroxycodone HCL 07/20/2022 Negative  50 ng/mL Final    Oxazepam 07/20/2022 Negative  25 ng/mL Final    Oxymorphone 07/20/2022 Negative  25 ng/mL Final    Tapentadol 07/20/2022 Negative  25 ng/mL Final    Temazepam  07/20/2022 Negative  25 ng/mL Final    THC-COOH 07/20/2022 Negative  25 ng/mL Final    Tramadol 07/20/2022 Negative  100 ng/mL Final    Amphetamine, Urine 07/20/2022 Negative  Negative Final    Methamphetamines, Urine 07/20/2022 Negative  Negative Final    Methadone, Urine 07/20/2022 Negative  Negative 25 ng/mL Final    Oxycodone, Urine 07/20/2022 Negative  Negative 25 ng/mL Final    Specific Gravity, UA 07/20/2022 1.029  <=1.030 Final   Office Visit on 06/16/2022   Component Date Value Ref Range Status    HIV 1/2 06/16/2022 Non-Reactive  Non-Reactive Final    Hepatitis C Ab 06/16/2022 Non-Reactive  Non-Reactive Final   Office Visit on 06/03/2022   Component Date Value Ref Range Status    Hemoglobin A1C 06/03/2022 5.5  4.5 - 6.6 % Final    Estimated Average Glucose 06/03/2022 97  mg/dL Final    TSH 06/03/2022 16.600 (H)  0.358 - 3.740 uIU/mL Final    Triglycerides 06/03/2022 73  35 - 150 mg/dL Final    Cholesterol 06/03/2022 152  0 - 200 mg/dL Final    HDL Cholesterol 06/03/2022 46  40 - 60 mg/dL Final    Cholesterol/HDL Ratio (Risk Factor) 06/03/2022 3.3   Final    Non-HDL 06/03/2022 106  mg/dL Final    LDL Calculated 06/03/2022 91  mg/dL Final    LDL/HDL 06/03/2022 2.0   Final    VLDL 06/03/2022 15  mg/dL Final    Sodium 06/03/2022 139  136 - 145 mmol/L Final    Potassium 06/03/2022 4.4  3.5 - 5.1 mmol/L Final    Chloride 06/03/2022 106  98 - 107 mmol/L Final    CO2 06/03/2022 23  21 - 32 mmol/L Final    Anion Gap 06/03/2022 14  7 - 16 mmol/L Final    Glucose 06/03/2022 94  74 - 106 mg/dL Final    BUN 06/03/2022 12  7 - 18 mg/dL Final    Creatinine 06/03/2022 1.07 (H)  0.55 - 1.02 mg/dL Final    BUN/Creatinine Ratio 06/03/2022 11  6 - 20 Final    Calcium 06/03/2022 8.3 (L)  8.5 - 10.1 mg/dL Final    Total Protein 06/03/2022 7.3  6.4 - 8.2 g/dL Final    Albumin 06/03/2022 3.5  3.5 - 5.0 g/dL Final    Globulin 06/03/2022 3.8  2.0 - 4.0 g/dL Final    A/G Ratio 06/03/2022 0.9   Final    Bilirubin, Total 06/03/2022 0.3   0.0 - 1.2 mg/dL Final    Alk Phos 06/03/2022 91  37 - 98 U/L Final    ALT 06/03/2022 23  13 - 56 U/L Final    AST 06/03/2022 19  15 - 37 U/L Final    eGFR 06/03/2022 60  >=60 mL/min/1.73m² Final    WBC 06/03/2022 6.72  4.50 - 11.00 K/uL Final    RBC 06/03/2022 6.64 (H)  4.20 - 5.40 M/uL Final    Hemoglobin 06/03/2022 14.5  12.0 - 16.0 g/dL Final    Hematocrit 06/03/2022 48.3 (H)  38.0 - 47.0 % Final    MCV 06/03/2022 72.7 (L)  80.0 - 96.0 fL Final    MCH 06/03/2022 21.8 (L)  27.0 - 31.0 pg Final    MCHC 06/03/2022 30.0 (L)  32.0 - 36.0 g/dL Final    RDW 06/03/2022 16.8 (H)  11.5 - 14.5 % Final    Platelet Count 06/03/2022 211  150 - 400 K/uL Final    MPV 06/03/2022 11.2  9.4 - 12.4 fL Final    Neutrophils % 06/03/2022 48.3 (L)  53.0 - 65.0 % Final    Lymphocytes % 06/03/2022 38.8  27.0 - 41.0 % Final    Monocytes % 06/03/2022 6.3 (H)  2.0 - 6.0 % Final    Eosinophils % 06/03/2022 5.8 (H)  1.0 - 4.0 % Final    Basophils % 06/03/2022 0.7  0.0 - 1.0 % Final    Immature Granulocytes % 06/03/2022 0.1  0.0 - 0.4 % Final    nRBC, Auto 06/03/2022 0.0  <=0.0 % Final    Neutrophils, Abs 06/03/2022 3.24  1.80 - 7.70 K/uL Final    Lymphocytes, Absolute 06/03/2022 2.61  1.00 - 4.80 K/uL Final    Monocytes, Absolute 06/03/2022 0.42  0.00 - 0.80 K/uL Final    Eosinophils, Absolute 06/03/2022 0.39  0.00 - 0.50 K/uL Final    Basophils, Absolute 06/03/2022 0.05  0.00 - 0.20 K/uL Final    Immature Granulocytes, Absolute 06/03/2022 0.01  0.00 - 0.04 K/uL Final    nRBC, Absolute 06/03/2022 0.00  <=0.00 x10e3/uL Final    Diff Type 06/03/2022 Scan Smear   Final    Platelet Morphology 06/03/2022 Normal  Normal Final    Anisocytosis 06/03/2022 1+   Final    Microcytosis 06/03/2022 2+   Final    Polychromasia 06/03/2022 Few   Final         No orders of the defined types were placed in this encounter.      Requested Prescriptions     Pending Prescriptions Disp Refills    naproxen (NAPROSYN) 500 MG tablet 60 tablet 0     Sig: Take 1 tablet  (500 mg total) by mouth 2 (two) times daily.       Assessment:     1. Lumbar spondylosis    2. Sacroiliitis             Plan:    Daily benzodiazepine use    Not using narcotics from our office    Has completed physical therapy     Continues complaint back pain buttock and leg pain radicular in nature pain numbness and tingling lower extremities worse with standing walking can have some sharp stabbing components worse with increased activity    Requesting Toradol injection    Toradol 60 mg IM, tolerated well    Definitive drug screen July 20, 2022    X-ray lumbar spine July 20, 2022 degenerative changes    X-ray sacroiliac joint July 20, 2022 degenerative changes no fracture noted    Gabapentin 100 mg 1 p.o. q.8 hours    MRI lumbar spine no contrast, lumbar radiculopathy     MRI for consideration procedure/surgery    I have given the patient medically directed home exercise program    Patient has tried NSAIDs and neuromodulators (neurontin, lyrica, elavil, or cymbalta)    Follow-up after MRI    Dr. Jimenez, July 2023    Physical therapy September 2022 [facility]    Massage therapy declines

## 2022-10-03 ENCOUNTER — OFFICE VISIT (OUTPATIENT)
Dept: PAIN MEDICINE | Facility: CLINIC | Age: 42
End: 2022-10-03
Payer: MEDICAID

## 2022-10-03 VITALS
SYSTOLIC BLOOD PRESSURE: 157 MMHG | BODY MASS INDEX: 48.4 KG/M2 | HEIGHT: 62 IN | RESPIRATION RATE: 18 BRPM | HEART RATE: 81 BPM | DIASTOLIC BLOOD PRESSURE: 100 MMHG | WEIGHT: 263 LBS

## 2022-10-03 DIAGNOSIS — M54.16 LUMBAR RADICULOPATHY, CHRONIC: ICD-10-CM

## 2022-10-03 DIAGNOSIS — M54.17 LUMBOSACRAL RADICULOPATHY: Primary | Chronic | ICD-10-CM

## 2022-10-03 DIAGNOSIS — M46.1 SACROILIITIS: Chronic | ICD-10-CM

## 2022-10-03 PROCEDURE — 3080F PR MOST RECENT DIASTOLIC BLOOD PRESSURE >= 90 MM HG: ICD-10-PCS | Mod: CPTII,,, | Performed by: PHYSICIAN ASSISTANT

## 2022-10-03 PROCEDURE — 3044F HG A1C LEVEL LT 7.0%: CPT | Mod: CPTII,,, | Performed by: PHYSICIAN ASSISTANT

## 2022-10-03 PROCEDURE — 3008F BODY MASS INDEX DOCD: CPT | Mod: CPTII,,, | Performed by: PHYSICIAN ASSISTANT

## 2022-10-03 PROCEDURE — 99214 OFFICE O/P EST MOD 30 MIN: CPT | Mod: S$PBB,25,, | Performed by: PHYSICIAN ASSISTANT

## 2022-10-03 PROCEDURE — 99214 OFFICE O/P EST MOD 30 MIN: CPT | Mod: PBBFAC | Performed by: PHYSICIAN ASSISTANT

## 2022-10-03 PROCEDURE — 99214 PR OFFICE/OUTPT VISIT, EST, LEVL IV, 30-39 MIN: ICD-10-PCS | Mod: S$PBB,25,, | Performed by: PHYSICIAN ASSISTANT

## 2022-10-03 PROCEDURE — 3077F SYST BP >= 140 MM HG: CPT | Mod: CPTII,,, | Performed by: PHYSICIAN ASSISTANT

## 2022-10-03 PROCEDURE — 96372 THER/PROPH/DIAG INJ SC/IM: CPT | Mod: PBBFAC | Performed by: PHYSICIAN ASSISTANT

## 2022-10-03 PROCEDURE — 3008F PR BODY MASS INDEX (BMI) DOCUMENTED: ICD-10-PCS | Mod: CPTII,,, | Performed by: PHYSICIAN ASSISTANT

## 2022-10-03 PROCEDURE — 3077F PR MOST RECENT SYSTOLIC BLOOD PRESSURE >= 140 MM HG: ICD-10-PCS | Mod: CPTII,,, | Performed by: PHYSICIAN ASSISTANT

## 2022-10-03 PROCEDURE — 1159F PR MEDICATION LIST DOCUMENTED IN MEDICAL RECORD: ICD-10-PCS | Mod: CPTII,,, | Performed by: PHYSICIAN ASSISTANT

## 2022-10-03 PROCEDURE — 3080F DIAST BP >= 90 MM HG: CPT | Mod: CPTII,,, | Performed by: PHYSICIAN ASSISTANT

## 2022-10-03 PROCEDURE — 3044F PR MOST RECENT HEMOGLOBIN A1C LEVEL <7.0%: ICD-10-PCS | Mod: CPTII,,, | Performed by: PHYSICIAN ASSISTANT

## 2022-10-03 PROCEDURE — 1159F MED LIST DOCD IN RCRD: CPT | Mod: CPTII,,, | Performed by: PHYSICIAN ASSISTANT

## 2022-10-03 RX ORDER — NAPROXEN 500 MG/1
500 TABLET ORAL 2 TIMES DAILY
Qty: 60 TABLET | Refills: 0 | Status: SHIPPED | OUTPATIENT
Start: 2022-10-03 | End: 2022-10-03

## 2022-10-03 RX ORDER — KETOROLAC TROMETHAMINE 30 MG/ML
60 INJECTION, SOLUTION INTRAMUSCULAR; INTRAVENOUS
Status: COMPLETED | OUTPATIENT
Start: 2022-10-03 | End: 2022-10-03

## 2022-10-03 RX ORDER — NAPROXEN 500 MG/1
500 TABLET ORAL 2 TIMES DAILY
Qty: 60 TABLET | Refills: 0 | Status: SHIPPED | OUTPATIENT
Start: 2022-10-03 | End: 2023-03-16

## 2022-10-03 RX ADMIN — KETOROLAC TROMETHAMINE 60 MG: 30 INJECTION, SOLUTION INTRAMUSCULAR; INTRAVENOUS at 10:10

## 2022-10-04 ENCOUNTER — OFFICE VISIT (OUTPATIENT)
Dept: GASTROENTEROLOGY | Facility: CLINIC | Age: 42
End: 2022-10-04
Payer: MEDICAID

## 2022-10-04 VITALS
HEART RATE: 78 BPM | SYSTOLIC BLOOD PRESSURE: 161 MMHG | WEIGHT: 265 LBS | HEIGHT: 62 IN | BODY MASS INDEX: 48.76 KG/M2 | DIASTOLIC BLOOD PRESSURE: 93 MMHG

## 2022-10-04 DIAGNOSIS — D50.9 IRON DEFICIENCY ANEMIA, UNSPECIFIED IRON DEFICIENCY ANEMIA TYPE: ICD-10-CM

## 2022-10-04 DIAGNOSIS — R74.8 ELEVATED LIVER ENZYMES: Primary | ICD-10-CM

## 2022-10-04 PROCEDURE — 3077F SYST BP >= 140 MM HG: CPT | Mod: CPTII,,, | Performed by: STUDENT IN AN ORGANIZED HEALTH CARE EDUCATION/TRAINING PROGRAM

## 2022-10-04 PROCEDURE — 3044F PR MOST RECENT HEMOGLOBIN A1C LEVEL <7.0%: ICD-10-PCS | Mod: CPTII,,, | Performed by: STUDENT IN AN ORGANIZED HEALTH CARE EDUCATION/TRAINING PROGRAM

## 2022-10-04 PROCEDURE — 1159F PR MEDICATION LIST DOCUMENTED IN MEDICAL RECORD: ICD-10-PCS | Mod: CPTII,,, | Performed by: STUDENT IN AN ORGANIZED HEALTH CARE EDUCATION/TRAINING PROGRAM

## 2022-10-04 PROCEDURE — 3044F HG A1C LEVEL LT 7.0%: CPT | Mod: CPTII,,, | Performed by: STUDENT IN AN ORGANIZED HEALTH CARE EDUCATION/TRAINING PROGRAM

## 2022-10-04 PROCEDURE — 3008F BODY MASS INDEX DOCD: CPT | Mod: CPTII,,, | Performed by: STUDENT IN AN ORGANIZED HEALTH CARE EDUCATION/TRAINING PROGRAM

## 2022-10-04 PROCEDURE — 3077F PR MOST RECENT SYSTOLIC BLOOD PRESSURE >= 140 MM HG: ICD-10-PCS | Mod: CPTII,,, | Performed by: STUDENT IN AN ORGANIZED HEALTH CARE EDUCATION/TRAINING PROGRAM

## 2022-10-04 PROCEDURE — 1159F MED LIST DOCD IN RCRD: CPT | Mod: CPTII,,, | Performed by: STUDENT IN AN ORGANIZED HEALTH CARE EDUCATION/TRAINING PROGRAM

## 2022-10-04 PROCEDURE — 3080F PR MOST RECENT DIASTOLIC BLOOD PRESSURE >= 90 MM HG: ICD-10-PCS | Mod: CPTII,,, | Performed by: STUDENT IN AN ORGANIZED HEALTH CARE EDUCATION/TRAINING PROGRAM

## 2022-10-04 PROCEDURE — 99213 PR OFFICE/OUTPT VISIT, EST, LEVL III, 20-29 MIN: ICD-10-PCS | Mod: ,,, | Performed by: STUDENT IN AN ORGANIZED HEALTH CARE EDUCATION/TRAINING PROGRAM

## 2022-10-04 PROCEDURE — 99213 OFFICE O/P EST LOW 20 MIN: CPT | Mod: ,,, | Performed by: STUDENT IN AN ORGANIZED HEALTH CARE EDUCATION/TRAINING PROGRAM

## 2022-10-04 PROCEDURE — 3080F DIAST BP >= 90 MM HG: CPT | Mod: CPTII,,, | Performed by: STUDENT IN AN ORGANIZED HEALTH CARE EDUCATION/TRAINING PROGRAM

## 2022-10-04 PROCEDURE — 3008F PR BODY MASS INDEX (BMI) DOCUMENTED: ICD-10-PCS | Mod: CPTII,,, | Performed by: STUDENT IN AN ORGANIZED HEALTH CARE EDUCATION/TRAINING PROGRAM

## 2022-10-04 NOTE — PROGRESS NOTES
[facility] Note    Reason for clinic visit: No chief complaint on file.      History of Present Illness:  Mary Magana is a 41 y.o. female that  has a past medical history of Diabetes mellitus, Hypertension, Thyroid disease, and WPW (Ilana-Parkinson-White syndrome). .    Elevated Hepatic chemistries  Referred here today for elevated liver chemistries.  Tbili wnl, , AST 41, ALT 76.    Iron Deficiency Anemia  Labs show Hgb 11.5, MCV 72, Plat wnl.   Ferritin 12, and iron 70.   Admits to periods, but not heavy.    On naproxen, Victoza, ASA 81mg.     Also noted to have HST 11.8,and free T3 1.95.  Previous dsDNA Ab with low positive    Denies prior EGD or colonoscopy.  Denies prior liver biopsy.    Denies EtOh or prior drinker.  Prior cigarette use but quit 1 year ago.  Denies drug use.   Prior tattoos done unprofessionally done.   Denies prior blood transfusion.    Review of Systems:  12 point review of systems otherwise negative except as stated in HPI.    Objective:  Vitals:    10/04/22 1337   BP: (!) 161/93   Pulse: 78     Physical Exam  Constitutional:       Appearance: Normal appearance.      Comments: Obese.   HENT:      Head: Normocephalic and atraumatic.      Nose: No congestion or rhinorrhea.      Mouth/Throat:      Mouth: Mucous membranes are moist.      Pharynx: Oropharynx is clear.   Eyes:      General: No scleral icterus.     Pupils: Pupils are equal, round, and reactive to light.   Cardiovascular:      Rate and Rhythm: Normal rate and regular rhythm.   Pulmonary:      Effort: Pulmonary effort is normal.      Breath sounds: Normal breath sounds.   Abdominal:      General: Abdomen is flat.      Palpations: Abdomen is soft.   Musculoskeletal:         General: No swelling or tenderness.      Cervical back: Neck supple. No rigidity.   Skin:     General: Skin is warm and dry.   Neurological:      General: No focal deficit present.      Mental Status: She is alert and oriented to  person, place, and time.   Psychiatric:         Mood and Affect: Mood normal.         Behavior: Behavior normal.       Assessment and Plan:    Iron Deficiency Anemia  - discussed EGD/colonoscopy for evaluation, but patient refused at this time and would like to continue watching iron deficiency    Elevated Hepatic Chemistries  - elevated ALP with liver fraction and elevated AST/ALT  - prior evaluation with normal: acute hep panel, ceruloplasmin, ASMA, AMA (did have positive KATHERINE with dsDNA)  - liver US 2020  was wnl  - discussed weight loss referral to [facility] and will place referral for bariatric surgery and weight loss program; referral to weight loss clinic locally as well    Constipation and Diarrhea  - suspect overflow diarrhea, [place] type 2 stools  - states she has not been eating much due to lack of appetite but denies weight loss  - recommend higher fiber in diet and fiber supplementation as needed    RTC 6 months    Mk Lamas  Gastroenterology

## 2022-10-05 ENCOUNTER — TELEPHONE (OUTPATIENT)
Dept: GASTROENTEROLOGY | Facility: CLINIC | Age: 42
End: 2022-10-05
Payer: MEDICAID

## 2022-10-10 ENCOUNTER — OFFICE VISIT (OUTPATIENT)
Dept: FAMILY MEDICINE | Facility: CLINIC | Age: 42
End: 2022-10-10
Payer: MEDICAID

## 2022-10-10 VITALS
BODY MASS INDEX: 48.76 KG/M2 | OXYGEN SATURATION: 98 % | RESPIRATION RATE: 18 BRPM | WEIGHT: 265 LBS | SYSTOLIC BLOOD PRESSURE: 132 MMHG | TEMPERATURE: 98 F | DIASTOLIC BLOOD PRESSURE: 78 MMHG | HEART RATE: 82 BPM | HEIGHT: 62 IN

## 2022-10-10 DIAGNOSIS — N39.0 URINARY TRACT INFECTION WITH HEMATURIA, SITE UNSPECIFIED: Primary | ICD-10-CM

## 2022-10-10 DIAGNOSIS — R31.9 URINARY TRACT INFECTION WITH HEMATURIA, SITE UNSPECIFIED: Primary | ICD-10-CM

## 2022-10-10 DIAGNOSIS — E03.9 HYPOTHYROIDISM, UNSPECIFIED TYPE: ICD-10-CM

## 2022-10-10 DIAGNOSIS — R10.2 PELVIC PAIN: ICD-10-CM

## 2022-10-10 LAB
BILIRUB SERPL-MCNC: ABNORMAL MG/DL
BLOOD URINE, POC: ABNORMAL
COLOR, POC UA: ABNORMAL
GLUCOSE UR QL STRIP: ABNORMAL
KETONES UR QL STRIP: ABNORMAL
LEUKOCYTE ESTERASE URINE, POC: ABNORMAL
NITRITE, POC UA: ABNORMAL
PH, POC UA: 6
PROTEIN, POC: ABNORMAL
SPECIFIC GRAVITY, POC UA: 1.02
UROBILINOGEN, POC UA: 0.2

## 2022-10-10 PROCEDURE — 3078F DIAST BP <80 MM HG: CPT | Mod: CPTII,,, | Performed by: FAMILY MEDICINE

## 2022-10-10 PROCEDURE — 3075F SYST BP GE 130 - 139MM HG: CPT | Mod: CPTII,,, | Performed by: FAMILY MEDICINE

## 2022-10-10 PROCEDURE — 3078F PR MOST RECENT DIASTOLIC BLOOD PRESSURE < 80 MM HG: ICD-10-PCS | Mod: CPTII,,, | Performed by: FAMILY MEDICINE

## 2022-10-10 PROCEDURE — 81003 URINALYSIS AUTO W/O SCOPE: CPT | Mod: RHCUB | Performed by: FAMILY MEDICINE

## 2022-10-10 PROCEDURE — 99213 PR OFFICE/OUTPT VISIT, EST, LEVL III, 20-29 MIN: ICD-10-PCS | Mod: ,,, | Performed by: FAMILY MEDICINE

## 2022-10-10 PROCEDURE — 87077 CULTURE, URINE: ICD-10-PCS | Mod: ,,, | Performed by: CLINICAL MEDICAL LABORATORY

## 2022-10-10 PROCEDURE — 3044F HG A1C LEVEL LT 7.0%: CPT | Mod: CPTII,,, | Performed by: FAMILY MEDICINE

## 2022-10-10 PROCEDURE — 3008F BODY MASS INDEX DOCD: CPT | Mod: CPTII,,, | Performed by: FAMILY MEDICINE

## 2022-10-10 PROCEDURE — 1159F PR MEDICATION LIST DOCUMENTED IN MEDICAL RECORD: ICD-10-PCS | Mod: CPTII,,, | Performed by: FAMILY MEDICINE

## 2022-10-10 PROCEDURE — 3075F PR MOST RECENT SYSTOLIC BLOOD PRESS GE 130-139MM HG: ICD-10-PCS | Mod: CPTII,,, | Performed by: FAMILY MEDICINE

## 2022-10-10 PROCEDURE — 1160F RVW MEDS BY RX/DR IN RCRD: CPT | Mod: CPTII,,, | Performed by: FAMILY MEDICINE

## 2022-10-10 PROCEDURE — 87077 CULTURE AEROBIC IDENTIFY: CPT | Mod: ,,, | Performed by: CLINICAL MEDICAL LABORATORY

## 2022-10-10 PROCEDURE — 87086 URINE CULTURE/COLONY COUNT: CPT | Mod: ,,, | Performed by: CLINICAL MEDICAL LABORATORY

## 2022-10-10 PROCEDURE — 1160F PR REVIEW ALL MEDS BY PRESCRIBER/CLIN PHARMACIST DOCUMENTED: ICD-10-PCS | Mod: CPTII,,, | Performed by: FAMILY MEDICINE

## 2022-10-10 PROCEDURE — 1159F MED LIST DOCD IN RCRD: CPT | Mod: CPTII,,, | Performed by: FAMILY MEDICINE

## 2022-10-10 PROCEDURE — 3044F PR MOST RECENT HEMOGLOBIN A1C LEVEL <7.0%: ICD-10-PCS | Mod: CPTII,,, | Performed by: FAMILY MEDICINE

## 2022-10-10 PROCEDURE — 3008F PR BODY MASS INDEX (BMI) DOCUMENTED: ICD-10-PCS | Mod: CPTII,,, | Performed by: FAMILY MEDICINE

## 2022-10-10 PROCEDURE — 87186 CULTURE, URINE: ICD-10-PCS | Mod: ,,, | Performed by: CLINICAL MEDICAL LABORATORY

## 2022-10-10 PROCEDURE — 87186 SC STD MICRODIL/AGAR DIL: CPT | Mod: ,,, | Performed by: CLINICAL MEDICAL LABORATORY

## 2022-10-10 PROCEDURE — 87086 CULTURE, URINE: ICD-10-PCS | Mod: ,,, | Performed by: CLINICAL MEDICAL LABORATORY

## 2022-10-10 PROCEDURE — 99213 OFFICE O/P EST LOW 20 MIN: CPT | Mod: ,,, | Performed by: FAMILY MEDICINE

## 2022-10-10 RX ORDER — LEVOTHYROXINE SODIUM 75 UG/1
75 TABLET ORAL
Qty: 30 TABLET | Refills: 2 | Status: SHIPPED | OUTPATIENT
Start: 2022-10-10 | End: 2023-03-16 | Stop reason: SDUPTHER

## 2022-10-10 RX ORDER — NITROFURANTOIN 25; 75 MG/1; MG/1
100 CAPSULE ORAL 2 TIMES DAILY
Qty: 14 CAPSULE | Refills: 0 | Status: SHIPPED | OUTPATIENT
Start: 2022-10-10 | End: 2022-10-19

## 2022-10-10 NOTE — PROGRESS NOTES
Mary Magana is a 41 y.o. female seen today for follow-up on her diabetes and hyperlipidemia.  Her A1c and lipids were to goal but her TSH was elevated.  She has been taking her medication we discussed increasing her dose to the 75 mcg daily.  We will plan on rechecking her TSH in 6-8 weeks.  She is also complaining of pelvic pain consistent with a urinary tract infection.  Her urinalysis did confirm a UTI.      Past Medical History:   Diagnosis Date    Diabetes mellitus     Hypertension     Thyroid disease     WPW (Ilana-Parkinson-White syndrome)      Family History   Problem Relation Age of Onset    Hypertension Mother     Diabetes Mother     Diabetes Father      Current Outpatient Medications on File Prior to Visit   Medication Sig Dispense Refill    aspirin (ECOTRIN) 81 MG EC tablet TAKE 1 TABLET BY MOUTH DAILY WITH FOOD 30 tablet 5    buPROPion (WELLBUTRIN XL) 150 MG TB24 tablet Take 150 mg by mouth every morning.      clonazePAM (KLONOPIN) 1 MG tablet Take 1 mg by mouth nightly.      metoprolol tartrate (LOPRESSOR) 25 MG tablet Take 1 tablet (25 mg total) by mouth 2 (two) times daily. 60 tablet 5    naproxen (NAPROSYN) 500 MG tablet Take 1 tablet (500 mg total) by mouth 2 (two) times daily. 60 tablet 0    tiZANidine (ZANAFLEX) 4 MG tablet Take 1 tablet (4 mg total) by mouth 3 (three) times daily. 90 tablet 0    TRUEPLUS LANCETS 30 gauge Misc AS DIRECTED      VICTOZA 2-JAMESON 0.6 mg/0.1 mL (18 mg/3 mL) PnIj pen INJECT 1.2 MG SUB-Q EVERY DAY ...KEEP IN REFRIGERATOR 6 mL 5    [DISCONTINUED] levothyroxine (SYNTHROID) 50 MCG tablet TAKE 1 TABLET BY MOUTH EACH MORNING BEFORE BREAKFAST ...TAKE ON EMPTY STOMACH (DRINK PLENTY OF WATER) 90 tablet 1     No current facility-administered medications on file prior to visit.       There is no immunization history on file for this patient.    Review of Systems   Constitutional:  Negative for fever, malaise/fatigue and weight loss.   Respiratory:  Negative for shortness  of breath.    Cardiovascular:  Negative for chest pain and palpitations.   Gastrointestinal:  Negative for nausea and vomiting.   Genitourinary:  Positive for dysuria and frequency.   Psychiatric/Behavioral:  Negative for depression.       Vitals:    10/10/22 1131   BP: 132/78   Pulse: 82   Resp: 18   Temp: 98.3 °F (36.8 °C)       Physical Exam  Vitals reviewed.   Eyes:      Conjunctiva/sclera: Conjunctivae normal.   Pulmonary:      Effort: Pulmonary effort is normal.   Neurological:      Mental Status: She is alert.   Psychiatric:         Mood and Affect: Mood normal.         Behavior: Behavior normal.         Thought Content: Thought content normal.         Judgment: Judgment normal.        Assessment and Plan  Urinary tract infection with hematuria, site unspecified  -     Urine culture  -     nitrofurantoin, macrocrystal-monohydrate, (MACROBID) 100 MG capsule; Take 1 capsule (100 mg total) by mouth 2 (two) times daily.  Dispense: 14 capsule; Refill: 0    Pelvic pain  -     POCT URINALYSIS W/O SCOPE    Hypothyroidism, unspecified type  -     levothyroxine (SYNTHROID) 75 MCG tablet; Take 1 tablet (75 mcg total) by mouth before breakfast.  Dispense: 30 tablet; Refill: 2            Return to clinic in 1 week.    Health Maintenance Topics with due status: Not Due       Topic Last Completion Date    Foot Exam 06/16/2022    Mammogram 06/24/2022    Lipid Panel 09/16/2022    Hemoglobin A1c 09/16/2022

## 2022-10-12 LAB — UA COMPLETE W REFLEX CULTURE PNL UR: ABNORMAL

## 2022-10-13 ENCOUNTER — OFFICE VISIT (OUTPATIENT)
Dept: PAIN MEDICINE | Facility: CLINIC | Age: 42
End: 2022-10-13
Payer: MEDICAID

## 2022-10-13 VITALS
HEIGHT: 62 IN | WEIGHT: 259 LBS | SYSTOLIC BLOOD PRESSURE: 140 MMHG | RESPIRATION RATE: 16 BRPM | DIASTOLIC BLOOD PRESSURE: 83 MMHG | BODY MASS INDEX: 47.66 KG/M2

## 2022-10-13 DIAGNOSIS — M47.816 LUMBAR SPONDYLOSIS: Primary | Chronic | ICD-10-CM

## 2022-10-13 DIAGNOSIS — M46.1 SACROILIITIS: Chronic | ICD-10-CM

## 2022-10-13 PROCEDURE — 1159F MED LIST DOCD IN RCRD: CPT | Mod: CPTII,,, | Performed by: PHYSICIAN ASSISTANT

## 2022-10-13 PROCEDURE — 3079F PR MOST RECENT DIASTOLIC BLOOD PRESSURE 80-89 MM HG: ICD-10-PCS | Mod: CPTII,,, | Performed by: PHYSICIAN ASSISTANT

## 2022-10-13 PROCEDURE — 3044F HG A1C LEVEL LT 7.0%: CPT | Mod: CPTII,,, | Performed by: PHYSICIAN ASSISTANT

## 2022-10-13 PROCEDURE — 99214 OFFICE O/P EST MOD 30 MIN: CPT | Mod: S$PBB,,, | Performed by: PHYSICIAN ASSISTANT

## 2022-10-13 PROCEDURE — 99214 PR OFFICE/OUTPT VISIT, EST, LEVL IV, 30-39 MIN: ICD-10-PCS | Mod: S$PBB,,, | Performed by: PHYSICIAN ASSISTANT

## 2022-10-13 PROCEDURE — 3077F PR MOST RECENT SYSTOLIC BLOOD PRESSURE >= 140 MM HG: ICD-10-PCS | Mod: CPTII,,, | Performed by: PHYSICIAN ASSISTANT

## 2022-10-13 PROCEDURE — 3077F SYST BP >= 140 MM HG: CPT | Mod: CPTII,,, | Performed by: PHYSICIAN ASSISTANT

## 2022-10-13 PROCEDURE — 3079F DIAST BP 80-89 MM HG: CPT | Mod: CPTII,,, | Performed by: PHYSICIAN ASSISTANT

## 2022-10-13 PROCEDURE — 1159F PR MEDICATION LIST DOCUMENTED IN MEDICAL RECORD: ICD-10-PCS | Mod: CPTII,,, | Performed by: PHYSICIAN ASSISTANT

## 2022-10-13 PROCEDURE — 3044F PR MOST RECENT HEMOGLOBIN A1C LEVEL <7.0%: ICD-10-PCS | Mod: CPTII,,, | Performed by: PHYSICIAN ASSISTANT

## 2022-10-13 PROCEDURE — 99214 OFFICE O/P EST MOD 30 MIN: CPT | Mod: PBBFAC | Performed by: PHYSICIAN ASSISTANT

## 2022-10-13 NOTE — PROGRESS NOTES
Subjective:         Patient ID: Mary Magana is a 41 y.o. female.    Chief Complaint: Follow-up (MRI on 10/11/2022) and Back Pain (lower)      Pain  This is a chronic problem. The current episode started more than 1 year ago. The problem occurs daily. The problem has been waxing and waning. Associated symptoms include arthralgias and neck pain. Pertinent negatives include no anorexia, change in bowel habit, chest pain, chills, coughing, diaphoresis, fever, rash, sore throat, swollen glands, vertigo or vomiting.   Review of Systems   Constitutional:  Negative for activity change, appetite change, chills, diaphoresis, fever and unexpected weight change.   HENT:  Negative for drooling, ear discharge, ear pain, facial swelling, nosebleeds, sore throat, trouble swallowing, voice change and goiter.    Eyes:  Negative for photophobia, pain, discharge, redness and visual disturbance.   Respiratory:  Negative for apnea, cough, choking, chest tightness, shortness of breath, wheezing and stridor.    Cardiovascular:  Negative for chest pain, palpitations and leg swelling.   Gastrointestinal:  Negative for abdominal distention, anorexia, change in bowel habit, diarrhea, rectal pain, vomiting, fecal incontinence and change in bowel habit.   Endocrine: Negative for cold intolerance, heat intolerance, polydipsia, polyphagia and polyuria.   Genitourinary:  Negative for bladder incontinence, dysuria, flank pain, frequency and hot flashes.   Musculoskeletal:  Positive for arthralgias, back pain, leg pain and neck pain.   Integumentary:  Negative for color change, pallor and rash.   Allergic/Immunologic: Negative for immunocompromised state.   Neurological:  Negative for dizziness, vertigo, seizures, syncope, facial asymmetry, speech difficulty, light-headedness, coordination difficulties, memory loss and coordination difficulties.   Hematological:  Negative for adenopathy. Does not bruise/bleed easily.  "  Psychiatric/Behavioral:  Negative for agitation, behavioral problems, confusion, decreased concentration, dysphoric mood, hallucinations, self-injury and suicidal ideas. The patient is not nervous/anxious and is not hyperactive.          Past Medical History:   Diagnosis Date    Diabetes mellitus     Hypertension     Thyroid disease     WPW (Ilana-Parkinson-White syndrome)      Past Surgical History:   Procedure Laterality Date    CHOLECYSTECTOMY      TUBAL LIGATION       Social History     Socioeconomic History    Marital status: Single   Tobacco Use    Smoking status: Former    Smokeless tobacco: Never   Substance and Sexual Activity    Alcohol use: Not Currently    Drug use: Not Currently    Sexual activity: Not Currently     Family History   Problem Relation Age of Onset    Hypertension Mother     Diabetes Mother     Diabetes Father      Review of patient's allergies indicates:  No Known Allergies     Objective:  Vitals:    10/13/22 0900   BP: (!) 140/83   Resp: 16   Weight: 117.5 kg (259 lb)   Height: 5' 2" (1.575 m)   PainSc:   8         Physical Exam  Vitals and nursing note reviewed. Exam conducted with a chaperone present.   Constitutional:       General: She is awake. She is not in acute distress.     Appearance: Normal appearance. She is not ill-appearing or toxic-appearing.   HENT:      Head: Normocephalic and atraumatic.      Nose: Nose normal.      Mouth/Throat:      Mouth: Mucous membranes are moist.      Pharynx: Oropharynx is clear.   Eyes:      Conjunctiva/sclera: Conjunctivae normal.      Pupils: Pupils are equal, round, and reactive to light.   Pulmonary:      Effort: Pulmonary effort is normal. No respiratory distress.   Abdominal:      Palpations: Abdomen is soft.      Tenderness: There is no guarding.   Musculoskeletal:         General: Normal range of motion.      Cervical back: Normal range of motion and neck supple. No rigidity.   Skin:     General: Skin is warm and dry.      Coloration: " Skin is not jaundiced or pale.   Neurological:      General: No focal deficit present.      Mental Status: She is alert and oriented to person, place, and time. Mental status is at baseline.      Cranial Nerves: No cranial nerve deficit (II-XII).   Psychiatric:         Mood and Affect: Mood normal.         Behavior: Behavior normal. Behavior is cooperative.         Thought Content: Thought content normal.         MRI Lumbar Spine Without Contrast  Narrative: EXAMINATION:  MRI LUMBAR SPINE WITHOUT CONTRAST    CLINICAL HISTORY:  Lumbar radiculopathy, symptoms persist with conservative treatment; Radiculopathy, lumbar region    TECHNIQUE:  Multiplanar, multisequence MRI of the lumbar spine performed without the administration of contrast.    COMPARISON:  Radiograph 07/20/2022    FINDINGS:  Vertebral body heights, alignment, and signal intensity are normal.  There is disc desiccation L3-4 and L5-S1.  The conus terminates at L1-2.    L1-2: No spinal canal or foraminal stenosis.  Facet degeneration.    L2-3: No spinal canal or foraminal stenosis.    L3-4: Annular fissuring centrally of the disc with shallow disc bulge.  Facet degeneration.  No spinal canal or foraminal stenosis.    L4-5: No spinal canal or foraminal stenosis.  Facet degeneration.    L5-S1: Facet degeneration.  No spinal canal stenosis.  Mild bilateral foraminal stenosis.  Impression: Mild multilevel degenerative changes of the lumbar spine.    Electronically signed by: Rogerio Dejesus  Date:    10/11/2022  Time:    16:30       Office Visit on 10/10/2022   Component Date Value Ref Range Status    Color, UA 10/10/2022 Light Yellow   Final    Spec Grav UA 10/10/2022 1.020   Final    pH, UA 10/10/2022 6.0   Final    WBC, UA 10/10/2022 small   Final    Nitrite, UA 10/10/2022 pos   Final    Protein, POC 10/10/2022 trace   Final    Glucose, UA 10/10/2022 neg   Final    Ketones, UA 10/10/2022 neg   Final    Bilirubin, POC 10/10/2022 neg   Final    Urobilinogen, UA  10/10/2022 0.2   Final    Blood, UA 10/10/2022 small   Final    Culture, Urine 10/10/2022 >100,000 Escherichia coli (A)   Final   Lab Visit on 09/22/2022   Component Date Value Ref Range Status    Alkaline Phosphatase, S 09/22/2022 104  35 - 104 U/L Final    Liver 1 % 09/22/2022 57.4  27.8 - 76.3 % Final    Liver 1 09/22/2022 59.7  16.2 - 70.2 IU/L Final    Liver 2 % 09/22/2022 6.1  0.0 - 8.0 % Final    Liver 2 09/22/2022 6.3 (H)  0.0 - 5.8 IU/L Final    Bone % 09/22/2022 25.5  19.1 - 67.7 % Final    Bone 09/22/2022 26.5  12.1 - 42.7 IU/L Final    Intestine % 09/22/2022 11.0  0.0 - 20.6 % Final    Intestine 09/22/2022 11.4 (H)  0.0 - 11.0 IU/L Final    Placental 09/22/2022 Not Present  Not present Final   Office Visit on 09/16/2022   Component Date Value Ref Range Status    Free T4 09/16/2022 0.78  0.76 - 1.46 ng/dL Final    TSH 09/16/2022 11.800 (H)  0.358 - 3.740 uIU/mL Final    Hemoglobin A1C 09/16/2022 6.0  4.5 - 6.6 % Final    Estimated Average Glucose 09/16/2022 114  mg/dL Final    Triglycerides 09/16/2022 68  35 - 150 mg/dL Final    Cholesterol 09/16/2022 116  0 - 200 mg/dL Final    HDL Cholesterol 09/16/2022 40  40 - 60 mg/dL Final    Cholesterol/HDL Ratio (Risk Factor) 09/16/2022 2.9   Final    Non-HDL 09/16/2022 76  mg/dL Final    LDL Calculated 09/16/2022 62  mg/dL Final    LDL/HDL 09/16/2022 1.6   Final    VLDL 09/16/2022 14  mg/dL Final    Sodium 09/16/2022 140  136 - 145 mmol/L Final    Potassium 09/16/2022 3.9  3.5 - 5.1 mmol/L Final    Chloride 09/16/2022 107  98 - 107 mmol/L Final    CO2 09/16/2022 26  21 - 32 mmol/L Final    Anion Gap 09/16/2022 11  7 - 16 mmol/L Final    Glucose 09/16/2022 94  74 - 106 mg/dL Final    BUN 09/16/2022 8  7 - 18 mg/dL Final    Creatinine 09/16/2022 0.99  0.55 - 1.02 mg/dL Final    BUN/Creatinine Ratio 09/16/2022 8  6 - 20 Final    Calcium 09/16/2022 8.7  8.5 - 10.1 mg/dL Final    Total Protein 09/16/2022 7.3  6.4 - 8.2 g/dL Final    Albumin 09/16/2022 3.5  3.5 - 5.0  g/dL Final    Globulin 09/16/2022 3.8  2.0 - 4.0 g/dL Final    A/G Ratio 09/16/2022 0.9   Final    Bilirubin, Total 09/16/2022 0.5  >0.0 - 1.2 mg/dL Final    Alk Phos 09/16/2022 140 (H)  37 - 98 U/L Final    ALT 09/16/2022 76 (H)  13 - 56 U/L Final    AST 09/16/2022 41 (H)  15 - 37 U/L Final    eGFR 09/16/2022 74  >=60 mL/min/1.73m² Final    WBC 09/16/2022 6.82  4.50 - 11.00 K/uL Final    RBC 09/16/2022 5.31  4.20 - 5.40 M/uL Final    Hemoglobin 09/16/2022 11.5 (L)  12.0 - 16.0 g/dL Final    Hematocrit 09/16/2022 38.4  38.0 - 47.0 % Final    MCV 09/16/2022 72.3 (L)  80.0 - 96.0 fL Final    MCH 09/16/2022 21.7 (L)  27.0 - 31.0 pg Final    MCHC 09/16/2022 29.9 (L)  32.0 - 36.0 g/dL Final    RDW 09/16/2022 14.6 (H)  11.5 - 14.5 % Final    Platelet Count 09/16/2022 260  150 - 400 K/uL Final    MPV 09/16/2022 11.8  9.4 - 12.4 fL Final    Neutrophils % 09/16/2022 49.0 (L)  53.0 - 65.0 % Final    Lymphocytes % 09/16/2022 39.0  27.0 - 41.0 % Final    Monocytes % 09/16/2022 7.3 (H)  2.0 - 6.0 % Final    Eosinophils % 09/16/2022 3.7  1.0 - 4.0 % Final    Basophils % 09/16/2022 0.7  0.0 - 1.0 % Final    Immature Granulocytes % 09/16/2022 0.3  0.0 - 0.4 % Final    nRBC, Auto 09/16/2022 0.0  <=0.0 % Final    Neutrophils, Abs 09/16/2022 3.34  1.80 - 7.70 K/uL Final    Lymphocytes, Absolute 09/16/2022 2.66  1.00 - 4.80 K/uL Final    Monocytes, Absolute 09/16/2022 0.50  0.00 - 0.80 K/uL Final    Eosinophils, Absolute 09/16/2022 0.25  0.00 - 0.50 K/uL Final    Basophils, Absolute 09/16/2022 0.05  0.00 - 0.20 K/uL Final    Immature Granulocytes, Absolute 09/16/2022 0.02  0.00 - 0.04 K/uL Final    nRBC, Absolute 09/16/2022 0.00  <=0.00 x10e3/uL Final    Diff Type 09/16/2022 Scan Smear   Final    Platelet Morphology 09/16/2022 Normal  Normal Final    Microcytosis 09/16/2022 Few   Final    Hypochromic 09/16/2022 Few   Final    GGT 09/19/2022 147 (H)  5 - 55 U/L Final    Iron 09/19/2022 70  50 - 170 µg/dL Final    Iron Saturation  09/19/2022 18  14 - 50 % Final    TIBC 09/19/2022 381  250 - 450 µg/dL Final    Ferritin 09/19/2022 12  8 - 252 ng/mL Final   Office Visit on 07/20/2022   Component Date Value Ref Range Status    POC Amphetamines 07/20/2022 Negative  Negative, Inconclusive Final    POC Barbiturates 07/20/2022 Negative  Negative, Inconclusive Final    POC Benzodiazepines 07/20/2022 Negative  Negative, Inconclusive Final    POC Cocaine 07/20/2022 Negative  Negative, Inconclusive Final    POC THC 07/20/2022 Negative  Negative, Inconclusive Final    POC Methadone 07/20/2022 Negative  Negative, Inconclusive Final    POC Methamphetamine 07/20/2022 Negative  Negative, Inconclusive Final    POC Opiates 07/20/2022 Negative  Negative, Inconclusive Final    POC Oxycodone 07/20/2022 Negative  Negative, Inconclusive Final    POC Phencyclidine 07/20/2022 Negative  Negative, Inconclusive Final    POC Methylenedioxymethamphetamine * 07/20/2022 Negative  Negative, Inconclusive Final    POC Tricyclic Antidepressants 07/20/2022 Negative  Negative, Inconclusive Final    POC Buprenorphine 07/20/2022 Negative   Final     Acceptable 07/20/2022 Yes   Final    POC Temperature (Urine) 07/20/2022 90   Final    pH, UA 07/20/2022 6.5  5.0 to 8.0 pH Units Final    Creatinine, Urine 07/20/2022 285 (H)  28 - 219 mg/dL Final    6-Acetylmorphine 07/20/2022 Negative  10 ng/mL Final    7-Aminoclonazepam 07/20/2022 Negative  Negative 25 ng/mL Final    a-Hydroxyalprazolam 07/20/2022 Negative  Negative 25 ng/mL Final    Acetyl Fentanyl 07/20/2022 Negative  2.5 ng/mL Final    Acetyl Norfentanyl Oxalate 07/20/2022 Negative  5 ng/mL Final    Benzoylecgonine 07/20/2022 Negative  100 ng/mL Final    Buprenorphine 07/20/2022 Negative  25 ng/mL Final    Codeine 07/20/2022 Negative  25 ng/mL Final    EDDP 07/20/2022 Negative  25 ng/mL Final    Fentanyl 07/20/2022 Negative  2.5 ng/mL Final    Hydrocodone 07/20/2022 Negative  25 ng/mL Final    Hydromorphone  07/20/2022 Negative  25 ng/mL Final    Lorazepam 07/20/2022 Negative  25 ng/mL Final    Morphine 07/20/2022 Negative  25 ng/mL Final    Norbuprenorphine 07/20/2022 Negative  25 ng/mL Final    Nordiazepam 07/20/2022 Negative  25 ng/mL Final    Norfentanyl Oxalate 07/20/2022 Negative  5 ng/mL Final    Norhydrocodone 07/20/2022 Negative  50 ng/mL Final    Noroxycodone HCL 07/20/2022 Negative  50 ng/mL Final    Oxazepam 07/20/2022 Negative  25 ng/mL Final    Oxymorphone 07/20/2022 Negative  25 ng/mL Final    Tapentadol 07/20/2022 Negative  25 ng/mL Final    Temazepam 07/20/2022 Negative  25 ng/mL Final    THC-COOH 07/20/2022 Negative  25 ng/mL Final    Tramadol 07/20/2022 Negative  100 ng/mL Final    Amphetamine, Urine 07/20/2022 Negative  Negative Final    Methamphetamines, Urine 07/20/2022 Negative  Negative Final    Methadone, Urine 07/20/2022 Negative  Negative 25 ng/mL Final    Oxycodone, Urine 07/20/2022 Negative  Negative 25 ng/mL Final    Specific Gravity, UA 07/20/2022 1.029  <=1.030 Final   Office Visit on 06/16/2022   Component Date Value Ref Range Status    HIV 1/2 06/16/2022 Non-Reactive  Non-Reactive Final    Hepatitis C Ab 06/16/2022 Non-Reactive  Non-Reactive Final   Office Visit on 06/03/2022   Component Date Value Ref Range Status    Hemoglobin A1C 06/03/2022 5.5  4.5 - 6.6 % Final    Estimated Average Glucose 06/03/2022 97  mg/dL Final    TSH 06/03/2022 16.600 (H)  0.358 - 3.740 uIU/mL Final    Triglycerides 06/03/2022 73  35 - 150 mg/dL Final    Cholesterol 06/03/2022 152  0 - 200 mg/dL Final    HDL Cholesterol 06/03/2022 46  40 - 60 mg/dL Final    Cholesterol/HDL Ratio (Risk Factor) 06/03/2022 3.3   Final    Non-HDL 06/03/2022 106  mg/dL Final    LDL Calculated 06/03/2022 91  mg/dL Final    LDL/HDL 06/03/2022 2.0   Final    VLDL 06/03/2022 15  mg/dL Final    Sodium 06/03/2022 139  136 - 145 mmol/L Final    Potassium 06/03/2022 4.4  3.5 - 5.1 mmol/L Final    Chloride 06/03/2022 106  98 - 107 mmol/L  Final    CO2 06/03/2022 23  21 - 32 mmol/L Final    Anion Gap 06/03/2022 14  7 - 16 mmol/L Final    Glucose 06/03/2022 94  74 - 106 mg/dL Final    BUN 06/03/2022 12  7 - 18 mg/dL Final    Creatinine 06/03/2022 1.07 (H)  0.55 - 1.02 mg/dL Final    BUN/Creatinine Ratio 06/03/2022 11  6 - 20 Final    Calcium 06/03/2022 8.3 (L)  8.5 - 10.1 mg/dL Final    Total Protein 06/03/2022 7.3  6.4 - 8.2 g/dL Final    Albumin 06/03/2022 3.5  3.5 - 5.0 g/dL Final    Globulin 06/03/2022 3.8  2.0 - 4.0 g/dL Final    A/G Ratio 06/03/2022 0.9   Final    Bilirubin, Total 06/03/2022 0.3  0.0 - 1.2 mg/dL Final    Alk Phos 06/03/2022 91  37 - 98 U/L Final    ALT 06/03/2022 23  13 - 56 U/L Final    AST 06/03/2022 19  15 - 37 U/L Final    eGFR 06/03/2022 60  >=60 mL/min/1.73m² Final    WBC 06/03/2022 6.72  4.50 - 11.00 K/uL Final    RBC 06/03/2022 6.64 (H)  4.20 - 5.40 M/uL Final    Hemoglobin 06/03/2022 14.5  12.0 - 16.0 g/dL Final    Hematocrit 06/03/2022 48.3 (H)  38.0 - 47.0 % Final    MCV 06/03/2022 72.7 (L)  80.0 - 96.0 fL Final    MCH 06/03/2022 21.8 (L)  27.0 - 31.0 pg Final    MCHC 06/03/2022 30.0 (L)  32.0 - 36.0 g/dL Final    RDW 06/03/2022 16.8 (H)  11.5 - 14.5 % Final    Platelet Count 06/03/2022 211  150 - 400 K/uL Final    MPV 06/03/2022 11.2  9.4 - 12.4 fL Final    Neutrophils % 06/03/2022 48.3 (L)  53.0 - 65.0 % Final    Lymphocytes % 06/03/2022 38.8  27.0 - 41.0 % Final    Monocytes % 06/03/2022 6.3 (H)  2.0 - 6.0 % Final    Eosinophils % 06/03/2022 5.8 (H)  1.0 - 4.0 % Final    Basophils % 06/03/2022 0.7  0.0 - 1.0 % Final    Immature Granulocytes % 06/03/2022 0.1  0.0 - 0.4 % Final    nRBC, Auto 06/03/2022 0.0  <=0.0 % Final    Neutrophils, Abs 06/03/2022 3.24  1.80 - 7.70 K/uL Final    Lymphocytes, Absolute 06/03/2022 2.61  1.00 - 4.80 K/uL Final    Monocytes, Absolute 06/03/2022 0.42  0.00 - 0.80 K/uL Final    Eosinophils, Absolute 06/03/2022 0.39  0.00 - 0.50 K/uL Final    Basophils, Absolute 06/03/2022 0.05  0.00 -  0.20 K/uL Final    Immature Granulocytes, Absolute 06/03/2022 0.01  0.00 - 0.04 K/uL Final    nRBC, Absolute 06/03/2022 0.00  <=0.00 x10e3/uL Final    Diff Type 06/03/2022 Scan Smear   Final    Platelet Morphology 06/03/2022 Normal  Normal Final    Anisocytosis 06/03/2022 1+   Final    Microcytosis 06/03/2022 2+   Final    Polychromasia 06/03/2022 Few   Final         No orders of the defined types were placed in this encounter.      Requested Prescriptions      No prescriptions requested or ordered in this encounter       Assessment:     1. Lumbar spondylosis    2. Sacroiliitis           Plan:    Daily benzodiazepine use    Not using narcotics from our office    Has completed physical therapy     She discontinue gabapentin    Continues complaint back pain buttock pain,    Follow-up after MRI lumbar spine    MRI lumbar spine [facility] August 2022, multiple level degenerative changes mild neuroforaminal stenosis L5/S1    X-ray lumbar spine July 20, 2022 degenerative changes    X-ray sacroiliac joint July 20, 2022 degenerative changes no fracture noted    She declines lumbar procedures    Requesting to discuss options with Dr. Jimenez    Follow-up 1 month discuss options    Dr. Jimenez, July 2023    Physical therapy September 2022 [place]    Massage therapy declines

## 2022-10-19 ENCOUNTER — OFFICE VISIT (OUTPATIENT)
Dept: FAMILY MEDICINE | Facility: CLINIC | Age: 42
End: 2022-10-19
Payer: MEDICAID

## 2022-10-19 VITALS
OXYGEN SATURATION: 98 % | DIASTOLIC BLOOD PRESSURE: 84 MMHG | HEART RATE: 63 BPM | BODY MASS INDEX: 46.56 KG/M2 | WEIGHT: 253 LBS | HEIGHT: 62 IN | SYSTOLIC BLOOD PRESSURE: 120 MMHG | RESPIRATION RATE: 18 BRPM

## 2022-10-19 DIAGNOSIS — N39.0 URINARY TRACT INFECTION WITH HEMATURIA, SITE UNSPECIFIED: Primary | ICD-10-CM

## 2022-10-19 DIAGNOSIS — R31.9 URINARY TRACT INFECTION WITH HEMATURIA, SITE UNSPECIFIED: Primary | ICD-10-CM

## 2022-10-19 LAB
BILIRUB SERPL-MCNC: ABNORMAL MG/DL
BLOOD URINE, POC: ABNORMAL
COLOR, POC UA: ABNORMAL
GLUCOSE UR QL STRIP: ABNORMAL
KETONES UR QL STRIP: ABNORMAL
LEUKOCYTE ESTERASE URINE, POC: ABNORMAL
NITRITE, POC UA: ABNORMAL
PH, POC UA: 6
PROTEIN, POC: ABNORMAL
SPECIFIC GRAVITY, POC UA: >=1.03
UROBILINOGEN, POC UA: 0.2

## 2022-10-19 PROCEDURE — 99213 OFFICE O/P EST LOW 20 MIN: CPT | Mod: 25,,, | Performed by: FAMILY MEDICINE

## 2022-10-19 PROCEDURE — 1160F PR REVIEW ALL MEDS BY PRESCRIBER/CLIN PHARMACIST DOCUMENTED: ICD-10-PCS | Mod: CPTII,,, | Performed by: FAMILY MEDICINE

## 2022-10-19 PROCEDURE — 1159F PR MEDICATION LIST DOCUMENTED IN MEDICAL RECORD: ICD-10-PCS | Mod: CPTII,,, | Performed by: FAMILY MEDICINE

## 2022-10-19 PROCEDURE — 96372 THER/PROPH/DIAG INJ SC/IM: CPT | Mod: ,,, | Performed by: FAMILY MEDICINE

## 2022-10-19 PROCEDURE — 81003 URINALYSIS AUTO W/O SCOPE: CPT | Mod: RHCUB | Performed by: FAMILY MEDICINE

## 2022-10-19 PROCEDURE — 3044F HG A1C LEVEL LT 7.0%: CPT | Mod: CPTII,,, | Performed by: FAMILY MEDICINE

## 2022-10-19 PROCEDURE — 1159F MED LIST DOCD IN RCRD: CPT | Mod: CPTII,,, | Performed by: FAMILY MEDICINE

## 2022-10-19 PROCEDURE — 1160F RVW MEDS BY RX/DR IN RCRD: CPT | Mod: CPTII,,, | Performed by: FAMILY MEDICINE

## 2022-10-19 PROCEDURE — 3074F PR MOST RECENT SYSTOLIC BLOOD PRESSURE < 130 MM HG: ICD-10-PCS | Mod: CPTII,,, | Performed by: FAMILY MEDICINE

## 2022-10-19 PROCEDURE — 3079F PR MOST RECENT DIASTOLIC BLOOD PRESSURE 80-89 MM HG: ICD-10-PCS | Mod: CPTII,,, | Performed by: FAMILY MEDICINE

## 2022-10-19 PROCEDURE — 99213 PR OFFICE/OUTPT VISIT, EST, LEVL III, 20-29 MIN: ICD-10-PCS | Mod: 25,,, | Performed by: FAMILY MEDICINE

## 2022-10-19 PROCEDURE — 3074F SYST BP LT 130 MM HG: CPT | Mod: CPTII,,, | Performed by: FAMILY MEDICINE

## 2022-10-19 PROCEDURE — 96372 PR INJECTION,THERAP/PROPH/DIAG2ST, IM OR SUBCUT: ICD-10-PCS | Mod: ,,, | Performed by: FAMILY MEDICINE

## 2022-10-19 PROCEDURE — 3079F DIAST BP 80-89 MM HG: CPT | Mod: CPTII,,, | Performed by: FAMILY MEDICINE

## 2022-10-19 PROCEDURE — 3044F PR MOST RECENT HEMOGLOBIN A1C LEVEL <7.0%: ICD-10-PCS | Mod: CPTII,,, | Performed by: FAMILY MEDICINE

## 2022-10-19 RX ORDER — CEFUROXIME AXETIL 500 MG/1
500 TABLET ORAL EVERY 12 HOURS
Qty: 14 TABLET | Refills: 0 | Status: SHIPPED | OUTPATIENT
Start: 2022-10-19 | End: 2022-12-16

## 2022-10-19 RX ORDER — CEFTRIAXONE 1 G/1
1 INJECTION, POWDER, FOR SOLUTION INTRAMUSCULAR; INTRAVENOUS
Status: COMPLETED | OUTPATIENT
Start: 2022-10-19 | End: 2022-10-19

## 2022-10-19 RX ADMIN — CEFTRIAXONE 1 G: 1 INJECTION, POWDER, FOR SOLUTION INTRAMUSCULAR; INTRAVENOUS at 10:10

## 2022-10-19 NOTE — PROGRESS NOTES
Mary Magana is a 41 y.o. female seen today for follow-up on her urinary tract infection.  Patient reports he is asymptomatic but unfortunately her urine today did show persistent infection.  We discussed an injection of Rocephin and a change of antibiotics.  Actually, her culture showed that she was sensitive to Macrodantin and we will instead use Ceftin which also the bacteria sensitive to.  Patient is encouraged use her medication and finish it as prescribed.      Past Medical History:   Diagnosis Date    Diabetes mellitus     Hypertension     Thyroid disease     WPW (Ilana-Parkinson-White syndrome)      Family History   Problem Relation Age of Onset    Hypertension Mother     Diabetes Mother     Diabetes Father      Current Outpatient Medications on File Prior to Visit   Medication Sig Dispense Refill    aspirin (ECOTRIN) 81 MG EC tablet TAKE 1 TABLET BY MOUTH DAILY WITH FOOD 30 tablet 5    buPROPion (WELLBUTRIN XL) 150 MG TB24 tablet Take 150 mg by mouth every morning.      clonazePAM (KLONOPIN) 1 MG tablet Take 1 mg by mouth nightly.      levothyroxine (SYNTHROID) 75 MCG tablet Take 1 tablet (75 mcg total) by mouth before breakfast. 30 tablet 2    metoprolol tartrate (LOPRESSOR) 25 MG tablet Take 1 tablet (25 mg total) by mouth 2 (two) times daily. 60 tablet 5    naproxen (NAPROSYN) 500 MG tablet Take 1 tablet (500 mg total) by mouth 2 (two) times daily. 60 tablet 0    TRUEPLUS LANCETS 30 gauge Misc AS DIRECTED      VICTOZA 2-JAMESON 0.6 mg/0.1 mL (18 mg/3 mL) PnIj pen INJECT 1.2 MG SUB-Q EVERY DAY ...KEEP IN REFRIGERATOR 6 mL 5    nitrofurantoin, macrocrystal-monohydrate, (MACROBID) 100 MG capsule Take 1 capsule (100 mg total) by mouth 2 (two) times daily. (Patient not taking: Reported on 10/19/2022) 14 capsule 0     No current facility-administered medications on file prior to visit.       There is no immunization history on file for this patient.    Review of Systems   Constitutional:  Negative for  fever, malaise/fatigue and weight loss.   Respiratory:  Negative for shortness of breath.    Cardiovascular:  Negative for chest pain and palpitations.   Gastrointestinal:  Negative for nausea and vomiting.   Genitourinary:  Negative for dysuria, frequency and urgency.   Psychiatric/Behavioral:  Negative for depression.       Vitals:    10/19/22 1035   BP: 120/84   Resp:        Physical Exam  Eyes:      Conjunctiva/sclera: Conjunctivae normal.   Pulmonary:      Effort: Pulmonary effort is normal.   Neurological:      Mental Status: She is alert.   Psychiatric:         Mood and Affect: Mood normal.         Behavior: Behavior normal.         Thought Content: Thought content normal.         Judgment: Judgment normal.        Assessment and Plan  Urinary tract infection with hematuria, site unspecified  -     POCT URINALYSIS W/O SCOPE  -     cefTRIAXone injection 1 g  -     cefUROXime (CEFTIN) 500 MG tablet; Take 1 tablet (500 mg total) by mouth every 12 (twelve) hours.  Dispense: 14 tablet; Refill: 0            Return to clinic in 1 week for follow-up or as needed.    Health Maintenance Topics with due status: Not Due       Topic Last Completion Date    Foot Exam 06/16/2022    Mammogram 06/24/2022    Lipid Panel 09/16/2022    Hemoglobin A1c 09/16/2022

## 2022-11-28 ENCOUNTER — OFFICE VISIT (OUTPATIENT)
Dept: OBSTETRICS AND GYNECOLOGY | Facility: CLINIC | Age: 42
End: 2022-11-28
Payer: MEDICAID

## 2022-11-28 VITALS
RESPIRATION RATE: 18 BRPM | WEIGHT: 258 LBS | BODY MASS INDEX: 47.48 KG/M2 | SYSTOLIC BLOOD PRESSURE: 116 MMHG | HEIGHT: 62 IN | DIASTOLIC BLOOD PRESSURE: 89 MMHG

## 2022-11-28 DIAGNOSIS — Z12.4 CERVICAL CANCER SCREENING: ICD-10-CM

## 2022-11-28 DIAGNOSIS — Z01.419 ENCOUNTER FOR WELL WOMAN EXAM WITH ROUTINE GYNECOLOGICAL EXAM: Primary | ICD-10-CM

## 2022-11-28 PROCEDURE — 3074F SYST BP LT 130 MM HG: CPT | Mod: CPTII,,, | Performed by: STUDENT IN AN ORGANIZED HEALTH CARE EDUCATION/TRAINING PROGRAM

## 2022-11-28 PROCEDURE — 87624 HPV HI-RISK TYP POOLED RSLT: CPT | Mod: ,,, | Performed by: CLINICAL MEDICAL LABORATORY

## 2022-11-28 PROCEDURE — 3008F PR BODY MASS INDEX (BMI) DOCUMENTED: ICD-10-PCS | Mod: CPTII,,, | Performed by: STUDENT IN AN ORGANIZED HEALTH CARE EDUCATION/TRAINING PROGRAM

## 2022-11-28 PROCEDURE — 3008F BODY MASS INDEX DOCD: CPT | Mod: CPTII,,, | Performed by: STUDENT IN AN ORGANIZED HEALTH CARE EDUCATION/TRAINING PROGRAM

## 2022-11-28 PROCEDURE — 87624 HUMAN PAPILLOMAVIRUS (HPV): ICD-10-PCS | Mod: ,,, | Performed by: CLINICAL MEDICAL LABORATORY

## 2022-11-28 PROCEDURE — 3074F PR MOST RECENT SYSTOLIC BLOOD PRESSURE < 130 MM HG: ICD-10-PCS | Mod: CPTII,,, | Performed by: STUDENT IN AN ORGANIZED HEALTH CARE EDUCATION/TRAINING PROGRAM

## 2022-11-28 PROCEDURE — 3079F DIAST BP 80-89 MM HG: CPT | Mod: CPTII,,, | Performed by: STUDENT IN AN ORGANIZED HEALTH CARE EDUCATION/TRAINING PROGRAM

## 2022-11-28 PROCEDURE — 88142 CYTOPATH C/V THIN LAYER: CPT | Mod: TC,GCY | Performed by: STUDENT IN AN ORGANIZED HEALTH CARE EDUCATION/TRAINING PROGRAM

## 2022-11-28 PROCEDURE — 3079F PR MOST RECENT DIASTOLIC BLOOD PRESSURE 80-89 MM HG: ICD-10-PCS | Mod: CPTII,,, | Performed by: STUDENT IN AN ORGANIZED HEALTH CARE EDUCATION/TRAINING PROGRAM

## 2022-11-28 PROCEDURE — 1159F MED LIST DOCD IN RCRD: CPT | Mod: CPTII,,, | Performed by: STUDENT IN AN ORGANIZED HEALTH CARE EDUCATION/TRAINING PROGRAM

## 2022-11-28 PROCEDURE — 3044F PR MOST RECENT HEMOGLOBIN A1C LEVEL <7.0%: ICD-10-PCS | Mod: CPTII,,, | Performed by: STUDENT IN AN ORGANIZED HEALTH CARE EDUCATION/TRAINING PROGRAM

## 2022-11-28 PROCEDURE — 99386 PREV VISIT NEW AGE 40-64: CPT | Mod: S$PBB,,, | Performed by: STUDENT IN AN ORGANIZED HEALTH CARE EDUCATION/TRAINING PROGRAM

## 2022-11-28 PROCEDURE — 1159F PR MEDICATION LIST DOCUMENTED IN MEDICAL RECORD: ICD-10-PCS | Mod: CPTII,,, | Performed by: STUDENT IN AN ORGANIZED HEALTH CARE EDUCATION/TRAINING PROGRAM

## 2022-11-28 PROCEDURE — 3044F HG A1C LEVEL LT 7.0%: CPT | Mod: CPTII,,, | Performed by: STUDENT IN AN ORGANIZED HEALTH CARE EDUCATION/TRAINING PROGRAM

## 2022-11-28 PROCEDURE — 99386 PR PREVENTIVE VISIT,NEW,40-64: ICD-10-PCS | Mod: S$PBB,,, | Performed by: STUDENT IN AN ORGANIZED HEALTH CARE EDUCATION/TRAINING PROGRAM

## 2022-11-28 PROCEDURE — 99214 OFFICE O/P EST MOD 30 MIN: CPT | Mod: PBBFAC | Performed by: STUDENT IN AN ORGANIZED HEALTH CARE EDUCATION/TRAINING PROGRAM

## 2022-11-28 NOTE — PROGRESS NOTES
Gynecology Annual    Assessment/Plan:   Problem List Items Addressed This Visit    None  Visit Diagnoses       Encounter for well woman exam with routine gynecological exam    -  Primary    Cervical cancer screening        Relevant Orders    ThinPrep Pap Test              CC:   Chief Complaint   Patient presents with    Well Woman     Last pap unknown   Last mammo 10/2022       HPI: Mary Magana is a 42 y.o. female who presents for annual exam and pap smear.     Review of Systems: The following ROS was otherwise negative, except as noted in the HPI:  constitutional, HEENT, respiratory, cardiovascular, gastrointestinal, genitourinary, skin, musculoskeletal, neurological, psych    Gynecologic History:   No history of abnormal pap smears  denies history of of STIs  Menstrual history: currently having regular cycles.       Obstetrical History:  OB History          6    Para   6    Term   5       1    AB        Living   5         SAB        IAB        Ectopic        Multiple   1    Live Births   7                 Past Medical History:   Past Medical History:   Diagnosis Date    Diabetes mellitus     Hypertension     Thyroid disease     WPW (Ilana-Parkinson-White syndrome)        Medications:  Medication List with Changes/Refills   Current Medications    ASPIRIN (ECOTRIN) 81 MG EC TABLET    TAKE 1 TABLET BY MOUTH DAILY WITH FOOD    BUPROPION (WELLBUTRIN XL) 150 MG TB24 TABLET    Take 150 mg by mouth every morning.    CEFUROXIME (CEFTIN) 500 MG TABLET    Take 1 tablet (500 mg total) by mouth every 12 (twelve) hours.    CLONAZEPAM (KLONOPIN) 1 MG TABLET    Take 1 mg by mouth nightly.    LEVOTHYROXINE (SYNTHROID) 75 MCG TABLET    Take 1 tablet (75 mcg total) by mouth before breakfast.    METOPROLOL TARTRATE (LOPRESSOR) 25 MG TABLET    Take 1 tablet (25 mg total) by mouth 2 (two) times daily.    NAPROXEN (NAPROSYN) 500 MG TABLET    Take 1 tablet (500 mg total) by mouth 2 (two) times daily.    TRUEPLUS  "LANCETS 30 GAUGE MISC    AS DIRECTED    VICTOZA 2-JAMESON 0.6 MG/0.1 ML (18 MG/3 ML) PNIJ PEN    INJECT 1.2 MG SUB-Q EVERY DAY ...KEEP IN REFRIGERATOR       Allergies:  Patient has no known allergies.    Surgical History:  Past Surgical History:   Procedure Laterality Date    CHOLECYSTECTOMY      TUBAL LIGATION         Family History:  Family History   Problem Relation Age of Onset    Diabetes Father     Hypertension Mother     Diabetes Mother     Leukemia Daughter        Social History:  Social History     Substance and Sexual Activity   Alcohol Use Not Currently     Social History     Substance and Sexual Activity   Drug Use Not Currently     Social History     Tobacco Use   Smoking Status Every Day    Types: Cigarettes, Vaping with nicotine   Smokeless Tobacco Never       Physical Exam:  /89 (BP Location: Left arm, Patient Position: Sitting)   Resp 18   Ht 5' 2" (1.575 m)   Wt 117 kg (258 lb)   LMP 11/14/2022   BMI 47.19 kg/m²     General: Alert, well appearing, no acute distress  Head: Normocephalic, atraumatic  Breasts: Symmetric, non-tender to palpation, no skin changes, palpable axillary lymph nodes or masses noted  Lungs: Unlabored respirations  Abdomen: Soft, nontender, nondistended   Pelvic:   External: normal female genitalia, no masses or lesions   Vagina: moist, pink mucosa with rugae, physiologic discharge  Cervix: no masses or lesions, nontender  Uterus: unable to palpate  Adnexa: unable to palpate  Rectovaginal: deferred  Extremities: No redness or tenderness  Skin: Well perfused, normal coloration and turgor, no lesions or rashes visualized  Neuro: Alert, oriented, normal speech, no focal deficits, moves extremities appropriately  Osteopathic: No TART changes    Labs:  No visits with results within 1 Day(s) from this visit.   Latest known visit with results is:   Office Visit on 10/19/2022   Component Date Value    Color, UA 10/19/2022 Dark Yellow     Spec Grav UA 10/19/2022 >=1.030     pH, " UA 10/19/2022 6.0     WBC, UA 10/19/2022 small     Nitrite, UA 10/19/2022 pos     Protein, POC 10/19/2022 neg     Glucose, UA 10/19/2022 neg     Ketones, UA 10/19/2022 neg     Bilirubin, POC 10/19/2022 neg     Urobilinogen, UA 10/19/2022 0.2     Blood, UA 10/19/2022 trace-intact

## 2022-12-01 LAB
GH SERPL-MCNC: NORMAL NG/ML
INSULIN SERPL-ACNC: NORMAL U[IU]/ML
LAB AP CLINICAL INFORMATION: NORMAL
LAB AP GYN INTERPRETATION: NEGATIVE
LAB AP PAP DISCLAIMER COMMENTS: NORMAL
RENIN PLAS-CCNC: NORMAL NG/ML/H

## 2022-12-03 LAB
HPV 16: NEGATIVE
HPV 18: NEGATIVE
HPV OTHER: NEGATIVE

## 2022-12-12 ENCOUNTER — OFFICE VISIT (OUTPATIENT)
Dept: PAIN MEDICINE | Facility: CLINIC | Age: 42
End: 2022-12-12
Payer: MEDICAID

## 2022-12-12 VITALS
BODY MASS INDEX: 47.29 KG/M2 | DIASTOLIC BLOOD PRESSURE: 95 MMHG | WEIGHT: 257 LBS | HEART RATE: 87 BPM | SYSTOLIC BLOOD PRESSURE: 137 MMHG | HEIGHT: 62 IN

## 2022-12-12 DIAGNOSIS — M46.1 SACROILIITIS: Chronic | ICD-10-CM

## 2022-12-12 DIAGNOSIS — M47.816 LUMBAR SPONDYLOSIS: Primary | Chronic | ICD-10-CM

## 2022-12-12 PROCEDURE — 3075F PR MOST RECENT SYSTOLIC BLOOD PRESS GE 130-139MM HG: ICD-10-PCS | Mod: CPTII,,, | Performed by: PAIN MEDICINE

## 2022-12-12 PROCEDURE — 99214 OFFICE O/P EST MOD 30 MIN: CPT | Mod: PBBFAC | Performed by: PAIN MEDICINE

## 2022-12-12 PROCEDURE — 3044F PR MOST RECENT HEMOGLOBIN A1C LEVEL <7.0%: ICD-10-PCS | Mod: CPTII,,, | Performed by: PAIN MEDICINE

## 2022-12-12 PROCEDURE — 99213 PR OFFICE/OUTPT VISIT, EST, LEVL III, 20-29 MIN: ICD-10-PCS | Mod: S$PBB,,, | Performed by: PAIN MEDICINE

## 2022-12-12 PROCEDURE — 3075F SYST BP GE 130 - 139MM HG: CPT | Mod: CPTII,,, | Performed by: PAIN MEDICINE

## 2022-12-12 PROCEDURE — 3008F BODY MASS INDEX DOCD: CPT | Mod: CPTII,,, | Performed by: PAIN MEDICINE

## 2022-12-12 PROCEDURE — 3008F PR BODY MASS INDEX (BMI) DOCUMENTED: ICD-10-PCS | Mod: CPTII,,, | Performed by: PAIN MEDICINE

## 2022-12-12 PROCEDURE — 3080F PR MOST RECENT DIASTOLIC BLOOD PRESSURE >= 90 MM HG: ICD-10-PCS | Mod: CPTII,,, | Performed by: PAIN MEDICINE

## 2022-12-12 PROCEDURE — 99213 OFFICE O/P EST LOW 20 MIN: CPT | Mod: S$PBB,,, | Performed by: PAIN MEDICINE

## 2022-12-12 PROCEDURE — 3080F DIAST BP >= 90 MM HG: CPT | Mod: CPTII,,, | Performed by: PAIN MEDICINE

## 2022-12-12 PROCEDURE — 3044F HG A1C LEVEL LT 7.0%: CPT | Mod: CPTII,,, | Performed by: PAIN MEDICINE

## 2022-12-12 PROCEDURE — 1159F PR MEDICATION LIST DOCUMENTED IN MEDICAL RECORD: ICD-10-PCS | Mod: CPTII,,, | Performed by: PAIN MEDICINE

## 2022-12-12 PROCEDURE — 1159F MED LIST DOCD IN RCRD: CPT | Mod: CPTII,,, | Performed by: PAIN MEDICINE

## 2022-12-12 NOTE — PROGRESS NOTES
Subjective:         Patient ID: Mary Magana is a 42 y.o. female.    Chief Complaint: Low-back Pain        Pain  This is a chronic problem. The current episode started more than 1 year ago. The problem occurs daily. The problem has been unchanged. Associated symptoms include arthralgias and neck pain. Pertinent negatives include no anorexia, change in bowel habit, chills, coughing, diaphoresis, fever, rash, sore throat, swollen glands, vertigo or vomiting.   Review of Systems   Constitutional:  Negative for activity change, appetite change, chills, diaphoresis, fever and unexpected weight change.   HENT:  Negative for drooling, ear discharge, ear pain, facial swelling, nosebleeds, sore throat, trouble swallowing, voice change and goiter.    Eyes:  Negative for photophobia, pain, discharge, redness and visual disturbance.   Respiratory:  Negative for apnea, cough, choking, chest tightness, shortness of breath, wheezing and stridor.    Cardiovascular:  Negative for palpitations and leg swelling.   Gastrointestinal:  Negative for abdominal distention, anorexia, change in bowel habit, diarrhea, rectal pain, vomiting, fecal incontinence and change in bowel habit.   Endocrine: Negative for cold intolerance, heat intolerance, polydipsia, polyphagia and polyuria.   Genitourinary:  Negative for flank pain, frequency and hot flashes.   Musculoskeletal:  Positive for arthralgias, back pain and neck pain.   Integumentary:  Negative for color change, pallor and rash.   Allergic/Immunologic: Negative for immunocompromised state.   Neurological:  Negative for dizziness, vertigo, seizures, syncope, facial asymmetry, speech difficulty, light-headedness, coordination difficulties, memory loss and coordination difficulties.   Hematological:  Negative for adenopathy. Does not bruise/bleed easily.   Psychiatric/Behavioral:  Negative for agitation, behavioral problems, confusion, decreased concentration, dysphoric mood,  "hallucinations, self-injury and suicidal ideas. The patient is not nervous/anxious and is not hyperactive.          Past Medical History:   Diagnosis Date    Diabetes mellitus     Hypertension     Thyroid disease     WPW (Ilana-Parkinson-White syndrome)      Past Surgical History:   Procedure Laterality Date    CHOLECYSTECTOMY      TUBAL LIGATION       Social History     Socioeconomic History    Marital status: Single   Tobacco Use    Smoking status: Every Day     Types: Cigarettes, Vaping with nicotine    Smokeless tobacco: Never   Substance and Sexual Activity    Alcohol use: Not Currently    Drug use: Not Currently    Sexual activity: Not Currently     Family History   Problem Relation Age of Onset    Diabetes Father     Hypertension Mother     Diabetes Mother     Leukemia Daughter      Review of patient's allergies indicates:  No Known Allergies     Objective:  Vitals:    12/12/22 0859   BP: (!) 137/95   Pulse: 87   Weight: 116.6 kg (257 lb)   Height: 5' 2.4" (1.585 m)   PainSc:   8         Physical Exam  Vitals and nursing note reviewed. Exam conducted with a chaperone present.   Constitutional:       General: She is awake. She is not in acute distress.     Appearance: Normal appearance. She is not ill-appearing or toxic-appearing.   HENT:      Head: Normocephalic and atraumatic.      Nose: Nose normal.      Mouth/Throat:      Mouth: Mucous membranes are moist.      Pharynx: Oropharynx is clear.   Eyes:      Conjunctiva/sclera: Conjunctivae normal.      Pupils: Pupils are equal, round, and reactive to light.   Pulmonary:      Effort: Pulmonary effort is normal. No respiratory distress.   Abdominal:      Palpations: Abdomen is soft.      Tenderness: There is no guarding.   Musculoskeletal:         General: Normal range of motion.      Cervical back: Normal range of motion and neck supple. No rigidity.   Skin:     General: Skin is warm and dry.      Coloration: Skin is not jaundiced or pale. "   Neurological:      General: No focal deficit present.      Mental Status: She is alert and oriented to person, place, and time. Mental status is at baseline.      Cranial Nerves: No cranial nerve deficit (II-XII).   Psychiatric:         Mood and Affect: Mood normal.         Behavior: Behavior normal. Behavior is cooperative.         Thought Content: Thought content normal.         MRI Lumbar Spine Without Contrast  Narrative: EXAMINATION:  MRI LUMBAR SPINE WITHOUT CONTRAST    CLINICAL HISTORY:  Lumbar radiculopathy, symptoms persist with conservative treatment; Radiculopathy, lumbar region    TECHNIQUE:  Multiplanar, multisequence MRI of the lumbar spine performed without the administration of contrast.    COMPARISON:  Radiograph 07/20/2022    FINDINGS:  Vertebral body heights, alignment, and signal intensity are normal.  There is disc desiccation L3-4 and L5-S1.  The conus terminates at L1-2.    L1-2: No spinal canal or foraminal stenosis.  Facet degeneration.    L2-3: No spinal canal or foraminal stenosis.    L3-4: Annular fissuring centrally of the disc with shallow disc bulge.  Facet degeneration.  No spinal canal or foraminal stenosis.    L4-5: No spinal canal or foraminal stenosis.  Facet degeneration.    L5-S1: Facet degeneration.  No spinal canal stenosis.  Mild bilateral foraminal stenosis.  Impression: Mild multilevel degenerative changes of the lumbar spine.    Electronically signed by: Rogerio Dejesus  Date:    10/11/2022  Time:    16:30         Office Visit on 11/28/2022   Component Date Value Ref Range Status    Case Report 11/28/2022    Final                    Value:Pap Cytology                                      Case: [id number]                                   Authorizing Provider:  Prabhu Hernandez DO Collected:           11/28/2022 03:34 PM          Ordering Location:     [facility] Received:            11/30/2022 09:08 AM                                 - Obstetrics And                                                                                     Gynecology                                                                   First Screen:          [facility](ASCP)                                                        Specimen:    Liquid-Based Pap Test, Screening, Cervix                                                   Interpretation 11/28/2022 Negative              Final    General Categorization 11/28/2022 Negative for intraepithelial lesion or malignancy   Final    Specimen Adequacy 11/28/2022 Satisfactory for evaluation  No endocervical component   Final    Clinical Information 11/28/2022    Final                    Value:This result contains rich text formatting which cannot be displayed here.    Disclaimer 11/28/2022    Final                    Value:This result contains rich text formatting which cannot be displayed here.    HPV 16 11/28/2022 Negative  Negative (NEG) Final    HPV 18 11/28/2022 Negative  Negative, Invalid Final    HPV Other 11/28/2022 Negative  Negative, Invalid Final   Office Visit on 10/19/2022   Component Date Value Ref Range Status    Color, UA 10/19/2022 Dark Yellow   Final    Spec Grav UA 10/19/2022 >=1.030   Final    pH, UA 10/19/2022 6.0   Final    WBC, UA 10/19/2022 small   Final    Nitrite, UA 10/19/2022 pos   Final    Protein, POC 10/19/2022 neg   Final    Glucose, UA 10/19/2022 neg   Final    Ketones, UA 10/19/2022 neg   Final    Bilirubin, POC 10/19/2022 neg   Final    Urobilinogen, UA 10/19/2022 0.2   Final    Blood, UA 10/19/2022 trace-intact   Final   Office Visit on 10/10/2022   Component Date Value Ref Range Status    Color, UA 10/10/2022 Light Yellow   Final    Spec Grav UA 10/10/2022 1.020   Final    pH, UA 10/10/2022 6.0   Final    WBC, UA 10/10/2022 small   Final    Nitrite, UA 10/10/2022 pos   Final    Protein, POC 10/10/2022 trace   Final    Glucose, UA 10/10/2022 neg   Final    Ketones, UA 10/10/2022 neg    Final    Bilirubin, POC 10/10/2022 neg   Final    Urobilinogen, UA 10/10/2022 0.2   Final    Blood, UA 10/10/2022 small   Final    Culture, Urine 10/10/2022 >100,000 Escherichia coli (A)   Final   Lab Visit on 09/22/2022   Component Date Value Ref Range Status    Alkaline Phosphatase, S 09/22/2022 104  35 - 104 U/L Final    Liver 1 % 09/22/2022 57.4  27.8 - 76.3 % Final    Liver 1 09/22/2022 59.7  16.2 - 70.2 IU/L Final    Liver 2 % 09/22/2022 6.1  0.0 - 8.0 % Final    Liver 2 09/22/2022 6.3 (H)  0.0 - 5.8 IU/L Final    Bone % 09/22/2022 25.5  19.1 - 67.7 % Final    Bone 09/22/2022 26.5  12.1 - 42.7 IU/L Final    Intestine % 09/22/2022 11.0  0.0 - 20.6 % Final    Intestine 09/22/2022 11.4 (H)  0.0 - 11.0 IU/L Final    Placental 09/22/2022 Not Present  Not present Final   Office Visit on 09/16/2022   Component Date Value Ref Range Status    Free T4 09/16/2022 0.78  0.76 - 1.46 ng/dL Final    TSH 09/16/2022 11.800 (H)  0.358 - 3.740 uIU/mL Final    Hemoglobin A1C 09/16/2022 6.0  4.5 - 6.6 % Final    Estimated Average Glucose 09/16/2022 114  mg/dL Final    Triglycerides 09/16/2022 68  35 - 150 mg/dL Final    Cholesterol 09/16/2022 116  0 - 200 mg/dL Final    HDL Cholesterol 09/16/2022 40  40 - 60 mg/dL Final    Cholesterol/HDL Ratio (Risk Factor) 09/16/2022 2.9   Final    Non-HDL 09/16/2022 76  mg/dL Final    LDL Calculated 09/16/2022 62  mg/dL Final    LDL/HDL 09/16/2022 1.6   Final    VLDL 09/16/2022 14  mg/dL Final    Sodium 09/16/2022 140  136 - 145 mmol/L Final    Potassium 09/16/2022 3.9  3.5 - 5.1 mmol/L Final    Chloride 09/16/2022 107  98 - 107 mmol/L Final    CO2 09/16/2022 26  21 - 32 mmol/L Final    Anion Gap 09/16/2022 11  7 - 16 mmol/L Final    Glucose 09/16/2022 94  74 - 106 mg/dL Final    BUN 09/16/2022 8  7 - 18 mg/dL Final    Creatinine 09/16/2022 0.99  0.55 - 1.02 mg/dL Final    BUN/Creatinine Ratio 09/16/2022 8  6 - 20 Final    Calcium 09/16/2022 8.7  8.5 - 10.1  mg/dL Final    Total Protein 09/16/2022 7.3  6.4 - 8.2 g/dL Final    Albumin 09/16/2022 3.5  3.5 - 5.0 g/dL Final    Globulin 09/16/2022 3.8  2.0 - 4.0 g/dL Final    A/G Ratio 09/16/2022 0.9   Final    Bilirubin, Total 09/16/2022 0.5  >0.0 - 1.2 mg/dL Final    Alk Phos 09/16/2022 140 (H)  37 - 98 U/L Final    ALT 09/16/2022 76 (H)  13 - 56 U/L Final    AST 09/16/2022 41 (H)  15 - 37 U/L Final    eGFR 09/16/2022 74  >=60 mL/min/1.73m² Final    WBC 09/16/2022 6.82  4.50 - 11.00 K/uL Final    RBC 09/16/2022 5.31  4.20 - 5.40 M/uL Final    Hemoglobin 09/16/2022 11.5 (L)  12.0 - 16.0 g/dL Final    Hematocrit 09/16/2022 38.4  38.0 - 47.0 % Final    MCV 09/16/2022 72.3 (L)  80.0 - 96.0 fL Final    MCH 09/16/2022 21.7 (L)  27.0 - 31.0 pg Final    MCHC 09/16/2022 29.9 (L)  32.0 - 36.0 g/dL Final    RDW 09/16/2022 14.6 (H)  11.5 - 14.5 % Final    Platelet Count 09/16/2022 260  150 - 400 K/uL Final    MPV 09/16/2022 11.8  9.4 - 12.4 fL Final    Neutrophils % 09/16/2022 49.0 (L)  53.0 - 65.0 % Final    Lymphocytes % 09/16/2022 39.0  27.0 - 41.0 % Final    Monocytes % 09/16/2022 7.3 (H)  2.0 - 6.0 % Final    Eosinophils % 09/16/2022 3.7  1.0 - 4.0 % Final    Basophils % 09/16/2022 0.7  0.0 - 1.0 % Final    Immature Granulocytes % 09/16/2022 0.3  0.0 - 0.4 % Final    nRBC, Auto 09/16/2022 0.0  <=0.0 % Final    Neutrophils, Abs 09/16/2022 3.34  1.80 - 7.70 K/uL Final    Lymphocytes, Absolute 09/16/2022 2.66  1.00 - 4.80 K/uL Final    Monocytes, Absolute 09/16/2022 0.50  0.00 - 0.80 K/uL Final    Eosinophils, Absolute 09/16/2022 0.25  0.00 - 0.50 K/uL Final    Basophils, Absolute 09/16/2022 0.05  0.00 - 0.20 K/uL Final    Immature Granulocytes, Absolute 09/16/2022 0.02  0.00 - 0.04 K/uL Final    nRBC, Absolute 09/16/2022 0.00  <=0.00 x10e3/uL Final    Diff Type 09/16/2022 Scan Smear   Final    Platelet Morphology 09/16/2022 Normal  Normal Final    Microcytosis 09/16/2022 Few   Final     Hypochromic 09/16/2022 Few   Final    GGT 09/19/2022 147 (H)  5 - 55 U/L Final    Iron 09/19/2022 70  50 - 170 µg/dL Final    Iron Saturation 09/19/2022 18  14 - 50 % Final    TIBC 09/19/2022 381  250 - 450 µg/dL Final    Ferritin 09/19/2022 12  8 - 252 ng/mL Final   Office Visit on 07/20/2022   Component Date Value Ref Range Status    POC Amphetamines 07/20/2022 Negative  Negative, Inconclusive Final    POC Barbiturates 07/20/2022 Negative  Negative, Inconclusive Final    POC Benzodiazepines 07/20/2022 Negative  Negative, Inconclusive Final    POC Cocaine 07/20/2022 Negative  Negative, Inconclusive Final    POC THC 07/20/2022 Negative  Negative, Inconclusive Final    POC Methadone 07/20/2022 Negative  Negative, Inconclusive Final    POC Methamphetamine 07/20/2022 Negative  Negative, Inconclusive Final    POC Opiates 07/20/2022 Negative  Negative, Inconclusive Final    POC Oxycodone 07/20/2022 Negative  Negative, Inconclusive Final    POC Phencyclidine 07/20/2022 Negative  Negative, Inconclusive Final    POC Methylenedioxymethamphetamine * 07/20/2022 Negative  Negative, Inconclusive Final    POC Tricyclic Antidepressants 07/20/2022 Negative  Negative, Inconclusive Final    POC Buprenorphine 07/20/2022 Negative   Final     Acceptable 07/20/2022 Yes   Final    POC Temperature (Urine) 07/20/2022 90   Final    pH, UA 07/20/2022 6.5  5.0 to 8.0 pH Units Final    Creatinine, Urine 07/20/2022 285 (H)  28 - 219 mg/dL Final    6-Acetylmorphine 07/20/2022 Negative  10 ng/mL Final    7-Aminoclonazepam 07/20/2022 Negative  Negative 25 ng/mL Final    a-Hydroxyalprazolam 07/20/2022 Negative  Negative 25 ng/mL Final    Acetyl Fentanyl 07/20/2022 Negative  2.5 ng/mL Final    Acetyl Norfentanyl Oxalate 07/20/2022 Negative  5 ng/mL Final    Benzoylecgonine 07/20/2022 Negative  100 ng/mL Final    Buprenorphine 07/20/2022 Negative  25 ng/mL Final    Codeine 07/20/2022 Negative  25 ng/mL  Final    EDDP 07/20/2022 Negative  25 ng/mL Final    Fentanyl 07/20/2022 Negative  2.5 ng/mL Final    Hydrocodone 07/20/2022 Negative  25 ng/mL Final    Hydromorphone 07/20/2022 Negative  25 ng/mL Final    Lorazepam 07/20/2022 Negative  25 ng/mL Final    Morphine 07/20/2022 Negative  25 ng/mL Final    Norbuprenorphine 07/20/2022 Negative  25 ng/mL Final    Nordiazepam 07/20/2022 Negative  25 ng/mL Final    Norfentanyl Oxalate 07/20/2022 Negative  5 ng/mL Final    Norhydrocodone 07/20/2022 Negative  50 ng/mL Final    Noroxycodone HCL 07/20/2022 Negative  50 ng/mL Final    Oxazepam 07/20/2022 Negative  25 ng/mL Final    Oxymorphone 07/20/2022 Negative  25 ng/mL Final    Tapentadol 07/20/2022 Negative  25 ng/mL Final    Temazepam 07/20/2022 Negative  25 ng/mL Final    THC-COOH 07/20/2022 Negative  25 ng/mL Final    Tramadol 07/20/2022 Negative  100 ng/mL Final    Amphetamine, Urine 07/20/2022 Negative  Negative Final    Methamphetamines, Urine 07/20/2022 Negative  Negative Final    Methadone, Urine 07/20/2022 Negative  Negative 25 ng/mL Final    Oxycodone, Urine 07/20/2022 Negative  Negative 25 ng/mL Final    Specific Gravity, UA 07/20/2022 1.029  <=1.030 Final   Office Visit on 06/16/2022   Component Date Value Ref Range Status    HIV 1/2 06/16/2022 Non-Reactive  Non-Reactive Final    Hepatitis C Ab 06/16/2022 Non-Reactive  Non-Reactive Final         No orders of the defined types were placed in this encounter.        Requested Prescriptions      No prescriptions requested or ordered in this encounter       Assessment:     1. Lumbar spondylosis    2. Sacroiliitis           Plan:    Daily benzodiazepine use    Not using narcotics from our office    Has completed physical therapy     She discontinue gabapentin    Again today complaint of back pain buttock and leg pain requesting options    Declines procedures    MRI lumbar spine [facility] August 2022, multiple level degenerative changes mild  neuroforaminal stenosis L5/S1    X-ray lumbar spine July 20, 2022 degenerative changes    X-ray sacroiliac joint July 20, 2022 degenerative changes no fracture noted    She declines lumbar procedures    Requesting to discuss options with Dr. Jimenez    Follow-up 1 month discuss options    Dr. Jimenez, July 2023    Physical therapy September 2022 [facility]    Massage therapy declines

## 2022-12-16 ENCOUNTER — OFFICE VISIT (OUTPATIENT)
Dept: FAMILY MEDICINE | Facility: CLINIC | Age: 42
End: 2022-12-16
Payer: MEDICAID

## 2022-12-16 VITALS
SYSTOLIC BLOOD PRESSURE: 133 MMHG | WEIGHT: 257 LBS | BODY MASS INDEX: 50.45 KG/M2 | OXYGEN SATURATION: 99 % | HEIGHT: 60 IN | DIASTOLIC BLOOD PRESSURE: 88 MMHG | RESPIRATION RATE: 18 BRPM | HEART RATE: 80 BPM | TEMPERATURE: 98 F

## 2022-12-16 DIAGNOSIS — E11.9 TYPE 2 DIABETES MELLITUS WITHOUT COMPLICATION, WITHOUT LONG-TERM CURRENT USE OF INSULIN: ICD-10-CM

## 2022-12-16 DIAGNOSIS — E03.9 HYPOTHYROIDISM, UNSPECIFIED TYPE: ICD-10-CM

## 2022-12-16 DIAGNOSIS — R74.8 ELEVATED LIVER ENZYMES: ICD-10-CM

## 2022-12-16 DIAGNOSIS — I10 HYPERTENSION, UNSPECIFIED TYPE: Primary | ICD-10-CM

## 2022-12-16 PROCEDURE — 3079F PR MOST RECENT DIASTOLIC BLOOD PRESSURE 80-89 MM HG: ICD-10-PCS | Mod: CPTII,,, | Performed by: FAMILY MEDICINE

## 2022-12-16 PROCEDURE — 3008F PR BODY MASS INDEX (BMI) DOCUMENTED: ICD-10-PCS | Mod: CPTII,,, | Performed by: FAMILY MEDICINE

## 2022-12-16 PROCEDURE — 99214 PR OFFICE/OUTPT VISIT, EST, LEVL IV, 30-39 MIN: ICD-10-PCS | Mod: ,,, | Performed by: FAMILY MEDICINE

## 2022-12-16 PROCEDURE — 99214 OFFICE O/P EST MOD 30 MIN: CPT | Mod: ,,, | Performed by: FAMILY MEDICINE

## 2022-12-16 PROCEDURE — 3044F PR MOST RECENT HEMOGLOBIN A1C LEVEL <7.0%: ICD-10-PCS | Mod: CPTII,,, | Performed by: FAMILY MEDICINE

## 2022-12-16 PROCEDURE — 1159F PR MEDICATION LIST DOCUMENTED IN MEDICAL RECORD: ICD-10-PCS | Mod: CPTII,,, | Performed by: FAMILY MEDICINE

## 2022-12-16 PROCEDURE — 1160F RVW MEDS BY RX/DR IN RCRD: CPT | Mod: CPTII,,, | Performed by: FAMILY MEDICINE

## 2022-12-16 PROCEDURE — 3044F HG A1C LEVEL LT 7.0%: CPT | Mod: CPTII,,, | Performed by: FAMILY MEDICINE

## 2022-12-16 PROCEDURE — 1159F MED LIST DOCD IN RCRD: CPT | Mod: CPTII,,, | Performed by: FAMILY MEDICINE

## 2022-12-16 PROCEDURE — 3008F BODY MASS INDEX DOCD: CPT | Mod: CPTII,,, | Performed by: FAMILY MEDICINE

## 2022-12-16 PROCEDURE — 3075F PR MOST RECENT SYSTOLIC BLOOD PRESS GE 130-139MM HG: ICD-10-PCS | Mod: CPTII,,, | Performed by: FAMILY MEDICINE

## 2022-12-16 PROCEDURE — 1160F PR REVIEW ALL MEDS BY PRESCRIBER/CLIN PHARMACIST DOCUMENTED: ICD-10-PCS | Mod: CPTII,,, | Performed by: FAMILY MEDICINE

## 2022-12-16 PROCEDURE — 3079F DIAST BP 80-89 MM HG: CPT | Mod: CPTII,,, | Performed by: FAMILY MEDICINE

## 2022-12-16 PROCEDURE — 3075F SYST BP GE 130 - 139MM HG: CPT | Mod: CPTII,,, | Performed by: FAMILY MEDICINE

## 2022-12-16 RX ORDER — METOPROLOL TARTRATE 25 MG/1
25 TABLET, FILM COATED ORAL 2 TIMES DAILY
Qty: 60 TABLET | Refills: 5 | Status: SHIPPED | OUTPATIENT
Start: 2022-12-16 | End: 2023-03-16 | Stop reason: SDUPTHER

## 2022-12-16 NOTE — PROGRESS NOTES
Mary Magana is a 42 y.o. female seen today for follow-up on her hypothyroidism and diabetes.  Unfortunately labs unavailable today and she will return to clinic next week for lab work.  She is not having any chest pain or shortness of breath but does continue to suffer from low back pain.  As of now patient is taking Klonopin and explained to her that Pain Management often can not write controlled substances for patients on this medication due to state law.  I encouraged her to have a conversation with her pain management physician on alternate treatments.  She also may want to speak with her psychiatric nurse practitioner on an alternate medication as well.  Patient defers flu vaccine today.    Past Medical History:   Diagnosis Date    Diabetes mellitus     Hypertension     Thyroid disease     WPW (Ilana-Parkinson-White syndrome)      Family History   Problem Relation Age of Onset    Diabetes Father     Hypertension Mother     Diabetes Mother     Leukemia Daughter      Current Outpatient Medications on File Prior to Visit   Medication Sig Dispense Refill    aspirin (ECOTRIN) 81 MG EC tablet TAKE 1 TABLET BY MOUTH DAILY WITH FOOD 30 tablet 5    buPROPion (WELLBUTRIN XL) 150 MG TB24 tablet Take 150 mg by mouth every morning.      clonazePAM (KLONOPIN) 1 MG tablet Take 1 mg by mouth nightly.      levothyroxine (SYNTHROID) 75 MCG tablet Take 1 tablet (75 mcg total) by mouth before breakfast. 30 tablet 2    naproxen (NAPROSYN) 500 MG tablet Take 1 tablet (500 mg total) by mouth 2 (two) times daily. 60 tablet 0    TRUEPLUS LANCETS 30 gauge Misc AS DIRECTED      VICTOZA 2-JAMESON 0.6 mg/0.1 mL (18 mg/3 mL) PnIj pen INJECT 1.2 MG SUB-Q EVERY DAY ...KEEP IN REFRIGERATOR 6 mL 5    [DISCONTINUED] metoprolol tartrate (LOPRESSOR) 25 MG tablet Take 1 tablet (25 mg total) by mouth 2 (two) times daily. 60 tablet 5    [DISCONTINUED] cefUROXime (CEFTIN) 500 MG tablet Take 1 tablet (500 mg total) by mouth every 12 (twelve)  hours. (Patient not taking: Reported on 12/16/2022) 14 tablet 0     No current facility-administered medications on file prior to visit.       There is no immunization history on file for this patient.    Review of Systems   Constitutional:  Negative for fever, malaise/fatigue and weight loss.   Respiratory:  Negative for shortness of breath.    Cardiovascular:  Negative for chest pain and palpitations.   Gastrointestinal:  Negative for nausea and vomiting.   Musculoskeletal:  Positive for back pain and myalgias.   Psychiatric/Behavioral:  Negative for depression.       Vitals:    12/16/22 0901   BP: 133/88   Pulse: 80   Resp: 18   Temp: 97.9 °F (36.6 °C)       Physical Exam  Vitals reviewed.   Constitutional:       Appearance: Normal appearance.   HENT:      Head: Normocephalic.   Eyes:      Extraocular Movements: Extraocular movements intact.      Conjunctiva/sclera: Conjunctivae normal.      Pupils: Pupils are equal, round, and reactive to light.   Neck:      Thyroid: No thyroid mass or thyromegaly.   Cardiovascular:      Rate and Rhythm: Normal rate and regular rhythm.      Heart sounds: Normal heart sounds. No murmur heard.    No gallop.   Pulmonary:      Effort: Pulmonary effort is normal. No respiratory distress.      Breath sounds: Normal breath sounds. No wheezing or rales.   Skin:     General: Skin is warm and dry.      Coloration: Skin is not jaundiced or pale.   Neurological:      Mental Status: She is alert.   Psychiatric:         Mood and Affect: Mood normal.         Behavior: Behavior normal.         Thought Content: Thought content normal.         Judgment: Judgment normal.        Assessment and Plan  Hypertension, unspecified type  -     metoprolol tartrate (LOPRESSOR) 25 MG tablet; Take 1 tablet (25 mg total) by mouth 2 (two) times daily.  Dispense: 60 tablet; Refill: 5  -     CBC Auto Differential; Future; Expected date: 12/23/2022  -     Comprehensive Metabolic Panel; Future; Expected date:  12/23/2022    Elevated liver enzymes  -     Ambulatory referral/consult to Gastroenterology; Future; Expected date: 12/23/2022    Hypothyroidism, unspecified type  -     TSH; Future; Expected date: 12/23/2022  -     T4, Free; Future; Expected date: 12/23/2022    Type 2 diabetes mellitus without complication, without long-term current use of insulin  -     Hemoglobin A1C; Future; Expected date: 12/23/2022            Return to clinic in 3 months for follow-up or as needed once her lab work is in.  Since her gastroenterologist his left the area we will schedule another appointment.    Health Maintenance Topics with due status: Not Due       Topic Last Completion Date    Foot Exam 06/16/2022    Mammogram 06/24/2022    Lipid Panel 09/16/2022    Hemoglobin A1c 09/16/2022    Cervical Cancer Screening 11/28/2022

## 2023-01-09 ENCOUNTER — OFFICE VISIT (OUTPATIENT)
Dept: GASTROENTEROLOGY | Facility: CLINIC | Age: 43
End: 2023-01-09
Payer: MEDICAID

## 2023-01-09 VITALS
DIASTOLIC BLOOD PRESSURE: 71 MMHG | BODY MASS INDEX: 47.78 KG/M2 | HEIGHT: 62 IN | WEIGHT: 259.63 LBS | OXYGEN SATURATION: 99 % | SYSTOLIC BLOOD PRESSURE: 142 MMHG | HEART RATE: 88 BPM

## 2023-01-09 DIAGNOSIS — R74.8 ELEVATED LIVER ENZYMES: Primary | ICD-10-CM

## 2023-01-09 DIAGNOSIS — K21.9 GASTROESOPHAGEAL REFLUX DISEASE, UNSPECIFIED WHETHER ESOPHAGITIS PRESENT: ICD-10-CM

## 2023-01-09 PROCEDURE — 99214 PR OFFICE/OUTPT VISIT, EST, LEVL IV, 30-39 MIN: ICD-10-PCS | Mod: S$PBB,,, | Performed by: NURSE PRACTITIONER

## 2023-01-09 PROCEDURE — 99214 OFFICE O/P EST MOD 30 MIN: CPT | Mod: S$PBB,,, | Performed by: NURSE PRACTITIONER

## 2023-01-09 PROCEDURE — 99215 OFFICE O/P EST HI 40 MIN: CPT | Mod: PBBFAC | Performed by: NURSE PRACTITIONER

## 2023-01-09 PROCEDURE — 1159F PR MEDICATION LIST DOCUMENTED IN MEDICAL RECORD: ICD-10-PCS | Mod: CPTII,,, | Performed by: NURSE PRACTITIONER

## 2023-01-09 PROCEDURE — 1159F MED LIST DOCD IN RCRD: CPT | Mod: CPTII,,, | Performed by: NURSE PRACTITIONER

## 2023-01-09 PROCEDURE — 1160F PR REVIEW ALL MEDS BY PRESCRIBER/CLIN PHARMACIST DOCUMENTED: ICD-10-PCS | Mod: CPTII,,, | Performed by: NURSE PRACTITIONER

## 2023-01-09 PROCEDURE — 3008F PR BODY MASS INDEX (BMI) DOCUMENTED: ICD-10-PCS | Mod: CPTII,,, | Performed by: NURSE PRACTITIONER

## 2023-01-09 PROCEDURE — 3078F PR MOST RECENT DIASTOLIC BLOOD PRESSURE < 80 MM HG: ICD-10-PCS | Mod: CPTII,,, | Performed by: NURSE PRACTITIONER

## 2023-01-09 PROCEDURE — 3077F PR MOST RECENT SYSTOLIC BLOOD PRESSURE >= 140 MM HG: ICD-10-PCS | Mod: CPTII,,, | Performed by: NURSE PRACTITIONER

## 2023-01-09 PROCEDURE — 1160F RVW MEDS BY RX/DR IN RCRD: CPT | Mod: CPTII,,, | Performed by: NURSE PRACTITIONER

## 2023-01-09 PROCEDURE — 3077F SYST BP >= 140 MM HG: CPT | Mod: CPTII,,, | Performed by: NURSE PRACTITIONER

## 2023-01-09 PROCEDURE — 3008F BODY MASS INDEX DOCD: CPT | Mod: CPTII,,, | Performed by: NURSE PRACTITIONER

## 2023-01-09 PROCEDURE — 3078F DIAST BP <80 MM HG: CPT | Mod: CPTII,,, | Performed by: NURSE PRACTITIONER

## 2023-01-09 NOTE — PATIENT INSTRUCTIONS
Exercise 150 minutes per week  Weight loss of 7-10%. Weight loss should be gradual  Diet low in saturated fats and carbohydrates  Good glucose and cholesterol control

## 2023-01-09 NOTE — PROGRESS NOTES
Mary Magana is a 42 y.o. female here for Elevated Hepatic Enzymes        PCP: Huber Clayton  Referring Provider: Huber Clayton Md  [address] [facility].  [facility] [place] [id number]     HPI:  Presents for follow up due to elevated liver enzymes. She is a former patient of Dr. Lamas. Last office visit in October. Last Liver ultrasound in 2020, read as normal. No GI complaints today. Previous AMA, and anti-smooth muscle antibody is negative. Positive KATHERINE with a weak positive 33. No hematochezia or melena. States that she has tried to loose weight but has not been successful.  She does have hypothyroidism. Reports average blood sugar is 103 at home. Does not drink alcohol.        ROS:  Review of Systems   Constitutional:  Negative for appetite change, fatigue, fever and unexpected weight change.   HENT:  Negative for trouble swallowing.    Respiratory:  Negative for shortness of breath.    Cardiovascular:  Negative for chest pain.   Gastrointestinal:  Negative for abdominal pain, blood in stool, change in bowel habit, constipation, diarrhea, nausea, vomiting, reflux and change in bowel habit.   Musculoskeletal:  Positive for back pain. Negative for gait problem.   Integumentary:  Negative for pallor.   Allergic/Immunologic: Negative for immunocompromised state.   Neurological:  Negative for light-headedness.   Psychiatric/Behavioral:  The patient is not nervous/anxious.         PMHX:  has a past medical history of Diabetes mellitus, Hypertension, Thyroid disease, and WPW (Ilana-Parkinson-White syndrome).    PSHX:  has a past surgical history that includes Cholecystectomy and Tubal ligation.    PFHX: family history includes Diabetes in her father and mother; Hypertension in her mother; Leukemia in her daughter.    PSlHX:  reports that she has been smoking cigarettes and vaping with nicotine. She has never used smokeless tobacco. She reports that she does not  "drink alcohol and does not use drugs.        Review of patient's allergies indicates:   Allergen Reactions    Anesthesia s/i-40 (propofol) [propofol] Itching     Anesthesia, possibly propofol; made her feel like there were ants all over her       Medication List with Changes/Refills   Current Medications    ASPIRIN (ECOTRIN) 81 MG EC TABLET    TAKE 1 TABLET BY MOUTH DAILY WITH FOOD    BUPROPION (WELLBUTRIN XL) 150 MG TB24 TABLET    Take 150 mg by mouth every morning.    CLONAZEPAM (KLONOPIN) 1 MG TABLET    Take 1 mg by mouth nightly.    LEVOTHYROXINE (SYNTHROID) 75 MCG TABLET    Take 1 tablet (75 mcg total) by mouth before breakfast.    METOPROLOL TARTRATE (LOPRESSOR) 25 MG TABLET    Take 1 tablet (25 mg total) by mouth 2 (two) times daily.    NAPROXEN (NAPROSYN) 500 MG TABLET    Take 1 tablet (500 mg total) by mouth 2 (two) times daily.    TRUEPLUS LANCETS 30 GAUGE MISC    AS DIRECTED    VICTOZA 2-JAMESON 0.6 MG/0.1 ML (18 MG/3 ML) PNIJ PEN    INJECT 1.2 MG SUB-Q EVERY DAY ...KEEP IN REFRIGERATOR        Objective Findings:  Vital Signs:  BP (!) 142/71   Pulse 88   Ht 5' 2" (1.575 m) Comment: per pt statement  Wt 117.8 kg (259 lb 9.6 oz)   LMP 01/06/2023 (Exact Date)   SpO2 99%   BMI 47.48 kg/m²  Body mass index is 47.48 kg/m².    Physical Exam:  Physical Exam  Vitals and nursing note reviewed.   Constitutional:       General: She is not in acute distress.     Appearance: Normal appearance.   HENT:      Mouth/Throat:      Mouth: Mucous membranes are moist.   Cardiovascular:      Rate and Rhythm: Normal rate.   Pulmonary:      Effort: Pulmonary effort is normal.      Breath sounds: No wheezing, rhonchi or rales.   Abdominal:      General: Bowel sounds are normal. There is no distension.      Palpations: Abdomen is soft. There is no mass.      Tenderness: There is no abdominal tenderness.   Skin:     General: Skin is warm and dry.      Coloration: Skin is not jaundiced or pale.   Neurological:      Mental Status: " She is alert and oriented to person, place, and time.   Psychiatric:         Mood and Affect: Mood normal.        Labs:  Lab Results   Component Value Date    WBC 6.82 09/16/2022    HGB 11.5 (L) 09/16/2022    HCT 38.4 09/16/2022    MCV 72.3 (L) 09/16/2022    RDW 14.6 (H) 09/16/2022     09/16/2022    LYMPH 39.0 09/16/2022    LYMPH 2.66 09/16/2022    MONO 7.3 (H) 09/16/2022    EOS 0.25 09/16/2022    BASO 0.05 09/16/2022     Lab Results   Component Value Date     09/16/2022    K 3.9 09/16/2022     09/16/2022    CO2 26 09/16/2022    GLU 94 09/16/2022    BUN 8 09/16/2022    CREATININE 0.99 09/16/2022    CALCIUM 8.7 09/16/2022    PROT 7.3 09/16/2022    ALBUMIN 3.5 09/16/2022    BILITOT 0.5 09/16/2022    ALKPHOS 140 (H) 09/16/2022    AST 41 (H) 09/16/2022    ALT 76 (H) 09/16/2022         Imaging: No results found.      Assessment:  Mary Magana is a 42 y.o. female here with:  1. Gastroesophageal reflux disease, unspecified whether esophagitis present    2. Elevated liver enzymes          Recommendations:  1. Schedule liver ultrasound  2. CBC, CMP today  3. Exercise 150 minutes per week  Weight loss of 7-10%. Weight loss should be gradual  Diet low in saturated fats and carbohydrates  Good glucose and cholesterol control      Follow up in about 6 months (around 7/9/2023).      Order summary:  Orders Placed This Encounter    US Abdomen Limited    Comprehensive Metabolic Panel    CBC Auto Differential       Thank you for allowing me to participate in the care of Mary Magana.      SULEIMAN Acevedo

## 2023-03-16 ENCOUNTER — OFFICE VISIT (OUTPATIENT)
Dept: FAMILY MEDICINE | Facility: CLINIC | Age: 43
End: 2023-03-16
Payer: MEDICAID

## 2023-03-16 VITALS
BODY MASS INDEX: 44.72 KG/M2 | TEMPERATURE: 99 F | SYSTOLIC BLOOD PRESSURE: 144 MMHG | HEIGHT: 62 IN | HEART RATE: 77 BPM | RESPIRATION RATE: 16 BRPM | DIASTOLIC BLOOD PRESSURE: 100 MMHG | WEIGHT: 243 LBS

## 2023-03-16 DIAGNOSIS — E03.9 HYPOTHYROIDISM, UNSPECIFIED TYPE: ICD-10-CM

## 2023-03-16 DIAGNOSIS — E11.9 TYPE 2 DIABETES MELLITUS WITHOUT COMPLICATION, WITHOUT LONG-TERM CURRENT USE OF INSULIN: Primary | ICD-10-CM

## 2023-03-16 DIAGNOSIS — R74.8 ELEVATED LIVER ENZYMES: ICD-10-CM

## 2023-03-16 DIAGNOSIS — I10 HYPERTENSION, UNSPECIFIED TYPE: ICD-10-CM

## 2023-03-16 DIAGNOSIS — D64.9 ANEMIA, UNSPECIFIED TYPE: ICD-10-CM

## 2023-03-16 LAB
ALBUMIN SERPL BCP-MCNC: 3.5 G/DL (ref 3.5–5)
ALBUMIN/GLOB SERPL: 1 {RATIO}
ALP SERPL-CCNC: 103 U/L (ref 37–98)
ALT SERPL W P-5'-P-CCNC: 20 U/L (ref 13–56)
ANION GAP SERPL CALCULATED.3IONS-SCNC: 8 MMOL/L (ref 7–16)
ANISOCYTOSIS BLD QL SMEAR: ABNORMAL
AST SERPL W P-5'-P-CCNC: 23 U/L (ref 15–37)
BASOPHILS # BLD AUTO: 0.08 K/UL (ref 0–0.2)
BASOPHILS NFR BLD AUTO: 1 % (ref 0–1)
BILIRUB SERPL-MCNC: 0.2 MG/DL (ref ?–1.2)
BUN SERPL-MCNC: 9 MG/DL (ref 7–18)
BUN/CREAT SERPL: 9 (ref 6–20)
CALCIUM SERPL-MCNC: 8.9 MG/DL (ref 8.5–10.1)
CHLORIDE SERPL-SCNC: 108 MMOL/L (ref 98–107)
CO2 SERPL-SCNC: 27 MMOL/L (ref 21–32)
CREAT SERPL-MCNC: 0.98 MG/DL (ref 0.55–1.02)
CREAT UR-MCNC: 273 MG/DL (ref 28–219)
DIFFERENTIAL METHOD BLD: ABNORMAL
EGFR (NO RACE VARIABLE) (RUSH/TITUS): 75 ML/MIN/1.73M²
EOSINOPHIL # BLD AUTO: 0.49 K/UL (ref 0–0.5)
EOSINOPHIL NFR BLD AUTO: 6.1 % (ref 1–4)
ERYTHROCYTE [DISTWIDTH] IN BLOOD BY AUTOMATED COUNT: 15.3 % (ref 11.5–14.5)
EST. AVERAGE GLUCOSE BLD GHB EST-MCNC: 114 MG/DL
GLOBULIN SER-MCNC: 3.6 G/DL (ref 2–4)
GLUCOSE SERPL-MCNC: 103 MG/DL (ref 74–106)
HBA1C MFR BLD HPLC: 6 % (ref 4.5–6.6)
HCT VFR BLD AUTO: 40 % (ref 38–47)
HGB BLD-MCNC: 11.6 G/DL (ref 12–16)
IMM GRANULOCYTES # BLD AUTO: 0.03 K/UL (ref 0–0.04)
IMM GRANULOCYTES NFR BLD: 0.4 % (ref 0–0.4)
LYMPHOCYTES # BLD AUTO: 3.11 K/UL (ref 1–4.8)
LYMPHOCYTES NFR BLD AUTO: 38.7 % (ref 27–41)
MCH RBC QN AUTO: 21.6 PG (ref 27–31)
MCHC RBC AUTO-ENTMCNC: 29 G/DL (ref 32–36)
MCV RBC AUTO: 74.3 FL (ref 80–96)
MICROALBUMIN UR-MCNC: 0.9 MG/DL (ref 0–2.8)
MICROALBUMIN/CREAT RATIO PNL UR: 3.3 MG/G (ref 0–30)
MICROCYTES BLD QL SMEAR: ABNORMAL
MONOCYTES # BLD AUTO: 0.62 K/UL (ref 0–0.8)
MONOCYTES NFR BLD AUTO: 7.7 % (ref 2–6)
MPC BLD CALC-MCNC: 11.1 FL (ref 9.4–12.4)
NEUTROPHILS # BLD AUTO: 3.71 K/UL (ref 1.8–7.7)
NEUTROPHILS NFR BLD AUTO: 46.1 % (ref 53–65)
NRBC # BLD AUTO: 0 X10E3/UL
NRBC, AUTO (.00): 0 %
PLATELET # BLD AUTO: 290 K/UL (ref 150–400)
PLATELET MORPHOLOGY: ABNORMAL
POTASSIUM SERPL-SCNC: 4.1 MMOL/L (ref 3.5–5.1)
PROT SERPL-MCNC: 7.1 G/DL (ref 6.4–8.2)
RBC # BLD AUTO: 5.38 M/UL (ref 4.2–5.4)
SODIUM SERPL-SCNC: 139 MMOL/L (ref 136–145)
TSH SERPL DL<=0.005 MIU/L-ACNC: 18.8 UIU/ML (ref 0.36–3.74)
WBC # BLD AUTO: 8.04 K/UL (ref 4.5–11)

## 2023-03-16 PROCEDURE — 3080F DIAST BP >= 90 MM HG: CPT | Mod: CPTII,,, | Performed by: FAMILY MEDICINE

## 2023-03-16 PROCEDURE — 82043 MICROALBUMIN / CREATININE RATIO URINE: ICD-10-PCS | Mod: ,,, | Performed by: CLINICAL MEDICAL LABORATORY

## 2023-03-16 PROCEDURE — 3008F PR BODY MASS INDEX (BMI) DOCUMENTED: ICD-10-PCS | Mod: CPTII,,, | Performed by: FAMILY MEDICINE

## 2023-03-16 PROCEDURE — 82570 ASSAY OF URINE CREATININE: CPT | Mod: ,,, | Performed by: CLINICAL MEDICAL LABORATORY

## 2023-03-16 PROCEDURE — 84443 ASSAY THYROID STIM HORMONE: CPT | Mod: ,,, | Performed by: CLINICAL MEDICAL LABORATORY

## 2023-03-16 PROCEDURE — 99214 OFFICE O/P EST MOD 30 MIN: CPT | Mod: ,,, | Performed by: FAMILY MEDICINE

## 2023-03-16 PROCEDURE — 99214 PR OFFICE/OUTPT VISIT, EST, LEVL IV, 30-39 MIN: ICD-10-PCS | Mod: ,,, | Performed by: FAMILY MEDICINE

## 2023-03-16 PROCEDURE — 80053 COMPREHENSIVE METABOLIC PANEL: ICD-10-PCS | Mod: ,,, | Performed by: CLINICAL MEDICAL LABORATORY

## 2023-03-16 PROCEDURE — 85025 COMPLETE CBC W/AUTO DIFF WBC: CPT | Mod: ,,, | Performed by: CLINICAL MEDICAL LABORATORY

## 2023-03-16 PROCEDURE — 3080F PR MOST RECENT DIASTOLIC BLOOD PRESSURE >= 90 MM HG: ICD-10-PCS | Mod: CPTII,,, | Performed by: FAMILY MEDICINE

## 2023-03-16 PROCEDURE — 82043 UR ALBUMIN QUANTITATIVE: CPT | Mod: ,,, | Performed by: CLINICAL MEDICAL LABORATORY

## 2023-03-16 PROCEDURE — 1160F RVW MEDS BY RX/DR IN RCRD: CPT | Mod: CPTII,,, | Performed by: FAMILY MEDICINE

## 2023-03-16 PROCEDURE — 83036 HEMOGLOBIN GLYCOSYLATED A1C: CPT | Mod: ,,, | Performed by: CLINICAL MEDICAL LABORATORY

## 2023-03-16 PROCEDURE — 1159F MED LIST DOCD IN RCRD: CPT | Mod: CPTII,,, | Performed by: FAMILY MEDICINE

## 2023-03-16 PROCEDURE — 83036 HEMOGLOBIN A1C: ICD-10-PCS | Mod: ,,, | Performed by: CLINICAL MEDICAL LABORATORY

## 2023-03-16 PROCEDURE — 85025 CBC WITH DIFFERENTIAL: ICD-10-PCS | Mod: ,,, | Performed by: CLINICAL MEDICAL LABORATORY

## 2023-03-16 PROCEDURE — 82570 MICROALBUMIN / CREATININE RATIO URINE: ICD-10-PCS | Mod: ,,, | Performed by: CLINICAL MEDICAL LABORATORY

## 2023-03-16 PROCEDURE — 1159F PR MEDICATION LIST DOCUMENTED IN MEDICAL RECORD: ICD-10-PCS | Mod: CPTII,,, | Performed by: FAMILY MEDICINE

## 2023-03-16 PROCEDURE — 80053 COMPREHEN METABOLIC PANEL: CPT | Mod: ,,, | Performed by: CLINICAL MEDICAL LABORATORY

## 2023-03-16 PROCEDURE — 3008F BODY MASS INDEX DOCD: CPT | Mod: CPTII,,, | Performed by: FAMILY MEDICINE

## 2023-03-16 PROCEDURE — 3077F SYST BP >= 140 MM HG: CPT | Mod: CPTII,,, | Performed by: FAMILY MEDICINE

## 2023-03-16 PROCEDURE — 1160F PR REVIEW ALL MEDS BY PRESCRIBER/CLIN PHARMACIST DOCUMENTED: ICD-10-PCS | Mod: CPTII,,, | Performed by: FAMILY MEDICINE

## 2023-03-16 PROCEDURE — 3077F PR MOST RECENT SYSTOLIC BLOOD PRESSURE >= 140 MM HG: ICD-10-PCS | Mod: CPTII,,, | Performed by: FAMILY MEDICINE

## 2023-03-16 PROCEDURE — 84443 TSH: ICD-10-PCS | Mod: ,,, | Performed by: CLINICAL MEDICAL LABORATORY

## 2023-03-16 RX ORDER — LEVOTHYROXINE SODIUM 75 UG/1
75 TABLET ORAL
Qty: 30 TABLET | Refills: 2 | Status: SHIPPED | OUTPATIENT
Start: 2023-03-16 | End: 2023-03-17 | Stop reason: DRUGHIGH

## 2023-03-16 RX ORDER — METOPROLOL TARTRATE 25 MG/1
25 TABLET, FILM COATED ORAL 2 TIMES DAILY
Qty: 60 TABLET | Refills: 5 | Status: SHIPPED | OUTPATIENT
Start: 2023-03-16 | End: 2023-09-13 | Stop reason: SDUPTHER

## 2023-03-16 RX ORDER — LIRAGLUTIDE 6 MG/ML
INJECTION SUBCUTANEOUS
Qty: 6 ML | Refills: 5 | Status: SHIPPED | OUTPATIENT
Start: 2023-03-16 | End: 2023-09-01

## 2023-03-16 NOTE — PROGRESS NOTES
Mary Magana is a 42 y.o. female seen today for follow-up on her diabetes hypertension hypothyroidism and elevated liver enzymes.  Unfortunately the patient was unable to make her ultrasound the liver and we have reschedule that today.  Patient is up-to-date on her diabetic eye exam and mammogram and today defers the pneumonia vaccine.      Past Medical History:   Diagnosis Date    Diabetes mellitus     Hypertension     Thyroid disease     WPW (Ilana-Parkinson-White syndrome)      Family History   Problem Relation Age of Onset    Diabetes Father     Hypertension Mother     Diabetes Mother     Leukemia Daughter      Current Outpatient Medications on File Prior to Visit   Medication Sig Dispense Refill    aspirin (ECOTRIN) 81 MG EC tablet TAKE 1 TABLET BY MOUTH DAILY WITH FOOD 30 tablet 5    clonazePAM (KLONOPIN) 1 MG tablet Take 1 mg by mouth nightly.      TRUEPLUS LANCETS 30 gauge Misc AS DIRECTED      [DISCONTINUED] levothyroxine (SYNTHROID) 75 MCG tablet Take 1 tablet (75 mcg total) by mouth before breakfast. 30 tablet 2    [DISCONTINUED] metoprolol tartrate (LOPRESSOR) 25 MG tablet Take 1 tablet (25 mg total) by mouth 2 (two) times daily. 60 tablet 5    [DISCONTINUED] VICTOZA 2-JAMESON 0.6 mg/0.1 mL (18 mg/3 mL) PnIj pen INJECT 1.2 MG SUB-Q EVERY DAY ...KEEP IN REFRIGERATOR 6 mL 5    buPROPion (WELLBUTRIN XL) 150 MG TB24 tablet Take 150 mg by mouth every morning.      naproxen (NAPROSYN) 500 MG tablet Take 1 tablet (500 mg total) by mouth 2 (two) times daily. (Patient not taking: Reported on 3/16/2023) 60 tablet 0     No current facility-administered medications on file prior to visit.       There is no immunization history on file for this patient.    Review of Systems   Constitutional:  Negative for fever, malaise/fatigue and weight loss.   Respiratory:  Negative for shortness of breath.    Cardiovascular:  Negative for chest pain and palpitations.   Gastrointestinal:  Negative for nausea and vomiting.    Psychiatric/Behavioral:  Negative for depression.       Vitals:    03/16/23 0947   BP: (!) 144/100   Pulse:    Resp:    Temp:        Physical Exam  Vitals reviewed.   Constitutional:       Appearance: Normal appearance.   HENT:      Head: Normocephalic.   Eyes:      Extraocular Movements: Extraocular movements intact.      Conjunctiva/sclera: Conjunctivae normal.      Pupils: Pupils are equal, round, and reactive to light.   Neck:      Thyroid: No thyroid mass or thyromegaly.   Cardiovascular:      Rate and Rhythm: Normal rate and regular rhythm.      Heart sounds: Normal heart sounds. No murmur heard.    No gallop.   Pulmonary:      Effort: Pulmonary effort is normal. No respiratory distress.      Breath sounds: Normal breath sounds. No wheezing or rales.   Skin:     General: Skin is warm and dry.      Coloration: Skin is not jaundiced or pale.   Neurological:      Mental Status: She is alert.   Psychiatric:         Mood and Affect: Mood normal.         Behavior: Behavior normal.         Thought Content: Thought content normal.         Judgment: Judgment normal.        Assessment and Plan  Type 2 diabetes mellitus without complication, without long-term current use of insulin  -     CBC Auto Differential; Future; Expected date: 03/16/2023  -     Comprehensive Metabolic Panel; Future; Expected date: 03/16/2023  -     Hemoglobin A1C; Future; Expected date: 03/16/2023  -     Microalbumin/Creatinine Ratio, Urine  -     VICTOZA 2-JAMESON 0.6 mg/0.1 mL (18 mg/3 mL) PnIj pen; INJECT 1.2 MG SUB-Q EVERY DAY ...KEEP IN REFRIGERATOR  Dispense: 6 mL; Refill: 5    Hypothyroidism, unspecified type  -     TSH; Future; Expected date: 03/16/2023  -     levothyroxine (SYNTHROID) 75 MCG tablet; Take 1 tablet (75 mcg total) by mouth before breakfast.  Dispense: 30 tablet; Refill: 2    Hypertension, unspecified type  -     metoprolol tartrate (LOPRESSOR) 25 MG tablet; Take 1 tablet (25 mg total) by mouth 2 (two) times daily.  Dispense:  60 tablet; Refill: 5    Elevated liver enzymes  -     US Abdomen Limited_Liver; Future; Expected date: 03/16/2023            Return to clinic in 1 month with home blood pressure log or as needed.    Health Maintenance Topics with due status: Not Due       Topic Last Completion Date    Foot Exam 06/16/2022    Mammogram 06/24/2022    Lipid Panel 09/16/2022    Cervical Cancer Screening 11/28/2022

## 2023-03-17 ENCOUNTER — TELEPHONE (OUTPATIENT)
Dept: FAMILY MEDICINE | Facility: CLINIC | Age: 43
End: 2023-03-17
Payer: MEDICAID

## 2023-03-17 DIAGNOSIS — E03.9 HYPOTHYROIDISM, UNSPECIFIED TYPE: Primary | ICD-10-CM

## 2023-03-17 DIAGNOSIS — D64.9 ANEMIA, UNSPECIFIED TYPE: Primary | ICD-10-CM

## 2023-03-17 LAB
FERRITIN SERPL-MCNC: 5 NG/ML (ref 8–252)
IRON SATN MFR SERPL: 10 % (ref 14–50)
IRON SERPL-MCNC: 39 ΜG/DL (ref 50–170)
TIBC SERPL-MCNC: 402 ΜG/DL (ref 250–450)

## 2023-03-17 PROCEDURE — 82728 FERRITIN: ICD-10-PCS | Mod: ,,, | Performed by: CLINICAL MEDICAL LABORATORY

## 2023-03-17 PROCEDURE — 83540 IRON AND TIBC: ICD-10-PCS | Mod: ,,, | Performed by: CLINICAL MEDICAL LABORATORY

## 2023-03-17 PROCEDURE — 83540 ASSAY OF IRON: CPT | Mod: ,,, | Performed by: CLINICAL MEDICAL LABORATORY

## 2023-03-17 PROCEDURE — 83550 IRON AND TIBC: ICD-10-PCS | Mod: ,,, | Performed by: CLINICAL MEDICAL LABORATORY

## 2023-03-17 PROCEDURE — 82728 ASSAY OF FERRITIN: CPT | Mod: ,,, | Performed by: CLINICAL MEDICAL LABORATORY

## 2023-03-17 PROCEDURE — 83550 IRON BINDING TEST: CPT | Mod: ,,, | Performed by: CLINICAL MEDICAL LABORATORY

## 2023-03-17 RX ORDER — LEVOTHYROXINE SODIUM 100 UG/1
100 TABLET ORAL
COMMUNITY
End: 2023-03-17 | Stop reason: SDUPTHER

## 2023-03-17 RX ORDER — LEVOTHYROXINE SODIUM 100 UG/1
100 TABLET ORAL
Qty: 30 TABLET | Refills: 1 | Status: SHIPPED | OUTPATIENT
Start: 2023-03-17 | End: 2023-08-17

## 2023-03-17 NOTE — TELEPHONE ENCOUNTER
----- Message from Huber Clayton MD sent at 3/17/2023  8:05 AM CDT -----  Please add iron studies.  Increase her Synthroid to 100 mcg daily and recheck her TSH and T4 in 2 months.  Office visit for microalbumin but her diabetes remains well controlled

## 2023-03-17 NOTE — TELEPHONE ENCOUNTER
Pt notified and verbalized understanding, will send new synthroid dosage to Dr Clayton for refill, will add iron studies. Pt has follow up scheduled for next month.

## 2023-03-20 ENCOUNTER — TELEPHONE (OUTPATIENT)
Dept: FAMILY MEDICINE | Facility: CLINIC | Age: 43
End: 2023-03-20
Payer: MEDICAID

## 2023-03-20 NOTE — TELEPHONE ENCOUNTER
----- Message from Huber Clayton MD sent at 3/17/2023  2:52 PM CDT -----  Office visit for anemia.

## 2023-03-21 ENCOUNTER — TELEPHONE (OUTPATIENT)
Dept: FAMILY MEDICINE | Facility: CLINIC | Age: 43
End: 2023-03-21
Payer: MEDICAID

## 2023-03-21 ENCOUNTER — HOSPITAL ENCOUNTER (OUTPATIENT)
Dept: RADIOLOGY | Facility: HOSPITAL | Age: 43
Discharge: HOME | End: 2023-03-21
Attending: FAMILY MEDICINE
Payer: MEDICAID

## 2023-03-21 PROCEDURE — 76705 ECHO EXAM OF ABDOMEN: CPT | Mod: TC

## 2023-03-21 PROCEDURE — 76705 US ABDOMEN LIMITED_LIVER: ICD-10-PCS | Mod: 26,,, | Performed by: STUDENT IN AN ORGANIZED HEALTH CARE EDUCATION/TRAINING PROGRAM

## 2023-03-21 PROCEDURE — 76705 ECHO EXAM OF ABDOMEN: CPT | Mod: 26,,, | Performed by: STUDENT IN AN ORGANIZED HEALTH CARE EDUCATION/TRAINING PROGRAM

## 2023-03-21 NOTE — TELEPHONE ENCOUNTER
----- Message from Huber Clayton MD sent at 3/21/2023 10:36 AM CDT -----  Patient has fatty liver please have her set up an appointment.

## 2023-04-12 ENCOUNTER — PATIENT OUTREACH (OUTPATIENT)
Dept: ADMINISTRATIVE | Facility: HOSPITAL | Age: 43
End: 2023-04-12

## 2023-04-18 ENCOUNTER — OFFICE VISIT (OUTPATIENT)
Dept: FAMILY MEDICINE | Facility: CLINIC | Age: 43
End: 2023-04-18
Payer: MEDICAID

## 2023-04-18 DIAGNOSIS — D50.9 IRON DEFICIENCY ANEMIA, UNSPECIFIED IRON DEFICIENCY ANEMIA TYPE: Primary | ICD-10-CM

## 2023-04-18 DIAGNOSIS — K76.0 FATTY LIVER: ICD-10-CM

## 2023-04-18 PROCEDURE — 3066F NEPHROPATHY DOC TX: CPT | Mod: CPTII,,, | Performed by: FAMILY MEDICINE

## 2023-04-18 PROCEDURE — 99213 OFFICE O/P EST LOW 20 MIN: CPT | Mod: ,,, | Performed by: FAMILY MEDICINE

## 2023-04-18 PROCEDURE — 3044F HG A1C LEVEL LT 7.0%: CPT | Mod: CPTII,,, | Performed by: FAMILY MEDICINE

## 2023-04-18 PROCEDURE — 3061F NEG MICROALBUMINURIA REV: CPT | Mod: CPTII,,, | Performed by: FAMILY MEDICINE

## 2023-04-18 PROCEDURE — 99213 PR OFFICE/OUTPT VISIT, EST, LEVL III, 20-29 MIN: ICD-10-PCS | Mod: ,,, | Performed by: FAMILY MEDICINE

## 2023-04-18 PROCEDURE — 1159F PR MEDICATION LIST DOCUMENTED IN MEDICAL RECORD: ICD-10-PCS | Mod: CPTII,,, | Performed by: FAMILY MEDICINE

## 2023-04-18 PROCEDURE — 1160F RVW MEDS BY RX/DR IN RCRD: CPT | Mod: CPTII,,, | Performed by: FAMILY MEDICINE

## 2023-04-18 PROCEDURE — 3061F PR NEG MICROALBUMINURIA RESULT DOCUMENTED/REVIEW: ICD-10-PCS | Mod: CPTII,,, | Performed by: FAMILY MEDICINE

## 2023-04-18 PROCEDURE — 3044F PR MOST RECENT HEMOGLOBIN A1C LEVEL <7.0%: ICD-10-PCS | Mod: CPTII,,, | Performed by: FAMILY MEDICINE

## 2023-04-18 PROCEDURE — 1159F MED LIST DOCD IN RCRD: CPT | Mod: CPTII,,, | Performed by: FAMILY MEDICINE

## 2023-04-18 PROCEDURE — 3066F PR DOCUMENTATION OF TREATMENT FOR NEPHROPATHY: ICD-10-PCS | Mod: CPTII,,, | Performed by: FAMILY MEDICINE

## 2023-04-18 PROCEDURE — 1160F PR REVIEW ALL MEDS BY PRESCRIBER/CLIN PHARMACIST DOCUMENTED: ICD-10-PCS | Mod: CPTII,,, | Performed by: FAMILY MEDICINE

## 2023-04-18 RX ORDER — FERROUS SULFATE 325(65) MG
325 TABLET ORAL
Qty: 30 TABLET | Refills: 5 | Status: SHIPPED | OUTPATIENT
Start: 2023-04-18 | End: 2023-09-13 | Stop reason: SDUPTHER

## 2023-04-18 NOTE — PROGRESS NOTES
Mary Magana is a 42 y.o. female seen today for lab follow-up.  Her A1c was excellent but she did have some elevated creatinine and urine microalbumin study.  Her liver enzymes are mildly elevated a follow-up ultrasound did confirm fatty liver.  We discussed decreasing her intake of fatty foods and since she also has concurrent iron deficiency anemia we will set her up with a GI evaluation.  She will be started on iron replacement but 1st will complete 3 Hemoccult cards.      Past Medical History:   Diagnosis Date    Diabetes mellitus     Hypertension     Thyroid disease     WPW (Ilana-Parkinson-White syndrome)      Family History   Problem Relation Age of Onset    Diabetes Father     Hypertension Mother     Diabetes Mother     Leukemia Daughter      Current Outpatient Medications on File Prior to Visit   Medication Sig Dispense Refill    aspirin (ECOTRIN) 81 MG EC tablet TAKE 1 TABLET BY MOUTH DAILY WITH FOOD 30 tablet 5    clonazePAM (KLONOPIN) 1 MG tablet Take 1 mg by mouth nightly.      levothyroxine (SYNTHROID) 100 MCG tablet Take 1 tablet (100 mcg total) by mouth before breakfast. 30 tablet 1    metoprolol tartrate (LOPRESSOR) 25 MG tablet Take 1 tablet (25 mg total) by mouth 2 (two) times daily. 60 tablet 5    TRUEPLUS LANCETS 30 gauge Misc AS DIRECTED      VICTOZA 2-JAMESON 0.6 mg/0.1 mL (18 mg/3 mL) PnIj pen INJECT 1.2 MG SUB-Q EVERY DAY ...KEEP IN REFRIGERATOR 6 mL 5     No current facility-administered medications on file prior to visit.       There is no immunization history on file for this patient.    Review of Systems   Constitutional:  Negative for fever, malaise/fatigue and weight loss.   Respiratory:  Negative for shortness of breath.    Cardiovascular:  Negative for chest pain and palpitations.   Gastrointestinal:  Negative for nausea and vomiting.   Psychiatric/Behavioral:  Negative for depression.       There were no vitals filed for this visit.    Physical Exam  Vitals reviewed.    Constitutional:       Appearance: Normal appearance.   HENT:      Head: Normocephalic.   Eyes:      Extraocular Movements: Extraocular movements intact.      Conjunctiva/sclera: Conjunctivae normal.      Pupils: Pupils are equal, round, and reactive to light.   Neck:      Thyroid: No thyroid mass or thyromegaly.   Cardiovascular:      Rate and Rhythm: Normal rate and regular rhythm.      Heart sounds: Normal heart sounds. No murmur heard.    No gallop.   Pulmonary:      Effort: Pulmonary effort is normal. No respiratory distress.      Breath sounds: Normal breath sounds. No wheezing or rales.   Skin:     General: Skin is warm and dry.      Coloration: Skin is not jaundiced or pale.   Neurological:      Mental Status: She is alert.   Psychiatric:         Mood and Affect: Mood normal.         Behavior: Behavior normal.         Thought Content: Thought content normal.         Judgment: Judgment normal.        Assessment and Plan  Iron deficiency anemia, unspecified iron deficiency anemia type  -     Ambulatory referral/consult to Gastroenterology; Future; Expected date: 04/25/2023  -     ferrous sulfate (IRON) 325 mg (65 mg iron) Tab tablet; Take 1 tablet (325 mg total) by mouth daily with breakfast.  Dispense: 30 tablet; Refill: 5    Fatty liver  -     Ambulatory referral/consult to Gastroenterology; Future; Expected date: 04/25/2023            Return to clinic in 3 months follow-up on her iron studies her A1c and liver function tests.    Health Maintenance Topics with due status: Not Due       Topic Last Completion Date    Mammogram 06/24/2022    Lipid Panel 09/16/2022    Cervical Cancer Screening 11/28/2022    Diabetes Urine Screening 03/16/2023    Hemoglobin A1c 03/16/2023

## 2023-04-19 VITALS
HEART RATE: 80 BPM | WEIGHT: 232 LBS | RESPIRATION RATE: 18 BRPM | HEIGHT: 62 IN | BODY MASS INDEX: 42.69 KG/M2 | TEMPERATURE: 99 F | OXYGEN SATURATION: 99 % | DIASTOLIC BLOOD PRESSURE: 88 MMHG | SYSTOLIC BLOOD PRESSURE: 120 MMHG

## 2023-04-24 DIAGNOSIS — D50.9 IRON DEFICIENCY ANEMIA, UNSPECIFIED IRON DEFICIENCY ANEMIA TYPE: Primary | ICD-10-CM

## 2023-04-25 ENCOUNTER — OFFICE VISIT (OUTPATIENT)
Dept: PAIN MEDICINE | Facility: CLINIC | Age: 43
End: 2023-04-25
Payer: MEDICAID

## 2023-04-25 VITALS
DIASTOLIC BLOOD PRESSURE: 81 MMHG | HEART RATE: 86 BPM | WEIGHT: 267 LBS | BODY MASS INDEX: 49.13 KG/M2 | SYSTOLIC BLOOD PRESSURE: 133 MMHG | RESPIRATION RATE: 18 BRPM | HEIGHT: 62 IN

## 2023-04-25 DIAGNOSIS — M46.1 SACROILIITIS: Chronic | ICD-10-CM

## 2023-04-25 DIAGNOSIS — M47.816 LUMBAR SPONDYLOSIS: Primary | Chronic | ICD-10-CM

## 2023-04-25 PROCEDURE — 3044F HG A1C LEVEL LT 7.0%: CPT | Mod: CPTII,,, | Performed by: PHYSICIAN ASSISTANT

## 2023-04-25 PROCEDURE — 3079F DIAST BP 80-89 MM HG: CPT | Mod: CPTII,,, | Performed by: PHYSICIAN ASSISTANT

## 2023-04-25 PROCEDURE — 3079F PR MOST RECENT DIASTOLIC BLOOD PRESSURE 80-89 MM HG: ICD-10-PCS | Mod: CPTII,,, | Performed by: PHYSICIAN ASSISTANT

## 2023-04-25 PROCEDURE — 3044F PR MOST RECENT HEMOGLOBIN A1C LEVEL <7.0%: ICD-10-PCS | Mod: CPTII,,, | Performed by: PHYSICIAN ASSISTANT

## 2023-04-25 PROCEDURE — 99214 PR OFFICE/OUTPT VISIT, EST, LEVL IV, 30-39 MIN: ICD-10-PCS | Mod: S$PBB,25,, | Performed by: PHYSICIAN ASSISTANT

## 2023-04-25 PROCEDURE — 3061F PR NEG MICROALBUMINURIA RESULT DOCUMENTED/REVIEW: ICD-10-PCS | Mod: CPTII,,, | Performed by: PHYSICIAN ASSISTANT

## 2023-04-25 PROCEDURE — 99214 OFFICE O/P EST MOD 30 MIN: CPT | Mod: S$PBB,25,, | Performed by: PHYSICIAN ASSISTANT

## 2023-04-25 PROCEDURE — 3066F PR DOCUMENTATION OF TREATMENT FOR NEPHROPATHY: ICD-10-PCS | Mod: CPTII,,, | Performed by: PHYSICIAN ASSISTANT

## 2023-04-25 PROCEDURE — 3061F NEG MICROALBUMINURIA REV: CPT | Mod: CPTII,,, | Performed by: PHYSICIAN ASSISTANT

## 2023-04-25 PROCEDURE — 3075F PR MOST RECENT SYSTOLIC BLOOD PRESS GE 130-139MM HG: ICD-10-PCS | Mod: CPTII,,, | Performed by: PHYSICIAN ASSISTANT

## 2023-04-25 PROCEDURE — 3008F PR BODY MASS INDEX (BMI) DOCUMENTED: ICD-10-PCS | Mod: CPTII,,, | Performed by: PHYSICIAN ASSISTANT

## 2023-04-25 PROCEDURE — 99214 OFFICE O/P EST MOD 30 MIN: CPT | Mod: PBBFAC | Performed by: PHYSICIAN ASSISTANT

## 2023-04-25 PROCEDURE — 1159F MED LIST DOCD IN RCRD: CPT | Mod: CPTII,,, | Performed by: PHYSICIAN ASSISTANT

## 2023-04-25 PROCEDURE — 96372 THER/PROPH/DIAG INJ SC/IM: CPT | Mod: PBBFAC | Performed by: PHYSICIAN ASSISTANT

## 2023-04-25 PROCEDURE — 3066F NEPHROPATHY DOC TX: CPT | Mod: CPTII,,, | Performed by: PHYSICIAN ASSISTANT

## 2023-04-25 PROCEDURE — 1159F PR MEDICATION LIST DOCUMENTED IN MEDICAL RECORD: ICD-10-PCS | Mod: CPTII,,, | Performed by: PHYSICIAN ASSISTANT

## 2023-04-25 PROCEDURE — 3008F BODY MASS INDEX DOCD: CPT | Mod: CPTII,,, | Performed by: PHYSICIAN ASSISTANT

## 2023-04-25 PROCEDURE — 3075F SYST BP GE 130 - 139MM HG: CPT | Mod: CPTII,,, | Performed by: PHYSICIAN ASSISTANT

## 2023-04-25 RX ORDER — KETOROLAC TROMETHAMINE 30 MG/ML
60 INJECTION, SOLUTION INTRAMUSCULAR; INTRAVENOUS
Status: COMPLETED | OUTPATIENT
Start: 2023-04-25 | End: 2023-04-25

## 2023-04-25 RX ORDER — GABAPENTIN 100 MG/1
100 CAPSULE ORAL EVERY 8 HOURS
Qty: 90 CAPSULE | Refills: 2 | Status: SHIPPED | OUTPATIENT
Start: 2023-04-25 | End: 2023-08-17

## 2023-04-25 RX ADMIN — KETOROLAC TROMETHAMINE 60 MG: 30 INJECTION, SOLUTION INTRAMUSCULAR at 08:04

## 2023-04-25 NOTE — PROGRESS NOTES
Subjective:         Patient ID: Mary Magana is a 42 y.o. female.    Chief Complaint: Low-back Pain        Pain  This is a chronic problem. The current episode started more than 1 year ago. The problem occurs daily. The problem has been waxing and waning. Associated symptoms include arthralgias and neck pain. Pertinent negatives include no anorexia, change in bowel habit, chills, coughing, diaphoresis, fever, rash, sore throat, swollen glands, vertigo or vomiting.   Review of Systems   Constitutional:  Negative for activity change, appetite change, chills, diaphoresis, fever and unexpected weight change.   HENT:  Negative for drooling, ear discharge, ear pain, facial swelling, nosebleeds, sore throat, trouble swallowing, voice change and goiter.    Eyes:  Negative for photophobia, pain, discharge, redness and visual disturbance.   Respiratory:  Negative for apnea, cough, choking, chest tightness, shortness of breath, wheezing and stridor.    Cardiovascular:  Negative for palpitations and leg swelling.   Gastrointestinal:  Negative for abdominal distention, anorexia, change in bowel habit, diarrhea, rectal pain, vomiting, fecal incontinence and change in bowel habit.   Endocrine: Negative for cold intolerance, heat intolerance, polydipsia, polyphagia and polyuria.   Genitourinary:  Negative for flank pain, frequency and hot flashes.   Musculoskeletal:  Positive for arthralgias, back pain and neck pain.   Integumentary:  Negative for color change, pallor and rash.   Allergic/Immunologic: Negative for immunocompromised state.   Neurological:  Negative for dizziness, vertigo, seizures, syncope, facial asymmetry, speech difficulty, light-headedness, coordination difficulties, memory loss and coordination difficulties.   Hematological:  Negative for adenopathy. Does not bruise/bleed easily.   Psychiatric/Behavioral:  Negative for agitation, behavioral problems, confusion, decreased concentration, dysphoric  "mood, hallucinations, self-injury and suicidal ideas. The patient is not nervous/anxious and is not hyperactive.          Past Medical History:   Diagnosis Date    Diabetes mellitus     Hypertension     Thyroid disease     WPW (Ilana-Parkinson-White syndrome)      Past Surgical History:   Procedure Laterality Date    CHOLECYSTECTOMY      TUBAL LIGATION       Social History     Socioeconomic History    Marital status: Single   Tobacco Use    Smoking status: Former     Packs/day: 0.25     Years: 15.00     Pack years: 3.75     Types: Cigarettes, Vaping with nicotine     Passive exposure: Past    Smokeless tobacco: Never    Tobacco comments:     1 pack per 2 weeks   Substance and Sexual Activity    Alcohol use: Never    Drug use: Never    Sexual activity: Not Currently     Family History   Problem Relation Age of Onset    Diabetes Father     Hypertension Mother     Diabetes Mother     Leukemia Daughter      Review of patient's allergies indicates:   Allergen Reactions    Anesthesia s/i-40 (propofol) [propofol] Itching     Anesthesia, possibly propofol; made her feel like there were ants all over her        Objective:  Vitals:    04/25/23 0808   BP: 133/81   Pulse: 86   Resp: 18   Weight: 121.1 kg (267 lb)   Height: 5' 2" (1.575 m)   PainSc:   8         Physical Exam  Vitals and nursing note reviewed. Exam conducted with a chaperone present.   Constitutional:       General: She is awake. She is not in acute distress.     Appearance: Normal appearance. She is not ill-appearing.   HENT:      Head: Normocephalic and atraumatic.      Nose: Nose normal.      Mouth/Throat:      Mouth: Mucous membranes are moist.      Pharynx: Oropharynx is clear.   Eyes:      Conjunctiva/sclera: Conjunctivae normal.      Pupils: Pupils are equal, round, and reactive to light.   Pulmonary:      Effort: Pulmonary effort is normal. No respiratory distress.   Abdominal:      Palpations: Abdomen is soft.      Tenderness: There is " no guarding.   Musculoskeletal:         General: Normal range of motion.      Cervical back: Normal range of motion and neck supple. No rigidity.   Skin:     General: Skin is warm and dry.      Coloration: Skin is not jaundiced or pale.   Neurological:      General: No focal deficit present.      Mental Status: She is alert and oriented to person, place, and time. Mental status is at baseline.      Cranial Nerves: No cranial nerve deficit (II-XII).   Psychiatric:         Mood and Affect: Mood normal.         Behavior: Behavior normal. Behavior is cooperative.         Thought Content: Thought content normal.         US Abdomen Limited_Liver  Narrative: EXAMINATION:  US ABDOMEN LIMITED_LIVER    CLINICAL HISTORY:  Abnormal levels of other serum enzymes    TECHNIQUE:  Routine limited abdominal ultrasound was performed.    COMPARISON:  2020    FINDINGS:  The liver demonstrates no definite focal abnormality.  The liver demonstrates mild fatty liver and is normal in size.    Gallbladder removed    The common bile duct measures 0.4 cm.    The right kidney measures 10.  The right kidney is normal.    Pancreas poorly visualized    The visualized aorta and IVC are unremarkable in appearance.  Impression: Mild fatty liver    Ultrasound images captured and stored.    Electronically signed by: Parag Martins  Date:    03/21/2023  Time:    09:41         Office Visit on 03/16/2023   Component Date Value Ref Range Status    Creatinine, Urine 03/16/2023 273 (H)  28 - 219 mg/dL Final    Microalbumin 03/16/2023 0.9  0.0 - 2.8 mg/dL Final    Microalbumin/Creatinine Ratio 03/16/2023 3.3  0.0 - 30.0 mg/g Final    TSH 03/16/2023 18.800 (H)  0.358 - 3.740 uIU/mL Final    Hemoglobin A1C 03/16/2023 6.0  4.5 - 6.6 % Final    Estimated Average Glucose 03/16/2023 114  mg/dL Final    Sodium 03/16/2023 139  136 - 145 mmol/L Final    Potassium 03/16/2023 4.1  3.5 - 5.1 mmol/L Final    Chloride 03/16/2023 108 (H)  98 - 107 mmol/L Final     CO2 03/16/2023 27  21 - 32 mmol/L Final    Anion Gap 03/16/2023 8  7 - 16 mmol/L Final    Glucose 03/16/2023 103  74 - 106 mg/dL Final    BUN 03/16/2023 9  7 - 18 mg/dL Final    Creatinine 03/16/2023 0.98  0.55 - 1.02 mg/dL Final    BUN/Creatinine Ratio 03/16/2023 9  6 - 20 Final    Calcium 03/16/2023 8.9  8.5 - 10.1 mg/dL Final    Total Protein 03/16/2023 7.1  6.4 - 8.2 g/dL Final    Albumin 03/16/2023 3.5  3.5 - 5.0 g/dL Final    Globulin 03/16/2023 3.6  2.0 - 4.0 g/dL Final    A/G Ratio 03/16/2023 1.0   Final    Bilirubin, Total 03/16/2023 0.2  >0.0 - 1.2 mg/dL Final    Alk Phos 03/16/2023 103 (H)  37 - 98 U/L Final    ALT 03/16/2023 20  13 - 56 U/L Final    AST 03/16/2023 23  15 - 37 U/L Final    eGFR 03/16/2023 75  >=60 mL/min/1.73m² Final    WBC 03/16/2023 8.04  4.50 - 11.00 K/uL Final    RBC 03/16/2023 5.38  4.20 - 5.40 M/uL Final    Hemoglobin 03/16/2023 11.6 (L)  12.0 - 16.0 g/dL Final    Hematocrit 03/16/2023 40.0  38.0 - 47.0 % Final    MCV 03/16/2023 74.3 (L)  80.0 - 96.0 fL Final    MCH 03/16/2023 21.6 (L)  27.0 - 31.0 pg Final    MCHC 03/16/2023 29.0 (L)  32.0 - 36.0 g/dL Final    RDW 03/16/2023 15.3 (H)  11.5 - 14.5 % Final    Platelet Count 03/16/2023 290  150 - 400 K/uL Final    MPV 03/16/2023 11.1  9.4 - 12.4 fL Final    Neutrophils % 03/16/2023 46.1 (L)  53.0 - 65.0 % Final    Lymphocytes % 03/16/2023 38.7  27.0 - 41.0 % Final    Monocytes % 03/16/2023 7.7 (H)  2.0 - 6.0 % Final    Eosinophils % 03/16/2023 6.1 (H)  1.0 - 4.0 % Final    Basophils % 03/16/2023 1.0  0.0 - 1.0 % Final    Immature Granulocytes % 03/16/2023 0.4  0.0 - 0.4 % Final    nRBC, Auto 03/16/2023 0.0  <=0.0 % Final    Neutrophils, Abs 03/16/2023 3.71  1.80 - 7.70 K/uL Final    Lymphocytes, Absolute 03/16/2023 3.11  1.00 - 4.80 K/uL Final    Monocytes, Absolute 03/16/2023 0.62  0.00 - 0.80 K/uL Final    Eosinophils, Absolute 03/16/2023 0.49  0.00 - 0.50 K/uL Final    Basophils, Absolute  03/16/2023 0.08  0.00 - 0.20 K/uL Final    Immature Granulocytes, Absolute 03/16/2023 0.03  0.00 - 0.04 K/uL Final    nRBC, Absolute 03/16/2023 0.00  <=0.00 x10e3/uL Final    Diff Type 03/16/2023 Scan Smear   Final    Platelet Morphology 03/16/2023 Few Large Platelets (A)  Normal Final    Anisocytosis 03/16/2023 Few   Final    Microcytosis 03/16/2023 Few   Final    Iron 03/17/2023 39 (L)  50 - 170 µg/dL Final    Iron Saturation 03/17/2023 10 (L)  14 - 50 % Final    TIBC 03/17/2023 402  250 - 450 µg/dL Final    Ferritin 03/17/2023 5 (L)  8 - 252 ng/mL Final   Lab Visit on 01/09/2023   Component Date Value Ref Range Status    Sodium 01/09/2023 142  136 - 145 mmol/L Final    Potassium 01/09/2023 4.7  3.5 - 5.1 mmol/L Final    Chloride 01/09/2023 109 (H)  98 - 107 mmol/L Final    CO2 01/09/2023 27  21 - 32 mmol/L Final    Anion Gap 01/09/2023 11  7 - 16 mmol/L Final    Glucose 01/09/2023 113 (H)  74 - 106 mg/dL Final    BUN 01/09/2023 12  7 - 18 mg/dL Final    Creatinine 01/09/2023 1.03 (H)  0.55 - 1.02 mg/dL Final    BUN/Creatinine Ratio 01/09/2023 12  6 - 20 Final    Calcium 01/09/2023 9.0  8.5 - 10.1 mg/dL Final    Total Protein 01/09/2023 7.1  6.4 - 8.2 g/dL Final    Albumin 01/09/2023 3.4 (L)  3.5 - 5.0 g/dL Final    Globulin 01/09/2023 3.7  2.0 - 4.0 g/dL Final    A/G Ratio 01/09/2023 0.9   Final    Bilirubin, Total 01/09/2023 0.2  >0.0 - 1.2 mg/dL Final    Alk Phos 01/09/2023 97  37 - 98 U/L Final    ALT 01/09/2023 27  13 - 56 U/L Final    AST 01/09/2023 20  15 - 37 U/L Final    eGFR 01/09/2023 70  >=60 mL/min/1.73m² Final    WBC 01/09/2023 6.46  4.50 - 11.00 K/uL Final    RBC 01/09/2023 5.48 (H)  4.20 - 5.40 M/uL Final    Hemoglobin 01/09/2023 12.0  12.0 - 16.0 g/dL Final    Hematocrit 01/09/2023 43.7  38.0 - 47.0 % Final    MCV 01/09/2023 79.7 (L)  80.0 - 96.0 fL Final    MCH 01/09/2023 21.9 (L)  27.0 - 31.0 pg Final    MCHC 01/09/2023 27.5 (L)  32.0 - 36.0 g/dL Final    RDW  01/09/2023 15.6 (H)  11.5 - 14.5 % Final    Platelet Count 01/09/2023 254  150 - 400 K/uL Final    MPV 01/09/2023 9.9  9.4 - 12.4 fL Final    Neutrophils % 01/09/2023 47.3 (L)  53.0 - 65.0 % Final    Lymphocytes % 01/09/2023 40.4  27.0 - 41.0 % Final    Monocytes % 01/09/2023 5.1  2.0 - 6.0 % Final    Eosinophils % 01/09/2023 6.0 (H)  1.0 - 4.0 % Final    Basophils % 01/09/2023 0.9  0.0 - 1.0 % Final    Immature Granulocytes % 01/09/2023 0.3  0.0 - 0.4 % Final    nRBC, Auto 01/09/2023 0.0  <=0.0 % Final    Neutrophils, Abs 01/09/2023 3.05  1.80 - 7.70 K/uL Final    Lymphocytes, Absolute 01/09/2023 2.61  1.00 - 4.80 K/uL Final    Monocytes, Absolute 01/09/2023 0.33  0.00 - 0.80 K/uL Final    Eosinophils, Absolute 01/09/2023 0.39  0.00 - 0.50 K/uL Final    Basophils, Absolute 01/09/2023 0.06  0.00 - 0.20 K/uL Final    Immature Granulocytes, Absolute 01/09/2023 0.02  0.00 - 0.04 K/uL Final    nRBC, Absolute 01/09/2023 0.00  <=0.00 x10e3/uL Final    Diff Type 01/09/2023 Auto   Final   Office Visit on 11/28/2022   Component Date Value Ref Range Status    Case Report 11/28/2022    Final                    Value:Pap Cytology                                      Case: [id number]                                   Authorizing Provider:  Prabhu Hernandez DO Collected:           11/28/2022 03:34 PM          Ordering Location:     [facility] Received:            11/30/2022 09:08 AM                                 - Obstetrics And                                                                                    Gynecology                                                                   First Screen:          [facility](ASCP)                                                        Specimen:    Liquid-Based Pap Test, Screening, Cervix                                                   Interpretation 11/28/2022 Negative              Final    General Categorization 11/28/2022 Negative  for intraepithelial lesion or malignancy   Final    Specimen Adequacy 11/28/2022 Satisfactory for evaluation  No endocervical component   Final    Clinical Information 11/28/2022    Final                    Value:This result contains rich text formatting which cannot be displayed here.    Disclaimer 11/28/2022    Final                    Value:This result contains rich text formatting which cannot be displayed here.    HPV 16 11/28/2022 Negative  Negative (NEG) Final    HPV 18 11/28/2022 Negative  Negative, Invalid Final    HPV Other 11/28/2022 Negative  Negative, Invalid Final         No orders of the defined types were placed in this encounter.        Requested Prescriptions      No prescriptions requested or ordered in this encounter       Assessment:     No diagnosis found.         Plan:    Last seen October 2022    Daily benzodiazepine use    Not using narcotics from our office    Has completed physical therapy     Again today complaint of back pain buttock and leg pain requesting options    Requesting Toradol injection     Requesting to resume gabapentin     Toradol 60 mg IM, tolerated well    Declines procedures    MRI lumbar spine [facility] August 2022, multiple level degenerative changes mild neuroforaminal stenosis L5/S1    X-ray lumbar spine July 20, 2022 degenerative changes    X-ray sacroiliac joint July 20, 2022 degenerative changes no fracture noted    Follow-up 3 months    Dr. Jimenez, July 2023

## 2023-05-03 ENCOUNTER — TELEPHONE (OUTPATIENT)
Dept: FAMILY MEDICINE | Facility: CLINIC | Age: 43
End: 2023-05-03
Payer: MEDICAID

## 2023-05-03 NOTE — TELEPHONE ENCOUNTER
----- Message from Huber Clayton MD sent at 4/25/2023 12:33 PM CDT -----  Negative for occult blood.

## 2023-05-17 ENCOUNTER — OFFICE VISIT (OUTPATIENT)
Dept: GASTROENTEROLOGY | Facility: CLINIC | Age: 43
End: 2023-05-17
Payer: MEDICAID

## 2023-05-17 VITALS
OXYGEN SATURATION: 100 % | HEART RATE: 83 BPM | DIASTOLIC BLOOD PRESSURE: 64 MMHG | SYSTOLIC BLOOD PRESSURE: 124 MMHG | HEIGHT: 62 IN | BODY MASS INDEX: 49.47 KG/M2 | WEIGHT: 268.81 LBS

## 2023-05-17 DIAGNOSIS — K76.0 FATTY LIVER: ICD-10-CM

## 2023-05-17 DIAGNOSIS — D50.9 IRON DEFICIENCY ANEMIA, UNSPECIFIED IRON DEFICIENCY ANEMIA TYPE: Primary | ICD-10-CM

## 2023-05-17 DIAGNOSIS — K21.9 GASTROESOPHAGEAL REFLUX DISEASE, UNSPECIFIED WHETHER ESOPHAGITIS PRESENT: ICD-10-CM

## 2023-05-17 PROCEDURE — 99214 OFFICE O/P EST MOD 30 MIN: CPT | Mod: S$PBB,,, | Performed by: NURSE PRACTITIONER

## 2023-05-17 PROCEDURE — 1159F MED LIST DOCD IN RCRD: CPT | Mod: CPTII,,, | Performed by: NURSE PRACTITIONER

## 2023-05-17 PROCEDURE — 1160F RVW MEDS BY RX/DR IN RCRD: CPT | Mod: CPTII,,, | Performed by: NURSE PRACTITIONER

## 2023-05-17 PROCEDURE — 3061F NEG MICROALBUMINURIA REV: CPT | Mod: CPTII,,, | Performed by: NURSE PRACTITIONER

## 2023-05-17 PROCEDURE — 3044F PR MOST RECENT HEMOGLOBIN A1C LEVEL <7.0%: ICD-10-PCS | Mod: CPTII,,, | Performed by: NURSE PRACTITIONER

## 2023-05-17 PROCEDURE — 3061F PR NEG MICROALBUMINURIA RESULT DOCUMENTED/REVIEW: ICD-10-PCS | Mod: CPTII,,, | Performed by: NURSE PRACTITIONER

## 2023-05-17 PROCEDURE — 3074F SYST BP LT 130 MM HG: CPT | Mod: CPTII,,, | Performed by: NURSE PRACTITIONER

## 2023-05-17 PROCEDURE — 3074F PR MOST RECENT SYSTOLIC BLOOD PRESSURE < 130 MM HG: ICD-10-PCS | Mod: CPTII,,, | Performed by: NURSE PRACTITIONER

## 2023-05-17 PROCEDURE — 3078F DIAST BP <80 MM HG: CPT | Mod: CPTII,,, | Performed by: NURSE PRACTITIONER

## 2023-05-17 PROCEDURE — 3066F NEPHROPATHY DOC TX: CPT | Mod: CPTII,,, | Performed by: NURSE PRACTITIONER

## 2023-05-17 PROCEDURE — 3008F BODY MASS INDEX DOCD: CPT | Mod: CPTII,,, | Performed by: NURSE PRACTITIONER

## 2023-05-17 PROCEDURE — 3078F PR MOST RECENT DIASTOLIC BLOOD PRESSURE < 80 MM HG: ICD-10-PCS | Mod: CPTII,,, | Performed by: NURSE PRACTITIONER

## 2023-05-17 PROCEDURE — 99214 OFFICE O/P EST MOD 30 MIN: CPT | Mod: PBBFAC | Performed by: NURSE PRACTITIONER

## 2023-05-17 PROCEDURE — 1159F PR MEDICATION LIST DOCUMENTED IN MEDICAL RECORD: ICD-10-PCS | Mod: CPTII,,, | Performed by: NURSE PRACTITIONER

## 2023-05-17 PROCEDURE — 3066F PR DOCUMENTATION OF TREATMENT FOR NEPHROPATHY: ICD-10-PCS | Mod: CPTII,,, | Performed by: NURSE PRACTITIONER

## 2023-05-17 PROCEDURE — 3008F PR BODY MASS INDEX (BMI) DOCUMENTED: ICD-10-PCS | Mod: CPTII,,, | Performed by: NURSE PRACTITIONER

## 2023-05-17 PROCEDURE — 1160F PR REVIEW ALL MEDS BY PRESCRIBER/CLIN PHARMACIST DOCUMENTED: ICD-10-PCS | Mod: CPTII,,, | Performed by: NURSE PRACTITIONER

## 2023-05-17 PROCEDURE — 99214 PR OFFICE/OUTPT VISIT, EST, LEVL IV, 30-39 MIN: ICD-10-PCS | Mod: S$PBB,,, | Performed by: NURSE PRACTITIONER

## 2023-05-17 PROCEDURE — 3044F HG A1C LEVEL LT 7.0%: CPT | Mod: CPTII,,, | Performed by: NURSE PRACTITIONER

## 2023-05-17 NOTE — PROGRESS NOTES
Mary Magana is a 42 y.o. female here for No chief complaint on file.        PCP: Huber Clayton  Referring Provider: No referring provider defined for this encounter.     HPI:  Presents for follow-up due to fatty liver.  Liver ultrasound on 03/21/2023, did show fatty liver.  Liver labs were performed.  She did have a weak positive KATHERINE.  AMA and anti smooth muscle antibody were negative.  Labs from Dr. Clayton's office in March of 2023 reviewed, alk-phos is 103, ALT and AST are normal.  She is iron-deficiency, iron is 39, saturation 10%, HGB 11.6 and HCT 40.  Patient had previously declined EGD and colonoscopy due to iron-deficiency.  We did discuss today and she agrees to schedule the EGD and the colonoscopy.  She does endorse PICA (Jose).  We did discuss stopping PICA.  Reports that she has eaten Jose most of her life.  Denies any hematochezia or melena.  She is currently taking oral iron 325 mg daily.  Denies heavy menstrual cycles.  Denies GI complaints today.        ROS:  Review of Systems   Constitutional:  Negative for appetite change, fatigue, fever and unexpected weight change.   HENT:  Negative for trouble swallowing.    Cardiovascular:  Negative for chest pain.   Gastrointestinal:  Positive for reflux (controlled on PPI). Negative for abdominal pain, blood in stool, change in bowel habit, constipation, diarrhea, nausea, vomiting and change in bowel habit.   Musculoskeletal:  Negative for gait problem.   Integumentary:  Negative for pallor.   Psychiatric/Behavioral:  The patient is not nervous/anxious.         PMHX:  has a past medical history of Diabetes mellitus, Hypertension, Thyroid disease, and WPW (Ilana-Parkinson-White syndrome).    PSHX:  has a past surgical history that includes Cholecystectomy and Tubal ligation.    PFHX: family history includes Diabetes in her father and mother; Hypertension in her mother; Leukemia in her daughter.    PSlHX:  reports that she has quit  "smoking. Her smoking use included cigarettes and vaping with nicotine. She has a 3.75 pack-year smoking history. She has been exposed to tobacco smoke. She has never used smokeless tobacco. She reports that she does not drink alcohol and does not use drugs.        Review of patient's allergies indicates:   Allergen Reactions    Anesthesia s/i-40 (propofol) [propofol] Itching     Anesthesia, possibly propofol; made her feel like there were ants all over her       Medication List with Changes/Refills   Current Medications    ASPIRIN (ECOTRIN) 81 MG EC TABLET    TAKE 1 TABLET BY MOUTH DAILY WITH FOOD    CLONAZEPAM (KLONOPIN) 1 MG TABLET    Take 1 mg by mouth nightly.    FERROUS SULFATE (IRON) 325 MG (65 MG IRON) TAB TABLET    Take 1 tablet (325 mg total) by mouth daily with breakfast.    GABAPENTIN (NEURONTIN) 100 MG CAPSULE    Take 1 capsule (100 mg total) by mouth every 8 (eight) hours.    LEVOTHYROXINE (SYNTHROID) 100 MCG TABLET    Take 1 tablet (100 mcg total) by mouth before breakfast.    METOPROLOL TARTRATE (LOPRESSOR) 25 MG TABLET    Take 1 tablet (25 mg total) by mouth 2 (two) times daily.    TRUEPLUS LANCETS 30 GAUGE MISC    AS DIRECTED    VICTOZA 2-JAMESON 0.6 MG/0.1 ML (18 MG/3 ML) PNIJ PEN    INJECT 1.2 MG SUB-Q EVERY DAY ...KEEP IN REFRIGERATOR        Objective Findings:  Vital Signs:  /64   Pulse 83   Ht 5' 2" (1.575 m)   Wt 121.9 kg (268 lb 12.8 oz)   SpO2 100%   BMI 49.16 kg/m²  Body mass index is 49.16 kg/m².    Physical Exam:  Physical Exam  Vitals and nursing note reviewed.   Constitutional:       General: She is not in acute distress.     Appearance: Normal appearance.   HENT:      Mouth/Throat:      Mouth: Mucous membranes are moist.   Cardiovascular:      Rate and Rhythm: Normal rate.   Pulmonary:      Effort: Pulmonary effort is normal.   Abdominal:      General: Bowel sounds are normal. There is no distension.      Palpations: Abdomen is soft. There is no mass.      Tenderness: There is " no abdominal tenderness. There is no guarding.   Skin:     General: Skin is warm and dry.      Coloration: Skin is not jaundiced or pale.   Neurological:      Mental Status: She is alert and oriented to person, place, and time.   Psychiatric:         Mood and Affect: Mood normal.        Labs:  Lab Results   Component Value Date    WBC 8.04 03/16/2023    HGB 11.6 (L) 03/16/2023    HCT 40.0 03/16/2023    MCV 74.3 (L) 03/16/2023    RDW 15.3 (H) 03/16/2023     03/16/2023    LYMPH 38.7 03/16/2023    LYMPH 3.11 03/16/2023    MONO 7.7 (H) 03/16/2023    EOS 0.49 03/16/2023    BASO 0.08 03/16/2023     Lab Results   Component Value Date     03/16/2023    K 4.1 03/16/2023     (H) 03/16/2023    CO2 27 03/16/2023     03/16/2023    BUN 9 03/16/2023    CREATININE 0.98 03/16/2023    CALCIUM 8.9 03/16/2023    PROT 7.1 03/16/2023    ALBUMIN 3.5 03/16/2023    BILITOT 0.2 03/16/2023    ALKPHOS 103 (H) 03/16/2023    AST 23 03/16/2023    ALT 20 03/16/2023         Imaging: No results found.      Assessment:  Mary Magana is a 42 y.o. female here with:  1. Iron deficiency anemia, unspecified iron deficiency anemia type    2. Gastroesophageal reflux disease, unspecified whether esophagitis present    3. Fatty liver          Recommendations:  1. Continue oral iron  2. Schedule EGD and colonoscopy due to MEL  3. Avoid PICA  4. Avoid NSAID's  5. Exercise 150 minutes per week  Weight loss of 7-10%. Weight loss should be gradual  Diet low in saturated fats and carbohydrates  Good glucose and cholesterol control      Follow up in about 3 months (around 8/17/2023).      Order summary:  Orders Placed This Encounter    EGD    Colonoscopy       Thank you for allowing me to participate in the care of Mary Magana.      SULEIMAN Acevedo

## 2023-07-25 ENCOUNTER — OFFICE VISIT (OUTPATIENT)
Dept: FAMILY MEDICINE | Facility: CLINIC | Age: 43
End: 2023-07-25
Payer: MEDICAID

## 2023-07-25 VITALS
HEIGHT: 62 IN | WEIGHT: 267 LBS | BODY MASS INDEX: 49.13 KG/M2 | HEART RATE: 62 BPM | SYSTOLIC BLOOD PRESSURE: 132 MMHG | TEMPERATURE: 98 F | RESPIRATION RATE: 18 BRPM | DIASTOLIC BLOOD PRESSURE: 82 MMHG

## 2023-07-25 DIAGNOSIS — I10 HYPERTENSION, UNSPECIFIED TYPE: ICD-10-CM

## 2023-07-25 DIAGNOSIS — E78.5 HYPERLIPIDEMIA, UNSPECIFIED HYPERLIPIDEMIA TYPE: ICD-10-CM

## 2023-07-25 DIAGNOSIS — Z01.00 DIABETIC EYE EXAM: ICD-10-CM

## 2023-07-25 DIAGNOSIS — E11.9 DIABETIC EYE EXAM: ICD-10-CM

## 2023-07-25 DIAGNOSIS — E03.9 HYPOTHYROIDISM, UNSPECIFIED TYPE: ICD-10-CM

## 2023-07-25 DIAGNOSIS — E11.9 TYPE 2 DIABETES MELLITUS WITHOUT COMPLICATION, WITHOUT LONG-TERM CURRENT USE OF INSULIN: Primary | ICD-10-CM

## 2023-07-25 DIAGNOSIS — Z12.31 ENCOUNTER FOR SCREENING MAMMOGRAM FOR MALIGNANT NEOPLASM OF BREAST: ICD-10-CM

## 2023-07-25 LAB
ALBUMIN SERPL BCP-MCNC: 3.5 G/DL (ref 3.5–5)
ALBUMIN/GLOB SERPL: 0.9 {RATIO}
ALP SERPL-CCNC: 92 U/L (ref 37–98)
ALT SERPL W P-5'-P-CCNC: 37 U/L (ref 13–56)
ANION GAP SERPL CALCULATED.3IONS-SCNC: 10 MMOL/L (ref 7–16)
AST SERPL W P-5'-P-CCNC: 41 U/L (ref 15–37)
BASOPHILS # BLD AUTO: 0.06 K/UL (ref 0–0.2)
BASOPHILS NFR BLD AUTO: 0.8 % (ref 0–1)
BILIRUB SERPL-MCNC: 0.3 MG/DL (ref ?–1.2)
BUN SERPL-MCNC: 11 MG/DL (ref 7–18)
BUN/CREAT SERPL: 11 (ref 6–20)
CALCIUM SERPL-MCNC: 9.1 MG/DL (ref 8.5–10.1)
CHLORIDE SERPL-SCNC: 108 MMOL/L (ref 98–107)
CHOLEST SERPL-MCNC: 150 MG/DL (ref 0–200)
CHOLEST/HDLC SERPL: 3.4 {RATIO}
CO2 SERPL-SCNC: 29 MMOL/L (ref 21–32)
CREAT SERPL-MCNC: 1 MG/DL (ref 0.55–1.02)
DIFFERENTIAL METHOD BLD: ABNORMAL
EGFR (NO RACE VARIABLE) (RUSH/TITUS): 73 ML/MIN/1.73M2
EOSINOPHIL # BLD AUTO: 0.43 K/UL (ref 0–0.5)
EOSINOPHIL NFR BLD AUTO: 5.6 % (ref 1–4)
ERYTHROCYTE [DISTWIDTH] IN BLOOD BY AUTOMATED COUNT: 15.5 % (ref 11.5–14.5)
EST. AVERAGE GLUCOSE BLD GHB EST-MCNC: 107 MG/DL
GLOBULIN SER-MCNC: 3.8 G/DL (ref 2–4)
GLUCOSE SERPL-MCNC: 87 MG/DL (ref 74–106)
HBA1C MFR BLD HPLC: 5.8 % (ref 4.5–6.6)
HCT VFR BLD AUTO: 42.7 % (ref 38–47)
HDLC SERPL-MCNC: 44 MG/DL (ref 40–60)
HGB BLD-MCNC: 12.7 G/DL (ref 12–16)
IMM GRANULOCYTES # BLD AUTO: 0.01 K/UL (ref 0–0.04)
IMM GRANULOCYTES NFR BLD: 0.1 % (ref 0–0.4)
LDLC SERPL CALC-MCNC: 84 MG/DL
LDLC/HDLC SERPL: 1.9 {RATIO}
LYMPHOCYTES # BLD AUTO: 3.19 K/UL (ref 1–4.8)
LYMPHOCYTES NFR BLD AUTO: 41.8 % (ref 27–41)
MCH RBC QN AUTO: 22.2 PG (ref 27–31)
MCHC RBC AUTO-ENTMCNC: 29.7 G/DL (ref 32–36)
MCV RBC AUTO: 74.7 FL (ref 80–96)
MICROCYTES BLD QL SMEAR: NORMAL
MONOCYTES # BLD AUTO: 0.44 K/UL (ref 0–0.8)
MONOCYTES NFR BLD AUTO: 5.8 % (ref 2–6)
MPC BLD CALC-MCNC: 11.5 FL (ref 9.4–12.4)
NEUTROPHILS # BLD AUTO: 3.51 K/UL (ref 1.8–7.7)
NEUTROPHILS NFR BLD AUTO: 45.9 % (ref 53–65)
NONHDLC SERPL-MCNC: 106 MG/DL
NRBC # BLD AUTO: 0 X10E3/UL
NRBC, AUTO (.00): 0 %
PLATELET # BLD AUTO: 250 K/UL (ref 150–400)
PLATELET MORPHOLOGY: NORMAL
POTASSIUM SERPL-SCNC: 4.7 MMOL/L (ref 3.5–5.1)
PROT SERPL-MCNC: 7.3 G/DL (ref 6.4–8.2)
RBC # BLD AUTO: 5.72 M/UL (ref 4.2–5.4)
SODIUM SERPL-SCNC: 142 MMOL/L (ref 136–145)
T4 FREE SERPL-MCNC: 0.99 NG/DL (ref 0.76–1.46)
TRIGL SERPL-MCNC: 112 MG/DL (ref 35–150)
TSH SERPL DL<=0.005 MIU/L-ACNC: 12.7 UIU/ML (ref 0.36–3.74)
VLDLC SERPL-MCNC: 22 MG/DL
WBC # BLD AUTO: 7.64 K/UL (ref 4.5–11)

## 2023-07-25 PROCEDURE — 84439 T4, FREE: ICD-10-PCS | Mod: ,,, | Performed by: CLINICAL MEDICAL LABORATORY

## 2023-07-25 PROCEDURE — 3061F PR NEG MICROALBUMINURIA RESULT DOCUMENTED/REVIEW: ICD-10-PCS | Mod: CPTII,,, | Performed by: FAMILY MEDICINE

## 2023-07-25 PROCEDURE — 1160F RVW MEDS BY RX/DR IN RCRD: CPT | Mod: CPTII,,, | Performed by: FAMILY MEDICINE

## 2023-07-25 PROCEDURE — 3079F DIAST BP 80-89 MM HG: CPT | Mod: CPTII,,, | Performed by: FAMILY MEDICINE

## 2023-07-25 PROCEDURE — 1160F PR REVIEW ALL MEDS BY PRESCRIBER/CLIN PHARMACIST DOCUMENTED: ICD-10-PCS | Mod: CPTII,,, | Performed by: FAMILY MEDICINE

## 2023-07-25 PROCEDURE — 3044F PR MOST RECENT HEMOGLOBIN A1C LEVEL <7.0%: ICD-10-PCS | Mod: CPTII,,, | Performed by: FAMILY MEDICINE

## 2023-07-25 PROCEDURE — 3066F NEPHROPATHY DOC TX: CPT | Mod: CPTII,,, | Performed by: FAMILY MEDICINE

## 2023-07-25 PROCEDURE — 3079F PR MOST RECENT DIASTOLIC BLOOD PRESSURE 80-89 MM HG: ICD-10-PCS | Mod: CPTII,,, | Performed by: FAMILY MEDICINE

## 2023-07-25 PROCEDURE — 3008F PR BODY MASS INDEX (BMI) DOCUMENTED: ICD-10-PCS | Mod: CPTII,,, | Performed by: FAMILY MEDICINE

## 2023-07-25 PROCEDURE — 84443 TSH: ICD-10-PCS | Mod: ,,, | Performed by: CLINICAL MEDICAL LABORATORY

## 2023-07-25 PROCEDURE — 80061 LIPID PANEL: CPT | Mod: ,,, | Performed by: CLINICAL MEDICAL LABORATORY

## 2023-07-25 PROCEDURE — 84443 ASSAY THYROID STIM HORMONE: CPT | Mod: ,,, | Performed by: CLINICAL MEDICAL LABORATORY

## 2023-07-25 PROCEDURE — 3044F HG A1C LEVEL LT 7.0%: CPT | Mod: CPTII,,, | Performed by: FAMILY MEDICINE

## 2023-07-25 PROCEDURE — 99213 OFFICE O/P EST LOW 20 MIN: CPT | Mod: ,,, | Performed by: FAMILY MEDICINE

## 2023-07-25 PROCEDURE — 80061 LIPID PANEL: ICD-10-PCS | Mod: ,,, | Performed by: CLINICAL MEDICAL LABORATORY

## 2023-07-25 PROCEDURE — 84439 ASSAY OF FREE THYROXINE: CPT | Mod: ,,, | Performed by: CLINICAL MEDICAL LABORATORY

## 2023-07-25 PROCEDURE — 85025 COMPLETE CBC W/AUTO DIFF WBC: CPT | Mod: ,,, | Performed by: CLINICAL MEDICAL LABORATORY

## 2023-07-25 PROCEDURE — 83036 HEMOGLOBIN GLYCOSYLATED A1C: CPT | Mod: ,,, | Performed by: CLINICAL MEDICAL LABORATORY

## 2023-07-25 PROCEDURE — 3075F PR MOST RECENT SYSTOLIC BLOOD PRESS GE 130-139MM HG: ICD-10-PCS | Mod: CPTII,,, | Performed by: FAMILY MEDICINE

## 2023-07-25 PROCEDURE — 3061F NEG MICROALBUMINURIA REV: CPT | Mod: CPTII,,, | Performed by: FAMILY MEDICINE

## 2023-07-25 PROCEDURE — 99213 PR OFFICE/OUTPT VISIT, EST, LEVL III, 20-29 MIN: ICD-10-PCS | Mod: ,,, | Performed by: FAMILY MEDICINE

## 2023-07-25 PROCEDURE — 80053 COMPREHENSIVE METABOLIC PANEL: ICD-10-PCS | Mod: ,,, | Performed by: CLINICAL MEDICAL LABORATORY

## 2023-07-25 PROCEDURE — 83036 HEMOGLOBIN A1C: ICD-10-PCS | Mod: ,,, | Performed by: CLINICAL MEDICAL LABORATORY

## 2023-07-25 PROCEDURE — 3075F SYST BP GE 130 - 139MM HG: CPT | Mod: CPTII,,, | Performed by: FAMILY MEDICINE

## 2023-07-25 PROCEDURE — 85025 CBC WITH DIFFERENTIAL: ICD-10-PCS | Mod: ,,, | Performed by: CLINICAL MEDICAL LABORATORY

## 2023-07-25 PROCEDURE — 80053 COMPREHEN METABOLIC PANEL: CPT | Mod: ,,, | Performed by: CLINICAL MEDICAL LABORATORY

## 2023-07-25 PROCEDURE — 3066F PR DOCUMENTATION OF TREATMENT FOR NEPHROPATHY: ICD-10-PCS | Mod: CPTII,,, | Performed by: FAMILY MEDICINE

## 2023-07-25 PROCEDURE — 1159F MED LIST DOCD IN RCRD: CPT | Mod: CPTII,,, | Performed by: FAMILY MEDICINE

## 2023-07-25 PROCEDURE — 3008F BODY MASS INDEX DOCD: CPT | Mod: CPTII,,, | Performed by: FAMILY MEDICINE

## 2023-07-25 PROCEDURE — 1159F PR MEDICATION LIST DOCUMENTED IN MEDICAL RECORD: ICD-10-PCS | Mod: CPTII,,, | Performed by: FAMILY MEDICINE

## 2023-07-25 NOTE — PROGRESS NOTES
Mary Magana is a 42 y.o. female seen today for   follow-up on her diabetes.  She is due for her mammogram and diabetic eye exam.  She has had no shortness of breath but has had some minor chest discomfort with working out which tends to be muscular in nature and transient.  Patient is reporting her blood sugars are usually less than 125.  Patient reports her GI workup is ongoing for her anemia and fatty liver.  So far, patient has been referred for weight management.    Past Medical History:   Diagnosis Date    Diabetes mellitus     Hypertension     Thyroid disease     WPW (Ilana-Parkinson-White syndrome)      Family History   Problem Relation Age of Onset    Diabetes Father     Hypertension Mother     Diabetes Mother     Leukemia Daughter      Current Outpatient Medications on File Prior to Visit   Medication Sig Dispense Refill    aspirin (ECOTRIN) 81 MG EC tablet TAKE 1 TABLET BY MOUTH DAILY WITH FOOD 30 tablet 5    clonazePAM (KLONOPIN) 1 MG tablet Take 1 mg by mouth nightly.      ferrous sulfate (IRON) 325 mg (65 mg iron) Tab tablet Take 1 tablet (325 mg total) by mouth daily with breakfast. 30 tablet 5    gabapentin (NEURONTIN) 100 MG capsule Take 1 capsule (100 mg total) by mouth every 8 (eight) hours. 90 capsule 2    levothyroxine (SYNTHROID) 100 MCG tablet Take 1 tablet (100 mcg total) by mouth before breakfast. 30 tablet 1    metoprolol tartrate (LOPRESSOR) 25 MG tablet Take 1 tablet (25 mg total) by mouth 2 (two) times daily. 60 tablet 5    TRUEPLUS LANCETS 30 gauge Misc AS DIRECTED      VICTOZA 2-JAMESON 0.6 mg/0.1 mL (18 mg/3 mL) PnIj pen INJECT 1.2 MG SUB-Q EVERY DAY ...KEEP IN REFRIGERATOR 6 mL 5     No current facility-administered medications on file prior to visit.       There is no immunization history on file for this patient.    Review of Systems   Constitutional:  Negative for fever, malaise/fatigue and weight loss.   Respiratory:  Negative for shortness of breath.    Cardiovascular:   Negative for chest pain and palpitations.   Gastrointestinal:  Negative for nausea and vomiting.   Psychiatric/Behavioral:  Negative for depression.       Vitals:    07/25/23 1022   BP: 132/82   Pulse: 62   Resp: 18   Temp: 97.7 °F (36.5 °C)       Physical Exam  Vitals reviewed.   Constitutional:       Appearance: Normal appearance.   HENT:      Head: Normocephalic.   Eyes:      Extraocular Movements: Extraocular movements intact.      Conjunctiva/sclera: Conjunctivae normal.      Pupils: Pupils are equal, round, and reactive to light.   Neck:      Thyroid: No thyroid mass or thyromegaly.   Cardiovascular:      Rate and Rhythm: Normal rate and regular rhythm.      Heart sounds: Normal heart sounds. No murmur heard.    No gallop.   Pulmonary:      Effort: Pulmonary effort is normal. No respiratory distress.      Breath sounds: Normal breath sounds. No wheezing or rales.   Skin:     General: Skin is warm and dry.      Coloration: Skin is not jaundiced or pale.   Neurological:      Mental Status: She is alert.   Psychiatric:         Mood and Affect: Mood normal.         Behavior: Behavior normal.         Thought Content: Thought content normal.         Judgment: Judgment normal.        Assessment and Plan  Type 2 diabetes mellitus without complication, without long-term current use of insulin  -     Hemoglobin A1C; Future; Expected date: 07/25/2023    Hyperlipidemia, unspecified hyperlipidemia type  -     Lipid Panel; Future; Expected date: 07/25/2023  -     Comprehensive Metabolic Panel; Future; Expected date: 07/25/2023    Diabetic eye exam  -     Ambulatory referral/consult to Ophthalmology; Future; Expected date: 08/01/2023    Hypothyroidism, unspecified type  -     TSH; Future; Expected date: 07/25/2023  -     T4, Free; Future; Expected date: 07/25/2023    Hypertension, unspecified type  -     Comprehensive Metabolic Panel; Future; Expected date: 07/25/2023  -     CBC Auto Differential; Future; Expected date:  07/25/2023    Encounter for screening mammogram for malignant neoplasm of breast  -     Mammo Digital Screening Bilat; Future; Expected date: 07/25/2023            Return to clinic in 3 months or as needed once her lab work is in.    Health Maintenance Topics with due status: Not Due       Topic Last Completion Date    Lipid Panel 09/16/2022    Cervical Cancer Screening 11/28/2022    Diabetes Urine Screening 03/16/2023    Hemoglobin A1c 03/16/2023    Influenza Vaccine Not Due

## 2023-07-25 NOTE — LETTER
July 25, 2023      [facility]  [address] [place] [address]  Phone: [phone]  Fax: [phone]       Patient: Mary Magana   YOB: 1980  Date of Visit: 07/25/2023    To Whom It May Concern:    Rohan Magana  was at [facility] on 07/25/2023. The patient may return to work/school on 07/25/2023 with no restrictions. If you have any questions or concerns, or if I can be of further assistance, please do not hesitate to contact me.    Sincerely,    Michelle Trevino LPN

## 2023-07-26 ENCOUNTER — TELEPHONE (OUTPATIENT)
Dept: FAMILY MEDICINE | Facility: CLINIC | Age: 43
End: 2023-07-26
Payer: MEDICAID

## 2023-07-26 NOTE — TELEPHONE ENCOUNTER
----- Message from Huber Clayton MD sent at 7/26/2023  7:59 AM CDT -----  Office visit for TSH and LDL.

## 2023-08-12 ENCOUNTER — HOSPITAL ENCOUNTER (EMERGENCY)
Facility: HOSPITAL | Age: 43
Discharge: HOME | End: 2023-08-12
Attending: EMERGENCY MEDICINE
Payer: MEDICAID

## 2023-08-12 VITALS
TEMPERATURE: 98 F | RESPIRATION RATE: 14 BRPM | HEART RATE: 64 BPM | OXYGEN SATURATION: 96 % | DIASTOLIC BLOOD PRESSURE: 94 MMHG | BODY MASS INDEX: 49.13 KG/M2 | SYSTOLIC BLOOD PRESSURE: 155 MMHG | HEIGHT: 62 IN | WEIGHT: 267 LBS

## 2023-08-12 DIAGNOSIS — R07.9 CHEST PAIN: ICD-10-CM

## 2023-08-12 DIAGNOSIS — F41.9 ANXIETY: Primary | ICD-10-CM

## 2023-08-12 LAB
ALBUMIN SERPL BCP-MCNC: 3.6 G/DL (ref 3.5–5)
ALBUMIN/GLOB SERPL: 1 {RATIO}
ALP SERPL-CCNC: 87 U/L (ref 37–98)
ALT SERPL W P-5'-P-CCNC: 24 U/L (ref 13–56)
ANION GAP SERPL CALCULATED.3IONS-SCNC: 8 MMOL/L (ref 7–16)
AST SERPL W P-5'-P-CCNC: 23 U/L (ref 15–37)
BASOPHILS # BLD AUTO: 0.07 K/UL (ref 0–0.2)
BASOPHILS NFR BLD AUTO: 0.8 % (ref 0–1)
BILIRUB SERPL-MCNC: 0.3 MG/DL (ref ?–1.2)
BUN SERPL-MCNC: 12 MG/DL (ref 7–18)
BUN/CREAT SERPL: 12 (ref 6–20)
CALCIUM SERPL-MCNC: 8.9 MG/DL (ref 8.5–10.1)
CHLORIDE SERPL-SCNC: 110 MMOL/L (ref 98–107)
CO2 SERPL-SCNC: 27 MMOL/L (ref 21–32)
CREAT SERPL-MCNC: 1.03 MG/DL (ref 0.55–1.02)
DIFFERENTIAL METHOD BLD: ABNORMAL
EGFR (NO RACE VARIABLE) (RUSH/TITUS): 70 ML/MIN/1.73M2
EOSINOPHIL # BLD AUTO: 0.56 K/UL (ref 0–0.5)
EOSINOPHIL NFR BLD AUTO: 6 % (ref 1–4)
ERYTHROCYTE [DISTWIDTH] IN BLOOD BY AUTOMATED COUNT: 14.8 % (ref 11.5–14.5)
GLOBULIN SER-MCNC: 3.6 G/DL (ref 2–4)
GLUCOSE SERPL-MCNC: 92 MG/DL (ref 74–106)
HCT VFR BLD AUTO: 39.9 % (ref 38–47)
HGB BLD-MCNC: 12.5 G/DL (ref 12–16)
IMM GRANULOCYTES # BLD AUTO: 0.03 K/UL (ref 0–0.04)
IMM GRANULOCYTES NFR BLD: 0.3 % (ref 0–0.4)
LYMPHOCYTES # BLD AUTO: 4.55 K/UL (ref 1–4.8)
LYMPHOCYTES NFR BLD AUTO: 49 % (ref 27–41)
MCH RBC QN AUTO: 23 PG (ref 27–31)
MCHC RBC AUTO-ENTMCNC: 31.3 G/DL (ref 32–36)
MCV RBC AUTO: 73.5 FL (ref 80–96)
MICROCYTES BLD QL SMEAR: ABNORMAL
MONOCYTES # BLD AUTO: 0.64 K/UL (ref 0–0.8)
MONOCYTES NFR BLD AUTO: 6.9 % (ref 2–6)
MPC BLD CALC-MCNC: 10.5 FL (ref 9.4–12.4)
NEUTROPHILS # BLD AUTO: 3.44 K/UL (ref 1.8–7.7)
NEUTROPHILS NFR BLD AUTO: 37 % (ref 53–65)
NRBC # BLD AUTO: 0 X10E3/UL
NRBC, AUTO (.00): 0 %
NT-PROBNP SERPL-MCNC: 48 PG/ML (ref 1–125)
OVALOCYTES BLD QL SMEAR: ABNORMAL
PLATELET # BLD AUTO: 233 K/UL (ref 150–400)
PLATELET MORPHOLOGY: ABNORMAL
POTASSIUM SERPL-SCNC: 3.8 MMOL/L (ref 3.5–5.1)
PROT SERPL-MCNC: 7.2 G/DL (ref 6.4–8.2)
RBC # BLD AUTO: 5.43 M/UL (ref 4.2–5.4)
SODIUM SERPL-SCNC: 141 MMOL/L (ref 136–145)
TARGETS BLD QL SMEAR: ABNORMAL
TROPONIN I SERPL DL<=0.01 NG/ML-MCNC: 4.2 PG/ML
WBC # BLD AUTO: 9.29 K/UL (ref 4.5–11)

## 2023-08-12 PROCEDURE — 99285 PR EMERGENCY DEPT VISIT,LEVEL V: ICD-10-PCS | Mod: ,,, | Performed by: EMERGENCY MEDICINE

## 2023-08-12 PROCEDURE — 84484 ASSAY OF TROPONIN QUANT: CPT | Performed by: EMERGENCY MEDICINE

## 2023-08-12 PROCEDURE — 63600175 PHARM REV CODE 636 W HCPCS: Performed by: EMERGENCY MEDICINE

## 2023-08-12 PROCEDURE — 93010 EKG 12-LEAD: ICD-10-PCS | Mod: ,,, | Performed by: STUDENT IN AN ORGANIZED HEALTH CARE EDUCATION/TRAINING PROGRAM

## 2023-08-12 PROCEDURE — 80053 COMPREHEN METABOLIC PANEL: CPT | Performed by: EMERGENCY MEDICINE

## 2023-08-12 PROCEDURE — 99285 EMERGENCY DEPT VISIT HI MDM: CPT | Mod: 25

## 2023-08-12 PROCEDURE — 96374 THER/PROPH/DIAG INJ IV PUSH: CPT

## 2023-08-12 PROCEDURE — 99285 EMERGENCY DEPT VISIT HI MDM: CPT | Mod: ,,, | Performed by: EMERGENCY MEDICINE

## 2023-08-12 PROCEDURE — 85025 COMPLETE CBC W/AUTO DIFF WBC: CPT | Performed by: EMERGENCY MEDICINE

## 2023-08-12 PROCEDURE — 93005 ELECTROCARDIOGRAM TRACING: CPT

## 2023-08-12 PROCEDURE — 93010 ELECTROCARDIOGRAM REPORT: CPT | Mod: ,,, | Performed by: STUDENT IN AN ORGANIZED HEALTH CARE EDUCATION/TRAINING PROGRAM

## 2023-08-12 PROCEDURE — 83880 ASSAY OF NATRIURETIC PEPTIDE: CPT | Performed by: EMERGENCY MEDICINE

## 2023-08-12 RX ORDER — LORAZEPAM 2 MG/ML
2 INJECTION INTRAMUSCULAR
Status: COMPLETED | OUTPATIENT
Start: 2023-08-12 | End: 2023-08-12

## 2023-08-12 RX ADMIN — LORAZEPAM 2 MG: 2 INJECTION INTRAMUSCULAR; INTRAVENOUS at 03:08

## 2023-08-12 NOTE — DISCHARGE INSTRUCTIONS
Continue current medications as prescribed.  Return to emergency department for any worsening or further problems.  Follow up in clinic with primary care provider in 2-3 days if symptoms persist.

## 2023-08-12 NOTE — ED PROVIDER NOTES
Encounter Date: 8/12/2023       History     Chief Complaint   Patient presents with    Chest Pain     Patient is a 42-year-old female who presents to the emergency department complaining of left-sided chest pain that feels like a pressure with some radiation into her left arm and associated nausea and diaphoresis.  Patient does admit she is been under lot of stress and states that yesterday was the 2 year anniversary of her 's death.  Patient denies any history of coronary disease.  Patient states that she had a heart catheterization 2 years ago and was told she did not have any blockage.  She did not have any stents placed.  Patient denies fever, cough, leg pain or swelling, or other acute problems or complaints at this time.      Review of patient's allergies indicates:   Allergen Reactions    Anesthesia s/i-40 (propofol) [propofol] Itching     Anesthesia, possibly propofol; made her feel like there were ants all over her     Past Medical History:   Diagnosis Date    Diabetes mellitus     Hypertension     Thyroid disease     WPW (Ilana-Parkinson-White syndrome)      Past Surgical History:   Procedure Laterality Date    CHOLECYSTECTOMY      TUBAL LIGATION       Family History   Problem Relation Age of Onset    Diabetes Father     Hypertension Mother     Diabetes Mother     Leukemia Daughter      Social History     Tobacco Use    Smoking status: Every Day     Current packs/day: 0.25     Average packs/day: 0.3 packs/day for 15.0 years (3.8 ttl pk-yrs)     Types: Cigarettes, Vaping with nicotine     Passive exposure: Past    Smokeless tobacco: Never    Tobacco comments:     1 pack per 2 weeks   Substance Use Topics    Alcohol use: Never    Drug use: Never     Review of Systems   Constitutional:  Positive for diaphoresis.   Respiratory:  Positive for chest tightness and shortness of breath.    Gastrointestinal:  Positive for nausea.   All other systems reviewed and are negative.      Physical Exam     Initial  Vitals   BP Pulse Resp Temp SpO2   08/12/23 0111 08/12/23 0109 08/12/23 0109 08/12/23 0112 08/12/23 0109   (!) 171/98 88 14 98.3 °F (36.8 °C) 100 %      MAP       --                Physical Exam    Nursing note and vitals reviewed.  Constitutional: She appears well-developed and well-nourished.   HENT:   Head: Normocephalic.   Eyes: Pupils are equal, round, and reactive to light.   Cardiovascular:  Normal rate.           Pulmonary/Chest: Breath sounds normal.   Abdominal: Abdomen is soft.   Musculoskeletal:         General: Normal range of motion.     Neurological: She is alert.   Skin: Skin is warm. Capillary refill takes less than 2 seconds.   Psychiatric:   Patient seems anxious         Medical Screening Exam   See Full Note    ED Course   Procedures  Labs Reviewed   COMPREHENSIVE METABOLIC PANEL - Abnormal; Notable for the following components:       Result Value    Chloride 110 (*)     Creatinine 1.03 (*)     All other components within normal limits   CBC WITH DIFFERENTIAL - Abnormal; Notable for the following components:    RBC 5.43 (*)     MCV 73.5 (*)     MCH 23.0 (*)     MCHC 31.3 (*)     RDW 14.8 (*)     Neutrophils % 37.0 (*)     Lymphocytes % 49.0 (*)     Monocytes % 6.9 (*)     Eosinophils % 6.0 (*)     Eosinophils, Absolute 0.56 (*)     All other components within normal limits   CBC MORPHOLOGY - Abnormal; Notable for the following components:    Platelet Morphology Few Large Platelets (*)     All other components within normal limits   TROPONIN I - Normal   NT-PRO NATRIURETIC PEPTIDE - Normal   CBC W/ AUTO DIFFERENTIAL    Narrative:     The following orders were created for panel order CBC auto differential.  Procedure                               Abnormality         Status                     ---------                               -----------         ------                     CBC with Differential[[id number]]        Abnormal            Final result                 Please view results for these  tests on the individual orders.          Imaging Results              X-Ray Chest AP Portable (In process)                      Medications   LORazepam injection 2 mg (has no administration in time range)                 ED Course as of 08/12/23 0355   Sat Aug 12, 2023   0113 Medical decision-making:  Differential diagnosis includes anxiety, STEMI, NSTEMI, ACS, CHF, pneumonia.  All labs and imaging ordered and interpreted by me. [BB]   0114 EKG by my interpretation shows sinus rhythm, rate of 80, no acute ST segment changes. [BB]   0226 Pro BNP is normal.  Troponin is normal.  CMP is normal. [BB]   0227 Chest x-ray by my interpretation shows no acute disease. [BB]      ED Course User Index  [BB] Rajat Zapata MD                  Clinical Impression:   Final diagnoses:  [R07.9] Chest pain  [F41.9] Anxiety (Primary)        ED Disposition Condition    Discharge Stable          ED Prescriptions    None       Follow-up Information    None          Rajat Zapata MD  08/12/23 0350

## 2023-08-12 NOTE — ED TRIAGE NOTES
Presents to the emergency department c/o CP that started around 1500 today while at work. States that is a . Reports left arm pain and palpitations as well.

## 2023-08-17 ENCOUNTER — OFFICE VISIT (OUTPATIENT)
Dept: FAMILY MEDICINE | Facility: CLINIC | Age: 43
End: 2023-08-17
Payer: MEDICAID

## 2023-08-17 VITALS
RESPIRATION RATE: 19 BRPM | HEIGHT: 62 IN | DIASTOLIC BLOOD PRESSURE: 80 MMHG | SYSTOLIC BLOOD PRESSURE: 132 MMHG | HEART RATE: 62 BPM | WEIGHT: 260 LBS | TEMPERATURE: 99 F | BODY MASS INDEX: 47.84 KG/M2

## 2023-08-17 DIAGNOSIS — E03.9 HYPOTHYROIDISM, UNSPECIFIED TYPE: ICD-10-CM

## 2023-08-17 DIAGNOSIS — F41.9 ANXIETY: ICD-10-CM

## 2023-08-17 DIAGNOSIS — E78.5 HYPERLIPIDEMIA, UNSPECIFIED HYPERLIPIDEMIA TYPE: Primary | ICD-10-CM

## 2023-08-17 PROCEDURE — 3008F PR BODY MASS INDEX (BMI) DOCUMENTED: ICD-10-PCS | Mod: CPTII,,, | Performed by: FAMILY MEDICINE

## 2023-08-17 PROCEDURE — 3066F NEPHROPATHY DOC TX: CPT | Mod: CPTII,,, | Performed by: FAMILY MEDICINE

## 2023-08-17 PROCEDURE — 3061F NEG MICROALBUMINURIA REV: CPT | Mod: CPTII,,, | Performed by: FAMILY MEDICINE

## 2023-08-17 PROCEDURE — 99213 OFFICE O/P EST LOW 20 MIN: CPT | Mod: ,,, | Performed by: FAMILY MEDICINE

## 2023-08-17 PROCEDURE — 1160F PR REVIEW ALL MEDS BY PRESCRIBER/CLIN PHARMACIST DOCUMENTED: ICD-10-PCS | Mod: CPTII,,, | Performed by: FAMILY MEDICINE

## 2023-08-17 PROCEDURE — 3044F PR MOST RECENT HEMOGLOBIN A1C LEVEL <7.0%: ICD-10-PCS | Mod: CPTII,,, | Performed by: FAMILY MEDICINE

## 2023-08-17 PROCEDURE — 3008F BODY MASS INDEX DOCD: CPT | Mod: CPTII,,, | Performed by: FAMILY MEDICINE

## 2023-08-17 PROCEDURE — 1159F MED LIST DOCD IN RCRD: CPT | Mod: CPTII,,, | Performed by: FAMILY MEDICINE

## 2023-08-17 PROCEDURE — 3066F PR DOCUMENTATION OF TREATMENT FOR NEPHROPATHY: ICD-10-PCS | Mod: CPTII,,, | Performed by: FAMILY MEDICINE

## 2023-08-17 PROCEDURE — 3061F PR NEG MICROALBUMINURIA RESULT DOCUMENTED/REVIEW: ICD-10-PCS | Mod: CPTII,,, | Performed by: FAMILY MEDICINE

## 2023-08-17 PROCEDURE — 3044F HG A1C LEVEL LT 7.0%: CPT | Mod: CPTII,,, | Performed by: FAMILY MEDICINE

## 2023-08-17 PROCEDURE — 3075F SYST BP GE 130 - 139MM HG: CPT | Mod: CPTII,,, | Performed by: FAMILY MEDICINE

## 2023-08-17 PROCEDURE — 3075F PR MOST RECENT SYSTOLIC BLOOD PRESS GE 130-139MM HG: ICD-10-PCS | Mod: CPTII,,, | Performed by: FAMILY MEDICINE

## 2023-08-17 PROCEDURE — 3079F DIAST BP 80-89 MM HG: CPT | Mod: CPTII,,, | Performed by: FAMILY MEDICINE

## 2023-08-17 PROCEDURE — 1160F RVW MEDS BY RX/DR IN RCRD: CPT | Mod: CPTII,,, | Performed by: FAMILY MEDICINE

## 2023-08-17 PROCEDURE — 1159F PR MEDICATION LIST DOCUMENTED IN MEDICAL RECORD: ICD-10-PCS | Mod: CPTII,,, | Performed by: FAMILY MEDICINE

## 2023-08-17 PROCEDURE — 3079F PR MOST RECENT DIASTOLIC BLOOD PRESSURE 80-89 MM HG: ICD-10-PCS | Mod: CPTII,,, | Performed by: FAMILY MEDICINE

## 2023-08-17 PROCEDURE — 99213 PR OFFICE/OUTPT VISIT, EST, LEVL III, 20-29 MIN: ICD-10-PCS | Mod: ,,, | Performed by: FAMILY MEDICINE

## 2023-08-17 RX ORDER — LEVOTHYROXINE SODIUM 75 UG/1
75 TABLET ORAL
COMMUNITY
Start: 2023-08-07 | End: 2023-08-17

## 2023-08-17 RX ORDER — HYDROXYZINE PAMOATE 25 MG/1
CAPSULE ORAL
Qty: 30 CAPSULE | Refills: 2 | Status: SHIPPED | OUTPATIENT
Start: 2023-08-17

## 2023-08-17 RX ORDER — ATORVASTATIN CALCIUM 10 MG/1
10 TABLET, FILM COATED ORAL NIGHTLY
Qty: 30 TABLET | Refills: 3 | Status: SHIPPED | OUTPATIENT
Start: 2023-08-17 | End: 2024-08-16

## 2023-08-17 RX ORDER — LEVOTHYROXINE SODIUM 125 UG/1
125 TABLET ORAL
Qty: 30 TABLET | Refills: 5 | Status: SHIPPED | OUTPATIENT
Start: 2023-08-17 | End: 2024-01-02

## 2023-08-17 NOTE — PROGRESS NOTES
Mary Magana is a 42 y.o. female seen today for  ER follow-up for her elevated blood pressure.  Patient was evaluated for markedly elevated blood pressure but had negative cardiac workup.  Patient reports that she was very anxious secondary to the 2 year anniversary of her 's death.  Patient's blood pressure in the office is normal and she denies any chest pain or shortness a breath.  We discussed a trial of Vistaril to take when she has episodes of anxiety.  Reviewing patient's labs her A1c was excellent but her LDL cholesterol remains not to goal and we discussed starting her back on cholesterol medication which she tolerated well in the past she reports.  We discussed using Lipitor and discussed the side effects of this medication.  Finally, the patient's thyroid dosing is not to goal and we discussed increasing her Synthroid to the 125 mcg dose daily and reviewed the proper way to take this medication.    Past Medical History:   Diagnosis Date    Diabetes mellitus     Hypertension     Thyroid disease     WPW (Ilana-Parkinson-White syndrome)      Family History   Problem Relation Age of Onset    Diabetes Father     Hypertension Mother     Diabetes Mother     Leukemia Daughter      Current Outpatient Medications on File Prior to Visit   Medication Sig Dispense Refill    aspirin (ECOTRIN) 81 MG EC tablet TAKE 1 TABLET BY MOUTH DAILY WITH FOOD 30 tablet 5    ferrous sulfate (IRON) 325 mg (65 mg iron) Tab tablet Take 1 tablet (325 mg total) by mouth daily with breakfast. 30 tablet 5    metoprolol tartrate (LOPRESSOR) 25 MG tablet Take 1 tablet (25 mg total) by mouth 2 (two) times daily. 60 tablet 5    TRUEPLUS LANCETS 30 gauge Misc AS DIRECTED      VICTOZA 2-JAMESON 0.6 mg/0.1 mL (18 mg/3 mL) PnIj pen INJECT 1.2 MG SUB-Q EVERY DAY ...KEEP IN REFRIGERATOR (Patient taking differently: Inject 1.8 mg into the skin once a week. INJECT 1.2 MG SUB-Q EVERY DAY ...KEEP IN REFRIGERATOR) 6 mL 5    [DISCONTINUED]  levothyroxine (SYNTHROID) 75 MCG tablet Take 75 mcg by mouth.      [DISCONTINUED] gabapentin (NEURONTIN) 100 MG capsule Take 1 capsule (100 mg total) by mouth every 8 (eight) hours. (Patient not taking: Reported on 8/12/2023) 90 capsule 2    [DISCONTINUED] levothyroxine (SYNTHROID) 100 MCG tablet Take 1 tablet (100 mcg total) by mouth before breakfast. (Patient not taking: Reported on 8/17/2023) 30 tablet 1     No current facility-administered medications on file prior to visit.       There is no immunization history on file for this patient.    Review of Systems   Constitutional:  Positive for malaise/fatigue. Negative for fever and weight loss.   Respiratory:  Negative for shortness of breath.    Cardiovascular:  Negative for chest pain and palpitations.   Gastrointestinal:  Negative for nausea and vomiting.   Musculoskeletal:  Positive for myalgias.   Psychiatric/Behavioral:  Negative for depression. The patient is nervous/anxious.         Vitals:    08/17/23 1451   BP: 132/80   Pulse: 62   Resp: 19   Temp: 98.7 °F (37.1 °C)       Physical Exam  Vitals reviewed.   Constitutional:       Appearance: Normal appearance.   HENT:      Head: Normocephalic.   Eyes:      Extraocular Movements: Extraocular movements intact.      Conjunctiva/sclera: Conjunctivae normal.      Pupils: Pupils are equal, round, and reactive to light.   Neck:      Thyroid: No thyroid mass or thyromegaly.   Cardiovascular:      Rate and Rhythm: Normal rate and regular rhythm.      Heart sounds: Normal heart sounds. No murmur heard.     No gallop.   Pulmonary:      Effort: Pulmonary effort is normal. No respiratory distress.      Breath sounds: Normal breath sounds. No wheezing or rales.   Skin:     General: Skin is warm and dry.      Coloration: Skin is not jaundiced or pale.   Neurological:      Mental Status: She is alert.   Psychiatric:         Mood and Affect: Mood normal.         Behavior: Behavior normal.         Thought Content: Thought  content normal.         Judgment: Judgment normal.          Assessment and Plan  Hyperlipidemia, unspecified hyperlipidemia type  -     atorvastatin (LIPITOR) 10 MG tablet; Take 1 tablet (10 mg total) by mouth every evening.  Dispense: 30 tablet; Refill: 3    Hypothyroidism, unspecified type  -     levothyroxine (SYNTHROID) 125 MCG tablet; Take 1 tablet (125 mcg total) by mouth before breakfast.  Dispense: 30 tablet; Refill: 5    Anxiety  -     hydrOXYzine pamoate (VISTARIL) 25 MG Cap; Take 1 to 2 capsule up to tid prn for severe anxiety  Dispense: 30 capsule; Refill: 2            Return to clinic in 3 months for follow-up on her lipids thyroid and A1c are as needed if symptoms worsen.    Health Maintenance Topics with due status: Not Due       Topic Last Completion Date    Cervical Cancer Screening 11/28/2022    Diabetes Urine Screening 03/16/2023    Lipid Panel 07/25/2023    Hemoglobin A1c 07/25/2023    Influenza Vaccine Not Due

## 2023-08-22 NOTE — PROGRESS NOTES
"She Disclaimer: This note has been generated using voice-recognition software. There may be typographical errors that have been missed during proof-reading        Patient ID: Mary Magana is a 42 y.o. female.      Chief Complaint: Back Pain      42-year-old female returns for re-evaluation of chronic lower back pain.  She notes some improvement with  hot showers.  She discontinued gabapentin secondary to side effects.  She has refused nerve block injections due to what she described as being "paralyzed for several days" after an injection by Dr. Lares several years ago.  She denies relief with NSAIDs.  Tylenol and muscle relaxants have provided some improvement.  She defers surgical evaluation.  Physical therapy was recommended and she agrees to restarting therapy.            Pain Assessment  Pain Assessment: 0-10  Pain Score:   7  Pain Location: Back  Pain Descriptors: Aching  Pain Frequency: Intermittent  Pain Onset: Awakened from sleep  Aggravating Factors: Bending  Pain Intervention(s): Heat applied      A's of Opioid Risk Assessment  Activity:Patient has difficulty perform ADL.   Analgesia:Patients pain is not controlled by current medication.   Adverse Effects: Patient denies constipation or sedation.  Aberrant Behavior:  reviewed with no aberrant drug seeking/taking behavior.      Patient denies any suicidal or homicidal ideations    Physical Therapy/Home Exercise: yes      X-Ray Chest AP Portable  Narrative: EXAMINATION:  XR CHEST AP PORTABLE    CLINICAL HISTORY:  Chest Pain;    TECHNIQUE:  Single frontal view of the chest was performed.    COMPARISON:  06/07/2021    FINDINGS:  Heart size normal. Lungs clear. No pneumothorax or pleural effusion.  Impression: No acute findings.    Electronically signed by: Rogerio Dejesus  Date:    08/12/2023  Time:    08:21      Review of Systems   Constitutional: Negative.    HENT: Negative.     Eyes: Negative.    Respiratory: Negative.     Cardiovascular: " Negative.    Gastrointestinal: Negative.    Endocrine: Negative.    Genitourinary: Negative.    Musculoskeletal:  Positive for arthralgias and back pain.   Integumentary:  Negative.   Neurological: Negative.    Hematological: Negative.    Psychiatric/Behavioral: Negative.               Past Medical History:   Diagnosis Date    Diabetes mellitus     Hypertension     Thyroid disease     WPW (Ilana-Parkinson-White syndrome)      Past Surgical History:   Procedure Laterality Date    CHOLECYSTECTOMY      TUBAL LIGATION       Social History     Socioeconomic History    Marital status: Single   Tobacco Use    Smoking status: Every Day     Current packs/day: 0.25     Average packs/day: 0.3 packs/day for 15.0 years (3.8 ttl pk-yrs)     Types: Cigarettes, Vaping with nicotine     Passive exposure: Past    Smokeless tobacco: Never    Tobacco comments:     1 pack per 2 weeks   Substance and Sexual Activity    Alcohol use: Never    Drug use: Never    Sexual activity: Not Currently     Family History   Problem Relation Age of Onset    Diabetes Father     Hypertension Mother     Diabetes Mother     Leukemia Daughter      Review of patient's allergies indicates:   Allergen Reactions    Anesthesia s/i-40 (propofol) [propofol] Itching     Anesthesia, possibly propofol; made her feel like there were ants all over her     has a current medication list which includes the following prescription(s): aspirin, atorvastatin, ferrous sulfate, hydroxyzine pamoate, levothyroxine, metoprolol tartrate, trueplus lancets, and victoza 2-christen.      Objective:  Vitals:    08/23/23 1440   BP: (!) 146/91   Pulse: 95        Physical Exam  Vitals and nursing note reviewed.   Constitutional:       General: She is not in acute distress.     Appearance: Normal appearance. She is not ill-appearing, toxic-appearing or diaphoretic.   HENT:      Head: Normocephalic and atraumatic.      Nose: Nose normal.      Mouth/Throat:      Mouth: Mucous membranes are moist.    Eyes:      Extraocular Movements: Extraocular movements intact.      Pupils: Pupils are equal, round, and reactive to light.   Cardiovascular:      Rate and Rhythm: Normal rate and regular rhythm.      Heart sounds: Normal heart sounds.   Pulmonary:      Effort: Pulmonary effort is normal. No respiratory distress.      Breath sounds: Normal breath sounds. No stridor. No wheezing or rhonchi.   Abdominal:      General: Bowel sounds are normal.      Palpations: Abdomen is soft.   Musculoskeletal:         General: No swelling or deformity.      Cervical back: Normal and normal range of motion. No spasms or tenderness. No pain with movement. Normal range of motion.      Thoracic back: Normal.      Lumbar back: Tenderness and bony tenderness present. No spasms. Decreased range of motion. Negative right straight leg raise test and negative left straight leg raise test. No scoliosis.      Right lower leg: No edema.      Left lower leg: No edema.      Comments: Lumbar pain with flexion, extension and lateral rotation.  Bilateral facet tenderness to palpation from L3-S1   Skin:     General: Skin is warm.   Neurological:      General: No focal deficit present.      Mental Status: She is alert and oriented to person, place, and time. Mental status is at baseline.      Cranial Nerves: No cranial nerve deficit.      Sensory: Sensation is intact. No sensory deficit.      Motor: No weakness.      Coordination: Coordination normal.      Gait: Gait normal.      Deep Tendon Reflexes: Reflexes are normal and symmetric.   Psychiatric:         Mood and Affect: Mood normal.         Behavior: Behavior normal.           Assessment:      1. Lumbar spondylosis    2. Sacroiliitis    3. Lumbosacral radiculopathy    4. Chronic bilateral low back pain without sciatica          Plan:  1. reviewed  2.Addiction, Dependency, Tolerance, Opioid abuse-misuse, Death, Diversion Discussed. Overdose reversal drug Naloxone discussed.  3.Refill/Continue  medications for pain control and function       Requested Prescriptions      No prescriptions requested or ordered in this encounter     4. Start physical therapy 2 to 3 times week x6 weeks for intractable lower back pain  Orders Placed This Encounter   Procedures    Ambulatory referral/consult to Physical/Occupational Therapy     Standing Status:   Future     Standing Expiration Date:   9/23/2024     Referral Priority:   Routine     Referral Type:   Physical Medicine     Referral Reason:   Specialty Services Required     Number of Visits Requested:   1      5. Consider lumbar medial branch blocks for lower back pain and spondylosis after completion of physical therapy and if indicated  6. Return in 2 months for re-evaluation     report:  Reviewed and consistent with medication use as prescribed.      The total time spent for evaluation and management on 08/23/2023 including reviewing separately obtained history, performing a medically appropriate exam and evaluation, documenting clinical information in the health record, independently interpreting results and communicating them to the patient/family/caregiver, and ordering medications/tests/procedures was between 15-29 minutes.    The above plan and management options were discussed at length with patient. Patient is in agreement with the above and verbalized understanding. It will be communicated with the referring physician via electronic record, fax, or mail.

## 2023-08-23 ENCOUNTER — OFFICE VISIT (OUTPATIENT)
Dept: PAIN MEDICINE | Facility: CLINIC | Age: 43
End: 2023-08-23
Payer: MEDICAID

## 2023-08-23 VITALS
SYSTOLIC BLOOD PRESSURE: 146 MMHG | HEART RATE: 95 BPM | HEIGHT: 62 IN | WEIGHT: 261 LBS | BODY MASS INDEX: 48.03 KG/M2 | DIASTOLIC BLOOD PRESSURE: 91 MMHG

## 2023-08-23 DIAGNOSIS — M54.17 LUMBOSACRAL RADICULOPATHY: ICD-10-CM

## 2023-08-23 DIAGNOSIS — M47.816 LUMBAR SPONDYLOSIS: Primary | ICD-10-CM

## 2023-08-23 DIAGNOSIS — G89.29 CHRONIC BILATERAL LOW BACK PAIN WITHOUT SCIATICA: ICD-10-CM

## 2023-08-23 DIAGNOSIS — M54.50 CHRONIC BILATERAL LOW BACK PAIN WITHOUT SCIATICA: ICD-10-CM

## 2023-08-23 DIAGNOSIS — M46.1 SACROILIITIS: ICD-10-CM

## 2023-08-23 PROCEDURE — 3008F PR BODY MASS INDEX (BMI) DOCUMENTED: ICD-10-PCS | Mod: CPTII,,, | Performed by: PAIN MEDICINE

## 2023-08-23 PROCEDURE — 3066F PR DOCUMENTATION OF TREATMENT FOR NEPHROPATHY: ICD-10-PCS | Mod: CPTII,,, | Performed by: PAIN MEDICINE

## 2023-08-23 PROCEDURE — 3080F PR MOST RECENT DIASTOLIC BLOOD PRESSURE >= 90 MM HG: ICD-10-PCS | Mod: CPTII,,, | Performed by: PAIN MEDICINE

## 2023-08-23 PROCEDURE — 3044F PR MOST RECENT HEMOGLOBIN A1C LEVEL <7.0%: ICD-10-PCS | Mod: CPTII,,, | Performed by: PAIN MEDICINE

## 2023-08-23 PROCEDURE — 3061F NEG MICROALBUMINURIA REV: CPT | Mod: CPTII,,, | Performed by: PAIN MEDICINE

## 2023-08-23 PROCEDURE — 99212 PR OFFICE/OUTPT VISIT, EST, LEVL II, 10-19 MIN: ICD-10-PCS | Mod: S$PBB,,, | Performed by: PAIN MEDICINE

## 2023-08-23 PROCEDURE — 3044F HG A1C LEVEL LT 7.0%: CPT | Mod: CPTII,,, | Performed by: PAIN MEDICINE

## 2023-08-23 PROCEDURE — 3061F PR NEG MICROALBUMINURIA RESULT DOCUMENTED/REVIEW: ICD-10-PCS | Mod: CPTII,,, | Performed by: PAIN MEDICINE

## 2023-08-23 PROCEDURE — 1159F MED LIST DOCD IN RCRD: CPT | Mod: CPTII,,, | Performed by: PAIN MEDICINE

## 2023-08-23 PROCEDURE — 1159F PR MEDICATION LIST DOCUMENTED IN MEDICAL RECORD: ICD-10-PCS | Mod: CPTII,,, | Performed by: PAIN MEDICINE

## 2023-08-23 PROCEDURE — 99212 OFFICE O/P EST SF 10 MIN: CPT | Mod: S$PBB,,, | Performed by: PAIN MEDICINE

## 2023-08-23 PROCEDURE — 99214 OFFICE O/P EST MOD 30 MIN: CPT | Mod: PBBFAC | Performed by: PAIN MEDICINE

## 2023-08-23 PROCEDURE — 3077F PR MOST RECENT SYSTOLIC BLOOD PRESSURE >= 140 MM HG: ICD-10-PCS | Mod: CPTII,,, | Performed by: PAIN MEDICINE

## 2023-08-23 PROCEDURE — 3008F BODY MASS INDEX DOCD: CPT | Mod: CPTII,,, | Performed by: PAIN MEDICINE

## 2023-08-23 PROCEDURE — 3077F SYST BP >= 140 MM HG: CPT | Mod: CPTII,,, | Performed by: PAIN MEDICINE

## 2023-08-23 PROCEDURE — 3066F NEPHROPATHY DOC TX: CPT | Mod: CPTII,,, | Performed by: PAIN MEDICINE

## 2023-08-23 PROCEDURE — 3080F DIAST BP >= 90 MM HG: CPT | Mod: CPTII,,, | Performed by: PAIN MEDICINE

## 2023-08-31 ENCOUNTER — CLINICAL SUPPORT (OUTPATIENT)
Dept: REHABILITATION | Facility: HOSPITAL | Age: 43
End: 2023-08-31
Attending: PAIN MEDICINE
Payer: MEDICAID

## 2023-08-31 DIAGNOSIS — M47.816 LUMBAR SPONDYLOSIS: Primary | ICD-10-CM

## 2023-08-31 DIAGNOSIS — R52 PAIN: ICD-10-CM

## 2023-08-31 PROCEDURE — 97161 PT EVAL LOW COMPLEX 20 MIN: CPT

## 2023-08-31 NOTE — PLAN OF CARE
[facility]  Physical Therapy Initial Evaluation    Date: 8/31/2023   Name: Mary Magana  Clinic Number: [id number]    Therapy Diagnosis:   Encounter Diagnoses   Name Primary?    Lumbar spondylosis Yes    Pain      Physician: Fatoumata Jimenez MD    Physician Orders: PT Eval and Treat   Medical Diagnosis from Referral: Lumbar Pain  Evaluation Date: 8/31/2023  Authorization Period Expiration: 8/22/24  Plan of Care Expiration: 11/23/23  Visit # / Visits authorized: 1/ ?    Time In: 150  Time Out: 230  Total Appointment Time (timed & untimed codes): 40 minutes    Precautions: Standard and Diabetes    Subjective   Date of onset: Chronic    History of current condition - Mary reports: Constant dull ache in the center of the lumbar spine. Worse with forward bend position especially with washing hair, prolonged positions. Relief with warm bath. Difficulty with sleeping through the night secondary to pain. Feels stable with no falls. Denies numbness/tingling. Right handed. Not a good reaction to previous injections from years ago.      Medical History:   Past Medical History:   Diagnosis Date    Diabetes mellitus     Hypertension     Thyroid disease     WPW (Ilana-Parkinson-White syndrome)        Surgical History:   Mary Magana  has a past surgical history that includes Cholecystectomy and Tubal ligation.    Medications:   Mary has a current medication list which includes the following prescription(s): aspirin, atorvastatin, ferrous sulfate, hydroxyzine pamoate, levothyroxine, metoprolol tartrate, trueplus lancets, and victoza 2-christen.    Allergies:   Review of patient's allergies indicates:   Allergen Reactions    Anesthesia s/i-40 (propofol) [propofol] Itching     Anesthesia, possibly propofol; made her feel like there were ants all over her        Imaging, MRI studies, bone scan films: FINDINGS:  Vertebral body heights, alignment, and signal intensity are normal.  There is  disc desiccation L3-4 and L5-S1.  The conus terminates at L1-2.  L1-2: No spinal canal or foraminal stenosis.  Facet degeneration.  L2-3: No spinal canal or foraminal stenosis.  L3-4: Annular fissuring centrally of the disc with shallow disc bulge.  Facet degeneration.  No spinal canal or foraminal stenosis.  L4-5: No spinal canal or foraminal stenosis.  Facet degeneration.  L5-S1: Facet degeneration.  No spinal canal stenosis.  Mild bilateral foraminal stenosis.  Impression:  Mild multilevel degenerative changes of the lumbar spine.    Pain:  Current 7/10, worst 7/10,  Location: bilateral back    Description: Aching, Dull, and Throbbing  Aggravating Factors: Sitting, Standing, Bending, Walking, Night Time, and Flexing  Easing Factors: hot bath and rest        Objective     Pain with returning from forward bend position in the lumbar spine  Level pelvis  Able to maintain single leg balance  Bilateral light touch intact for lower extremities   (-) bilateral sitting slump test  (-) bilateral straight leg raise test   (-) bilateral TEODORA  No quad lag  No leg length difference  Bilateral manual muscle test for lower extremities 5/5  Weak core - left multifidus       Limitation/Restriction for FOTO Survey    Therapist reviewed FOTO scores for Mary Magana on 8/31/2023.   FOTO documents entered into [employer] - see Media section.    Intake Score: 53%     Home Exercises and Patient Education Provided    Education provided:   [place] Findings  - Patient educated on biomechanical justification for therapeutic exercise and importance of compliance with HEP in order to improve overall impairments and QOL   Patient was educated on all the above exercise prior/during/after for proper posture, positioning, and execution for safe performance with home exercise program.   Patient educated on the importance of improved core and upper and lower extremity strength in order to improve alignment of the spine and upper and lower  extremities with static positions and dynamic movement.   Patient educated on the importance of strong core and lower extremity musculature in order to improve both static and dynamic balance, improve gait mechanics, reduce fall risk and improve household and community mobility.     Written Home Exercises Provided:  cristhian walker .  Exercises were reviewed and Mary was able to demonstrate them prior to the end of the session.  Mary demonstrated good  understanding of the education provided.       Assessment   Mary is a 42 y.o. female referred to outpatient Physical Therapy with a medical diagnosis of lumbar pain. Patient presents with mild arthritis in the lumbar spine causing pain with prolonged positions and with static forward bending position. Patient has weak core stability but is very strong with lower extremities and hip strength. Patient will benefit from skilled physical therapy at this time to address deficits with core stabilization, strengthening, stretching, manual, myofascial release, dry needling, aquatic, traction and modalities as needed.     Patient prognosis is Good.     Patient will benefit from skilled outpatient Physical Therapy to address the deficits stated above and in the chart below, provide patient /family education, and to maximize patientt's level of independence.     Plan of care discussed with patient: Yes  Patient's spiritual, cultural and educational needs considered and patient is agreeable to the plan of care and goals as stated below:     Anticipated Barriers for therapy: none     Goals:  Short Term Goals: 6 weeks   Patient will report change in pain from constant to intermittent  Patient will be able to complete activities of daily living and hair washing with 3/10 pain  Patient will maintain prolonged positions x 15 minutes with 2 /10 pain    Long Term Goals: 12 weeks   Patient will be able to sleep through the night without waking from pain  Patient will be able to  maintain hair washing position with 0/10 pain  Patient will maintain prolonged positions x 30 minutes with  0 /10 pain  Patient will be independent with home exercise program by D/C      Additional Information for [employer]     Date of Onset/Injury: 8/31/22  Dates of Services: 09/04/23 to 11/27/23.  Discharge Plan: Patient will be discharged from skilled PT treatment once all goals have been met, patient has plateaued, or physician/insurance requests discontinuation of care. Pt will be discharged with a home exercise program.   Last Face-to-Face Visit with Prescribing Physician: 08/23/23  Are the services requested intended for initial treatment of an ACUTE medical condition including newly diagnosed condition, illness or surgery? No  Did the surgery, injury, or illness occur in the last 6 months? No  What is the ACUTE condition requiring treatment? N/A  Or, is there a chronic underlying illness or injury with an acute exacerbation that will require short term therapy? Yes  If YES: What is the chronic condition?  Lumbar pain from arthritis  CPT Codes and Number of Units Requested:  59267 [therapeutic exercise]  Total units: 72  3 units/visit x 24 visits  38998 [neuromuscular re-education]  Total units: 72  3 units/visit x 24 visits  91381 [manual therapy]  Total units: 48  2 units/visit x 24 visits  00955 [ultrasound]  Total units: 24  1 units/visit x 24 visits  32354 [unattended electrical stimulation]  Total units: 24  1 units/visit x 24 visits  91215 [mechanical traction]  Total units: 24  1 units/visit x 24 visits    Plan   Plan of care Certification: 8/31/2023 to 11/23/23.    Outpatient Physical Therapy 2 times weekly for 12 weeks to include the following interventions: Aquatic Therapy, Hybresis with Dex, Cervical/Lumbar Traction, Electrical Stimulation IFC/Premod, Gait Training, Manual Therapy, Moist Heat/ Ice, Neuromuscular Re-ed, Patient Education, Self Care, Therapeutic Activities,  Therapeutic Exercise, Ultrasound, and Dry Needling.     KARLOS JEFFRIES, PT        I CERTIFY THE NEED FOR THESE SERVICES FURNISHED UNDER THIS PLAN OF TREATMENT AND WHILE UNDER MY CARE.    Physician's comments:      Physician's Signature: ____________________________________________Date________________        Please fax this MD signed form to:  [facility]  [phone] fax

## 2023-09-01 ENCOUNTER — TELEPHONE (OUTPATIENT)
Dept: FAMILY MEDICINE | Facility: CLINIC | Age: 43
End: 2023-09-01
Payer: MEDICAID

## 2023-09-01 ENCOUNTER — ANESTHESIA (OUTPATIENT)
Dept: GASTROENTEROLOGY | Facility: HOSPITAL | Age: 43
End: 2023-09-01
Payer: MEDICAID

## 2023-09-01 ENCOUNTER — HOSPITAL ENCOUNTER (OUTPATIENT)
Dept: GASTROENTEROLOGY | Facility: HOSPITAL | Age: 43
Discharge: HOME | End: 2023-09-01
Attending: NURSE PRACTITIONER
Payer: MEDICAID

## 2023-09-01 ENCOUNTER — ANESTHESIA EVENT (OUTPATIENT)
Dept: GASTROENTEROLOGY | Facility: HOSPITAL | Age: 43
End: 2023-09-01
Payer: MEDICAID

## 2023-09-01 VITALS
DIASTOLIC BLOOD PRESSURE: 81 MMHG | TEMPERATURE: 98 F | RESPIRATION RATE: 13 BRPM | SYSTOLIC BLOOD PRESSURE: 147 MMHG | OXYGEN SATURATION: 96 % | HEART RATE: 61 BPM

## 2023-09-01 DIAGNOSIS — K31.84 GASTROPARESIS: ICD-10-CM

## 2023-09-01 DIAGNOSIS — D50.9 IRON DEFICIENCY ANEMIA, UNSPECIFIED IRON DEFICIENCY ANEMIA TYPE: ICD-10-CM

## 2023-09-01 DIAGNOSIS — K29.00 ACUTE SUPERFICIAL GASTRITIS WITHOUT HEMORRHAGE: Primary | ICD-10-CM

## 2023-09-01 DIAGNOSIS — K31.84 GASTROPARESIS: Primary | ICD-10-CM

## 2023-09-01 LAB — GLUCOSE SERPL-MCNC: 125 MG/DL (ref 70–105)

## 2023-09-01 PROCEDURE — 82962 GLUCOSE BLOOD TEST: CPT

## 2023-09-01 PROCEDURE — 25000003 PHARM REV CODE 250: Performed by: ANESTHESIOLOGY

## 2023-09-01 PROCEDURE — 43235 PR EGD, FLEX, DIAGNOSTIC: ICD-10-PCS | Mod: ,,, | Performed by: INTERNAL MEDICINE

## 2023-09-01 PROCEDURE — 37000009 HC ANESTHESIA EA ADD 15 MINS

## 2023-09-01 PROCEDURE — 37000008 HC ANESTHESIA 1ST 15 MINUTES

## 2023-09-01 PROCEDURE — 63600175 PHARM REV CODE 636 W HCPCS: Performed by: ANESTHESIOLOGY

## 2023-09-01 PROCEDURE — D9220A PRA ANESTHESIA: Mod: ,,, | Performed by: ANESTHESIOLOGY

## 2023-09-01 PROCEDURE — D9220A PRA ANESTHESIA: ICD-10-PCS | Mod: ,,, | Performed by: ANESTHESIOLOGY

## 2023-09-01 PROCEDURE — 43235 EGD DIAGNOSTIC BRUSH WASH: CPT | Mod: ,,, | Performed by: INTERNAL MEDICINE

## 2023-09-01 PROCEDURE — 43235 EGD DIAGNOSTIC BRUSH WASH: CPT | Performed by: INTERNAL MEDICINE

## 2023-09-01 RX ORDER — ETOMIDATE 2 MG/ML
INJECTION INTRAVENOUS
Status: DISCONTINUED | OUTPATIENT
Start: 2023-09-01 | End: 2023-09-01

## 2023-09-01 RX ORDER — SODIUM CHLORIDE 9 MG/ML
INJECTION, SOLUTION INTRAVENOUS CONTINUOUS PRN
Status: DISCONTINUED | OUTPATIENT
Start: 2023-09-01 | End: 2023-09-01

## 2023-09-01 RX ORDER — MIDAZOLAM HYDROCHLORIDE 5 MG/ML
INJECTION INTRAMUSCULAR; INTRAVENOUS
Status: DISCONTINUED | OUTPATIENT
Start: 2023-09-01 | End: 2023-09-01

## 2023-09-01 RX ORDER — SODIUM CHLORIDE 0.9 % (FLUSH) 0.9 %
10 SYRINGE (ML) INJECTION
Status: DISCONTINUED | OUTPATIENT
Start: 2023-09-01 | End: 2023-09-02 | Stop reason: HOSPADM

## 2023-09-01 RX ORDER — FENTANYL CITRATE 50 UG/ML
INJECTION, SOLUTION INTRAMUSCULAR; INTRAVENOUS
Status: DISCONTINUED | OUTPATIENT
Start: 2023-09-01 | End: 2023-09-01

## 2023-09-01 RX ADMIN — MIDAZOLAM HYDROCHLORIDE 2 MG: 5 INJECTION, SOLUTION INTRAMUSCULAR; INTRAVENOUS at 08:09

## 2023-09-01 RX ADMIN — FENTANYL CITRATE 100 MCG: 50 INJECTION INTRAMUSCULAR; INTRAVENOUS at 08:09

## 2023-09-01 RX ADMIN — ETOMIDATE 4 MG: 20 INJECTION, SOLUTION INTRAVENOUS at 08:09

## 2023-09-01 RX ADMIN — SODIUM CHLORIDE: 9 INJECTION, SOLUTION INTRAVENOUS at 08:09

## 2023-09-01 NOTE — ANESTHESIA POSTPROCEDURE EVALUATION
Anesthesia Post Evaluation    Patient: Mary Magana    Procedure(s) Performed: * EGD *    Final Anesthesia Type: general      Patient location during evaluation: GI PACU  Patient participation: Yes- Able to Participate  Level of consciousness: awake and alert, oriented and awake  Post-procedure vital signs: reviewed and stable  Pain management: adequate  Airway patency: patent    PONV status at discharge: No PONV  Anesthetic complications: no      Cardiovascular status: blood pressure returned to baseline, hemodynamically stable and stable  Respiratory status: unassisted and spontaneous ventilation  Hydration status: euvolemic  Follow-up not needed.          Vitals Value Taken Time   /81 09/01/23 0900   Temp 36.4 °C (97.5 °F) 09/01/23 0843   Pulse 62 09/01/23 0901   Resp 13 09/01/23 0901   SpO2 97 % 09/01/23 0901   Vitals shown include unvalidated device data.      Event Time   Out of Recovery 09:02:47         Pain/Arlene Score: Arlene Score: 10 (9/1/2023  8:43 AM)

## 2023-09-01 NOTE — DISCHARGE INSTRUCTIONS
Procedure Date  9/1/23     Impression  Overall Impression:   Erythematous mucosa in the fundus of the stomach and antrum  Mucosa of the esophagus was normal with z-line at 36cm. The stomach contained a large amount of food with high risk of vomiting and aspirating. The stomach had erythema. The food was refluxing from the duodenum through the pyloric channel so the duodenal mucosa was not seen.     Recommendation    Schedule repeat EGD      Consider stopping medications which may worsen gastroparesis, ie Victoza. Eat small frequent low residue meals. Stay on liquids for 1 day before next EGD.  Discharge: disp: DC to home. Resume diet. No driving x 24 hours. F/U with PCP as scheduled.  Dx: iron deficiency anemia, gastroparesis.    NO DRIVING, OPERATING EQUIPMENT, OR SIGNING LEGAL DOCUMENTS FOR 24 HOURS.

## 2023-09-01 NOTE — ANESTHESIA PREPROCEDURE EVALUATION
09/01/2023  Mary Magana is a 42 y.o., female.      Pre-op Assessment    I have reviewed the Patient Summary Reports.    I have reviewed the NPO Status.   I have reviewed the Medications.     Review of Systems  Anesthesia Hx:  No problems with previous Anesthesia    Social:  Non-Smoker, No Alcohol Use    Hematology/Oncology:  Hematology Normal   Oncology Normal     EENT/Dental:EENT/Dental Normal   Cardiovascular:   Hypertension Dysrhythmias WPW   Hepatic/GI:   GERD    Musculoskeletal:   Arthritis     Endocrine:   Diabetes, well controlled, type 1 Hypothyroidism      Past Medical History:   Diagnosis Date    Diabetes mellitus     Hypertension     Thyroid disease     WPW (Ilana-Parkinson-White syndrome)      Current Outpatient Medications on File Prior to Encounter   Medication Sig Dispense Refill    aspirin (ECOTRIN) 81 MG EC tablet TAKE 1 TABLET BY MOUTH DAILY WITH FOOD 30 tablet 5    atorvastatin (LIPITOR) 10 MG tablet Take 1 tablet (10 mg total) by mouth every evening. 30 tablet 3    ferrous sulfate (IRON) 325 mg (65 mg iron) Tab tablet Take 1 tablet (325 mg total) by mouth daily with breakfast. 30 tablet 5    hydrOXYzine pamoate (VISTARIL) 25 MG Cap Take 1 to 2 capsule up to tid prn for severe anxiety 30 capsule 2    levothyroxine (SYNTHROID) 125 MCG tablet Take 1 tablet (125 mcg total) by mouth before breakfast. 30 tablet 5    metoprolol tartrate (LOPRESSOR) 25 MG tablet Take 1 tablet (25 mg total) by mouth 2 (two) times daily. 60 tablet 5    TRUEPLUS LANCETS 30 gauge Misc AS DIRECTED      VICTOZA 2-JAMESON 0.6 mg/0.1 mL (18 mg/3 mL) PnIj pen INJECT 1.2 MG SUB-Q EVERY DAY ...KEEP IN REFRIGERATOR (Patient taking differently: Inject 1.8 mg into the skin once a week. INJECT 1.2 MG SUB-Q EVERY DAY ...KEEP IN REFRIGERATOR) 6 mL 5     No current facility-administered medications on file prior  to encounter.       Physical Exam  General: Well nourished, Cooperative, Alert and Oriented    Airway:  Mallampati: I   Mouth Opening: Normal  TM Distance: Normal  Neck ROM: Normal ROM    Chest/Lungs:  Normal Respiratory Rate    Heart:  Rate: Normal  Rhythm: Regular Rhythm        Anesthesia Plan  Type of Anesthesia, risks & benefits discussed:    Anesthesia Type: Gen Natural Airway  Intra-op Monitoring Plan: Standard ASA Monitors  Post Op Pain Control Plan: multimodal analgesia  Induction:  IV  Informed Consent: Informed consent signed with the Patient and all parties understand the risks and agree with anesthesia plan.  All questions answered. Patient consented to blood products? No  ASA Score: 3  Day of Surgery Review of History & Physical: H&P Update referred to the surgeon/provider.I have interviewed and examined the patient. I have reviewed the patient's H&P dated: There are no significant changes.     Ready For Surgery From Anesthesia Perspective.     .

## 2023-09-01 NOTE — H&P
[facility]  Gastroenterology  H&P    Patient Name: Mary Magana  MRN: [id number]  Admission Date: 9/1/2023  Code Status: No Order    Attending Provider: Livier Castillo FNP   Primary Care Physician: Huber Clayton MD  Principal Problem:<principal problem not specified>    Subjective:     History of Present Illness: Pt has iron deficiency anemia; for egd.    Past Medical History:   Diagnosis Date    Diabetes mellitus     Hypertension     Thyroid disease     WPW (Ilana-Parkinson-White syndrome)        Past Surgical History:   Procedure Laterality Date    CHOLECYSTECTOMY      TUBAL LIGATION         Review of patient's allergies indicates:   Allergen Reactions    Anesthesia s/i-40 (propofol) [propofol] Itching     Anesthesia, possibly propofol; made her feel like there were ants all over her     Family History       Problem Relation (Age of Onset)    Diabetes Father, Mother    Hypertension Mother    Leukemia Daughter          Tobacco Use    Smoking status: Every Day     Current packs/day: 0.25     Average packs/day: 0.3 packs/day for 15.0 years (3.8 ttl pk-yrs)     Types: Cigarettes, Vaping with nicotine     Passive exposure: Past    Smokeless tobacco: Never    Tobacco comments:     1 pack per 2 weeks   Substance and Sexual Activity    Alcohol use: Never    Drug use: Never    Sexual activity: Not Currently     Review of Systems   Respiratory: Negative.     Cardiovascular: Negative.    Gastrointestinal: Negative.      Objective:     Vital Signs (Most Recent):  Pulse: 68 (09/01/23 0735)  Resp: 15 (09/01/23 0735)  BP: (!) 126/54 (09/01/23 0735)  SpO2: 100 % (09/01/23 0735) Vital Signs (24h Range):  Pulse:  [68] 68  Resp:  [15] 15  SpO2:  [100 %] 100 %  BP: (126)/(54) 126/54        There is no height or weight on file to calculate BMI.    No intake or output data in the 24 hours ending 09/01/23 0815    Lines/Drains/Airways       Peripheral Intravenous Line  Duration                  Peripheral IV  "- Single Lumen 09/01/23 0735 22 G Anterior;Right Forearm <1 day                    Physical Exam  Vitals reviewed.   Constitutional:       General: She is not in acute distress.     Appearance: Normal appearance. She is well-developed. She is not ill-appearing.   HENT:      Head: Normocephalic and atraumatic.      Nose: Nose normal.   Eyes:      Pupils: Pupils are equal, round, and reactive to light.   Cardiovascular:      Rate and Rhythm: Normal rate and regular rhythm.   Pulmonary:      Effort: Pulmonary effort is normal.      Breath sounds: Normal breath sounds. No wheezing.   Abdominal:      General: Abdomen is flat. Bowel sounds are normal. There is no distension.      Palpations: Abdomen is soft.      Tenderness: There is no abdominal tenderness. There is no guarding.   Skin:     General: Skin is warm and dry.      Coloration: Skin is not jaundiced.   Neurological:      Mental Status: She is alert.   Psychiatric:         Attention and Perception: Attention normal.         Mood and Affect: Affect normal.         Speech: Speech normal.         Behavior: Behavior is cooperative.      Comments: Pt was calm while speaking.         Significant Labs:  CBC: No results for input(s): "WBC", "HGB", "HCT", "PLT" in the last 48 hours.  CMP: No results for input(s): "GLU", "CALCIUM", "ALBUMIN", "PROT", "NA", "K", "CO2", "CL", "BUN", "CREATININE", "ALKPHOS", "ALT", "AST", "BILITOT" in the last 48 hours.    Significant Imaging:  Imaging results within the past 24 hours have been reviewed.    Assessment/Plan:     There are no hospital problems to display for this patient.        Imp: iron deficiency anemia  Plan: egd    Asher Cross MD  Gastroenterology  [facility]  " No

## 2023-09-01 NOTE — TELEPHONE ENCOUNTER
The pt was notified of having a great deal of retained food in her stomach please and she needs to an appointment with GI. The pt was also notified to discontinue Victoza. She voices understanding.

## 2023-09-01 NOTE — TRANSFER OF CARE
Anesthesia Transfer of Care Note    Patient: Mary Magana    Procedure(s) Performed: *egd*    Patient location: GI    Anesthesia Type: general    Transport from OR: Transported from OR on room air with adequate spontaneous ventilation    Post pain: adequate analgesia    Post assessment: no apparent anesthetic complications    Post vital signs: stable    Level of consciousness: awake and responds to stimulation    Nausea/Vomiting: no nausea/vomiting    Complications: none    Transfer of care protocol was followedComments: See nurses notes for gabrielle and pain score      Last vitals:   Visit Vitals  BP (!) 147/81   Pulse 61   Temp 36.4 °C (97.5 °F) (Oral)   Resp 13   SpO2 96%   Breastfeeding No

## 2023-09-01 NOTE — TELEPHONE ENCOUNTER
----- Message from Huber Clayton MD sent at 9/1/2023 11:44 AM CDT -----  Patient had a great deal of retained food in her stomach please set her up an appointment and will need to discontinue her Victoza.

## 2023-09-01 NOTE — TRANSFER OF CARE
Anesthesia Transfer of Care Note    Patient: Mary Magana    Procedure(s) Performed: *EGD*    Patient location: GI    Anesthesia Type: general    Transport from OR: Transported from OR on room air with adequate spontaneous ventilation    Post pain: adequate analgesia    Post assessment: no apparent anesthetic complications    Post vital signs: stable    Level of consciousness: awake and responds to stimulation    Nausea/Vomiting: no nausea/vomiting    Complications: none    Transfer of care protocol was followed      Last vitals:   Visit Vitals  /78   Pulse 65   Resp 14   SpO2 96%   Breastfeeding No

## 2023-09-13 ENCOUNTER — OFFICE VISIT (OUTPATIENT)
Dept: FAMILY MEDICINE | Facility: CLINIC | Age: 43
End: 2023-09-13
Payer: MEDICAID

## 2023-09-13 VITALS
BODY MASS INDEX: 48.58 KG/M2 | OXYGEN SATURATION: 98 % | TEMPERATURE: 98 F | SYSTOLIC BLOOD PRESSURE: 130 MMHG | DIASTOLIC BLOOD PRESSURE: 88 MMHG | RESPIRATION RATE: 19 BRPM | WEIGHT: 264 LBS | HEIGHT: 62 IN | HEART RATE: 73 BPM

## 2023-09-13 DIAGNOSIS — D50.9 IRON DEFICIENCY ANEMIA, UNSPECIFIED IRON DEFICIENCY ANEMIA TYPE: ICD-10-CM

## 2023-09-13 DIAGNOSIS — E11.9 TYPE 2 DIABETES MELLITUS WITHOUT COMPLICATION, WITHOUT LONG-TERM CURRENT USE OF INSULIN: ICD-10-CM

## 2023-09-13 DIAGNOSIS — R35.0 URINARY FREQUENCY: ICD-10-CM

## 2023-09-13 DIAGNOSIS — I10 HYPERTENSION, UNSPECIFIED TYPE: ICD-10-CM

## 2023-09-13 DIAGNOSIS — N30.01 ACUTE CYSTITIS WITH HEMATURIA: Primary | ICD-10-CM

## 2023-09-13 LAB
BILIRUB SERPL-MCNC: NORMAL MG/DL
BLOOD URINE, POC: NORMAL
COLOR, POC UA: YELLOW
GLUCOSE UR QL STRIP: NORMAL
KETONES UR QL STRIP: NORMAL
LEUKOCYTE ESTERASE URINE, POC: NORMAL
NITRITE, POC UA: NORMAL
PH, POC UA: 6
PROTEIN, POC: NORMAL
SPECIFIC GRAVITY, POC UA: 1.01
UROBILINOGEN, POC UA: 0.2

## 2023-09-13 PROCEDURE — 3079F PR MOST RECENT DIASTOLIC BLOOD PRESSURE 80-89 MM HG: ICD-10-PCS | Mod: CPTII,,, | Performed by: FAMILY MEDICINE

## 2023-09-13 PROCEDURE — 3066F NEPHROPATHY DOC TX: CPT | Mod: CPTII,,, | Performed by: FAMILY MEDICINE

## 2023-09-13 PROCEDURE — 87186 CULTURE, URINE: ICD-10-PCS | Mod: ,,, | Performed by: CLINICAL MEDICAL LABORATORY

## 2023-09-13 PROCEDURE — 1159F PR MEDICATION LIST DOCUMENTED IN MEDICAL RECORD: ICD-10-PCS | Mod: CPTII,,, | Performed by: FAMILY MEDICINE

## 2023-09-13 PROCEDURE — 3075F SYST BP GE 130 - 139MM HG: CPT | Mod: CPTII,,, | Performed by: FAMILY MEDICINE

## 2023-09-13 PROCEDURE — 1160F RVW MEDS BY RX/DR IN RCRD: CPT | Mod: CPTII,,, | Performed by: FAMILY MEDICINE

## 2023-09-13 PROCEDURE — 3008F PR BODY MASS INDEX (BMI) DOCUMENTED: ICD-10-PCS | Mod: CPTII,,, | Performed by: FAMILY MEDICINE

## 2023-09-13 PROCEDURE — 99213 PR OFFICE/OUTPT VISIT, EST, LEVL III, 20-29 MIN: ICD-10-PCS | Mod: ,,, | Performed by: FAMILY MEDICINE

## 2023-09-13 PROCEDURE — 87086 CULTURE, URINE: ICD-10-PCS | Mod: ,,, | Performed by: CLINICAL MEDICAL LABORATORY

## 2023-09-13 PROCEDURE — 87186 SC STD MICRODIL/AGAR DIL: CPT | Mod: ,,, | Performed by: CLINICAL MEDICAL LABORATORY

## 2023-09-13 PROCEDURE — 3075F PR MOST RECENT SYSTOLIC BLOOD PRESS GE 130-139MM HG: ICD-10-PCS | Mod: CPTII,,, | Performed by: FAMILY MEDICINE

## 2023-09-13 PROCEDURE — 1160F PR REVIEW ALL MEDS BY PRESCRIBER/CLIN PHARMACIST DOCUMENTED: ICD-10-PCS | Mod: CPTII,,, | Performed by: FAMILY MEDICINE

## 2023-09-13 PROCEDURE — 99213 OFFICE O/P EST LOW 20 MIN: CPT | Mod: ,,, | Performed by: FAMILY MEDICINE

## 2023-09-13 PROCEDURE — 81003 URINALYSIS AUTO W/O SCOPE: CPT | Mod: RHCUB | Performed by: FAMILY MEDICINE

## 2023-09-13 PROCEDURE — 3066F PR DOCUMENTATION OF TREATMENT FOR NEPHROPATHY: ICD-10-PCS | Mod: CPTII,,, | Performed by: FAMILY MEDICINE

## 2023-09-13 PROCEDURE — 3008F BODY MASS INDEX DOCD: CPT | Mod: CPTII,,, | Performed by: FAMILY MEDICINE

## 2023-09-13 PROCEDURE — 3061F PR NEG MICROALBUMINURIA RESULT DOCUMENTED/REVIEW: ICD-10-PCS | Mod: CPTII,,, | Performed by: FAMILY MEDICINE

## 2023-09-13 PROCEDURE — 87077 CULTURE AEROBIC IDENTIFY: CPT | Mod: ,,, | Performed by: CLINICAL MEDICAL LABORATORY

## 2023-09-13 PROCEDURE — 1159F MED LIST DOCD IN RCRD: CPT | Mod: CPTII,,, | Performed by: FAMILY MEDICINE

## 2023-09-13 PROCEDURE — 3044F PR MOST RECENT HEMOGLOBIN A1C LEVEL <7.0%: ICD-10-PCS | Mod: CPTII,,, | Performed by: FAMILY MEDICINE

## 2023-09-13 PROCEDURE — 3061F NEG MICROALBUMINURIA REV: CPT | Mod: CPTII,,, | Performed by: FAMILY MEDICINE

## 2023-09-13 PROCEDURE — 3079F DIAST BP 80-89 MM HG: CPT | Mod: CPTII,,, | Performed by: FAMILY MEDICINE

## 2023-09-13 PROCEDURE — 87077 CULTURE, URINE: ICD-10-PCS | Mod: ,,, | Performed by: CLINICAL MEDICAL LABORATORY

## 2023-09-13 PROCEDURE — 87086 URINE CULTURE/COLONY COUNT: CPT | Mod: ,,, | Performed by: CLINICAL MEDICAL LABORATORY

## 2023-09-13 PROCEDURE — 3044F HG A1C LEVEL LT 7.0%: CPT | Mod: CPTII,,, | Performed by: FAMILY MEDICINE

## 2023-09-13 RX ORDER — METFORMIN HYDROCHLORIDE 500 MG/1
500 TABLET ORAL 2 TIMES DAILY WITH MEALS
Qty: 60 TABLET | Refills: 11 | Status: SHIPPED | OUTPATIENT
Start: 2023-09-13 | End: 2023-12-01 | Stop reason: DRUGHIGH

## 2023-09-13 RX ORDER — CEFUROXIME AXETIL 500 MG/1
500 TABLET ORAL EVERY 12 HOURS
Qty: 14 TABLET | Refills: 0 | Status: SHIPPED | OUTPATIENT
Start: 2023-09-13 | End: 2023-11-01

## 2023-09-13 RX ORDER — METOPROLOL TARTRATE 25 MG/1
25 TABLET, FILM COATED ORAL 2 TIMES DAILY
Qty: 60 TABLET | Refills: 5 | Status: SHIPPED | OUTPATIENT
Start: 2023-09-13

## 2023-09-13 RX ORDER — FERROUS SULFATE 325(65) MG
325 TABLET ORAL
Qty: 30 TABLET | Refills: 5 | Status: SHIPPED | OUTPATIENT
Start: 2023-09-13

## 2023-09-13 NOTE — PROGRESS NOTES
Mary Magana is a 42 y.o. female seen today for  follow-up on her diabetes.  Patient's blood sugars tend to be more elevated since they discontinued her Victoza likely secondary to gastroparesis.  Patient therefore will not be a candidate to use a GLP 1 medication and we discussed restarting metformin.  In the past this had bottomed out sugar but at that time she had been on insulin as well we discussed taking 500 mg b.i.d. with food and we discussed the side effects.  Patient also does have urinary tract infection and was started on Ceftin.    Past Medical History:   Diagnosis Date    Diabetes mellitus     Hypertension     Thyroid disease     WPW (Ilana-Parkinson-White syndrome)      Family History   Problem Relation Age of Onset    Diabetes Father     Hypertension Mother     Diabetes Mother     Leukemia Daughter      Current Outpatient Medications on File Prior to Visit   Medication Sig Dispense Refill    aspirin (ECOTRIN) 81 MG EC tablet TAKE 1 TABLET BY MOUTH DAILY WITH FOOD 30 tablet 5    atorvastatin (LIPITOR) 10 MG tablet Take 1 tablet (10 mg total) by mouth every evening. 30 tablet 3    hydrOXYzine pamoate (VISTARIL) 25 MG Cap Take 1 to 2 capsule up to tid prn for severe anxiety 30 capsule 2    levothyroxine (SYNTHROID) 125 MCG tablet Take 1 tablet (125 mcg total) by mouth before breakfast. 30 tablet 5    TRUEPLUS LANCETS 30 gauge Misc AS DIRECTED      [DISCONTINUED] ferrous sulfate (IRON) 325 mg (65 mg iron) Tab tablet Take 1 tablet (325 mg total) by mouth daily with breakfast. 30 tablet 5    [DISCONTINUED] metoprolol tartrate (LOPRESSOR) 25 MG tablet Take 1 tablet (25 mg total) by mouth 2 (two) times daily. 60 tablet 5     No current facility-administered medications on file prior to visit.       There is no immunization history on file for this patient.    Review of Systems   Constitutional:  Negative for fever, malaise/fatigue and weight loss.   Respiratory:  Negative for shortness of breath.     Cardiovascular:  Negative for chest pain and palpitations.   Gastrointestinal:  Negative for nausea and vomiting.   Genitourinary:  Positive for dysuria and frequency.   Psychiatric/Behavioral:  Negative for depression.         Vitals:    09/13/23 0809   BP: 130/88   Pulse: 73   Resp: 19   Temp: 97.9 °F (36.6 °C)       Physical Exam  Vitals reviewed.   Constitutional:       Appearance: Normal appearance.   HENT:      Head: Normocephalic.   Eyes:      Extraocular Movements: Extraocular movements intact.      Conjunctiva/sclera: Conjunctivae normal.      Pupils: Pupils are equal, round, and reactive to light.   Neck:      Thyroid: No thyroid mass or thyromegaly.   Cardiovascular:      Rate and Rhythm: Normal rate and regular rhythm.      Heart sounds: Normal heart sounds. No murmur heard.     No gallop.   Pulmonary:      Effort: Pulmonary effort is normal. No respiratory distress.      Breath sounds: Normal breath sounds. No wheezing or rales.   Skin:     General: Skin is warm and dry.      Coloration: Skin is not jaundiced or pale.   Neurological:      Mental Status: She is alert.   Psychiatric:         Mood and Affect: Mood normal.         Behavior: Behavior normal.         Thought Content: Thought content normal.         Judgment: Judgment normal.          Assessment and Plan  Urinary frequency  -     POCT URINALYSIS W/O SCOPE    Hypertension, unspecified type  -     metoprolol tartrate (LOPRESSOR) 25 MG tablet; Take 1 tablet (25 mg total) by mouth 2 (two) times daily.  Dispense: 60 tablet; Refill: 5    Iron deficiency anemia, unspecified iron deficiency anemia type  -     ferrous sulfate (IRON) 325 mg (65 mg iron) Tab tablet; Take 1 tablet (325 mg total) by mouth daily with breakfast.  Dispense: 30 tablet; Refill: 5    Acute cystitis with hematuria  -     Urine culture    Other orders  -     cefUROXime (CEFTIN) 500 MG tablet; Take 1 tablet (500 mg total) by mouth every 12 (twelve) hours.  Dispense: 14 tablet;  Refill: 0            Return to clinic in 1 week to follow-up on her urinalysis as well as to review her blood sugars.    Health Maintenance Topics with due status: Not Due       Topic Last Completion Date    Cervical Cancer Screening 11/28/2022    Diabetes Urine Screening 03/16/2023    Lipid Panel 07/25/2023    Hemoglobin A1c 07/25/2023    Low Dose Statin 08/23/2023

## 2023-09-15 LAB — UA COMPLETE W REFLEX CULTURE PNL UR: ABNORMAL

## 2023-09-19 ENCOUNTER — CLINICAL SUPPORT (OUTPATIENT)
Dept: REHABILITATION | Facility: HOSPITAL | Age: 43
End: 2023-09-19
Payer: MEDICAID

## 2023-09-19 DIAGNOSIS — R52 PAIN: Primary | ICD-10-CM

## 2023-09-19 PROCEDURE — 97110 THERAPEUTIC EXERCISES: CPT

## 2023-09-19 PROCEDURE — 97112 NEUROMUSCULAR REEDUCATION: CPT

## 2023-09-19 NOTE — PROGRESS NOTES
[facility]   Physical Therapy Treatment Note      Name: Mary Magana  Clinic Number: [id number]    Therapy Diagnosis:   Encounter Diagnosis   Name Primary?    Pain Yes     Physician: Fatoumata Jimenez MD    Visit Date: 9/19/2023    Physician Orders: PT Eval and Treat   Medical Diagnosis from Referral: Lumbar Pain  Evaluation Date: 8/31/2023  Authorization Period Expiration: 8/22/24  Plan of Care Expiration: 11/23/23  Visit # / Visits authorized: 2/ 25 until 11/27    PTA Visit #: 0/5     Time In: 320  Time Out: 400  Total Billable Time: 40 minutes    Subjective     Pt reports:  No change from eval. Today is not too bad    She was compliant with home exercise program.    Pain: 5/10  Location: bilateral back      Objective      Objective Measures updated at progress report unless specified.     Treatment     Mary received the treatments listed below:      therapeutic exercises to develop strength, endurance, ROM, flexibility, posture, and core stabilization for 14 minutes including:  Nustep x 5 min  Calf stretch 3 x 30 sec  HS stretch on step 3 x 15 sec  Sitting trunk ball stretch x 5 each side - pain with forward    manual therapy techniques: Joint mobilizations and Soft tissue Mobilization were applied to the: sacrum for 3 minutes, including:  Sacral mobilization - counternutation    neuromuscular re-education activities to improve: Balance, Coordination, Kinesthetic, Sense, Proprioception, and Posture for 23 minutes. The following activities were included:  Cybex back extension 3pl x 30  SL clams gray thera band bilateral  SL hip abduction x 30  Supine bridge with hip abduction gray thera band x 30    therapeutic activities to improve functional performance for -  minutes, including:  -    gait training to improve functional mobility and safety for -  minutes, including:  -    direct contact modalities after being cleared for contraindications:     supervised modalities  after being cleared for contradictions: IFC 4 pole with MHP in prone x 15 minutes     hot pack for - minutes to -.    cold pack for - minutes to -.    Patient Education and Home Exercises       Education provided:   - will add to home exercise program next session    Written Home Exercises Provided:  - . Exercises were reviewed and Mary was able to demonstrate them prior to the end of the session.  Mary demonstrated good  understanding of the education provided. See EMR under Patient Instructions for exercises provided during therapy sessions    Assessment     Today's session focused on strengthening, stretching and core/hip stabilization. Patient did fatigue but did not have an increase in symptoms. Patient requested IFC and heat after session for pain control.     Mary Is progressing well towards her goals.   Pt prognosis is Good.     Pt will continue to benefit from skilled outpatient physical therapy to address the deficits listed in the problem list box on initial evaluation, provide pt/family education and to maximize pt's level of independence in the home and community environment.     Pt's spiritual, cultural and educational needs considered and pt agreeable to plan of care and goals.     Anticipated barriers to physical therapy: none    Goals: Short Term Goals: 6 weeks   Patient will report change in pain from constant to intermittent  Patient will be able to complete activities of daily living and hair washing with 3/10 pain  Patient will maintain prolonged positions x 15 minutes with 2 /10 pain    Long Term Goals: 12 weeks   Patient will be able to sleep through the night without waking from pain  Patient will be able to maintain hair washing position with 0/10 pain  Patient will maintain prolonged positions x 30 minutes with  0 /10 pain  Patient will be independent with home exercise program by D/C    Plan     Progress toward goals    KARLOS JEFFRIES, PT

## 2023-09-21 ENCOUNTER — CLINICAL SUPPORT (OUTPATIENT)
Dept: REHABILITATION | Facility: HOSPITAL | Age: 43
End: 2023-09-21
Payer: MEDICAID

## 2023-09-21 DIAGNOSIS — R52 PAIN: Primary | ICD-10-CM

## 2023-09-21 PROCEDURE — 97014 ELECTRIC STIMULATION THERAPY: CPT | Mod: CQ

## 2023-09-21 PROCEDURE — 97110 THERAPEUTIC EXERCISES: CPT | Mod: CQ

## 2023-09-21 PROCEDURE — 97112 NEUROMUSCULAR REEDUCATION: CPT | Mod: CQ

## 2023-09-21 NOTE — PROGRESS NOTES
[facility]   Physical Therapy Treatment Note / Discharge Summary     Name: Mary Magana  Clinic Number: [id number]    Therapy Diagnosis:   Encounter Diagnosis   Name Primary?    Pain Yes     Physician: Fatoumata Jimenez MD    Visit Date: 9/21/2023    Physician Orders: PT Eval and Treat   Medical Diagnosis from Referral: Lumbar Pain  Evaluation Date: 8/31/2023  Authorization Period Expiration: 8/22/24  Plan of Care Expiration: 11/23/23  Visit # / Visits authorized: 3/ 25 until 11/27    PTA Visit #: 1/5     Time In: 1:00 pm  Time Out: 1:53 pm  Total Billable Time: 53 minutes    Subjective     Pt reports:  No change from eval. Today is not too bad    She was compliant with home exercise program.    Pain: 5/10  Location: bilateral back      Objective      Objective Measures updated at progress report unless specified.     Treatment     Mary received the treatments listed below:      therapeutic exercises to develop strength, endurance, ROM, flexibility, posture, and core stabilization for 10 minutes including:  Nustep x 5 min  Calf stretch 3 x 30 sec  HS stretch on step 3 x 15 sec  Sitting trunk ball stretch x 5 each side - pain with forward (Not today)    manual therapy techniques: Joint mobilizations and Soft tissue Mobilization were applied to the: sacrum for 0 minutes, including:  Sacral mobilization - counternutation    neuromuscular re-education activities to improve: Balance, Coordination, Kinesthetic, Sense, Proprioception, and Posture for 28 minutes. The following activities were included:  Cybex back extension 2pl x 20  SL clams gray thera band bilateral 2 x 10 each  SL hip abduction x 20   Supine bridge with hip abduction gray thera band x 20  Hamstring rolls stretch 10 x 10 second hold   Lower trunk rotation on swiss ball 2 x 10 each    therapeutic activities to improve functional performance for -  minutes, including:  -    gait training to improve functional  mobility and safety for -  minutes, including:  -    direct contact modalities after being cleared for contraindications:     supervised modalities after being cleared for contradictions: IFC 4 pole with MHP in prone x 15 minutes     hot pack for - minutes to -.    cold pack for - minutes to -.    Patient Education and Home Exercises       Education provided:   - will add to home exercise program next session    Written Home Exercises Provided:  - . Exercises were reviewed and Mary was able to demonstrate them prior to the end of the session.  Mary demonstrated good  understanding of the education provided. See EMR under Patient Instructions for exercises provided during therapy sessions    Assessment     Case conference with Kira Broussard DPT. Patient states that TENS helps her pain and that she gets great relief from it. Patient was informed that she could purchase the unit Capsule[employer]. Patient is not able to bend forward so she could not perform ball roll outs. Patient was placed on Cybex back extension but stopped in upright position. Ended session with IFC and heat for pain relief.    Mary Is progressing well towards her goals.   Pt prognosis is Good.     Pt will continue to benefit from skilled outpatient physical therapy to address the deficits listed in the problem list box on initial evaluation, provide pt/family education and to maximize pt's level of independence in the home and community environment.     Pt's spiritual, cultural and educational needs considered and pt agreeable to plan of care and goals.     Anticipated barriers to physical therapy: none    Goals: Short Term Goals: 6 weeks   Patient will report change in pain from constant to intermittent  Patient will be able to complete activities of daily living and hair washing with 3/10 pain  Patient will maintain prolonged positions x 15 minutes with 2 /10 pain    Long Term Goals: 12 weeks   Patient will be able to sleep through the night  without waking from pain  Patient will be able to maintain hair washing position with 0/10 pain  Patient will maintain prolonged positions x 30 minutes with  0 /10 pain  Patient will be independent with home exercise program by D/C    Plan     Progress toward goals    Holli Ramos PTA          Patient did not return after session. Reason Unknown. Please consider this note a discharge from physical therapy. Thank you for this referral!  Kira Broussard DPT, MTC

## 2023-10-25 ENCOUNTER — OFFICE VISIT (OUTPATIENT)
Dept: FAMILY MEDICINE | Facility: CLINIC | Age: 43
End: 2023-10-25
Payer: MEDICAID

## 2023-10-25 VITALS
WEIGHT: 265 LBS | HEIGHT: 62 IN | BODY MASS INDEX: 48.76 KG/M2 | OXYGEN SATURATION: 99 % | HEART RATE: 75 BPM | DIASTOLIC BLOOD PRESSURE: 72 MMHG | RESPIRATION RATE: 18 BRPM | SYSTOLIC BLOOD PRESSURE: 120 MMHG

## 2023-10-25 DIAGNOSIS — E11.9 TYPE 2 DIABETES MELLITUS WITHOUT COMPLICATION, WITHOUT LONG-TERM CURRENT USE OF INSULIN: ICD-10-CM

## 2023-10-25 DIAGNOSIS — N30.01 ACUTE CYSTITIS WITH HEMATURIA: Primary | ICD-10-CM

## 2023-10-25 LAB
BILIRUB SERPL-MCNC: NORMAL MG/DL
BLOOD URINE, POC: NORMAL
COLOR, POC UA: YELLOW
GLUCOSE UR QL STRIP: 100
KETONES UR QL STRIP: NORMAL
LEUKOCYTE ESTERASE URINE, POC: NORMAL
NITRITE, POC UA: NORMAL
PH, POC UA: 7
PROTEIN, POC: NORMAL
SPECIFIC GRAVITY, POC UA: 1.02
UROBILINOGEN, POC UA: 0.2

## 2023-10-25 PROCEDURE — 87086 URINE CULTURE/COLONY COUNT: CPT | Mod: ,,, | Performed by: CLINICAL MEDICAL LABORATORY

## 2023-10-25 PROCEDURE — 99213 OFFICE O/P EST LOW 20 MIN: CPT | Mod: ,,, | Performed by: FAMILY MEDICINE

## 2023-10-25 PROCEDURE — 3044F HG A1C LEVEL LT 7.0%: CPT | Mod: CPTII,,, | Performed by: FAMILY MEDICINE

## 2023-10-25 PROCEDURE — 1159F PR MEDICATION LIST DOCUMENTED IN MEDICAL RECORD: ICD-10-PCS | Mod: CPTII,,, | Performed by: FAMILY MEDICINE

## 2023-10-25 PROCEDURE — 81003 URINALYSIS AUTO W/O SCOPE: CPT | Mod: RHCUB | Performed by: FAMILY MEDICINE

## 2023-10-25 PROCEDURE — 3061F NEG MICROALBUMINURIA REV: CPT | Mod: CPTII,,, | Performed by: FAMILY MEDICINE

## 2023-10-25 PROCEDURE — 3061F PR NEG MICROALBUMINURIA RESULT DOCUMENTED/REVIEW: ICD-10-PCS | Mod: CPTII,,, | Performed by: FAMILY MEDICINE

## 2023-10-25 PROCEDURE — 3078F PR MOST RECENT DIASTOLIC BLOOD PRESSURE < 80 MM HG: ICD-10-PCS | Mod: CPTII,,, | Performed by: FAMILY MEDICINE

## 2023-10-25 PROCEDURE — 3008F PR BODY MASS INDEX (BMI) DOCUMENTED: ICD-10-PCS | Mod: CPTII,,, | Performed by: FAMILY MEDICINE

## 2023-10-25 PROCEDURE — 87086 CULTURE, URINE: ICD-10-PCS | Mod: ,,, | Performed by: CLINICAL MEDICAL LABORATORY

## 2023-10-25 PROCEDURE — 87186 CULTURE, URINE: ICD-10-PCS | Mod: ,,, | Performed by: CLINICAL MEDICAL LABORATORY

## 2023-10-25 PROCEDURE — 1160F RVW MEDS BY RX/DR IN RCRD: CPT | Mod: CPTII,,, | Performed by: FAMILY MEDICINE

## 2023-10-25 PROCEDURE — 87186 SC STD MICRODIL/AGAR DIL: CPT | Mod: ,,, | Performed by: CLINICAL MEDICAL LABORATORY

## 2023-10-25 PROCEDURE — 1159F MED LIST DOCD IN RCRD: CPT | Mod: CPTII,,, | Performed by: FAMILY MEDICINE

## 2023-10-25 PROCEDURE — 1160F PR REVIEW ALL MEDS BY PRESCRIBER/CLIN PHARMACIST DOCUMENTED: ICD-10-PCS | Mod: CPTII,,, | Performed by: FAMILY MEDICINE

## 2023-10-25 PROCEDURE — 3074F PR MOST RECENT SYSTOLIC BLOOD PRESSURE < 130 MM HG: ICD-10-PCS | Mod: CPTII,,, | Performed by: FAMILY MEDICINE

## 2023-10-25 PROCEDURE — 3008F BODY MASS INDEX DOCD: CPT | Mod: CPTII,,, | Performed by: FAMILY MEDICINE

## 2023-10-25 PROCEDURE — 3066F NEPHROPATHY DOC TX: CPT | Mod: CPTII,,, | Performed by: FAMILY MEDICINE

## 2023-10-25 PROCEDURE — 87077 CULTURE AEROBIC IDENTIFY: CPT | Mod: ,,, | Performed by: CLINICAL MEDICAL LABORATORY

## 2023-10-25 PROCEDURE — 3078F DIAST BP <80 MM HG: CPT | Mod: CPTII,,, | Performed by: FAMILY MEDICINE

## 2023-10-25 PROCEDURE — 3066F PR DOCUMENTATION OF TREATMENT FOR NEPHROPATHY: ICD-10-PCS | Mod: CPTII,,, | Performed by: FAMILY MEDICINE

## 2023-10-25 PROCEDURE — 87077 CULTURE, URINE: ICD-10-PCS | Mod: ,,, | Performed by: CLINICAL MEDICAL LABORATORY

## 2023-10-25 PROCEDURE — 3074F SYST BP LT 130 MM HG: CPT | Mod: CPTII,,, | Performed by: FAMILY MEDICINE

## 2023-10-25 PROCEDURE — 99213 PR OFFICE/OUTPT VISIT, EST, LEVL III, 20-29 MIN: ICD-10-PCS | Mod: ,,, | Performed by: FAMILY MEDICINE

## 2023-10-25 PROCEDURE — 3044F PR MOST RECENT HEMOGLOBIN A1C LEVEL <7.0%: ICD-10-PCS | Mod: CPTII,,, | Performed by: FAMILY MEDICINE

## 2023-10-25 RX ORDER — GLIMEPIRIDE 1 MG/1
TABLET ORAL
Qty: 30 TABLET | Refills: 5 | Status: SHIPPED | OUTPATIENT
Start: 2023-10-25 | End: 2023-11-01

## 2023-10-25 RX ORDER — NITROFURANTOIN 25; 75 MG/1; MG/1
100 CAPSULE ORAL 2 TIMES DAILY
Qty: 14 CAPSULE | Refills: 0 | Status: SHIPPED | OUTPATIENT
Start: 2023-10-25 | End: 2023-11-01

## 2023-10-25 NOTE — PROGRESS NOTES
Mary Magana is a 42 y.o. female seen today for follow-up on her diabetes.  Patient is intolerant GLP 1 medications due to her gastroparesis and is currently on metformin only.  Unfortunately her blood sugars remain in the 200s with her current dose and we discussed a trial of low-dose Amaryl daily with breakfast.  We reviewed the side effects of the medication and the signs and symptoms of hypoglycemia.  Patient also was here to follow up on her urinary tract infection and unfortunately this persist and a follow-up culture has been ordered.    Past Medical History:   Diagnosis Date    Diabetes mellitus     Hypertension     Thyroid disease     WPW (Ilana-Parkinson-White syndrome)      Family History   Problem Relation Age of Onset    Diabetes Father     Hypertension Mother     Diabetes Mother     Leukemia Daughter      Current Outpatient Medications on File Prior to Visit   Medication Sig Dispense Refill    aspirin (ECOTRIN) 81 MG EC tablet TAKE 1 TABLET BY MOUTH DAILY WITH FOOD 30 tablet 5    atorvastatin (LIPITOR) 10 MG tablet Take 1 tablet (10 mg total) by mouth every evening. 30 tablet 3    cefUROXime (CEFTIN) 500 MG tablet Take 1 tablet (500 mg total) by mouth every 12 (twelve) hours. 14 tablet 0    ferrous sulfate (IRON) 325 mg (65 mg iron) Tab tablet Take 1 tablet (325 mg total) by mouth daily with breakfast. 30 tablet 5    hydrOXYzine pamoate (VISTARIL) 25 MG Cap Take 1 to 2 capsule up to tid prn for severe anxiety 30 capsule 2    levothyroxine (SYNTHROID) 125 MCG tablet Take 1 tablet (125 mcg total) by mouth before breakfast. 30 tablet 5    metFORMIN (GLUCOPHAGE) 500 MG tablet Take 1 tablet (500 mg total) by mouth 2 (two) times daily with meals. 60 tablet 11    metoprolol tartrate (LOPRESSOR) 25 MG tablet Take 1 tablet (25 mg total) by mouth 2 (two) times daily. 60 tablet 5    TRUEPLUS LANCETS 30 gauge Misc AS DIRECTED       No current facility-administered medications on file prior to visit.        There is no immunization history on file for this patient.    Review of Systems   Constitutional:  Negative for fever, malaise/fatigue and weight loss.   Respiratory:  Negative for shortness of breath.    Cardiovascular:  Negative for chest pain and palpitations.   Gastrointestinal:  Negative for nausea and vomiting.   Genitourinary:  Negative for dysuria, frequency, hematuria and urgency.   Psychiatric/Behavioral:  Negative for depression.         Vitals:    10/25/23 1550   BP: 120/72   Pulse: 75   Resp: 18       Physical Exam  Vitals reviewed.   Eyes:      Conjunctiva/sclera: Conjunctivae normal.   Pulmonary:      Effort: Pulmonary effort is normal.   Psychiatric:         Mood and Affect: Mood normal.         Behavior: Behavior normal.         Thought Content: Thought content normal.         Judgment: Judgment normal.          Assessment and Plan  1. Acute cystitis with hematuria  -     POCT URINALYSIS W/O SCOPE  -     Urine culture  -     nitrofurantoin, macrocrystal-monohydrate, (MACROBID) 100 MG capsule; Take 1 capsule (100 mg total) by mouth 2 (two) times daily.  Dispense: 14 capsule; Refill: 0    2. Type 2 diabetes mellitus without complication, without long-term current use of insulin  -     glimepiride (AMARYL) 1 MG tablet; Take 1 p.o. with breakfast.  Dispense: 30 tablet; Refill: 5             Return to clinic in 1 week with her blood sugar log and to recheck her urinalysis.    Health Maintenance Topics with due status: Not Due       Topic Last Completion Date    Cervical Cancer Screening 11/28/2022    Diabetes Urine Screening 03/16/2023    Lipid Panel 07/25/2023    Hemoglobin A1c 07/25/2023    Low Dose Statin 09/13/2023

## 2023-10-27 PROBLEM — R52 PAIN: Status: RESOLVED | Noted: 2023-08-31 | Resolved: 2023-10-27

## 2023-10-27 LAB — UA COMPLETE W REFLEX CULTURE PNL UR: ABNORMAL

## 2023-11-01 ENCOUNTER — OFFICE VISIT (OUTPATIENT)
Dept: FAMILY MEDICINE | Facility: CLINIC | Age: 43
End: 2023-11-01
Payer: MEDICAID

## 2023-11-01 VITALS
TEMPERATURE: 99 F | OXYGEN SATURATION: 98 % | WEIGHT: 272 LBS | HEIGHT: 62 IN | SYSTOLIC BLOOD PRESSURE: 120 MMHG | BODY MASS INDEX: 50.05 KG/M2 | RESPIRATION RATE: 19 BRPM | HEART RATE: 81 BPM | DIASTOLIC BLOOD PRESSURE: 84 MMHG

## 2023-11-01 DIAGNOSIS — E11.9 TYPE 2 DIABETES MELLITUS WITHOUT COMPLICATION, WITHOUT LONG-TERM CURRENT USE OF INSULIN: ICD-10-CM

## 2023-11-01 DIAGNOSIS — N30.01 ACUTE CYSTITIS WITH HEMATURIA: Primary | ICD-10-CM

## 2023-11-01 LAB
BILIRUB SERPL-MCNC: NORMAL MG/DL
BLOOD URINE, POC: NORMAL
COLOR, POC UA: YELLOW
GLUCOSE UR QL STRIP: NORMAL
KETONES UR QL STRIP: NORMAL
LEUKOCYTE ESTERASE URINE, POC: NORMAL
NITRITE, POC UA: NORMAL
PH, POC UA: 6
PROTEIN, POC: NORMAL
SPECIFIC GRAVITY, POC UA: 1.02
UROBILINOGEN, POC UA: 0.2

## 2023-11-01 PROCEDURE — 1160F RVW MEDS BY RX/DR IN RCRD: CPT | Mod: CPTII,,, | Performed by: FAMILY MEDICINE

## 2023-11-01 PROCEDURE — 3079F DIAST BP 80-89 MM HG: CPT | Mod: CPTII,,, | Performed by: FAMILY MEDICINE

## 2023-11-01 PROCEDURE — 1159F PR MEDICATION LIST DOCUMENTED IN MEDICAL RECORD: ICD-10-PCS | Mod: CPTII,,, | Performed by: FAMILY MEDICINE

## 2023-11-01 PROCEDURE — 99213 OFFICE O/P EST LOW 20 MIN: CPT | Mod: ,,, | Performed by: FAMILY MEDICINE

## 2023-11-01 PROCEDURE — 3061F PR NEG MICROALBUMINURIA RESULT DOCUMENTED/REVIEW: ICD-10-PCS | Mod: CPTII,,, | Performed by: FAMILY MEDICINE

## 2023-11-01 PROCEDURE — 1159F MED LIST DOCD IN RCRD: CPT | Mod: CPTII,,, | Performed by: FAMILY MEDICINE

## 2023-11-01 PROCEDURE — 3008F PR BODY MASS INDEX (BMI) DOCUMENTED: ICD-10-PCS | Mod: CPTII,,, | Performed by: FAMILY MEDICINE

## 2023-11-01 PROCEDURE — 3079F PR MOST RECENT DIASTOLIC BLOOD PRESSURE 80-89 MM HG: ICD-10-PCS | Mod: CPTII,,, | Performed by: FAMILY MEDICINE

## 2023-11-01 PROCEDURE — 3066F PR DOCUMENTATION OF TREATMENT FOR NEPHROPATHY: ICD-10-PCS | Mod: CPTII,,, | Performed by: FAMILY MEDICINE

## 2023-11-01 PROCEDURE — 1160F PR REVIEW ALL MEDS BY PRESCRIBER/CLIN PHARMACIST DOCUMENTED: ICD-10-PCS | Mod: CPTII,,, | Performed by: FAMILY MEDICINE

## 2023-11-01 PROCEDURE — 3074F PR MOST RECENT SYSTOLIC BLOOD PRESSURE < 130 MM HG: ICD-10-PCS | Mod: CPTII,,, | Performed by: FAMILY MEDICINE

## 2023-11-01 PROCEDURE — 3061F NEG MICROALBUMINURIA REV: CPT | Mod: CPTII,,, | Performed by: FAMILY MEDICINE

## 2023-11-01 PROCEDURE — 3044F HG A1C LEVEL LT 7.0%: CPT | Mod: CPTII,,, | Performed by: FAMILY MEDICINE

## 2023-11-01 PROCEDURE — 3008F BODY MASS INDEX DOCD: CPT | Mod: CPTII,,, | Performed by: FAMILY MEDICINE

## 2023-11-01 PROCEDURE — 3044F PR MOST RECENT HEMOGLOBIN A1C LEVEL <7.0%: ICD-10-PCS | Mod: CPTII,,, | Performed by: FAMILY MEDICINE

## 2023-11-01 PROCEDURE — 3066F NEPHROPATHY DOC TX: CPT | Mod: CPTII,,, | Performed by: FAMILY MEDICINE

## 2023-11-01 PROCEDURE — 3074F SYST BP LT 130 MM HG: CPT | Mod: CPTII,,, | Performed by: FAMILY MEDICINE

## 2023-11-01 PROCEDURE — 99213 PR OFFICE/OUTPT VISIT, EST, LEVL III, 20-29 MIN: ICD-10-PCS | Mod: ,,, | Performed by: FAMILY MEDICINE

## 2023-11-01 PROCEDURE — 81003 URINALYSIS AUTO W/O SCOPE: CPT | Mod: RHCUB | Performed by: FAMILY MEDICINE

## 2023-11-01 RX ORDER — DAPAGLIFLOZIN 5 MG/1
5 TABLET, FILM COATED ORAL DAILY
Qty: 30 TABLET | Refills: 5 | Status: SHIPPED | OUTPATIENT
Start: 2023-11-01 | End: 2023-12-01

## 2023-11-01 NOTE — PROGRESS NOTES
Mary Magana is a 42 y.o. female seen today for follow-up on urinary tract infection.  Patient's follow-up urinalysis is normal.  Patient is currently asymptomatic.  Unfortunately, patient did have low blood sugars in the 50s with Amaryl 1 mg.  Previously her blood sugars were in the 200s and today we discussed a trial of Farxiga but we spoke about personal hygiene as well as increased hydration.  I also reminded the patient if she has any signs or symptoms of urinary tract infection to discontinue this medication until she follows up with us.  We also discussed an evaluation by diabetic education today.      Past Medical History:   Diagnosis Date    Diabetes mellitus     Hypertension     Thyroid disease     WPW (Ilana-Parkinson-White syndrome)      Family History   Problem Relation Age of Onset    Diabetes Father     Hypertension Mother     Diabetes Mother     Leukemia Daughter      Current Outpatient Medications on File Prior to Visit   Medication Sig Dispense Refill    aspirin (ECOTRIN) 81 MG EC tablet TAKE 1 TABLET BY MOUTH DAILY WITH FOOD 30 tablet 5    atorvastatin (LIPITOR) 10 MG tablet Take 1 tablet (10 mg total) by mouth every evening. 30 tablet 3    ferrous sulfate (IRON) 325 mg (65 mg iron) Tab tablet Take 1 tablet (325 mg total) by mouth daily with breakfast. 30 tablet 5    hydrOXYzine pamoate (VISTARIL) 25 MG Cap Take 1 to 2 capsule up to tid prn for severe anxiety 30 capsule 2    levothyroxine (SYNTHROID) 125 MCG tablet Take 1 tablet (125 mcg total) by mouth before breakfast. 30 tablet 5    metFORMIN (GLUCOPHAGE) 500 MG tablet Take 1 tablet (500 mg total) by mouth 2 (two) times daily with meals. 60 tablet 11    metoprolol tartrate (LOPRESSOR) 25 MG tablet Take 1 tablet (25 mg total) by mouth 2 (two) times daily. 60 tablet 5    TRUEPLUS LANCETS 30 gauge Misc AS DIRECTED      [DISCONTINUED] cefUROXime (CEFTIN) 500 MG tablet Take 1 tablet (500 mg total) by mouth every 12 (twelve) hours. (Patient  not taking: Reported on 11/1/2023) 14 tablet 0    [DISCONTINUED] glimepiride (AMARYL) 1 MG tablet Take 1 p.o. with breakfast. (Patient not taking: Reported on 11/1/2023) 30 tablet 5    [DISCONTINUED] nitrofurantoin, macrocrystal-monohydrate, (MACROBID) 100 MG capsule Take 1 capsule (100 mg total) by mouth 2 (two) times daily. (Patient not taking: Reported on 11/1/2023) 14 capsule 0     No current facility-administered medications on file prior to visit.       There is no immunization history on file for this patient.    Review of Systems   Constitutional:  Negative for fever, malaise/fatigue and weight loss.   Respiratory:  Negative for shortness of breath.    Cardiovascular:  Negative for chest pain and palpitations.   Gastrointestinal:  Negative for nausea and vomiting.   Genitourinary:  Negative for dysuria, flank pain, frequency, hematuria and urgency.   Psychiatric/Behavioral:  Negative for depression.         Vitals:    11/01/23 0904   BP: (!) 126/90   Pulse: 81   Resp: 19   Temp: 98.6 °F (37 °C)       Physical Exam  Vitals reviewed.   Eyes:      Conjunctiva/sclera: Conjunctivae normal.   Pulmonary:      Effort: Pulmonary effort is normal.   Neurological:      Mental Status: She is alert.   Psychiatric:         Mood and Affect: Mood normal.         Behavior: Behavior normal.         Thought Content: Thought content normal.         Judgment: Judgment normal.          Assessment and Plan  1. Acute cystitis with hematuria  -     POCT URINALYSIS W/O SCOPE    2. Type 2 diabetes mellitus without complication, without long-term current use of insulin  -     Ambulatory referral/consult to Diabetes Education; Future; Expected date: 11/08/2023  -     dapagliflozin propanediol (FARXIGA) 5 mg Tab tablet; Take 1 tablet (5 mg total) by mouth once daily.  Dispense: 30 tablet; Refill: 5             Return to clinic in 1 month.    Health Maintenance Topics with due status: Not Due       Topic Last Completion Date    Cervical  Cancer Screening 11/28/2022    Diabetes Urine Screening 03/16/2023    Lipid Panel 07/25/2023    Hemoglobin A1c 07/25/2023    Low Dose Statin 11/01/2023

## 2023-11-09 ENCOUNTER — CLINICAL SUPPORT (OUTPATIENT)
Dept: DIABETES | Facility: CLINIC | Age: 43
End: 2023-11-09
Payer: MEDICAID

## 2023-11-09 VITALS — WEIGHT: 250.38 LBS | HEIGHT: 62 IN | BODY MASS INDEX: 46.07 KG/M2

## 2023-11-09 DIAGNOSIS — E11.9 TYPE 2 DIABETES MELLITUS WITHOUT COMPLICATION, WITHOUT LONG-TERM CURRENT USE OF INSULIN: ICD-10-CM

## 2023-11-09 PROCEDURE — 99999PBSHW PR PBB SHADOW TECHNICAL ONLY FILED TO HB: Mod: PBBFAC,,,

## 2023-11-09 PROCEDURE — 99213 OFFICE O/P EST LOW 20 MIN: CPT | Mod: PBBFAC

## 2023-11-09 PROCEDURE — G0108 DIAB MANAGE TRN  PER INDIV: HCPCS | Mod: PBBFAC | Performed by: FAMILY MEDICINE

## 2023-11-09 PROCEDURE — 99999PBSHW PR PBB SHADOW TECHNICAL ONLY FILED TO HB: ICD-10-PCS | Mod: PBBFAC,,,

## 2023-11-09 NOTE — LETTER
November 10, 2023      Huber Clayton MD  [address].  [facility] [place] [address]       Patient: Mary Magana   MR Number: [id number]   YOB: 1980   Date of Visit: 11/9/2023       Dear Dr. Clayton:    Thank you for referring Mary for diabetes self-management education and support services. She was seen 11/9/2023 for an initial visit. Below is a summary of self-manage goals she set to improve on.    Patient Outcomes:    A1c Status:   Lab Results   Component Value Date    HGBA1C 5.8 07/25/2023    HGBA1C 6.0 03/16/2023     Care Plan: Diabetes Management     Problem: Healthy Eating      Goal: Eat 3 meals daily with 30-45 g/ 2-3 servings of Carbohydrate per meal.  or eat 6 small meals a day (15-30 g/ 1-2 servings a meal) Dont skip meals    Start Date: 11/9/2023   Expected End Date: 12/7/2023   Barriers: No Barriers Identified   Note:    Reviewed the sources and role of Carbohydrate, Protein, and Fat and how each nutrient impact blood sugar. Provided meal-planning instruction via food lists, plate method, food models, food labels. Reviewed micro/macronutrient effect on health management. Discussed carbohydrate counting and spacing. Reviewed principles of energy metabolism to assist weight and health management. Discussed strategies for healthier dining out and replacing sugary beverages with water and other noncaloric, sugar free options.   A handout on weight loss and recommendations for healthier food choices. Several handouts on Planning a healthy meal, Building a Balanced Meal, Heart-Healthy Consistent Carbohydrate Nutrition Therapy.      Task: Review the importance of balancing carbohydrates with each meal using portion control techniques to count servings of carbohydrate and label reading to identify serving size and amount of total carbs per serving. Completed 11/9/2023     Task: Provided Sample plate method and reviewed the use of the plate to  estimate amounts of carbohydrate per meal. Completed 11/9/2023       Follow up:   · Mary to attend medical appointments as scheduled  · Mary to update you on her DM education progress as needed  · Mary has a follow up appointment with me on 12/7/2023.       If you have questions, please do not hesitate to call me. I look forward to providing additional education and support as needed.    Sincerely,    Vanessa Healy RN  Diabetes Care and

## 2023-11-10 NOTE — PROGRESS NOTES
"Diabetes Care Specialist Progress Note  Author: MIKI SEALS RN  Date: 11/9/2023    Program Intake  Reason for Diabetes Program Visit: Initial Diabetes Assessment (Diagnosed with diabetes  2 years ago)  Current diabetes risk level:: moderate (2)  In the last 12 months, have you been to the ED or admitted to the hospital: none  Permission to speak with others about care:: yes (joleen morataya (Sister)   [phone] ([place])    Lab Results   Component Value Date    HGBA1C 5.8 07/25/2023       Clinical    CURRENT DM MEDICATIONS:   Farxiga take 1  tablet  (5 mg)  daily   Metformin take 1 tablet (500 mg total) by mouth 2 (two) times daily with meals    ACE or ARB:  None    Statin:   (LIPITOR) 1 Take 1  10 mg tablet every evening      Weight: 113.6 kg (250 lb 6.4 oz)   Height: 5' 2" (157.5 cm)   Body mass index is 45.8 kg/m².    Patient Health Rating  Compared to other people your age, how would you rate your health?: Good    Problem Review  Reviewed Problem List with Patient: yes  Active comorbidities affecting diabetes self-care.: no  Reviewed health maintenance: yes    Clinical Assessment  Current Diabetes Treatment: Oral Medication  Have you ever experienced hypoglycemia (low blood sugar)?: yes  Are you able to tell when your blood sugar is low?: Yes  What symptoms do you experience?: Anxious/nervous  How do you treat hypoglycemia (low blood sugar)?: 1/2 can soda/fruit juice  Have you ever experienced hyperglycemia (high blood sugar)?: yes  Are you able to tell when your blood sugar is high?: Yes  What are your symptoms?: fatigue  Have you ever been hospitalized because your blood sugar was high?: yes (see comments) (2 years ago was in ICU with DKA not hospitalized since)    Medication Information  How do you obtain your medications?: Patient drives  How many days a week do you miss your medications?: Never  Do you use a pill box or medication chart to help you manage your medications?: Pill box  Do you sometimes " have difficulty refilling your medications?: No  Medication adherence impacting ability to self-manage diabetes?: No    Labs  Do you have regular lab work to monitor your medications?: Yes  Type of Regular Lab Work: A1c, Cholesterol, Microalbumin  Where do you get your labs drawn?: [facility]  Lab Compliance Barriers: No    Nutritional Status  Meal Plan 24 Hour Recall: Dinner, Lunch (Eats 1 meal most days and thats in the afternoon)  Meal Plan 24 Hour Recall - Breakfast: skips meals, doens't eat regualrly, has problems sleeping due to anxiety. Goes to sleep about 3-4 am and wakes up at 7am (keeps grand-child)  no food goes back to sleep eats 1 meal at 5pm.  Meal Plan 24 Hour Recall - Dinner: [employer] made them meat, cheese, lettuce salsa and water  Change in appetite?: No (eats 1 small meal a day in the afternoon)  Dentation:: Intact (goes yearly)  Current nutritional status an area of need that is impacting patient's ability to self-manage diabetes?: Yes    Additional Social History    Support  Does anyone support you with your diabetes care?: yes  Who supports you?: son/daughter  Who takes you to your medical appointments?: self  Does the current support meet the patient's needs?: Yes  Is Support an area impacting ability to self-manage diabetes?: No    Access to Mass Media & Technology  Does the patient have access to any of the following devices or technologies?: Smart phone, Internet Access  Media or technology needs impacting ability to self-manage diabetes?: No    Cognitive/Behavioral Health  Alert and Oriented: Yes  Difficulty Thinking: No  Requires Prompting: No  Requires assistance for routine expression?: No  Cognitive or behavioral barriers impacting ability to self-manage diabetes?: No    Culture/Orthodoxy  Culture or Hindu beliefs that may impact ability to access healthcare: No    Communication  Language preference: English  Hearing Problems: No  Vision Problems: No  Communication needs impacting  ability to self-manage diabetes?: No    Health Literacy  Preferred Learning Method: Face to Face, Reading Materials  How often do you need to have someone help you read instructions, pamphlets, or written material from your doctor or pharmacy?: Never  Health literacy needs impacting ability to self-manage diabetes?: No      Diabetes Self-Management Skills Assessment    Diabetes Disease Process/Treatment Options  Patient/caregiver able to state what happens when someone has diabetes.: no  Patient/caregiver knows what type of diabetes they have.: yes  Diabetes Type : Type II  Patient/caregiver able to identify at least three signs and symptoms of diabetes.: no  Patient able to identify at least three risk factors for diabetes.: no  Diabetes Disease Process/Treatment Options: Skills Assessment Completed: Yes  Assessment indicates:: Knowledge deficit, Instruction Needed  Area of need?: Yes    Nutrition/Healthy Eating  Challenges to healthy eating:: other (see comments) (Depression / anxiety for last 2 years and has no appetite and goes hours with out eating.)  Method of carbohydrate measurement:: no method  Nutrition/Healthy Eating Skills Assessment Completed:: Yes  Assessment indicates:: Knowledge deficit, Instruction Needed  Area of need?: Yes    Physical Activity/Exercise  Patient's daily activity level:: moderately active  Patient can identify forms of physical activity.: yes  Physical Activity/Exercise Skills Assessment Completed: : Yes  Assessment indicates:: Knowledge deficit, Instruction Needed  Area of need?: Yes    Medications  Patient is able to describe current diabetes management routine.: yes  Diabetes management routine:: oral medications  Patient is able to identify current diabetes medications, dosages, and appropriate timing of medications.: yes  Patient understands the purpose of the medications taken for diabetes.: no  Medication Skills Assessment Completed:: Yes  Assessment indicates:: Knowledge  deficit, Instruction Needed  Area of need?: Yes    Home Blood Glucose Monitoring  Patient states that blood sugar is checked at home daily.: yes  Monitoring Method:: home glucometer  How often do you check your blood sugar?: Twice a day  When do you check your blood sugar?: Before breakfast, Before bedtime  When you check what is your typical blood sugar range? : fasting 130's at bedtime 180's  Home Blood Glucose Monitoring Skills Assessment Completed: : Yes  Assessment indicates:: Knowledge deficit, Instruction Needed  Area of need?: Yes    Acute Complications  Patient is able to identify types of acute complications: No  Acute Complications Skills Assessment Completed: : Yes  Assessment indicates:: Knowledge deficit, Instruction Needed  Area of need?: Yes    Chronic Complications  Patient can identify major chronic complications of diabetes.: no  Patient can identify ways to prevent or delay diabetes complications.: no  Patient is taking statin?: Yes  Chronic Complications Skills Assessment Completed: : Yes  Assessment indicates:: Knowledge deficit, Instruction Needed  Area of need?: Yes    Psychosocial/Coping  Patient can identify ways of coping with chronic disease.: no (see comments) (2 years ago she lost her  and sister suddenly. She quit her job, didn't get out of her house for months and not much now, started skipping meals.)  Patient-stated ways of coping with chronic disease:: support from loved ones  Psychosocial/Coping Skills Assessment Completed: : Yes  Assessment indicates:: Instruction Needed  Area of need?: Yes      Assessment Summary and Plan    Based on today's diabetes care assessment, the following areas of need were identified:          11/9/2023       Social   Support No   Access to Mass Media/Tech No   Cognitive/Behavioral Health No   Culture/Gnosticism No   Communication No   Health Literacy No            11/9/2023       Clinical   Medication Adherence No   Lab Compliance No    Nutritional Status Yes            11/9/2023      Diabetes Self-Management Skills   Diabetes Disease Process/Treatment Options Yes -  Reviewed diabetes pathophysiology, different types of diabetes, signs and symptoms, risk factors and treatment guidelines. Emphasized on the importance of controlling diabetes to prevent complications and how diabetes can be successfully managed. Handout Provided.     Nutrition/Healthy Eating Yes  - See Care Plan     Physical Activity/Exercise Yes - Discussed importance of daily physical activity with review of benefits, methods, guidelines and precautions. Discussed role of physical activity on reducing insulin resistance, weight loss and improvement in overall glycemic control.   Handout provided on diabetes and physical activity.     Medication Yes - Provided review of current diabetes medication action, dosage, frequency, side effects, and storage guidelines.  Reviewed the importance of meal and medication timing with diabetes mediations for prevention of acute complications and maximum drug benefit.  Handout Provided.     Home Blood Glucose Monitoring Yes - Reviewed benefits, techniques of self-monitoring blood glucose. Discussed appropriate timing and frequency to SMBG, education on parameters and when to notify provider. Discussed factors that affect blood sugar such as diet, lack of physical activity, need more diabetes medications or not taking as prescribed. Handout Provided.     Acute Complications Yes - Discussed prevention, identification signs/symptoms and treatment of hyperglycemia and hypoglycemia and when to contact clinic. Handouts provided including one on having a sick day plan.     Chronic Complications Yes - Reviewed diabetes complications and preventative screenings including annual dilated eye exams, regular dental exams, and daily foot self-exams. Reviewed foot care guidelines.   Discussed current A1c, Blood Pressure, and Cholesterol results (ABC's of  "Diabetes) and ADA target guidelines. Handout Provided.      Psychosocial/Coping Yes - Discussed behavioral strategies for improving social and environmental support of lifestyle changes. Discussed community resources for long term diabetes self-management support and progress. Discussed role of stress on diabetes management. Contact provided for a "Grief" support group locally. Handout provided including one on Diabetes Distress.           Today's interventions were provided through individual discussion, instruction, and written materials were provided.      Patient verbalized understanding of instruction and written materials.  Pt was able to return back demonstration of instructions today. Patient understood key points, needs reinforcement and further instruction.     Diabetes Self-Management Care Plan:    Today's Diabetes Self-Management Care Plan was developed with Mary's input. Mary has agreed to work toward the following goal(s) to improve his/her overall diabetes control.      Care Plan: Diabetes Management     Problem: Healthy Eating         Goal: Eat 3 meals daily with 30-45 g/ 2-3 servings of Carbohydrate per meal.  or eat 6 small meals a day (15-30 g/ 1-2 servings a meal) Dont skip meals    Start Date: 11/9/2023   Expected End Date: 12/7/2023   Barriers: No Barriers Identified   Note:    Reviewed the sources and role of Carbohydrate, Protein, and Fat and how each nutrient impact blood sugar. Provided meal-planning instruction via food lists, plate method, food models, food labels. Reviewed micro/macronutrient effect on health management. Discussed carbohydrate counting and spacing. Reviewed principles of energy metabolism to assist weight and health management. Discussed strategies for healthier dining out and replacing sugary beverages with water and other noncaloric, sugar free options.   A handout on weight loss and recommendations for healthier food choices. Several handouts on Planning a healthy " meal, Building a Balanced Meal, Heart-Healthy Consistent Carbohydrate Nutrition Therapy.      Task: Review the importance of balancing carbohydrates with each meal using portion control techniques to count servings of carbohydrate and label reading to identify serving size and amount of total carbs per serving. Completed 11/9/2023        Task: Provided Sample plate method and reviewed the use of the plate to estimate amounts of carbohydrate per meal. Completed 11/9/2023          Follow Up Plan     Follow up in about 4 weeks (around 12/7/2023).    Today's care plan and follow up schedule was discussed with patient.  Mary verbalized understanding of the care plan, goals, and agrees to follow up plan.        The patient was encouraged to communicate with his/her health care provider/physician and care team regarding his/her condition(s) and treatment.  I provided the patient with my contact information today and encouraged to contact me via phone or [facility] Patient Portal as needed.     Length of Visit   Total Time: 90 Minutes

## 2023-11-15 ENCOUNTER — PATIENT MESSAGE (OUTPATIENT)
Dept: ADMINISTRATIVE | Facility: HOSPITAL | Age: 43
End: 2023-11-15

## 2023-11-22 ENCOUNTER — PATIENT OUTREACH (OUTPATIENT)
Dept: ADMINISTRATIVE | Facility: HOSPITAL | Age: 43
End: 2023-11-22

## 2023-11-22 NOTE — PROGRESS NOTES
Health maintenance record review for population health care gaps    Noted to upcoming appointment   1 month follow up*Mammogram and Eye Exam Foot Exam are due for compliance*       Mary Magana  [employer] User7 days ago       Patient Questionnaire Submission  --------------------------------     Questionnaire: Preventative Care Screenings     Question: Would you like help scheduling your screenings?  Answer:   Yes     Question: How would you like to be contacted?  Answer:   Portal     Question: Will any of these be scheduled at a non-[facility] location?  Answer:   No     Question: Have you completed any of these tests at a non-[facility] location recently?  Answer:   No       [employer] Outreach User  Mary Magana7 days ago     CI  Dear Ms. Magana,     Regular health screenings are one of the most important things you can do for your health. These medical tests help find problems before they start.     You are due for the health screening(s) below:      Due Now  Topic Last Completed Next Steps   Mammogram 6/24/2022  Learn more   Eye Exam

## 2023-12-01 ENCOUNTER — OFFICE VISIT (OUTPATIENT)
Dept: FAMILY MEDICINE | Facility: CLINIC | Age: 43
End: 2023-12-01
Payer: MEDICAID

## 2023-12-01 VITALS
HEIGHT: 62 IN | TEMPERATURE: 99 F | BODY MASS INDEX: 46.12 KG/M2 | OXYGEN SATURATION: 99 % | SYSTOLIC BLOOD PRESSURE: 120 MMHG | RESPIRATION RATE: 18 BRPM | DIASTOLIC BLOOD PRESSURE: 78 MMHG | HEART RATE: 74 BPM | WEIGHT: 250.63 LBS

## 2023-12-01 DIAGNOSIS — I10 HYPERTENSION, UNSPECIFIED TYPE: ICD-10-CM

## 2023-12-01 DIAGNOSIS — E11.9 TYPE 2 DIABETES MELLITUS WITHOUT COMPLICATION, WITHOUT LONG-TERM CURRENT USE OF INSULIN: Primary | ICD-10-CM

## 2023-12-01 DIAGNOSIS — E03.9 HYPOTHYROIDISM, UNSPECIFIED TYPE: ICD-10-CM

## 2023-12-01 LAB
ALBUMIN SERPL BCP-MCNC: 3.4 G/DL (ref 3.5–5)
ALBUMIN/GLOB SERPL: 0.9 {RATIO}
ALP SERPL-CCNC: 133 U/L (ref 37–98)
ALT SERPL W P-5'-P-CCNC: 50 U/L (ref 13–56)
ANION GAP SERPL CALCULATED.3IONS-SCNC: 6 MMOL/L (ref 7–16)
ANISOCYTOSIS BLD QL SMEAR: NORMAL
AST SERPL W P-5'-P-CCNC: 21 U/L (ref 15–37)
BASOPHILS # BLD AUTO: 0.07 K/UL (ref 0–0.2)
BASOPHILS NFR BLD AUTO: 0.7 % (ref 0–1)
BILIRUB SERPL-MCNC: 0.5 MG/DL (ref ?–1.2)
BUN SERPL-MCNC: 10 MG/DL (ref 7–18)
BUN/CREAT SERPL: 10 (ref 6–20)
CALCIUM SERPL-MCNC: 8.9 MG/DL (ref 8.5–10.1)
CHLORIDE SERPL-SCNC: 106 MMOL/L (ref 98–107)
CHOLEST SERPL-MCNC: 112 MG/DL (ref 0–200)
CHOLEST/HDLC SERPL: 2.8 {RATIO}
CO2 SERPL-SCNC: 28 MMOL/L (ref 21–32)
CREAT SERPL-MCNC: 0.96 MG/DL (ref 0.55–1.02)
DIFFERENTIAL METHOD BLD: ABNORMAL
EGFR (NO RACE VARIABLE) (RUSH/TITUS): 76 ML/MIN/1.73M2
EOSINOPHIL # BLD AUTO: 0.56 K/UL (ref 0–0.5)
EOSINOPHIL NFR BLD AUTO: 5.8 % (ref 1–4)
ERYTHROCYTE [DISTWIDTH] IN BLOOD BY AUTOMATED COUNT: 13.8 % (ref 11.5–14.5)
EST. AVERAGE GLUCOSE BLD GHB EST-MCNC: 171 MG/DL
GLOBULIN SER-MCNC: 3.7 G/DL (ref 2–4)
GLUCOSE SERPL-MCNC: 153 MG/DL (ref 74–106)
HBA1C MFR BLD HPLC: 7.6 % (ref 4.5–6.6)
HCT VFR BLD AUTO: 42.5 % (ref 38–47)
HDLC SERPL-MCNC: 40 MG/DL (ref 40–60)
HGB BLD-MCNC: 13.4 G/DL (ref 12–16)
IMM GRANULOCYTES # BLD AUTO: 0.03 K/UL (ref 0–0.04)
IMM GRANULOCYTES NFR BLD: 0.3 % (ref 0–0.4)
LDLC SERPL CALC-MCNC: 48 MG/DL
LDLC/HDLC SERPL: 1.2 {RATIO}
LYMPHOCYTES # BLD AUTO: 4.01 K/UL (ref 1–4.8)
LYMPHOCYTES NFR BLD AUTO: 41.3 % (ref 27–41)
MCH RBC QN AUTO: 23.4 PG (ref 27–31)
MCHC RBC AUTO-ENTMCNC: 31.5 G/DL (ref 32–36)
MCV RBC AUTO: 74.2 FL (ref 80–96)
MICROCYTES BLD QL SMEAR: NORMAL
MONOCYTES # BLD AUTO: 0.53 K/UL (ref 0–0.8)
MONOCYTES NFR BLD AUTO: 5.5 % (ref 2–6)
MPC BLD CALC-MCNC: 11.6 FL (ref 9.4–12.4)
NEUTROPHILS # BLD AUTO: 4.51 K/UL (ref 1.8–7.7)
NEUTROPHILS NFR BLD AUTO: 46.4 % (ref 53–65)
NONHDLC SERPL-MCNC: 72 MG/DL
NRBC # BLD AUTO: 0 X10E3/UL
NRBC, AUTO (.00): 0 %
PLATELET # BLD AUTO: 250 K/UL (ref 150–400)
PLATELET MORPHOLOGY: NORMAL
POTASSIUM SERPL-SCNC: 4 MMOL/L (ref 3.5–5.1)
PROT SERPL-MCNC: 7.1 G/DL (ref 6.4–8.2)
RBC # BLD AUTO: 5.73 M/UL (ref 4.2–5.4)
SODIUM SERPL-SCNC: 136 MMOL/L (ref 136–145)
T4 FREE SERPL-MCNC: 1.13 NG/DL (ref 0.76–1.46)
TRIGL SERPL-MCNC: 120 MG/DL (ref 35–150)
TSH SERPL DL<=0.005 MIU/L-ACNC: 7.6 UIU/ML (ref 0.36–3.74)
VLDLC SERPL-MCNC: 24 MG/DL
WBC # BLD AUTO: 9.71 K/UL (ref 4.5–11)

## 2023-12-01 PROCEDURE — 84439 ASSAY OF FREE THYROXINE: CPT | Mod: ,,, | Performed by: CLINICAL MEDICAL LABORATORY

## 2023-12-01 PROCEDURE — 3078F PR MOST RECENT DIASTOLIC BLOOD PRESSURE < 80 MM HG: ICD-10-PCS | Mod: CPTII,,, | Performed by: FAMILY MEDICINE

## 2023-12-01 PROCEDURE — 3066F PR DOCUMENTATION OF TREATMENT FOR NEPHROPATHY: ICD-10-PCS | Mod: CPTII,,, | Performed by: FAMILY MEDICINE

## 2023-12-01 PROCEDURE — 3078F DIAST BP <80 MM HG: CPT | Mod: CPTII,,, | Performed by: FAMILY MEDICINE

## 2023-12-01 PROCEDURE — 1160F RVW MEDS BY RX/DR IN RCRD: CPT | Mod: CPTII,,, | Performed by: FAMILY MEDICINE

## 2023-12-01 PROCEDURE — 84443 TSH: ICD-10-PCS | Mod: ,,, | Performed by: CLINICAL MEDICAL LABORATORY

## 2023-12-01 PROCEDURE — 84439 T4, FREE: ICD-10-PCS | Mod: ,,, | Performed by: CLINICAL MEDICAL LABORATORY

## 2023-12-01 PROCEDURE — 1160F PR REVIEW ALL MEDS BY PRESCRIBER/CLIN PHARMACIST DOCUMENTED: ICD-10-PCS | Mod: CPTII,,, | Performed by: FAMILY MEDICINE

## 2023-12-01 PROCEDURE — 85025 COMPLETE CBC W/AUTO DIFF WBC: CPT | Mod: ,,, | Performed by: CLINICAL MEDICAL LABORATORY

## 2023-12-01 PROCEDURE — 3074F PR MOST RECENT SYSTOLIC BLOOD PRESSURE < 130 MM HG: ICD-10-PCS | Mod: CPTII,,, | Performed by: FAMILY MEDICINE

## 2023-12-01 PROCEDURE — 3008F PR BODY MASS INDEX (BMI) DOCUMENTED: ICD-10-PCS | Mod: CPTII,,, | Performed by: FAMILY MEDICINE

## 2023-12-01 PROCEDURE — 80053 COMPREHENSIVE METABOLIC PANEL: ICD-10-PCS | Mod: ,,, | Performed by: CLINICAL MEDICAL LABORATORY

## 2023-12-01 PROCEDURE — 80061 LIPID PANEL: CPT | Mod: ,,, | Performed by: CLINICAL MEDICAL LABORATORY

## 2023-12-01 PROCEDURE — 3074F SYST BP LT 130 MM HG: CPT | Mod: CPTII,,, | Performed by: FAMILY MEDICINE

## 2023-12-01 PROCEDURE — 3061F NEG MICROALBUMINURIA REV: CPT | Mod: CPTII,,, | Performed by: FAMILY MEDICINE

## 2023-12-01 PROCEDURE — 84443 ASSAY THYROID STIM HORMONE: CPT | Mod: ,,, | Performed by: CLINICAL MEDICAL LABORATORY

## 2023-12-01 PROCEDURE — 99214 OFFICE O/P EST MOD 30 MIN: CPT | Mod: ,,, | Performed by: FAMILY MEDICINE

## 2023-12-01 PROCEDURE — 83036 HEMOGLOBIN A1C: ICD-10-PCS | Mod: ,,, | Performed by: CLINICAL MEDICAL LABORATORY

## 2023-12-01 PROCEDURE — 83036 HEMOGLOBIN GLYCOSYLATED A1C: CPT | Mod: ,,, | Performed by: CLINICAL MEDICAL LABORATORY

## 2023-12-01 PROCEDURE — 1159F PR MEDICATION LIST DOCUMENTED IN MEDICAL RECORD: ICD-10-PCS | Mod: CPTII,,, | Performed by: FAMILY MEDICINE

## 2023-12-01 PROCEDURE — 80061 LIPID PANEL: ICD-10-PCS | Mod: ,,, | Performed by: CLINICAL MEDICAL LABORATORY

## 2023-12-01 PROCEDURE — 3044F HG A1C LEVEL LT 7.0%: CPT | Mod: CPTII,,, | Performed by: FAMILY MEDICINE

## 2023-12-01 PROCEDURE — 3066F NEPHROPATHY DOC TX: CPT | Mod: CPTII,,, | Performed by: FAMILY MEDICINE

## 2023-12-01 PROCEDURE — 1159F MED LIST DOCD IN RCRD: CPT | Mod: CPTII,,, | Performed by: FAMILY MEDICINE

## 2023-12-01 PROCEDURE — 3008F BODY MASS INDEX DOCD: CPT | Mod: CPTII,,, | Performed by: FAMILY MEDICINE

## 2023-12-01 PROCEDURE — 3061F PR NEG MICROALBUMINURIA RESULT DOCUMENTED/REVIEW: ICD-10-PCS | Mod: CPTII,,, | Performed by: FAMILY MEDICINE

## 2023-12-01 PROCEDURE — 85025 CBC WITH DIFFERENTIAL: ICD-10-PCS | Mod: ,,, | Performed by: CLINICAL MEDICAL LABORATORY

## 2023-12-01 PROCEDURE — 3044F PR MOST RECENT HEMOGLOBIN A1C LEVEL <7.0%: ICD-10-PCS | Mod: CPTII,,, | Performed by: FAMILY MEDICINE

## 2023-12-01 PROCEDURE — 99214 PR OFFICE/OUTPT VISIT, EST, LEVL IV, 30-39 MIN: ICD-10-PCS | Mod: ,,, | Performed by: FAMILY MEDICINE

## 2023-12-01 PROCEDURE — 80053 COMPREHEN METABOLIC PANEL: CPT | Mod: ,,, | Performed by: CLINICAL MEDICAL LABORATORY

## 2023-12-01 RX ORDER — CLONAZEPAM 1 MG/1
1 TABLET ORAL NIGHTLY PRN
COMMUNITY
Start: 2023-11-28

## 2023-12-01 RX ORDER — METFORMIN HYDROCHLORIDE 1000 MG/1
1000 TABLET ORAL 2 TIMES DAILY WITH MEALS
Qty: 60 TABLET | Refills: 5 | Status: SHIPPED | OUTPATIENT
Start: 2023-12-01 | End: 2024-04-02 | Stop reason: SDUPTHER

## 2023-12-01 NOTE — PROGRESS NOTES
Mary Magana is a 43 y.o. female seen today for follow-up on her diabetes.  Patient reports she did not tolerate the Farxiga secondary to GI upset and we discussed a trial of Jardiance samples.  Patient was given 10 mg samples.  Patient reports her blood sugars have been in the 200s and she has upped her metformin to a 1000 mg b.i.d. and tolerated this well.  She defers the flu vaccination today and is fasting for follow-up lab work regarding her diabetes and mildly elevated LDL cholesterol.      Past Medical History:   Diagnosis Date    Diabetes mellitus     Hypertension     Thyroid disease     WPW (Ilana-Parkinson-White syndrome)      Family History   Problem Relation Age of Onset    Hypertension Mother     Diabetes Mother     Diabetes Father     Diabetes Sister     Leukemia Daughter      Current Outpatient Medications on File Prior to Visit   Medication Sig Dispense Refill    aspirin (ECOTRIN) 81 MG EC tablet TAKE 1 TABLET BY MOUTH DAILY WITH FOOD 30 tablet 5    atorvastatin (LIPITOR) 10 MG tablet Take 1 tablet (10 mg total) by mouth every evening. 30 tablet 3    clonazePAM (KLONOPIN) 1 MG tablet Take 1 mg by mouth nightly as needed for Anxiety.      ferrous sulfate (IRON) 325 mg (65 mg iron) Tab tablet Take 1 tablet (325 mg total) by mouth daily with breakfast. 30 tablet 5    hydrOXYzine pamoate (VISTARIL) 25 MG Cap Take 1 to 2 capsule up to tid prn for severe anxiety 30 capsule 2    levothyroxine (SYNTHROID) 125 MCG tablet Take 1 tablet (125 mcg total) by mouth before breakfast. 30 tablet 5    metoprolol tartrate (LOPRESSOR) 25 MG tablet Take 1 tablet (25 mg total) by mouth 2 (two) times daily. 60 tablet 5    TRUEPLUS LANCETS 30 gauge Misc AS DIRECTED      [DISCONTINUED] dapagliflozin propanediol (FARXIGA) 5 mg Tab tablet Take 1 tablet (5 mg total) by mouth once daily. 30 tablet 5    [DISCONTINUED] metFORMIN (GLUCOPHAGE) 500 MG tablet Take 1 tablet (500 mg total) by mouth 2 (two) times daily with  meals. 60 tablet 11    empagliflozin (JARDIANCE) 10 mg tablet Take 10 mg by mouth once daily.       No current facility-administered medications on file prior to visit.       There is no immunization history on file for this patient.    Review of Systems   Constitutional:  Negative for fever, malaise/fatigue and weight loss.   Respiratory:  Negative for shortness of breath.    Cardiovascular:  Negative for chest pain and palpitations.   Gastrointestinal:  Negative for nausea and vomiting.   Psychiatric/Behavioral:  Negative for depression.         Vitals:    12/01/23 1048   BP: 120/78   Pulse: 74   Resp: 18   Temp: 98.5 °F (36.9 °C)       Physical Exam  Vitals reviewed.   Constitutional:       Appearance: Normal appearance.   HENT:      Head: Normocephalic.   Eyes:      Extraocular Movements: Extraocular movements intact.      Conjunctiva/sclera: Conjunctivae normal.      Pupils: Pupils are equal, round, and reactive to light.   Neck:      Thyroid: No thyroid mass or thyromegaly.   Cardiovascular:      Rate and Rhythm: Normal rate and regular rhythm.      Heart sounds: Normal heart sounds. No murmur heard.     No gallop.   Pulmonary:      Effort: Pulmonary effort is normal. No respiratory distress.      Breath sounds: Normal breath sounds. No wheezing or rales.   Musculoskeletal:      Right foot: No deformity.      Left foot: No deformity.   Feet:      Right foot:      Protective Sensation: 10 sites tested.  10 sites sensed.      Skin integrity: No ulcer, blister, skin breakdown, erythema, callus or fissure.      Left foot:      Protective Sensation: 10 sites tested.  10 sites sensed.      Skin integrity: No ulcer, blister, skin breakdown, erythema, callus or fissure.   Skin:     General: Skin is warm and dry.      Coloration: Skin is not jaundiced or pale.   Neurological:      Mental Status: She is alert.   Psychiatric:         Mood and Affect: Mood normal.         Behavior: Behavior normal.         Thought  Content: Thought content normal.         Judgment: Judgment normal.          Assessment and Plan  1. Type 2 diabetes mellitus without complication, without long-term current use of insulin  -     Foot Exam Performed  -     Hemoglobin A1C; Future; Expected date: 12/01/2023  -     metFORMIN (GLUCOPHAGE) 1000 MG tablet; Take 1 tablet (1,000 mg total) by mouth 2 (two) times daily with meals.  Dispense: 60 tablet; Refill: 5    2. Hypertension, unspecified type  -     CBC Auto Differential; Future; Expected date: 12/01/2023  -     Comprehensive Metabolic Panel; Future; Expected date: 12/01/2023  -     Lipid Panel; Future; Expected date: 12/01/2023    3. Hypothyroidism, unspecified type  -     TSH; Future; Expected date: 12/01/2023  -     T4, Free; Future; Expected date: 12/01/2023             Return to clinic in 1 month with blood glucose log.    Health Maintenance Topics with due status: Not Due       Topic Last Completion Date    Cervical Cancer Screening 11/28/2022    Diabetes Urine Screening 03/16/2023    Lipid Panel 07/25/2023    Hemoglobin A1c 07/25/2023    Low Dose Statin 11/01/2023    Foot Exam 12/01/2023

## 2023-12-06 ENCOUNTER — HOSPITAL ENCOUNTER (OUTPATIENT)
Dept: RADIOLOGY | Facility: HOSPITAL | Age: 43
Discharge: HOME | End: 2023-12-06
Attending: FAMILY MEDICINE
Payer: MEDICAID

## 2023-12-06 ENCOUNTER — PATIENT MESSAGE (OUTPATIENT)
Dept: DIABETES | Facility: CLINIC | Age: 43
End: 2023-12-06
Payer: MEDICAID

## 2023-12-06 VITALS — BODY MASS INDEX: 46.01 KG/M2 | HEIGHT: 62 IN | WEIGHT: 250 LBS

## 2023-12-06 DIAGNOSIS — Z12.31 ENCOUNTER FOR SCREENING MAMMOGRAM FOR MALIGNANT NEOPLASM OF BREAST: ICD-10-CM

## 2023-12-06 PROCEDURE — 77067 SCR MAMMO BI INCL CAD: CPT | Mod: TC

## 2023-12-07 ENCOUNTER — TELEPHONE (OUTPATIENT)
Dept: FAMILY MEDICINE | Facility: CLINIC | Age: 43
End: 2023-12-07
Payer: MEDICAID

## 2023-12-07 NOTE — TELEPHONE ENCOUNTER
----- Message from Maribel Salgado LPN sent at 12/6/2023  6:18 PM CST -----    ----- Message -----  From: Huber Clayton MD  Sent: 12/6/2023  12:01 PM CST  To: [facility] Staff    Mammogram is negative.  Repeat in 1 year.

## 2023-12-12 ENCOUNTER — OFFICE VISIT (OUTPATIENT)
Dept: GASTROENTEROLOGY | Facility: CLINIC | Age: 43
End: 2023-12-12
Payer: MEDICAID

## 2023-12-12 VITALS
BODY MASS INDEX: 48.43 KG/M2 | HEART RATE: 85 BPM | HEIGHT: 62 IN | SYSTOLIC BLOOD PRESSURE: 131 MMHG | WEIGHT: 263.19 LBS | DIASTOLIC BLOOD PRESSURE: 91 MMHG

## 2023-12-12 DIAGNOSIS — K76.0 FATTY LIVER: ICD-10-CM

## 2023-12-12 DIAGNOSIS — K31.84 GASTROPARESIS: Primary | ICD-10-CM

## 2023-12-12 PROCEDURE — 3061F PR NEG MICROALBUMINURIA RESULT DOCUMENTED/REVIEW: ICD-10-PCS | Mod: CPTII,,, | Performed by: NURSE PRACTITIONER

## 2023-12-12 PROCEDURE — 3080F DIAST BP >= 90 MM HG: CPT | Mod: CPTII,,, | Performed by: NURSE PRACTITIONER

## 2023-12-12 PROCEDURE — 1159F MED LIST DOCD IN RCRD: CPT | Mod: CPTII,,, | Performed by: NURSE PRACTITIONER

## 2023-12-12 PROCEDURE — 3008F BODY MASS INDEX DOCD: CPT | Mod: CPTII,,, | Performed by: NURSE PRACTITIONER

## 2023-12-12 PROCEDURE — 3075F SYST BP GE 130 - 139MM HG: CPT | Mod: CPTII,,, | Performed by: NURSE PRACTITIONER

## 2023-12-12 PROCEDURE — 3008F PR BODY MASS INDEX (BMI) DOCUMENTED: ICD-10-PCS | Mod: CPTII,,, | Performed by: NURSE PRACTITIONER

## 2023-12-12 PROCEDURE — 3080F PR MOST RECENT DIASTOLIC BLOOD PRESSURE >= 90 MM HG: ICD-10-PCS | Mod: CPTII,,, | Performed by: NURSE PRACTITIONER

## 2023-12-12 PROCEDURE — 3066F PR DOCUMENTATION OF TREATMENT FOR NEPHROPATHY: ICD-10-PCS | Mod: CPTII,,, | Performed by: NURSE PRACTITIONER

## 2023-12-12 PROCEDURE — 99214 PR OFFICE/OUTPT VISIT, EST, LEVL IV, 30-39 MIN: ICD-10-PCS | Mod: S$PBB,,, | Performed by: NURSE PRACTITIONER

## 2023-12-12 PROCEDURE — 3051F PR MOST RECENT HEMOGLOBIN A1C LEVEL 7.0 - < 8.0%: ICD-10-PCS | Mod: CPTII,,, | Performed by: NURSE PRACTITIONER

## 2023-12-12 PROCEDURE — 3061F NEG MICROALBUMINURIA REV: CPT | Mod: CPTII,,, | Performed by: NURSE PRACTITIONER

## 2023-12-12 PROCEDURE — 3066F NEPHROPATHY DOC TX: CPT | Mod: CPTII,,, | Performed by: NURSE PRACTITIONER

## 2023-12-12 PROCEDURE — 1159F PR MEDICATION LIST DOCUMENTED IN MEDICAL RECORD: ICD-10-PCS | Mod: CPTII,,, | Performed by: NURSE PRACTITIONER

## 2023-12-12 PROCEDURE — 3051F HG A1C>EQUAL 7.0%<8.0%: CPT | Mod: CPTII,,, | Performed by: NURSE PRACTITIONER

## 2023-12-12 PROCEDURE — 99214 OFFICE O/P EST MOD 30 MIN: CPT | Mod: PBBFAC | Performed by: NURSE PRACTITIONER

## 2023-12-12 PROCEDURE — 99214 OFFICE O/P EST MOD 30 MIN: CPT | Mod: S$PBB,,, | Performed by: NURSE PRACTITIONER

## 2023-12-12 PROCEDURE — 3075F PR MOST RECENT SYSTOLIC BLOOD PRESS GE 130-139MM HG: ICD-10-PCS | Mod: CPTII,,, | Performed by: NURSE PRACTITIONER

## 2023-12-12 NOTE — PROGRESS NOTES
Mary Magana is a 43 y.o. female here for No chief complaint on file.        PCP: Huber Clayton  Referring Provider: No referring provider defined for this encounter.     HPI:  Presents for follow-up after EGD.  EGD on 09/01/2023, large amount of retained food noted in the stomach with refluxing from the duodenal.  Pyloric channel was open.  The patient was previously taking Victoza.  It was recommended that Victoza be stopped due to delayed gastric emptying.  Patient was rescheduled for an EGD.  EGD is scheduled for 12/28/23.  Advised that she should stay on clear liquids the entire day before EGD.  She has stopped Victoza.  Victoza was stopped on the day of the EGD.  She denies any epigastric pain, reflux, nausea, or vomiting.  Endorses early satiety.  No hematochezia or melena.  CBC from 12/01 reviewed, HGB is 13.4 and HCT is 42.5.  She does have a history of fatty liver.  Liver ultrasound on 03/21/2023 did show fatty liver.  We have discussed weight loss and exercise of 150 minutes per week.  Also recommend good glucose control.  Reports that she was started on metformin after stopping Victoza.  Reports that glucose has been elevated recently.  Also endorses loose stools related to metformin.          ROS:  Review of Systems   Constitutional:  Positive for appetite change (early satiety). Negative for fatigue, fever and unexpected weight change.   HENT:  Negative for trouble swallowing.    Gastrointestinal:  Positive for diarrhea. Negative for abdominal pain, blood in stool, change in bowel habit, constipation, nausea, vomiting and reflux.   Musculoskeletal:  Negative for gait problem.   Integumentary:  Negative for pallor.   Psychiatric/Behavioral:  The patient is not nervous/anxious.           PMHX:  has a past medical history of Diabetes mellitus, Hypertension, Thyroid disease, and WPW (Ilana-Parkinson-White syndrome).    PSHX:  has a past surgical history that includes Cholecystectomy and  "Tubal ligation.    PFHX: family history includes Diabetes in her father, mother, and sister; Hypertension in her mother; Leukemia in her daughter.    PSlHX:  reports that she has been smoking cigarettes and vaping with nicotine. She has a 3.8 pack-year smoking history. She has been exposed to tobacco smoke. She has never used smokeless tobacco. She reports that she does not drink alcohol and does not use drugs.        Review of patient's allergies indicates:   Allergen Reactions    Anesthesia s/i-40 (propofol) [propofol] Itching     Anesthesia, possibly propofol; made her feel like there were ants all over her       Medication List with Changes/Refills   Current Medications    ASPIRIN (ECOTRIN) 81 MG EC TABLET    TAKE 1 TABLET BY MOUTH DAILY WITH FOOD    ATORVASTATIN (LIPITOR) 10 MG TABLET    Take 1 tablet (10 mg total) by mouth every evening.    CLONAZEPAM (KLONOPIN) 1 MG TABLET    Take 1 mg by mouth nightly as needed for Anxiety.    EMPAGLIFLOZIN (JARDIANCE) 10 MG TABLET    Take 10 mg by mouth once daily.    FERROUS SULFATE (IRON) 325 MG (65 MG IRON) TAB TABLET    Take 1 tablet (325 mg total) by mouth daily with breakfast.    HYDROXYZINE PAMOATE (VISTARIL) 25 MG CAP    Take 1 to 2 capsule up to tid prn for severe anxiety    LEVOTHYROXINE (SYNTHROID) 125 MCG TABLET    Take 1 tablet (125 mcg total) by mouth before breakfast.    METFORMIN (GLUCOPHAGE) 1000 MG TABLET    Take 1 tablet (1,000 mg total) by mouth 2 (two) times daily with meals.    METOPROLOL TARTRATE (LOPRESSOR) 25 MG TABLET    Take 1 tablet (25 mg total) by mouth 2 (two) times daily.    TRUEPLUS LANCETS 30 GAUGE MISC    AS DIRECTED        Objective Findings:  Vital Signs:  BP (!) 131/91   Pulse 85   Ht 5' 2" (1.575 m)   Wt 119.4 kg (263 lb 3.2 oz)   LMP 11/11/2023 (Exact Date)   BMI 48.14 kg/m²  Body mass index is 48.14 kg/m².    Physical Exam:  Physical Exam  Vitals and nursing note reviewed.   Constitutional:       General: She is not in acute " distress.     Appearance: Normal appearance.   HENT:      Mouth/Throat:      Mouth: Mucous membranes are moist.   Cardiovascular:      Rate and Rhythm: Normal rate.   Pulmonary:      Effort: Pulmonary effort is normal.      Breath sounds: No wheezing, rhonchi or rales.   Abdominal:      General: Bowel sounds are normal. There is no distension.      Palpations: Abdomen is soft. There is no mass.      Tenderness: There is no abdominal tenderness. There is no guarding.   Skin:     General: Skin is warm and dry.      Coloration: Skin is not jaundiced or pale.   Neurological:      Mental Status: She is alert and oriented to person, place, and time.   Psychiatric:         Mood and Affect: Mood normal.          Labs:  Lab Results   Component Value Date    WBC 9.71 12/01/2023    HGB 13.4 12/01/2023    HCT 42.5 12/01/2023    MCV 74.2 (L) 12/01/2023    RDW 13.8 12/01/2023     12/01/2023    LYMPH 41.3 (H) 12/01/2023    LYMPH 4.01 12/01/2023    MONO 5.5 12/01/2023    EOS 0.56 (H) 12/01/2023    BASO 0.07 12/01/2023     Lab Results   Component Value Date     12/01/2023    K 4.0 12/01/2023     12/01/2023    CO2 28 12/01/2023     (H) 12/01/2023    BUN 10 12/01/2023    CREATININE 0.96 12/01/2023    CALCIUM 8.9 12/01/2023    PROT 7.1 12/01/2023    ALBUMIN 3.4 (L) 12/01/2023    BILITOT 0.5 12/01/2023    ALKPHOS 133 (H) 12/01/2023    AST 21 12/01/2023    ALT 50 12/01/2023         Imaging: Mammo Digital Screening Bilat w/ Ector    Result Date: 12/6/2023  Result: Mammo Digital Screening Bilat w/ Ector  History: Patient is 43 y.o. and is seen for a screening mammogram. Films Compared: Compared to: 06/24/2022 Mammo Digital Screening Bilat  Findings: This procedure was performed using tomosynthesis. Computer-aided detection was utilized in the interpretation of this examination. The breasts have scattered areas of fibroglandular density. There is no evidence of suspicious masses, calcifications, or other abnormal  findings.     Bilateral There is no mammographic evidence of malignancy. BI-RADS Category: Overall: 1 - Negative  Recommendation: Routine screening mammogram in 1 year is recommended. Your estimated lifetime risk of breast cancer (to age 85) based on Tyrer-Cuzick risk assessment model is Tyrer-Cuzick: 5.78 %. According to the American Cancer Society, patients with a lifetime breast cancer risk of 20% or higher might benefit from supplemental screening tests.         Assessment:  Mary Magana is a 43 y.o. female here with:  1. Gastroparesis    2. Fatty liver          Recommendations:  1. Do not lay down within 3 hours of eating.  Avoid spicy, greasy foods  Eat 6-8 small meals per day.  Exercise 150 minutes per week  Avoid raw fruits and vegetables  Small amount of protein and fiber at one time  Written gastroparesis diet  Clear liquids for 1 day prior to EGD  2. Keep appointment for EGD this scheduled for 12/28/2023. Schedule gastric emptying study  3. Schedule liver ultrasound and elastography    Follow up in about 3 months (around 3/12/2024).      Order summary:  Orders Placed This Encounter    US Abdomen Limited    US Elastography Liver w/imaging    NM Gastric Emptying       Thank you for allowing me to participate in the care of Mary Magana.      SULEIMAN Acevedo

## 2023-12-12 NOTE — PATIENT INSTRUCTIONS
Do not lay down within 3 hours of eating.  Avoid spicy, greasy foods  Eat 6-8 small meals per day.  Exercise 150 minutes per week  Avoid raw fruits and vegetables  Small amount of protein and fiber at one time    Clear liquid diet the day prior to EGD

## 2023-12-27 ENCOUNTER — HOSPITAL ENCOUNTER (OUTPATIENT)
Dept: RADIOLOGY | Facility: HOSPITAL | Age: 43
Discharge: HOME | End: 2023-12-27
Attending: NURSE PRACTITIONER
Payer: MEDICAID

## 2023-12-27 DIAGNOSIS — K76.0 FATTY LIVER: ICD-10-CM

## 2023-12-27 PROCEDURE — 76981 US ELASTOGRAPHY LIVER W/ IMAGING: ICD-10-PCS | Mod: 26,,, | Performed by: RADIOLOGY

## 2023-12-27 PROCEDURE — 76705 US ABDOMEN LIMITED: ICD-10-PCS | Mod: 26,59,, | Performed by: RADIOLOGY

## 2023-12-27 PROCEDURE — 76705 ECHO EXAM OF ABDOMEN: CPT | Mod: 26,59,, | Performed by: RADIOLOGY

## 2023-12-27 PROCEDURE — 76981 USE PARENCHYMA: CPT | Mod: 26,,, | Performed by: RADIOLOGY

## 2023-12-27 PROCEDURE — 76981 USE PARENCHYMA: CPT | Mod: TC,59

## 2023-12-27 PROCEDURE — 76705 ECHO EXAM OF ABDOMEN: CPT | Mod: TC

## 2023-12-28 ENCOUNTER — ANESTHESIA EVENT (OUTPATIENT)
Dept: GASTROENTEROLOGY | Facility: HOSPITAL | Age: 43
End: 2023-12-28
Payer: MEDICAID

## 2023-12-28 ENCOUNTER — ANESTHESIA (OUTPATIENT)
Dept: GASTROENTEROLOGY | Facility: HOSPITAL | Age: 43
End: 2023-12-28
Payer: MEDICAID

## 2023-12-28 ENCOUNTER — HOSPITAL ENCOUNTER (OUTPATIENT)
Dept: GASTROENTEROLOGY | Facility: HOSPITAL | Age: 43
Discharge: HOME | End: 2023-12-28
Attending: INTERNAL MEDICINE
Payer: MEDICAID

## 2023-12-28 VITALS
RESPIRATION RATE: 17 BRPM | HEART RATE: 65 BPM | DIASTOLIC BLOOD PRESSURE: 69 MMHG | HEIGHT: 62 IN | WEIGHT: 263 LBS | SYSTOLIC BLOOD PRESSURE: 123 MMHG | TEMPERATURE: 98 F | OXYGEN SATURATION: 98 % | BODY MASS INDEX: 48.4 KG/M2

## 2023-12-28 DIAGNOSIS — A04.8 H. PYLORI INFECTION: ICD-10-CM

## 2023-12-28 DIAGNOSIS — K29.00 ACUTE SUPERFICIAL GASTRITIS WITHOUT HEMORRHAGE: ICD-10-CM

## 2023-12-28 DIAGNOSIS — K31.84 GASTROPARESIS: ICD-10-CM

## 2023-12-28 DIAGNOSIS — R11.0 NAUSEA: Primary | ICD-10-CM

## 2023-12-28 LAB
GLUCOSE SERPL-MCNC: 127 MG/DL (ref 70–105)
GLUCOSE SERPL-MCNC: 137 MG/DL (ref 70–105)

## 2023-12-28 PROCEDURE — 63600175 PHARM REV CODE 636 W HCPCS: Performed by: INTERNAL MEDICINE

## 2023-12-28 PROCEDURE — 88342 SURGICAL PATHOLOGY: ICD-10-PCS | Mod: 26,,, | Performed by: PATHOLOGY

## 2023-12-28 PROCEDURE — 25000003 PHARM REV CODE 250: Performed by: NURSE ANESTHETIST, CERTIFIED REGISTERED

## 2023-12-28 PROCEDURE — 88313 SPECIAL STAINS GROUP 2: CPT | Mod: 26,,, | Performed by: PATHOLOGY

## 2023-12-28 PROCEDURE — 88305 TISSUE EXAM BY PATHOLOGIST: CPT | Mod: 26,,, | Performed by: PATHOLOGY

## 2023-12-28 PROCEDURE — 43239 EGD BIOPSY SINGLE/MULTIPLE: CPT | Mod: ,,, | Performed by: INTERNAL MEDICINE

## 2023-12-28 PROCEDURE — 82962 GLUCOSE BLOOD TEST: CPT

## 2023-12-28 PROCEDURE — 27000716 HC OXISENSOR PROBE, ANY SIZE: Performed by: NURSE ANESTHETIST, CERTIFIED REGISTERED

## 2023-12-28 PROCEDURE — 43239 PR EGD, FLEX, W/BIOPSY, SGL/MULTI: ICD-10-PCS | Mod: ,,, | Performed by: INTERNAL MEDICINE

## 2023-12-28 PROCEDURE — 88342 IMHCHEM/IMCYTCHM 1ST ANTB: CPT | Mod: 26,,, | Performed by: PATHOLOGY

## 2023-12-28 PROCEDURE — 43239 EGD BIOPSY SINGLE/MULTIPLE: CPT | Performed by: INTERNAL MEDICINE

## 2023-12-28 PROCEDURE — 88313 SURGICAL PATHOLOGY: ICD-10-PCS | Mod: 26,,, | Performed by: PATHOLOGY

## 2023-12-28 PROCEDURE — 27000284 HC CANNULA NASAL: Performed by: NURSE ANESTHETIST, CERTIFIED REGISTERED

## 2023-12-28 PROCEDURE — 37000008 HC ANESTHESIA 1ST 15 MINUTES

## 2023-12-28 PROCEDURE — 37000009 HC ANESTHESIA EA ADD 15 MINS

## 2023-12-28 PROCEDURE — 27201423 OPTIME MED/SURG SUP & DEVICES STERILE SUPPLY

## 2023-12-28 PROCEDURE — 88305 SURGICAL PATHOLOGY: ICD-10-PCS | Mod: 26,,, | Performed by: PATHOLOGY

## 2023-12-28 PROCEDURE — 88305 TISSUE EXAM BY PATHOLOGIST: CPT | Mod: TC,SUR | Performed by: INTERNAL MEDICINE

## 2023-12-28 PROCEDURE — D9220A PRA ANESTHESIA: Mod: ,,, | Performed by: NURSE ANESTHETIST, CERTIFIED REGISTERED

## 2023-12-28 PROCEDURE — D9220A PRA ANESTHESIA: ICD-10-PCS | Mod: ,,, | Performed by: NURSE ANESTHETIST, CERTIFIED REGISTERED

## 2023-12-28 RX ORDER — ONDANSETRON 2 MG/ML
4 INJECTION INTRAMUSCULAR; INTRAVENOUS ONCE
Status: COMPLETED | OUTPATIENT
Start: 2023-12-28 | End: 2023-12-28

## 2023-12-28 RX ORDER — FAMOTIDINE 40 MG/1
40 TABLET, FILM COATED ORAL DAILY
Qty: 30 TABLET | Refills: 4 | Status: SHIPPED | OUTPATIENT
Start: 2023-12-28 | End: 2024-12-27

## 2023-12-28 RX ORDER — LIDOCAINE HYDROCHLORIDE 20 MG/ML
INJECTION, SOLUTION EPIDURAL; INFILTRATION; INTRACAUDAL; PERINEURAL
Status: DISCONTINUED | OUTPATIENT
Start: 2023-12-28 | End: 2023-12-28

## 2023-12-28 RX ORDER — ETOMIDATE 2 MG/ML
INJECTION INTRAVENOUS
Status: DISCONTINUED | OUTPATIENT
Start: 2023-12-28 | End: 2023-12-28

## 2023-12-28 RX ORDER — SODIUM CHLORIDE 0.9 % (FLUSH) 0.9 %
10 SYRINGE (ML) INJECTION
Status: DISCONTINUED | OUTPATIENT
Start: 2023-12-28 | End: 2023-12-29 | Stop reason: HOSPADM

## 2023-12-28 RX ADMIN — ONDANSETRON 4 MG: 2 INJECTION INTRAMUSCULAR; INTRAVENOUS at 01:12

## 2023-12-28 RX ADMIN — LIDOCAINE HYDROCHLORIDE 10 MG: 20 INJECTION, SOLUTION INTRAVENOUS at 01:12

## 2023-12-28 RX ADMIN — SODIUM CHLORIDE: 9 INJECTION, SOLUTION INTRAVENOUS at 12:12

## 2023-12-28 RX ADMIN — ETOMIDATE 20 MG: 20 INJECTION, SOLUTION INTRAVENOUS at 01:12

## 2023-12-28 RX ADMIN — ETOMIDATE 5 MG: 20 INJECTION, SOLUTION INTRAVENOUS at 01:12

## 2023-12-28 NOTE — PROGRESS NOTES
Patient sitting on side of bed talking with daughter, and smiling. Patient denies nausea, vomiting, states headache was present before procedure.  Discharge report and instructions given. Patient and daughter verbalized understanding of all instructions. Patient discharged via wheelchair to car. No distress noted.

## 2023-12-28 NOTE — PROGRESS NOTES
Patient states nausea still the same, also c/o headache. Ice chips offered, pt is sucking on ice chips, BP and vomiting has improved since receiving Zofran. Pt also c/o of lip hurting. Small cut noted inside lip from where patient had bit down on bite block. No bleeding from lip noted. Will continue to monitor.

## 2023-12-28 NOTE — DISCHARGE INSTRUCTIONS
Procedure Date  12/28/23     Impression  Overall Impression:   Performed forceps biopsies in the stomach  Erythematous mucosa in the fundus of the stomach and antrum; performed cold forceps biopsy  Mucosa of the esophagus was normal  appearing with z-line at 38cm. The stomach did not have retained food. Mild gastric erythema was noted and biopsies were obtained. Mucosa of the duodenum was normal appearing.     Recommendation    Await pathology results      Avoid nsaids; eat small frequent meals.     Discharge: disp: DC to home. Resume diet. No driving x 24 hours.     PPI or H2RA daily x 8 weeks.    Gastric Emptying Study - Gerald. 10, 2024 @ 8:30 [facility]     F/U with PCP and     GI clinic appt - March 13, 2024 @ 8:00am    Dx: nausea, gastritis, delayed gastric emptying.    No driving today, no operating heavy machinery, no signing any legal documents until tomorrow.    Drink lots of fluids, resume regular diet.  Take your normal medications.     Please call our office for any nausea, vomiting or abdominal pain.

## 2023-12-28 NOTE — H&P
[facility]  Gastroenterology  H&P    Patient Name: Mary Magana  MRN: [id number]  Admission Date: 12/28/2023  Code Status: Full Code    Attending Provider: Asher Cross MD   Primary Care Physician: Huber Clayton MD  Principal Problem:<principal problem not specified>    Subjective:     History of Present Illness: Pt had retained food during her EGD in 9/23; now for repeat EGD.    Past Medical History:   Diagnosis Date    Diabetes mellitus     Hypertension     Thyroid disease     WPW (Ilana-Parkinson-White syndrome)        Past Surgical History:   Procedure Laterality Date    CHOLECYSTECTOMY      TUBAL LIGATION         Review of patient's allergies indicates:   Allergen Reactions    Anesthesia s/i-40 (propofol) [propofol] Itching     Anesthesia, possibly propofol; made her feel like there were ants all over her     Family History       Problem Relation (Age of Onset)    Diabetes Mother, Father, Sister    Hypertension Mother    Leukemia Daughter          Tobacco Use    Smoking status: Every Day     Current packs/day: 0.25     Average packs/day: 0.3 packs/day for 15.0 years (3.8 ttl pk-yrs)     Types: Cigarettes, Vaping with nicotine     Last attempt to quit: 2022     Passive exposure: Past    Smokeless tobacco: Never    Tobacco comments:     1 pack per 2 weeks   Substance and Sexual Activity    Alcohol use: Never    Drug use: Never    Sexual activity: Not Currently     Review of Systems   Respiratory: Negative.     Cardiovascular: Negative.    Gastrointestinal:  Positive for nausea.     Objective:     Vital Signs (Most Recent):  Pulse: 62 (12/28/23 1156)  Resp: 15 (12/28/23 1156)  BP: (!) 153/89 (12/28/23 1156)  SpO2: 98 % (12/28/23 1156) Vital Signs (24h Range):  Pulse:  [62] 62  Resp:  [15] 15  SpO2:  [98 %] 98 %  BP: (153)/(89) 153/89     Weight: 119.3 kg (263 lb) (12/28/23 1156)  Body mass index is 48.1 kg/m².    No intake or output data in the 24 hours ending 12/28/23  "1301    Lines/Drains/Airways       Peripheral Intravenous Line  Duration                  Peripheral IV - Single Lumen 12/28/23 1216 22 G Anterior;Right Wrist <1 day                    Physical Exam  Vitals reviewed.   Constitutional:       General: She is not in acute distress.     Appearance: Normal appearance. She is well-developed. She is obese. She is not ill-appearing.   HENT:      Head: Normocephalic and atraumatic.      Nose: Nose normal.   Eyes:      Pupils: Pupils are equal, round, and reactive to light.   Cardiovascular:      Rate and Rhythm: Normal rate and regular rhythm.   Pulmonary:      Effort: Pulmonary effort is normal.      Breath sounds: Normal breath sounds. No wheezing.   Abdominal:      General: Abdomen is flat. Bowel sounds are normal. There is no distension.      Palpations: Abdomen is soft.      Tenderness: There is no abdominal tenderness. There is no guarding.   Skin:     General: Skin is warm and dry.      Coloration: Skin is not jaundiced.   Neurological:      Mental Status: She is alert.   Psychiatric:         Attention and Perception: Attention normal.         Mood and Affect: Affect normal.         Speech: Speech normal.         Behavior: Behavior is cooperative.      Comments: Pt was calm while speaking.         Significant Labs:  CBC: No results for input(s): "WBC", "HGB", "HCT", "PLT" in the last 48 hours.  CMP: No results for input(s): "GLU", "CALCIUM", "ALBUMIN", "PROT", "NA", "K", "CO2", "CL", "BUN", "CREATININE", "ALKPHOS", "ALT", "AST", "BILITOT" in the last 48 hours.    Significant Imaging:  Imaging results within the past 24 hours have been reviewed.    Assessment/Plan:     There are no hospital problems to display for this patient.        Imp: nausea, delayed gastric emptying  Plan: egd    Asher Cross MD  Gastroenterology  [facility]  "

## 2023-12-28 NOTE — PROGRESS NOTES
Patient vomiting, CRNA and MD notified. New order for Zofran 4mg IV push now given.  Medication administered.  Will continue to monitor patient.

## 2023-12-28 NOTE — ANESTHESIA PREPROCEDURE EVALUATION
"                                                                                                             12/28/2023  Mary Magana is a 43 y.o., female.      Pre-op Assessment    I have reviewed the Patient Summary Reports.     I have reviewed the Nursing Notes. I have reviewed the NPO Status.   I have reviewed the Medications.     Review of Systems  Anesthesia Hx:    "Felt like ants were crawling on her" after possibly getting Propofol              Social:  Smoker, No Alcohol Use       Hematology/Oncology:       -- Anemia:                                  Cardiovascular:     Hypertension, well controlled                                        Hepatic/GI:     GERD, well controlled Liver Disease,            Musculoskeletal:  Arthritis   Lumbar Spondylosis  Lumbar radiculopathy       Spine Disorders: lumbar            Neurological:    Neuromuscular Disease,                                   Endocrine:  Diabetes, well controlled, type 2 Hypothyroidism          Psych:    depression                Physical Exam  General: Well nourished, Cooperative and Alert    Airway:  Mallampati: II   Mouth Opening: Normal  TM Distance: Normal  Tongue: Normal  Neck ROM: Normal ROM    Dental:  Intact        Anesthesia Plan  Type of Anesthesia, risks & benefits discussed:    Anesthesia Type: Gen Natural Airway, MAC  Intra-op Monitoring Plan: Standard ASA Monitors  Post Op Pain Control Plan: multimodal analgesia and IV/PO Opioids PRN  Induction:  IV  Informed Consent: Informed consent signed with the Patient and all parties understand the risks and agree with anesthesia plan.  All questions answered. Patient consented to blood products? Yes  ASA Score: 3  Day of Surgery Review of History & Physical: I have interviewed and examined the patient. I have reviewed the patient's H&P dated: There are no significant changes.     Ready For Surgery From Anesthesia Perspective.     .  Past Medical History:   Diagnosis Date    Diabetes " mellitus     Hypertension     Thyroid disease     WPW (Ilana-Parkinson-White syndrome)        Past Surgical History:   Procedure Laterality Date    CHOLECYSTECTOMY      TUBAL LIGATION         Family History   Problem Relation Age of Onset    Hypertension Mother     Diabetes Mother     Diabetes Father     Diabetes Sister     Leukemia Daughter        Social History     Socioeconomic History    Marital status: Single    Number of children: 6    Years of education: 12   Occupational History    Occupation: None   Tobacco Use    Smoking status: Every Day     Current packs/day: 0.25     Average packs/day: 0.3 packs/day for 15.0 years (3.8 ttl pk-yrs)     Types: Cigarettes, Vaping with nicotine     Last attempt to quit: 2022     Passive exposure: Past    Smokeless tobacco: Never    Tobacco comments:     1 pack per 2 weeks   Substance and Sexual Activity    Alcohol use: Never    Drug use: Never    Sexual activity: Not Currently     Social Determinants of Health     Financial Resource Strain: Medium Risk (11/9/2023)    Overall Financial Resource Strain (CARDIA)     Difficulty of Paying Living Expenses: Somewhat hard   Food Insecurity: No Food Insecurity (11/9/2023)    Hunger Vital Sign     Worried About Running Out of Food in the Last Year: Never true     Ran Out of Food in the Last Year: Never true   Transportation Needs: No Transportation Needs (11/9/2023)    PRAPARE - Transportation     Lack of Transportation (Medical): No     Lack of Transportation (Non-Medical): No   Physical Activity: Sufficiently Active (11/9/2023)    Exercise Vital Sign     Days of Exercise per Week: 7 days     Minutes of Exercise per Session: 30 min   Stress: Stress Concern Present (11/9/2023)    [employer] - Occupational Stress Questionnaire     Feeling of Stress : Very much   Social Connections: Moderately Integrated (11/9/2023)    Social Connection and Isolation Panel [NHANES]     Frequency of Communication with  Friends and Family: More than three times a week     Frequency of Social Gatherings with Friends and Family: Never     Attends Shinto Services: More than 4 times per year     Active Member of Clubs or Organizations: Yes     Attends Club or Organization Meetings: More than 4 times per year     Marital Status:    Housing Stability: Low Risk  (11/9/2023)    Housing Stability Vital Sign     Unable to Pay for Housing in the Last Year: No     Number of Places Lived in the Last Year: 1     Unstable Housing in the Last Year: No       Current Outpatient Medications   Medication Sig Dispense Refill    aspirin (ECOTRIN) 81 MG EC tablet TAKE 1 TABLET BY MOUTH DAILY WITH FOOD 30 tablet 5    atorvastatin (LIPITOR) 10 MG tablet Take 1 tablet (10 mg total) by mouth every evening. 30 tablet 3    clonazePAM (KLONOPIN) 1 MG tablet Take 1 mg by mouth nightly as needed for Anxiety.      empagliflozin (JARDIANCE) 10 mg tablet Take 10 mg by mouth once daily.      ferrous sulfate (IRON) 325 mg (65 mg iron) Tab tablet Take 1 tablet (325 mg total) by mouth daily with breakfast. 30 tablet 5    hydrOXYzine pamoate (VISTARIL) 25 MG Cap Take 1 to 2 capsule up to tid prn for severe anxiety 30 capsule 2    levothyroxine (SYNTHROID) 125 MCG tablet Take 1 tablet (125 mcg total) by mouth before breakfast. 30 tablet 5    metFORMIN (GLUCOPHAGE) 1000 MG tablet Take 1 tablet (1,000 mg total) by mouth 2 (two) times daily with meals. 60 tablet 5    metoprolol tartrate (LOPRESSOR) 25 MG tablet Take 1 tablet (25 mg total) by mouth 2 (two) times daily. 60 tablet 5    TRUEPLUS LANCETS 30 gauge Misc AS DIRECTED       No current facility-administered medications for this encounter.       Review of patient's allergies indicates:   Allergen Reactions    Anesthesia s/i-40 (propofol) [propofol] Itching     Anesthesia, possibly propofol; made her feel like there were ants all over her      Patient Active Problem List   Diagnosis    Diabetes 1.5, managed as  type 2    Hypertension    Gastroesophageal reflux disease    Obesity    Sick-euthyroid syndrome    Hypothyroidism    Goiter    Chest pain    Decreased strength    Lumbar spondylosis    Lumbosacral radiculopathy    Sacroiliitis    Elevated liver enzymes    Fatty liver    Iron deficiency anemia    Acute superficial gastritis without hemorrhage    Gastroparesis

## 2023-12-28 NOTE — TRANSFER OF CARE
"Anesthesia Transfer of Care Note    Patient: Mary Magana    Procedure(s) Performed: * No procedures listed *    Patient location: GI    Anesthesia Type: general    Transport from OR: Transported from OR on room air with adequate spontaneous ventilation. Continuous ECG monitoring in transport. Continuous SpO2 monitoring in transport    Post pain: adequate analgesia    Post assessment: no apparent anesthetic complications    Post vital signs: stable    Level of consciousness: sedated and responds to stimulation    Nausea/Vomiting: no nausea/vomiting    Complications: none    Transfer of care protocol was followedComments: Good SV continue, NAD, VSS, RTRN      Last vitals: Visit Vitals  BP (!) 203/91 (BP Location: Left forearm, Patient Position: Lying)   Pulse 86   Temp 36.6 °C (97.8 °F) (Oral)   Resp 16   Ht 5' 2" (1.575 m)   Wt 119.3 kg (263 lb)   LMP 11/11/2023 (Exact Date)   SpO2 100%   Breastfeeding No   BMI 48.10 kg/m²     "

## 2023-12-28 NOTE — ANESTHESIA POSTPROCEDURE EVALUATION
Anesthesia Post Evaluation    Patient: Mary Magana    Procedure(s) Performed: EGD with biopsies    Final Anesthesia Type: general      Patient location during evaluation: GI PACU  Patient participation: Yes- Able to Participate  Level of consciousness: awake and alert  Post-procedure vital signs: reviewed and stable  Pain management: adequate  Airway patency: patent    PONV status at discharge: vomiting (controlled)  Anesthetic complications: no      Cardiovascular status: blood pressure returned to baseline and hemodynamically stable  Respiratory status: spontaneous ventilation  Hydration status: euvolemic  Follow-up not needed.  Comments: Refer to nursing notes for pain/gabrielle score upon discharge from recovery.              Vitals Value Taken Time   /101 12/28/23 1332   Temp 36.6 °C (97.8 °F) 12/28/23 1309   Pulse 77 12/28/23 1331   Resp 17 12/28/23 1332   SpO2 100 % 12/28/23 1330   Vitals shown include unvalidated device data.      No case tracking events are documented in the log.      Pain/Gabrielle Score: Gabrielle Score: 9 (12/28/2023  1:11 PM)

## 2023-12-29 DIAGNOSIS — A04.8 H. PYLORI INFECTION: Primary | ICD-10-CM

## 2023-12-29 LAB
DHEA SERPL-MCNC: NORMAL
ESTROGEN SERPL-MCNC: NORMAL PG/ML
INSULIN SERPL-ACNC: NORMAL U[IU]/ML
LAB AP GROSS DESCRIPTION: NORMAL
LAB AP LABORATORY NOTES: NORMAL
T3RU NFR SERPL: NORMAL %

## 2023-12-29 RX ORDER — AMOXICILLIN 500 MG/1
1000 TABLET, FILM COATED ORAL EVERY 12 HOURS
Qty: 56 TABLET | Refills: 0 | Status: SHIPPED | OUTPATIENT
Start: 2023-12-29 | End: 2024-01-12

## 2023-12-29 RX ORDER — OMEPRAZOLE 40 MG/1
40 CAPSULE, DELAYED RELEASE ORAL DAILY
Qty: 30 CAPSULE | Refills: 2 | Status: SHIPPED | OUTPATIENT
Start: 2023-12-29 | End: 2024-12-28

## 2023-12-29 RX ORDER — CLARITHROMYCIN 500 MG/1
500 TABLET, FILM COATED ORAL EVERY 12 HOURS
Qty: 28 TABLET | Refills: 0 | Status: SHIPPED | OUTPATIENT
Start: 2023-12-29 | End: 2024-01-12

## 2024-01-02 ENCOUNTER — OFFICE VISIT (OUTPATIENT)
Dept: FAMILY MEDICINE | Facility: CLINIC | Age: 44
End: 2024-01-02
Payer: MEDICAID

## 2024-01-02 ENCOUNTER — TELEPHONE (OUTPATIENT)
Dept: FAMILY MEDICINE | Facility: CLINIC | Age: 44
End: 2024-01-02

## 2024-01-02 VITALS
RESPIRATION RATE: 18 BRPM | SYSTOLIC BLOOD PRESSURE: 107 MMHG | TEMPERATURE: 99 F | HEIGHT: 62 IN | BODY MASS INDEX: 48.4 KG/M2 | WEIGHT: 263 LBS | HEART RATE: 79 BPM | DIASTOLIC BLOOD PRESSURE: 79 MMHG | OXYGEN SATURATION: 100 %

## 2024-01-02 DIAGNOSIS — E11.9 TYPE 2 DIABETES MELLITUS WITHOUT COMPLICATION, WITHOUT LONG-TERM CURRENT USE OF INSULIN: Primary | ICD-10-CM

## 2024-01-02 DIAGNOSIS — E03.9 HYPOTHYROIDISM, UNSPECIFIED TYPE: ICD-10-CM

## 2024-01-02 PROCEDURE — 3008F BODY MASS INDEX DOCD: CPT | Mod: CPTII,,, | Performed by: FAMILY MEDICINE

## 2024-01-02 PROCEDURE — 1160F RVW MEDS BY RX/DR IN RCRD: CPT | Mod: CPTII,,, | Performed by: FAMILY MEDICINE

## 2024-01-02 PROCEDURE — 3078F DIAST BP <80 MM HG: CPT | Mod: CPTII,,, | Performed by: FAMILY MEDICINE

## 2024-01-02 PROCEDURE — 99213 OFFICE O/P EST LOW 20 MIN: CPT | Mod: ,,, | Performed by: FAMILY MEDICINE

## 2024-01-02 PROCEDURE — 3074F SYST BP LT 130 MM HG: CPT | Mod: CPTII,,, | Performed by: FAMILY MEDICINE

## 2024-01-02 PROCEDURE — 1159F MED LIST DOCD IN RCRD: CPT | Mod: CPTII,,, | Performed by: FAMILY MEDICINE

## 2024-01-02 RX ORDER — LEVOTHYROXINE SODIUM 125 UG/1
125 TABLET ORAL
Qty: 30 TABLET | Refills: 5 | Status: CANCELLED | OUTPATIENT
Start: 2024-01-02 | End: 2025-01-01

## 2024-01-02 RX ORDER — LEVOTHYROXINE SODIUM 137 UG/1
137 TABLET ORAL
Qty: 30 TABLET | Refills: 3 | Status: SHIPPED | OUTPATIENT
Start: 2024-01-02 | End: 2025-01-01

## 2024-01-02 NOTE — PROGRESS NOTES
Mary Magana is a 43 y.o. female seen today for follow-up on her lab work.  Unfortunately her A1c was elevated at 7.6 but she has been off her Victoza due to history of gastroparesis.  Patient also had a mildly elevated TSH and we discussed increasing her Synthroid dose.  We also discussed increasing her Jardiance to the 25 mg dose and watching her diet over the next 3 months.      Past Medical History:   Diagnosis Date    Diabetes mellitus     Hypertension     Thyroid disease     WPW (Ilana-Parkinson-White syndrome)      Family History   Problem Relation Age of Onset    Hypertension Mother     Diabetes Mother     Diabetes Father     Diabetes Sister     Leukemia Daughter      Current Outpatient Medications on File Prior to Visit   Medication Sig Dispense Refill    amoxicillin (AMOXIL) 500 MG Tab Take 2 tablets (1,000 mg total) by mouth every 12 (twelve) hours. for 14 days 56 tablet 0    aspirin (ECOTRIN) 81 MG EC tablet TAKE 1 TABLET BY MOUTH DAILY WITH FOOD 30 tablet 5    atorvastatin (LIPITOR) 10 MG tablet Take 1 tablet (10 mg total) by mouth every evening. 30 tablet 3    clarithromycin (BIAXIN) 500 MG tablet Take 1 tablet (500 mg total) by mouth every 12 (twelve) hours. for 14 days 28 tablet 0    clonazePAM (KLONOPIN) 1 MG tablet Take 1 mg by mouth nightly as needed for Anxiety.      famotidine (PEPCID) 40 MG tablet Take 1 tablet (40 mg total) by mouth once daily. 30 tablet 4    ferrous sulfate (IRON) 325 mg (65 mg iron) Tab tablet Take 1 tablet (325 mg total) by mouth daily with breakfast. 30 tablet 5    hydrOXYzine pamoate (VISTARIL) 25 MG Cap Take 1 to 2 capsule up to tid prn for severe anxiety 30 capsule 2    metFORMIN (GLUCOPHAGE) 1000 MG tablet Take 1 tablet (1,000 mg total) by mouth 2 (two) times daily with meals. 60 tablet 5    metoprolol tartrate (LOPRESSOR) 25 MG tablet Take 1 tablet (25 mg total) by mouth 2 (two) times daily. 60 tablet 5    omeprazole (PRILOSEC) 40 MG capsule Take 1 capsule  (40 mg total) by mouth once daily. 30 capsule 2    TRUEPLUS LANCETS 30 gauge Misc AS DIRECTED      [DISCONTINUED] empagliflozin (JARDIANCE) 10 mg tablet Take 10 mg by mouth once daily.      [DISCONTINUED] levothyroxine (SYNTHROID) 125 MCG tablet Take 1 tablet (125 mcg total) by mouth before breakfast. 30 tablet 5     No current facility-administered medications on file prior to visit.       There is no immunization history on file for this patient.    Review of Systems   Constitutional:  Negative for fever, malaise/fatigue and weight loss.   Respiratory:  Negative for shortness of breath.    Cardiovascular:  Negative for chest pain and palpitations.   Gastrointestinal:  Negative for nausea and vomiting.   Psychiatric/Behavioral:  Negative for depression.         Vitals:    01/02/24 1533   BP: 107/79   Pulse: 79   Resp: 18   Temp: 98.8 °F (37.1 °C)       Physical Exam  Eyes:      Conjunctiva/sclera: Conjunctivae normal.   Pulmonary:      Effort: Pulmonary effort is normal.   Neurological:      Mental Status: She is alert.   Psychiatric:         Mood and Affect: Mood normal.         Behavior: Behavior normal.         Thought Content: Thought content normal.         Judgment: Judgment normal.          Assessment and Plan  1. Type 2 diabetes mellitus without complication, without long-term current use of insulin  -     empagliflozin (JARDIANCE) 25 mg tablet; Take 1 tablet (25 mg total) by mouth once daily.  Dispense: 30 tablet; Refill: 3    2. Hypothyroidism, unspecified type  -     levothyroxine (SYNTHROID) 137 MCG Tab tablet; Take 1 tablet (137 mcg total) by mouth before breakfast.  Dispense: 30 tablet; Refill: 3             Return to clinic in 3 months or as needed.  Health Maintenance Topics with due status: Not Due       Topic Last Completion Date    Cervical Cancer Screening 11/28/2022    Diabetes Urine Screening 03/16/2023    Foot Exam 12/01/2023    Lipid Panel 12/01/2023    Hemoglobin A1c 12/01/2023    Mammogram  12/06/2023    Low Dose Statin 12/12/2023

## 2024-01-02 NOTE — TELEPHONE ENCOUNTER
----- Message from Maribel Salgado LPN sent at 12/4/2023  4:08 PM CST -----    ----- Message -----  From: Huber Clayton MD  Sent: 12/4/2023   7:53 AM CST  To: [facility]go Staff    This visit for diabetes and thyroid studies.

## 2024-02-27 ENCOUNTER — HOSPITAL ENCOUNTER (EMERGENCY)
Facility: HOSPITAL | Age: 44
Discharge: HOME | End: 2024-02-27
Attending: EMERGENCY MEDICINE
Payer: MEDICAID

## 2024-02-27 VITALS
RESPIRATION RATE: 18 BRPM | HEART RATE: 75 BPM | DIASTOLIC BLOOD PRESSURE: 87 MMHG | TEMPERATURE: 98 F | BODY MASS INDEX: 47.66 KG/M2 | HEIGHT: 62 IN | WEIGHT: 259 LBS | OXYGEN SATURATION: 100 % | SYSTOLIC BLOOD PRESSURE: 147 MMHG

## 2024-02-27 DIAGNOSIS — R07.9 CHEST PAIN, UNSPECIFIED TYPE: Primary | ICD-10-CM

## 2024-02-27 DIAGNOSIS — R07.9 CHEST PAIN: ICD-10-CM

## 2024-02-27 LAB
ALBUMIN SERPL BCP-MCNC: 3.3 G/DL (ref 3.5–5)
ALBUMIN/GLOB SERPL: 0.8 {RATIO}
ALP SERPL-CCNC: 91 U/L (ref 37–98)
ALT SERPL W P-5'-P-CCNC: 28 U/L (ref 13–56)
ANION GAP SERPL CALCULATED.3IONS-SCNC: 8 MMOL/L (ref 7–16)
AST SERPL W P-5'-P-CCNC: 23 U/L (ref 15–37)
BASOPHILS # BLD AUTO: 0.07 K/UL (ref 0–0.2)
BASOPHILS NFR BLD AUTO: 1 % (ref 0–1)
BILIRUB SERPL-MCNC: 0.5 MG/DL (ref ?–1.2)
BUN SERPL-MCNC: 8 MG/DL (ref 7–18)
BUN/CREAT SERPL: 8 (ref 6–20)
CALCIUM SERPL-MCNC: 9 MG/DL (ref 8.5–10.1)
CHLORIDE SERPL-SCNC: 108 MMOL/L (ref 98–107)
CO2 SERPL-SCNC: 27 MMOL/L (ref 21–32)
CREAT SERPL-MCNC: 0.98 MG/DL (ref 0.55–1.02)
DIFFERENTIAL METHOD BLD: ABNORMAL
EGFR (NO RACE VARIABLE) (RUSH/TITUS): 74 ML/MIN/1.73M2
EOSINOPHIL # BLD AUTO: 0.42 K/UL (ref 0–0.5)
EOSINOPHIL NFR BLD AUTO: 6.1 % (ref 1–4)
ERYTHROCYTE [DISTWIDTH] IN BLOOD BY AUTOMATED COUNT: 13.9 % (ref 11.5–14.5)
GLOBULIN SER-MCNC: 4.2 G/DL (ref 2–4)
GLUCOSE SERPL-MCNC: 103 MG/DL (ref 74–106)
HCT VFR BLD AUTO: 42.1 % (ref 38–47)
HGB BLD-MCNC: 13 G/DL (ref 12–16)
HYPOCHROMIA BLD QL SMEAR: NORMAL
IMM GRANULOCYTES # BLD AUTO: 0.02 K/UL (ref 0–0.04)
IMM GRANULOCYTES NFR BLD: 0.3 % (ref 0–0.4)
INR BLD: 1.04
LYMPHOCYTES # BLD AUTO: 2.61 K/UL (ref 1–4.8)
LYMPHOCYTES NFR BLD AUTO: 37.9 % (ref 27–41)
MCH RBC QN AUTO: 22.6 PG (ref 27–31)
MCHC RBC AUTO-ENTMCNC: 30.9 G/DL (ref 32–36)
MCV RBC AUTO: 73.2 FL (ref 80–96)
MICROCYTES BLD QL SMEAR: NORMAL
MONOCYTES # BLD AUTO: 0.39 K/UL (ref 0–0.8)
MONOCYTES NFR BLD AUTO: 5.7 % (ref 2–6)
MPC BLD CALC-MCNC: 11.2 FL (ref 9.4–12.4)
NEUTROPHILS # BLD AUTO: 3.38 K/UL (ref 1.8–7.7)
NEUTROPHILS NFR BLD AUTO: 49 % (ref 53–65)
NRBC # BLD AUTO: 0 X10E3/UL
NRBC, AUTO (.00): 0 %
PLATELET # BLD AUTO: 223 K/UL (ref 150–400)
PLATELET MORPHOLOGY: NORMAL
POTASSIUM SERPL-SCNC: 3.7 MMOL/L (ref 3.5–5.1)
PROT SERPL-MCNC: 7.5 G/DL (ref 6.4–8.2)
PROTHROMBIN TIME: 13.5 SECONDS (ref 11.7–14.7)
RBC # BLD AUTO: 5.75 M/UL (ref 4.2–5.4)
SODIUM SERPL-SCNC: 139 MMOL/L (ref 136–145)
STOMATOCYTES BLD QL SMEAR: NORMAL
TROPONIN I SERPL DL<=0.01 NG/ML-MCNC: <4 PG/ML
TROPONIN I SERPL DL<=0.01 NG/ML-MCNC: <4 PG/ML
WBC # BLD AUTO: 6.89 K/UL (ref 4.5–11)

## 2024-02-27 PROCEDURE — 80053 COMPREHEN METABOLIC PANEL: CPT | Performed by: EMERGENCY MEDICINE

## 2024-02-27 PROCEDURE — 99285 EMERGENCY DEPT VISIT HI MDM: CPT | Mod: 25

## 2024-02-27 PROCEDURE — 93010 ELECTROCARDIOGRAM REPORT: CPT | Mod: ,,, | Performed by: INTERNAL MEDICINE

## 2024-02-27 PROCEDURE — 84484 ASSAY OF TROPONIN QUANT: CPT | Performed by: EMERGENCY MEDICINE

## 2024-02-27 PROCEDURE — 99284 EMERGENCY DEPT VISIT MOD MDM: CPT | Mod: ,,, | Performed by: EMERGENCY MEDICINE

## 2024-02-27 PROCEDURE — 93005 ELECTROCARDIOGRAM TRACING: CPT

## 2024-02-27 PROCEDURE — 85610 PROTHROMBIN TIME: CPT | Performed by: EMERGENCY MEDICINE

## 2024-02-27 PROCEDURE — 85025 COMPLETE CBC W/AUTO DIFF WBC: CPT | Performed by: EMERGENCY MEDICINE

## 2024-02-27 NOTE — ED TRIAGE NOTES
Pt presents to ED via POV with c/o anterior left sided chest pain, left arm & shoulder pain, and intermittent SOB. Pt reports hx of WPW syndrome.

## 2024-02-28 NOTE — ED PROVIDER NOTES
Encounter Date: 2/27/2024    SCRIBE #1 NOTE: I, Nile Vang MD, am scribing for, and in the presence of,  Refugio Julian. I have scribed the entire note.       History     Chief Complaint   Patient presents with    Chest Pain     43 y.o.female presented to the ED for chest pains. PT stated the pain started 2-3 hours ago and her left arm hurts as well. PT doesn't have a current heart DR, but she did stated that 4 years ago she was diagnosed with Ilana-Parkinson-White.PT has  HX of smoking. PT also has a medical HX of Diabetes, hypertension, thyroid disease, and WPW.     The history is provided by the patient. No  was used.   Chest Pain  The current episode started 2 to 3 hours ago. Pertinent negatives for primary symptoms include no fever, no fatigue, no syncope, no cough and no palpitations.   Her past medical history is significant for diabetes, hypertension and thyroid problem.     Review of patient's allergies indicates:   Allergen Reactions    Anesthesia s/i-40 (propofol) [propofol] Itching     Anesthesia, possibly propofol; made her feel like there were ants all over her     Past Medical History:   Diagnosis Date    Diabetes mellitus     Hypertension     Thyroid disease     WPW (Ilana-Parkinson-White syndrome)      Past Surgical History:   Procedure Laterality Date    CHOLECYSTECTOMY      TUBAL LIGATION       Family History   Problem Relation Age of Onset    Hypertension Mother     Diabetes Mother     Diabetes Father     Diabetes Sister     Leukemia Daughter      Social History     Tobacco Use    Smoking status: Some Days     Current packs/day: 0.25     Average packs/day: 0.3 packs/day for 15.0 years (3.8 ttl pk-yrs)     Types: Cigarettes, Vaping with nicotine     Last attempt to quit: 2022     Passive exposure: Past    Smokeless tobacco: Never    Tobacco comments:     1 pack per 2 weeks   Substance Use Topics    Alcohol use: Never    Drug use: Never     Review of Systems    Constitutional:  Negative for fatigue and fever.   Respiratory:  Negative for cough.    Cardiovascular:  Positive for chest pain. Negative for palpitations and syncope.   All other systems reviewed and are negative.      Physical Exam     Initial Vitals [02/27/24 1755]   BP Pulse Resp Temp SpO2   (!) 168/90 91 18 98.4 °F (36.9 °C) 100 %      MAP       --         Physical Exam    Nursing note and vitals reviewed.  Constitutional: She appears well-developed and well-nourished.   Eyes: EOM are normal.   Neck:   Normal range of motion.  Cardiovascular:  Normal rate, regular rhythm, normal heart sounds and intact distal pulses.           Pulmonary/Chest: She exhibits tenderness.   PT has chest pains    Abdominal: Abdomen is soft.   Musculoskeletal:         General: Normal range of motion.      Cervical back: Normal range of motion.      Comments: Left arm pain      Neurological: She is alert.   Skin: Skin is warm.   Psychiatric: She has a normal mood and affect. Her behavior is normal. Judgment and thought content normal.         ED Course   Procedures  Labs Reviewed   COMPREHENSIVE METABOLIC PANEL - Abnormal; Notable for the following components:       Result Value    Chloride 108 (*)     Albumin 3.3 (*)     Globulin 4.2 (*)     All other components within normal limits   CBC WITH DIFFERENTIAL - Abnormal; Notable for the following components:    RBC 5.75 (*)     MCV 73.2 (*)     MCH 22.6 (*)     MCHC 30.9 (*)     Neutrophils % 49.0 (*)     Eosinophils % 6.1 (*)     All other components within normal limits   TROPONIN I - Normal   PROTIME-INR - Normal   TROPONIN I - Normal   CBC W/ AUTO DIFFERENTIAL    Narrative:     The following orders were created for panel order CBC auto differential.  Procedure                               Abnormality         Status                     ---------                               -----------         ------                     CBC with Differential[[id number]]       Abnormal             Final result                 Please view results for these tests on the individual orders.   CBC MORPHOLOGY     EKG Readings: (Independently Interpreted)   Heart Rate: 99.   Interpreted by Dr. Vang:    Unusual P axis and short RI, PROBABLE  Junctional tachycardia  Inferior infarct, age undetermined  Cannot rule out anterior infarct  Age undetermined  Marked ST abnormality, possible  Lateral subendocardial injury  Abnormal ECG      ECG Results              EKG 12-lead (Final result)        Collection Time Result Time QRS Duration OHS QTC Calculation    02/27/24 17:48:39 03/06/24 05:13:31 118 474                     Final result by Interface, Lab In [facility] (03/06/24 05:13:37)                   Narrative:    Test Reason : R07.9,    Vent. Rate : 099 BPM     Atrial Rate : 099 BPM     P-R Int : 108 ms          QRS Dur : 118 ms      QT Int : 370 ms       P-R-T Axes : 246 015 253 degrees     QTc Int : 474 ms    Normal sinus rhythm    Confirmed by Capo Eden MD ([id number]) on 3/6/2024 5:13:25 AM    Referred By: [email or web address]   SELF           Confirmed By:Capo Eden MD                                  Imaging Results              X-Ray Chest PA And Lateral (Final result)  Result time 02/27/24 19:17:27      Final result by Jeffrey Chilel MD (02/27/24 19:17:27)                   Impression:      No acute cardiopulmonary process      Electronically signed by: Jeffrey Chilel  Date:    02/27/2024  Time:    19:17               Narrative:    EXAMINATION:  XR CHEST PA AND LATERAL    CLINICAL HISTORY:  Chest Pain;.    COMPARISON:  August 12, 2023    TECHNIQUE:  PA and lateral views of the chest    FINDINGS:  Cardiac silhouette is not enlarged.  There is no mediastinal mass.  There is no pulmonary vascular engorgement.    Lungs and pleural spaces are clear.    There is no acute osseous abnormality                                    X-Rays:   Independently Interpreted Readings:   Other Readings:  Xray chest PA and  Lateral  Impression:     No acute cardiopulmonary process    Medications - No data to display  Medical Decision Making  CHEST PAIN    DDX:  CARDIAC VS GI VS OTHER    OUTPATIENT FOLLOW UP    Amount and/or Complexity of Data Reviewed  Labs: ordered. Decision-making details documented in ED Course.  Radiology: ordered. Decision-making details documented in ED Course.  ECG/medicine tests: ordered. Decision-making details documented in ED Course.              Attending Attestation:           Physician Attestation for Scribe:  Physician Attestation Statement for Scribe #1: I, Nile Vang MD, reviewed documentation, as scribed by Refugio Julian in my presence, and it is both accurate and complete.             ED Course as of 04/04/24 0417 Tue Feb 27, 2024 2007 Xray chest PA and Lateral  Impression:     No acute cardiopulmonary process      [CH]      ED Course User Index  [CH] Refugio Julian                           Clinical Impression:  Final diagnoses:  [R07.9] Chest pain  [R07.9] Chest pain, unspecified type (Primary)          ED Disposition Condition    Discharge Stable          ED Prescriptions    None       Follow-up Information       Follow up With Specialties Details Why Contact Info    Huber Clayton MD Family Medicine Schedule an appointment as soon as possible for a visit   [address] [facility].  [facility] [id number] [phone]               Nile Vang MD  04/04/24 0417

## 2024-02-28 NOTE — DISCHARGE INSTRUCTIONS
FOLLOW UP WITH DR BETH.  RETURN TO THE EMERGENCY DEPARTMENT AS NEEDED.  TAKE TYLENOL OR IBUPROFEN FOR CHEST WALL PAIN.  RETURN TO THE EMERGENCY DEPARTMENT AS NEEDED.

## 2024-02-29 ENCOUNTER — TELEPHONE (OUTPATIENT)
Dept: EMERGENCY MEDICINE | Facility: HOSPITAL | Age: 44
End: 2024-02-29
Payer: MEDICAID

## 2024-03-06 LAB
OHS QRS DURATION: 118 MS
OHS QTC CALCULATION: 474 MS

## 2024-04-02 ENCOUNTER — OFFICE VISIT (OUTPATIENT)
Dept: FAMILY MEDICINE | Facility: CLINIC | Age: 44
End: 2024-04-02
Payer: COMMERCIAL

## 2024-04-02 VITALS
SYSTOLIC BLOOD PRESSURE: 124 MMHG | BODY MASS INDEX: 48.1 KG/M2 | RESPIRATION RATE: 18 BRPM | HEART RATE: 55 BPM | DIASTOLIC BLOOD PRESSURE: 84 MMHG | HEIGHT: 62 IN | WEIGHT: 261.38 LBS | OXYGEN SATURATION: 98 % | TEMPERATURE: 98 F

## 2024-04-02 DIAGNOSIS — E03.9 HYPOTHYROIDISM, UNSPECIFIED TYPE: ICD-10-CM

## 2024-04-02 DIAGNOSIS — I10 HYPERTENSION, UNSPECIFIED TYPE: ICD-10-CM

## 2024-04-02 DIAGNOSIS — E11.9 TYPE 2 DIABETES MELLITUS WITHOUT COMPLICATION, WITHOUT LONG-TERM CURRENT USE OF INSULIN: Primary | ICD-10-CM

## 2024-04-02 LAB
CREAT UR-MCNC: 223 MG/DL (ref 28–219)
MICROALBUMIN UR-MCNC: 0.8 MG/DL (ref 0–2.8)
MICROALBUMIN/CREAT RATIO PNL UR: 3.6 MG/G (ref 0–30)

## 2024-04-02 PROCEDURE — 3079F DIAST BP 80-89 MM HG: CPT | Mod: CPTII,,, | Performed by: NURSE PRACTITIONER

## 2024-04-02 PROCEDURE — 99213 OFFICE O/P EST LOW 20 MIN: CPT | Mod: ,,, | Performed by: NURSE PRACTITIONER

## 2024-04-02 PROCEDURE — 3008F BODY MASS INDEX DOCD: CPT | Mod: CPTII,,, | Performed by: NURSE PRACTITIONER

## 2024-04-02 PROCEDURE — 3066F NEPHROPATHY DOC TX: CPT | Mod: CPTII,,, | Performed by: NURSE PRACTITIONER

## 2024-04-02 PROCEDURE — 83036 HEMOGLOBIN GLYCOSYLATED A1C: CPT | Mod: ,,, | Performed by: CLINICAL MEDICAL LABORATORY

## 2024-04-02 PROCEDURE — 1160F RVW MEDS BY RX/DR IN RCRD: CPT | Mod: CPTII,,, | Performed by: NURSE PRACTITIONER

## 2024-04-02 PROCEDURE — 3074F SYST BP LT 130 MM HG: CPT | Mod: CPTII,,, | Performed by: NURSE PRACTITIONER

## 2024-04-02 PROCEDURE — 1159F MED LIST DOCD IN RCRD: CPT | Mod: CPTII,,, | Performed by: NURSE PRACTITIONER

## 2024-04-02 PROCEDURE — 82570 ASSAY OF URINE CREATININE: CPT | Mod: ,,, | Performed by: CLINICAL MEDICAL LABORATORY

## 2024-04-02 PROCEDURE — 82043 UR ALBUMIN QUANTITATIVE: CPT | Mod: ,,, | Performed by: CLINICAL MEDICAL LABORATORY

## 2024-04-02 PROCEDURE — 3044F HG A1C LEVEL LT 7.0%: CPT | Mod: CPTII,,, | Performed by: NURSE PRACTITIONER

## 2024-04-02 PROCEDURE — 3061F NEG MICROALBUMINURIA REV: CPT | Mod: CPTII,,, | Performed by: NURSE PRACTITIONER

## 2024-04-02 RX ORDER — BUPROPION HYDROCHLORIDE 150 MG/1
150 TABLET ORAL EVERY MORNING
COMMUNITY
Start: 2024-03-20

## 2024-04-02 RX ORDER — LEVOTHYROXINE SODIUM 137 UG/1
137 TABLET ORAL
Qty: 30 TABLET | Refills: 3 | Status: SHIPPED | OUTPATIENT
Start: 2024-04-02

## 2024-04-02 RX ORDER — GLIMEPIRIDE 1 MG/1
1 TABLET ORAL EVERY MORNING
COMMUNITY
Start: 2023-11-19

## 2024-04-02 RX ORDER — METFORMIN HYDROCHLORIDE 1000 MG/1
1000 TABLET ORAL 2 TIMES DAILY WITH MEALS
Qty: 60 TABLET | Refills: 5 | Status: SHIPPED | OUTPATIENT
Start: 2024-04-02

## 2024-04-02 NOTE — PROGRESS NOTES
[facility] Medicine    Chief Complaint      Chief Complaint   Patient presents with    Diabetes     FU , A1C 7.6  in December     Care Gaps      Pt states she had already had her eye exam in November or December at [facility]     Pt defers TETANUS ands COVID vaccines at OV today        History of Present Illness      Mary Magana is a 43 y.o. female. She  has a past medical history of Diabetes mellitus, Hypertension, Thyroid disease, and WPW (Ilana-Parkinson-White syndrome)., who presents today for f/u of her Diabetes with lab work and medication refills.    Past Medical History:  Past Medical History:   Diagnosis Date    Diabetes mellitus     Hypertension     Thyroid disease     WPW (Ilana-Parkinson-White syndrome)        Past Surgical History:   has a past surgical history that includes Cholecystectomy and Tubal ligation.    Social History:  Social History     Tobacco Use    Smoking status: Some Days     Current packs/day: 0.25     Average packs/day: 0.3 packs/day for 15.0 years (3.8 ttl pk-yrs)     Types: Cigarettes, Vaping with nicotine     Last attempt to quit: 2022     Passive exposure: Past    Smokeless tobacco: Never    Tobacco comments:     1 pack per 2 weeks   Substance Use Topics    Alcohol use: Never    Drug use: Never       I personally reviewed all past medical, surgical, and social.     Review of Systems   Constitutional:  Negative for fatigue.   HENT:  Negative for sore throat.    Eyes:  Negative for pain and visual disturbance.   Respiratory:  Negative for cough and shortness of breath.    Cardiovascular: Negative.    Gastrointestinal:  Negative for abdominal pain, constipation and diarrhea.   Genitourinary:  Negative for dysuria, menstrual problem and vaginal discharge.   Musculoskeletal:  Negative for gait problem.   Skin: Negative.    Neurological:  Negative for dizziness and light-headedness.        Medications:  Outpatient Encounter Medications as of 4/2/2024    Medication Sig Dispense Refill    aspirin (ECOTRIN) 81 MG EC tablet TAKE 1 TABLET BY MOUTH DAILY WITH FOOD 30 tablet 5    atorvastatin (LIPITOR) 10 MG tablet Take 1 tablet (10 mg total) by mouth every evening. 30 tablet 3    buPROPion (WELLBUTRIN XL) 150 MG TB24 tablet Take 150 mg by mouth every morning.      clonazePAM (KLONOPIN) 1 MG tablet Take 1 mg by mouth nightly as needed for Anxiety.      famotidine (PEPCID) 40 MG tablet Take 1 tablet (40 mg total) by mouth once daily. 30 tablet 4    ferrous sulfate (IRON) 325 mg (65 mg iron) Tab tablet Take 1 tablet (325 mg total) by mouth daily with breakfast. 30 tablet 5    glimepiride (AMARYL) 1 MG tablet Take 1 mg by mouth every morning.      hydrOXYzine pamoate (VISTARIL) 25 MG Cap Take 1 to 2 capsule up to tid prn for severe anxiety 30 capsule 2    metoprolol tartrate (LOPRESSOR) 25 MG tablet Take 1 tablet (25 mg total) by mouth 2 (two) times daily. 60 tablet 5    omeprazole (PRILOSEC) 40 MG capsule Take 1 capsule (40 mg total) by mouth once daily. 30 capsule 2    TRUEPLUS LANCETS 30 gauge Misc AS DIRECTED      [DISCONTINUED] levothyroxine (SYNTHROID) 137 MCG Tab tablet Take 1 tablet (137 mcg total) by mouth before breakfast. 30 tablet 3    [DISCONTINUED] metFORMIN (GLUCOPHAGE) 1000 MG tablet Take 1 tablet (1,000 mg total) by mouth 2 (two) times daily with meals. 60 tablet 5    empagliflozin (JARDIANCE) 25 mg tablet Take 1 tablet (25 mg total) by mouth once daily. (Patient not taking: Reported on 4/2/2024) 30 tablet 3    levothyroxine (SYNTHROID) 137 MCG Tab tablet Take 1 tablet (137 mcg total) by mouth before breakfast. 30 tablet 3    metFORMIN (GLUCOPHAGE) 1000 MG tablet Take 1 tablet (1,000 mg total) by mouth 2 (two) times daily with meals. 60 tablet 5     No facility-administered encounter medications on file as of 4/2/2024.       Allergies:  Review of patient's allergies indicates:   Allergen Reactions    Anesthesia s/i-40 (propofol) [propofol] Itching      "Anesthesia, possibly propofol; made her feel like there were ants all over her       Health Maintenance:    There is no immunization history on file for this patient.   Health Maintenance   Topic Date Due    Eye Exam  Never done    TETANUS VACCINE  Never done    Hemoglobin A1c  10/02/2024    Foot Exam  12/01/2024    Lipid Panel  12/01/2024    Mammogram  12/06/2024    Low Dose Statin  04/02/2025    Hepatitis C Screening  Completed        Physical Exam      Vital Signs  Temp: 98.4 °F (36.9 °C)  Temp Source: Oral  Pulse: (!) 55  Resp: 18  SpO2: 98 %  BP: 124/84  BP Location: Right arm  Patient Position: Sitting  Pain Score:   8  Pain Loc: Back  Height and Weight  Height: 5' 2" (157.5 cm)  Weight: 118.6 kg (261 lb 6.4 oz)  BSA (Calculated - sq m): 2.28 sq meters  BMI (Calculated): 47.8  Weight in (lb) to have BMI = 25: 136.4]    Physical Exam  Vitals and nursing note reviewed.   Constitutional:       Appearance: Normal appearance. She is well-developed.   HENT:      Head: Normocephalic.      Right Ear: Hearing normal.      Left Ear: Hearing normal.      Nose: Nose normal.   Eyes:      General: Lids are normal.      Extraocular Movements: Extraocular movements intact.      Conjunctiva/sclera: Conjunctivae normal.      Pupils: Pupils are equal, round, and reactive to light.   Cardiovascular:      Rate and Rhythm: Normal rate and regular rhythm.      Heart sounds: Normal heart sounds.   Pulmonary:      Effort: Pulmonary effort is normal.      Breath sounds: Normal breath sounds.   Musculoskeletal:         General: Normal range of motion.      Cervical back: Normal range of motion and neck supple.      Right lower leg: No edema.      Left lower leg: No edema.   Skin:     General: Skin is warm and dry.   Neurological:      Mental Status: She is alert and oriented to person, place, and time.      Gait: Gait is intact.   Psychiatric:         Behavior: Behavior is cooperative.          Laboratory:  CBC:  Recent Labs   Lab " 08/12/23  0123 12/01/23  1108 02/27/24  1829   WBC 9.29 9.71 6.89   RBC 5.43 H 5.73 H 5.75 H   Hemoglobin 12.5 13.4 13.0   Hematocrit 39.9 42.5 42.1   Platelet Count 233 250 223   MCV 73.5 L 74.2 L 73.2 L   MCH 23.0 L 23.4 L 22.6 L   MCHC 31.3 L 31.5 L 30.9 L     CMP:  Recent Labs   Lab 08/12/23  0123 12/01/23  1108 02/27/24  1829   Glucose 92 153 H 103   Calcium 8.9 8.9 9.0   Albumin 3.6 3.4 L 3.3 L   Total Protein 7.2 7.1 7.5   Sodium 141 136 139   Potassium 3.8 4.0 3.7   CO2 27 28 27   Chloride 110 H 106 108 H   BUN 12 10 8   Alk Phos 87 133 H 91   ALT 24 50 28   AST 23 21 23   Bilirubin, Total 0.3 0.5 0.5     LIPIDS:  Recent Labs   Lab 09/16/22  1500 03/16/23  0953 07/25/23  1058 12/01/23  1108   TSH 11.800 H 18.800 H 12.700 H 7.600 H   HDL Cholesterol 40  --  44 40   Cholesterol 116  --  150 112   Triglycerides 68  --  112 120   LDL Calculated 62  --  84 48   Cholesterol/HDL Ratio (Risk Factor) 2.9  --  3.4 2.8   Non-HDL 76  --  106 72     TSH:  Recent Labs   Lab 03/16/23  0953 07/25/23  1058 12/01/23  1108   TSH 18.800 H 12.700 H 7.600 H     A1C:  Recent Labs   Lab 04/19/21  1516 09/23/21  1554 06/03/22  1520 09/16/22  1500 03/16/23  0953 07/25/23  1058 12/01/23  1108 04/02/24  1107   Hemoglobin A1C 6.2 5.4 5.5 6.0 6.0 5.8 7.6 H 6.6       Assessment/Plan     Mary Magana is a 43 y.o.female with:     1. Type 2 diabetes mellitus without complication, without long-term current use of insulin  -     Hemoglobin A1C; Future; Expected date: 04/02/2024  -     Microalbumin/Creatinine Ratio, Urine  -     metFORMIN (GLUCOPHAGE) 1000 MG tablet; Take 1 tablet (1,000 mg total) by mouth 2 (two) times daily with meals.  Dispense: 60 tablet; Refill: 5    2. Hypertension, unspecified type    3. Hypothyroidism, unspecified type  -     levothyroxine (SYNTHROID) 137 MCG Tab tablet; Take 1 tablet (137 mcg total) by mouth before breakfast.  Dispense: 30 tablet; Refill: 3         Total time spent face-to-face and  non-face-to-face coordinating care for this encounter was: 20 minutes     Chronic conditions status updated as per HPI.  Other than changes above, cont current medications and maintain follow up with specialists.  Return to clinic in 3 month(s).    HENRI Shane  [facility]  Reviewed on 04/23/2024 by Huber Clayton MD

## 2024-04-03 LAB
EST. AVERAGE GLUCOSE BLD GHB EST-MCNC: 143 MG/DL
HBA1C MFR BLD HPLC: 6.6 % (ref 4.5–6.6)

## 2024-04-07 PROBLEM — E13.9 DIABETES 1.5, MANAGED AS TYPE 2: Status: RESOLVED | Noted: 2021-04-19 | Resolved: 2024-04-07

## 2024-04-09 ENCOUNTER — TELEPHONE (OUTPATIENT)
Dept: FAMILY MEDICINE | Facility: CLINIC | Age: 44
End: 2024-04-09
Payer: COMMERCIAL

## 2024-04-09 NOTE — TELEPHONE ENCOUNTER
----- Message from HENRI Shane sent at 4/8/2024 10:03 PM CDT -----  Urine creatinine slightly improved; A1c at goal 6.6%

## 2024-04-11 NOTE — TELEPHONE ENCOUNTER
Pt notified that her urine creatinine has improved and her A1c is to goal at 6.6, she voiced understanding.

## 2024-05-09 ENCOUNTER — OFFICE VISIT (OUTPATIENT)
Dept: FAMILY MEDICINE | Facility: CLINIC | Age: 44
End: 2024-05-09
Payer: COMMERCIAL

## 2024-05-09 VITALS
HEART RATE: 92 BPM | WEIGHT: 172 LBS | BODY MASS INDEX: 27.64 KG/M2 | HEIGHT: 66 IN | DIASTOLIC BLOOD PRESSURE: 84 MMHG | SYSTOLIC BLOOD PRESSURE: 124 MMHG

## 2024-05-09 DIAGNOSIS — F41.1 GAD (GENERALIZED ANXIETY DISORDER): ICD-10-CM

## 2024-05-09 DIAGNOSIS — Z79.899 LONG-TERM USE OF HIGH-RISK MEDICATION: ICD-10-CM

## 2024-05-09 DIAGNOSIS — F90.0 ATTENTION DEFICIT HYPERACTIVITY DISORDER (ADHD), PREDOMINANTLY INATTENTIVE TYPE: Primary | ICD-10-CM

## 2024-05-09 PROCEDURE — 1160F RVW MEDS BY RX/DR IN RCRD: CPT | Mod: CPTII,S$GLB,, | Performed by: FAMILY MEDICINE

## 2024-05-09 PROCEDURE — 1159F MED LIST DOCD IN RCRD: CPT | Mod: CPTII,S$GLB,, | Performed by: FAMILY MEDICINE

## 2024-05-09 PROCEDURE — 3008F BODY MASS INDEX DOCD: CPT | Mod: CPTII,S$GLB,, | Performed by: FAMILY MEDICINE

## 2024-05-09 PROCEDURE — 99202 OFFICE O/P NEW SF 15 MIN: CPT | Mod: S$GLB,,, | Performed by: FAMILY MEDICINE

## 2024-05-09 PROCEDURE — 3079F DIAST BP 80-89 MM HG: CPT | Mod: CPTII,S$GLB,, | Performed by: FAMILY MEDICINE

## 2024-05-09 PROCEDURE — 3074F SYST BP LT 130 MM HG: CPT | Mod: CPTII,S$GLB,, | Performed by: FAMILY MEDICINE

## 2024-05-09 RX ORDER — ALPRAZOLAM 0.5 MG/1
0.5 TABLET ORAL 3 TIMES DAILY
COMMUNITY

## 2024-05-09 RX ORDER — PHENTERMINE HYDROCHLORIDE 37.5 MG/1
37.5 TABLET ORAL
COMMUNITY

## 2024-05-09 RX ORDER — TRIAMCINOLONE ACETONIDE 5 MG/G
1 CREAM TOPICAL 2 TIMES DAILY
COMMUNITY

## 2024-05-09 NOTE — PROGRESS NOTES
SUBJECTIVE:    Patient ID: Yanira Lerma is a 43 y.o. female.    Chief Complaint: Establish Care (New patient to establish care//no med bottles//tc)    Pt here to get established.      Has PMHx ADHD, Allergy, Anxiety, Hidradenitis.     Works from home for [employer].     Also in school right now.  Has 4 kids as well, and .     Has had panic attacks in the past, but not often. Takes Xanax as needed for them. Has also taken lexapro in the past as well. Has seen psychiatrist in the past as well. Last one was in August 2023.     Has a hx ADD.   Has not been prescribed with anything since 2004.     Has been taking phentermine recently for weight loss at the end of January. Was given with topiramate, which she had to stop because she had the tingling and because her taste buds were off. Has not been losing enough weight. May not be eating enough protein.     Walks for exercise and tries to pilates.     Not having issues with hidradenitis as much. Has not seen Derm for a while. Has issues with eczema for sometime.     Has issues with a bleeding nipple on the right. (Branden Arango).  Has had an MRI breast and bloodwork and told ok for now and to follow in 6 months.   ---------------------------------------------------  S/p [facility] 11/2022          No visits with results within 6 Month(s) from this visit.   Latest known visit with results is:   Admission on 02/01/2020, Discharged on 02/01/2020   Component Date Value Ref Range Status    POC Preg Test, Ur 02/01/2020 Negative  Negative Final     Acceptable 02/01/2020 Yes   Final       Past Medical History:   Diagnosis Date    Abnormal Pap smear 10 years ago    colpo normal    ADHD (attention deficit hyperactivity disorder)     Allergy     Anxiety     Hidradenitis      Social History     Socioeconomic History    Marital status:    Tobacco Use    Smoking status: Never    Smokeless tobacco: Never   Substance and Sexual Activity    Alcohol use: No     Drug use: No    Sexual activity: Yes     Partners: Male     Birth control/protection: OCP     Social Determinants of Health     Financial Resource Strain: High Risk (2024)    Received from [facility]    Overall Financial Resource Strain (CARDIA)     Difficulty of Paying Living Expenses: Hard   Food Insecurity: Food Insecurity Present (2024)    Received from [facility]    Hunger Vital Sign     Worried About Running Out of Food in the Last Year: Sometimes true     Ran Out of Food in the Last Year: Sometimes true   Transportation Needs: Unmet Transportation Needs (2024)    Received from [facility]    PRAPARE - Transportation     Lack of Transportation (Medical): Yes     Lack of Transportation (Non-Medical): No   Physical Activity: Insufficiently Active (2024)    Received from [facility]    Exercise Vital Sign     Days of Exercise per Week: 2 days     Minutes of Exercise per Session: 30 min   Stress: Stress Concern Present (2024)    Received from [facility]    [employer] - Occupational Stress Questionnaire     Feeling of Stress : Rather much     Past Surgical History:   Procedure Laterality Date    BREAST SURGERY      Breast Reduction     SECTION  '02, '09    x4    DILATION AND CURETTAGE OF UTERUS  '01    after SAB    HYSTERECTOMY N/A 2022     Family History   Problem Relation Name Age of Onset    Miscarriages / Stillbirths Mother      Cancer Father      Hypertension Father      Breast cancer Neg Hx      Colon cancer Neg Hx      Ovarian cancer Neg Hx         Review of patient's allergies indicates:   Allergen Reactions    Diflucan [fluconazole] Hives    Griseofulvin Hives    Amoxicillin        Current Outpatient Medications:     ALPRAZolam (XANAX) 0.5 MG tablet, Take 0.5 mg by mouth 3 (three) times daily., Disp: , Rfl:     phentermine (ADIPEX-P) 37.5 mg tablet, Take 37.5 mg by mouth before breakfast., Disp: , Rfl:     triamcinolone acetonide 0.5%  "(KENALOG) 0.5 % Crea, Apply 1 Tube topically 2 (two) times daily., Disp: , Rfl:     Review of Systems   Constitutional:  Negative for appetite change, fatigue, fever and unexpected weight change.   Respiratory:  Negative for cough, chest tightness, shortness of breath and wheezing.    Cardiovascular:  Negative for chest pain and leg swelling.   Gastrointestinal:  Negative for abdominal pain, constipation, nausea and vomiting.        -heartburn   Genitourinary:  Negative for difficulty urinating, dysuria, frequency and urgency.        +right nipple bleeding   Musculoskeletal:  Negative for arthralgias, back pain, myalgias and neck pain.   Skin:  Positive for rash.   Neurological:  Negative for dizziness, weakness, numbness and headaches.   Hematological:  Does not bruise/bleed easily.   Psychiatric/Behavioral:  Negative for dysphoric mood, sleep disturbance and suicidal ideas. The patient is nervous/anxious.    All other systems reviewed and are negative.         Objective:      Vitals:    05/09/24 1410   BP: 124/84   Pulse: 92   Weight: 78 kg (172 lb)   Height: 5' 6" (1.676 m)     Wt Readings from Last 3 Encounters:   05/09/24 78 kg (172 lb)   02/01/20 79.4 kg (175 lb)   03/22/18 74.8 kg (164 lb 14.5 oz)       Physical Exam  Vitals reviewed.   Constitutional:       General: She is not in acute distress.     Appearance: Normal appearance. She is well-developed.   HENT:      Head: Normocephalic and atraumatic.   Neck:      Thyroid: No thyromegaly.   Cardiovascular:      Rate and Rhythm: Normal rate and regular rhythm.      Heart sounds: Normal heart sounds. No murmur heard.     No friction rub.   Pulmonary:      Effort: Pulmonary effort is normal.      Breath sounds: Normal breath sounds. No wheezing or rales.   Abdominal:      General: Bowel sounds are normal. There is no distension.      Palpations: Abdomen is soft.      Tenderness: There is no abdominal tenderness.   Musculoskeletal:      Cervical back: Neck " supple.   Lymphadenopathy:      Cervical: No cervical adenopathy.   Skin:     General: Skin is warm and dry.      Findings: No rash.   Neurological:      Mental Status: She is alert and oriented to person, place, and time.   Psychiatric:         Attention and Perception: She is attentive.         Speech: Speech normal.         Behavior: Behavior normal.         Thought Content: Thought content normal.         Judgment: Judgment normal.           Assessment:       1. Attention deficit hyperactivity disorder (ADHD), predominantly inattentive type    2. JUAN R (generalized anxiety disorder)    3. Long-term use of high-risk medication         Plan:       Attention deficit hyperactivity disorder (ADHD), predominantly inattentive type  Comments:  Chronic. Pt may try medication when needed to help take tests.    JUAN R (generalized anxiety disorder)  Comments:  Controlled. Will continue to monitor symptoms. To use prn Xanax    Long-term use of high-risk medication  -     TSH w/reflex to FT4; Future; Expected date: 05/10/2024  -     Urinalysis, Reflex to Urine Culture Urine, Clean Catch; Future; Expected date: 05/10/2024  -     CBC Auto Differential; Future; Expected date: 05/10/2024  -     Lipid Panel; Future; Expected date: 05/10/2024  -     Comprehensive Metabolic Panel; Future; Expected date: 05/10/2024    Labs and/or tests have been ordered for the evaluation/monitoring of acute/chronic conditions, to be done just before next visit.    Follow up in about 4 months (around 9/9/2024) for ADD, Anxiety.        5/10/2024 Blessing Hinson

## 2024-05-21 ENCOUNTER — PATIENT MESSAGE (OUTPATIENT)
Dept: FAMILY MEDICINE | Facility: CLINIC | Age: 44
End: 2024-05-21
Payer: COMMERCIAL

## 2024-05-21 ENCOUNTER — OFFICE VISIT (OUTPATIENT)
Dept: URGENT CARE | Facility: CLINIC | Age: 44
End: 2024-05-21
Payer: COMMERCIAL

## 2024-05-21 ENCOUNTER — TELEPHONE (OUTPATIENT)
Dept: FAMILY MEDICINE | Facility: CLINIC | Age: 44
End: 2024-05-21
Payer: COMMERCIAL

## 2024-05-21 VITALS
BODY MASS INDEX: 27.62 KG/M2 | OXYGEN SATURATION: 99 % | RESPIRATION RATE: 20 BRPM | SYSTOLIC BLOOD PRESSURE: 138 MMHG | HEIGHT: 67 IN | HEART RATE: 84 BPM | TEMPERATURE: 100 F | WEIGHT: 176 LBS | DIASTOLIC BLOOD PRESSURE: 79 MMHG

## 2024-05-21 DIAGNOSIS — R89.4 INFLUENZA A VIRUS NOT DETECTED: ICD-10-CM

## 2024-05-21 DIAGNOSIS — R09.89 CHEST CONGESTION: ICD-10-CM

## 2024-05-21 DIAGNOSIS — J40 BRONCHITIS: Primary | ICD-10-CM

## 2024-05-21 DIAGNOSIS — Z20.822 COVID-19 VIRUS NOT DETECTED: ICD-10-CM

## 2024-05-21 LAB
CTP QC/QA: YES
FLUAV AG NPH QL: NEGATIVE
FLUBV AG NPH QL: NEGATIVE
S PYO RRNA THROAT QL PROBE: NEGATIVE
SARS-COV-2 AG RESP QL IA.RAPID: NEGATIVE

## 2024-05-21 PROCEDURE — 99204 OFFICE O/P NEW MOD 45 MIN: CPT | Mod: S$GLB,,,

## 2024-05-21 PROCEDURE — 87804 INFLUENZA ASSAY W/OPTIC: CPT | Mod: QW,,,

## 2024-05-21 PROCEDURE — 87880 STREP A ASSAY W/OPTIC: CPT | Mod: QW,,,

## 2024-05-21 PROCEDURE — 87811 SARS-COV-2 COVID19 W/OPTIC: CPT | Mod: QW,S$GLB,,

## 2024-05-21 RX ORDER — BROMPHENIRAMINE MALEATE, PSEUDOEPHEDRINE HYDROCHLORIDE, AND DEXTROMETHORPHAN HYDROBROMIDE 2; 30; 10 MG/5ML; MG/5ML; MG/5ML
10 SYRUP ORAL 4 TIMES DAILY PRN
Qty: 118 ML | Refills: 0 | Status: SHIPPED | OUTPATIENT
Start: 2024-05-21 | End: 2024-05-31

## 2024-05-21 RX ORDER — ALBUTEROL SULFATE 90 UG/1
2 AEROSOL, METERED RESPIRATORY (INHALATION) EVERY 6 HOURS PRN
Qty: 6.7 G | Refills: 0 | Status: SHIPPED | OUTPATIENT
Start: 2024-05-21 | End: 2024-06-20

## 2024-05-21 RX ORDER — AZITHROMYCIN 250 MG/1
TABLET, FILM COATED ORAL
Qty: 6 TABLET | Refills: 0 | Status: SHIPPED | OUTPATIENT
Start: 2024-05-21

## 2024-05-21 RX ORDER — PREDNISONE 20 MG/1
20 TABLET ORAL 2 TIMES DAILY
Qty: 10 TABLET | Refills: 0 | Status: SHIPPED | OUTPATIENT
Start: 2024-05-21 | End: 2024-05-26

## 2024-05-21 RX ORDER — PROMETHAZINE HYDROCHLORIDE AND DEXTROMETHORPHAN HYDROBROMIDE 6.25; 15 MG/5ML; MG/5ML
5 SYRUP ORAL NIGHTLY PRN
Qty: 50 ML | Refills: 0 | Status: SHIPPED | OUTPATIENT
Start: 2024-05-21 | End: 2024-05-31

## 2024-05-21 NOTE — PROGRESS NOTES
"Subjective:      Patient ID: Yanira Lerma is a 43 y.o. female.    Vitals:  height is 5' 6.5" (1.689 m) and weight is 79.8 kg (176 lb). Her temperature is 99.6 °F (37.6 °C). Her blood pressure is 138/79 and her pulse is 84. Her respiration is 20 and oxygen saturation is 99%.     Chief Complaint: Chest Congestion    In clinic with a chief complaint of fever, body aches, chest congestion, HA, Bilateral ear pressure, productive cough, changes in phonation, and sore throat that has been going on for 6 days. She reports her cough is worse at nighttime, and is dry.  At onset of symptoms states her cough is blood-streaked.  She did a virtual visit, and was instructed to return to urgent care for viral testing as well as strep testing.  She denies fever, or blood-streaked sputum within the last 48 hours.  She has been taking Tylenol, ibuprofen, Claritin D, and Mucinex.        Constitution: Positive for fever.   HENT:  Positive for sore throat and voice change.    Neck: neck negative.   Cardiovascular: Negative.    Eyes: Negative.    Respiratory:  Positive for cough, sputum production and bloody sputum.    Gastrointestinal:  Positive for constipation.   Endocrine: negative.   Genitourinary: Negative.    Musculoskeletal:  Positive for muscle ache.   Skin: Negative.    Allergic/Immunologic: Positive for immunizations up-to-date and flu shot.   Neurological:  Positive for headaches.   Hematologic/Lymphatic: Negative.    Psychiatric/Behavioral: Negative.        Objective:     Physical Exam   Constitutional: She is oriented to person, place, and time. She appears well-developed. She is cooperative.   HENT:   Head: Normocephalic and atraumatic.   Ears:   Right Ear: Hearing, tympanic membrane, external ear and ear canal normal.   Left Ear: Hearing, tympanic membrane, external ear and ear canal normal.   Nose: Nose normal. No mucosal edema or nasal deformity. No epistaxis. Right sinus exhibits no maxillary sinus tenderness and no " frontal sinus tenderness. Left sinus exhibits no maxillary sinus tenderness and no frontal sinus tenderness.   Mouth/Throat: Uvula is midline, oropharynx is clear and moist and mucous membranes are normal. Mucous membranes are moist. No trismus in the jaw. Normal dentition. No uvula swelling. Oropharynx is clear.   Eyes: Conjunctivae and lids are normal. Pupils are equal, round, and reactive to light. Extraocular movement intact   Neck: Trachea normal. Neck supple.   Cardiovascular: Normal rate, regular rhythm, normal heart sounds and normal pulses.   Pulmonary/Chest: Effort normal and breath sounds normal.   Abdominal: Normal appearance. Soft. flat abdomen There is no abdominal tenderness.   Musculoskeletal: Normal range of motion.         General: Normal range of motion.   Lymphadenopathy:     She has no cervical adenopathy.   Neurological: no focal deficit. She is alert, oriented to person, place, and time and at baseline. She exhibits normal muscle tone.   Skin: Skin is warm, dry and intact. Capillary refill takes 2 to 3 seconds.   Psychiatric: Her speech is normal and behavior is normal. Mood, judgment and thought content normal.   Nursing note and vitals reviewed.      Assessment:     1. Bronchitis    2. Chest congestion    3. Influenza A virus not detected    4. COVID-19 virus not detected        Plan:       Bronchitis  -     brompheniramine-pseudoeph-DM (BROMFED DM) 2-30-10 mg/5 mL Syrp; Take 10 mLs by mouth 4 (four) times daily as needed (Cough).  Dispense: 118 mL; Refill: 0  -     azithromycin (Z-LUIS) 250 MG tablet; Take 2 tablets by mouth on day 1; Take 1 tablet by mouth on days 2-5  Dispense: 6 tablet; Refill: 0  -     promethazine-dextromethorphan (PROMETHAZINE-DM) 6.25-15 mg/5 mL Syrp; Take 5 mLs by mouth nightly as needed (night time cough).  Dispense: 50 mL; Refill: 0  -     albuterol (PROVENTIL HFA) 90 mcg/actuation inhaler; Inhale 2 puffs into the lungs every 6 (six) hours as needed for Wheezing.  Rescue  Dispense: 6.7 g; Refill: 0  -     predniSONE (DELTASONE) 20 MG tablet; Take 1 tablet (20 mg total) by mouth 2 (two) times daily. for 5 days  Dispense: 10 tablet; Refill: 0    Chest congestion  -     SARS Coronavirus 2 Antigen, POCT Manual Read  -     POCT Influenza A/B Rapid Antigen  -     POCT rapid strep A    Influenza A virus not detected    COVID-19 virus not detected           Rapid strep negative.  Continues to have cough that is worse at nighttime, sore throat, and changes in phonation.  OTC medications not effective.  Subjective fevers, but no measured fevers in the last 48 hours.  She also reports decreased energy.  Is tolerating p.o..  No ill contacts within the household.  Lungs CTA.  Do not suspect pneumonia.  Symptoms consistent with bronchitis.

## 2024-05-21 NOTE — TELEPHONE ENCOUNTER
Spoke with patient to schedule visit per [employer] IP message.   Patient declined 3pm today and 7:40am tomorrow due to /drop off school schedule, patient states she will not have a ride at those times.  Informed patient to report to urgent care.  Patient verbalized understanding -DN

## 2024-05-21 NOTE — LETTER
May 21, 2024      [facility]  [address]  [facility] [address]  Phone: [phone]       Patient: Yanira Lerma   YOB: 1980  Date of Visit: 05/21/2024    To Whom It May Concern:    Philipp Lerma  was at [facility] on 05/21/2024. The patient may return to work/school on 5/23/24 with no restrictions. If you have any questions or concerns, or if I can be of further assistance, please do not hesitate to contact me.    Sincerely,    TARA Boogie

## 2024-05-21 NOTE — TELEPHONE ENCOUNTER
----- Message from Alexa Fuentes sent at 5/21/2024 10:00 AM CDT -----  - 8:55-pt is calling candis carbajal   [phone]

## 2024-05-22 NOTE — PATIENT INSTRUCTIONS
No additional decongestants, or antihistamines.  You may continue taking Mucinex.  Albuterol inhaler as needed for bronchospasms, shortness for breath, or wheezing.  Finish entire course of antibiotics and steroids.  Promethazine DM at nighttime only.  This medication will cause sedation

## 2024-07-02 ENCOUNTER — OFFICE VISIT (OUTPATIENT)
Dept: FAMILY MEDICINE | Facility: CLINIC | Age: 44
End: 2024-07-02
Payer: COMMERCIAL

## 2024-07-02 VITALS
DIASTOLIC BLOOD PRESSURE: 80 MMHG | RESPIRATION RATE: 16 BRPM | BODY MASS INDEX: 48.03 KG/M2 | HEIGHT: 62 IN | SYSTOLIC BLOOD PRESSURE: 120 MMHG | OXYGEN SATURATION: 97 % | HEART RATE: 73 BPM | TEMPERATURE: 98 F | WEIGHT: 261 LBS

## 2024-07-02 DIAGNOSIS — I10 HYPERTENSION, UNSPECIFIED TYPE: ICD-10-CM

## 2024-07-02 DIAGNOSIS — E03.9 HYPOTHYROIDISM, UNSPECIFIED TYPE: ICD-10-CM

## 2024-07-02 DIAGNOSIS — E11.9 TYPE 2 DIABETES MELLITUS WITHOUT COMPLICATION, WITHOUT LONG-TERM CURRENT USE OF INSULIN: Primary | ICD-10-CM

## 2024-07-02 DIAGNOSIS — R30.0 DYSURIA: ICD-10-CM

## 2024-07-02 DIAGNOSIS — D50.9 IRON DEFICIENCY ANEMIA, UNSPECIFIED IRON DEFICIENCY ANEMIA TYPE: ICD-10-CM

## 2024-07-02 DIAGNOSIS — F41.9 ANXIETY: ICD-10-CM

## 2024-07-02 DIAGNOSIS — E78.5 HYPERLIPIDEMIA, UNSPECIFIED HYPERLIPIDEMIA TYPE: ICD-10-CM

## 2024-07-02 LAB
ALBUMIN SERPL BCP-MCNC: 3.6 G/DL (ref 3.5–5)
ALBUMIN/GLOB SERPL: 1 {RATIO}
ALP SERPL-CCNC: 91 U/L (ref 37–98)
ALT SERPL W P-5'-P-CCNC: 27 U/L (ref 13–56)
ANION GAP SERPL CALCULATED.3IONS-SCNC: 14 MMOL/L (ref 7–16)
AST SERPL W P-5'-P-CCNC: 23 U/L (ref 15–37)
BACTERIA #/AREA URNS HPF: ABNORMAL /HPF
BASOPHILS # BLD AUTO: 0.06 K/UL (ref 0–0.2)
BASOPHILS NFR BLD AUTO: 0.7 % (ref 0–1)
BILIRUB SERPL-MCNC: 0.4 MG/DL (ref ?–1.2)
BILIRUB SERPL-MCNC: NORMAL MG/DL
BILIRUB UR QL STRIP: NEGATIVE
BLOOD URINE, POC: NORMAL
BUN SERPL-MCNC: 14 MG/DL (ref 7–18)
BUN/CREAT SERPL: 12 (ref 6–20)
CALCIUM SERPL-MCNC: 8.6 MG/DL (ref 8.5–10.1)
CHLORIDE SERPL-SCNC: 106 MMOL/L (ref 98–107)
CLARITY UR: CLEAR
CO2 SERPL-SCNC: 23 MMOL/L (ref 21–32)
COLOR UR: ABNORMAL
COLOR, POC UA: YELLOW
CREAT SERPL-MCNC: 1.14 MG/DL (ref 0.55–1.02)
DIFFERENTIAL METHOD BLD: ABNORMAL
EGFR (NO RACE VARIABLE) (RUSH/TITUS): 62 ML/MIN/1.73M2
EOSINOPHIL # BLD AUTO: 0.36 K/UL (ref 0–0.5)
EOSINOPHIL NFR BLD AUTO: 4.2 % (ref 1–4)
ERYTHROCYTE [DISTWIDTH] IN BLOOD BY AUTOMATED COUNT: 14.6 % (ref 11.5–14.5)
EST. AVERAGE GLUCOSE BLD GHB EST-MCNC: 177 MG/DL
GLOBULIN SER-MCNC: 3.6 G/DL (ref 2–4)
GLUCOSE SERPL-MCNC: 148 MG/DL (ref 74–106)
GLUCOSE UR QL STRIP: NORMAL
GLUCOSE UR STRIP-MCNC: NORMAL MG/DL
HBA1C MFR BLD HPLC: 7.8 % (ref 4.5–6.6)
HCT VFR BLD AUTO: 43.4 % (ref 38–47)
HGB BLD-MCNC: 12.6 G/DL (ref 12–16)
IMM GRANULOCYTES # BLD AUTO: 0.02 K/UL (ref 0–0.04)
IMM GRANULOCYTES NFR BLD: 0.2 % (ref 0–0.4)
KETONES UR QL STRIP: NORMAL
KETONES UR STRIP-SCNC: NEGATIVE MG/DL
LEUKOCYTE ESTERASE UR QL STRIP: NEGATIVE
LEUKOCYTE ESTERASE URINE, POC: NORMAL
LYMPHOCYTES # BLD AUTO: 3.58 K/UL (ref 1–4.8)
LYMPHOCYTES NFR BLD AUTO: 41.8 % (ref 27–41)
MCH RBC QN AUTO: 22.6 PG (ref 27–31)
MCHC RBC AUTO-ENTMCNC: 29 G/DL (ref 32–36)
MCV RBC AUTO: 77.9 FL (ref 80–96)
MONOCYTES # BLD AUTO: 0.49 K/UL (ref 0–0.8)
MONOCYTES NFR BLD AUTO: 5.7 % (ref 2–6)
MPC BLD CALC-MCNC: 11.6 FL (ref 9.4–12.4)
MUCOUS, UA: ABNORMAL /LPF
NEUTROPHILS # BLD AUTO: 4.05 K/UL (ref 1.8–7.7)
NEUTROPHILS NFR BLD AUTO: 47.4 % (ref 53–65)
NITRITE UR QL STRIP: NEGATIVE
NITRITE, POC UA: NORMAL
NRBC # BLD AUTO: 0 X10E3/UL
NRBC, AUTO (.00): 0 %
PH UR STRIP: 6 PH UNITS
PH, POC UA: 6
PLATELET # BLD AUTO: 251 K/UL (ref 150–400)
POTASSIUM SERPL-SCNC: 3.8 MMOL/L (ref 3.5–5.1)
PROT SERPL-MCNC: 7.2 G/DL (ref 6.4–8.2)
PROT UR QL STRIP: 20
PROTEIN, POC: 30
RBC # BLD AUTO: 5.57 M/UL (ref 4.2–5.4)
RBC # UR STRIP: ABNORMAL /UL
RBC #/AREA URNS HPF: 15 /HPF
SODIUM SERPL-SCNC: 139 MMOL/L (ref 136–145)
SP GR UR STRIP: 1.03
SPECIFIC GRAVITY, POC UA: >=1.03
SQUAMOUS #/AREA URNS LPF: ABNORMAL /HPF
T4 FREE SERPL-MCNC: 0.9 NG/DL (ref 0.76–1.46)
TSH SERPL DL<=0.005 MIU/L-ACNC: 29.6 UIU/ML (ref 0.36–3.74)
UROBILINOGEN UR STRIP-ACNC: NORMAL MG/DL
UROBILINOGEN, POC UA: 0.2
WBC # BLD AUTO: 8.56 K/UL (ref 4.5–11)
WBC #/AREA URNS HPF: 1 /HPF

## 2024-07-02 PROCEDURE — 99214 OFFICE O/P EST MOD 30 MIN: CPT | Mod: ,,, | Performed by: FAMILY MEDICINE

## 2024-07-02 PROCEDURE — 1160F RVW MEDS BY RX/DR IN RCRD: CPT | Mod: CPTII,,, | Performed by: FAMILY MEDICINE

## 2024-07-02 PROCEDURE — 3079F DIAST BP 80-89 MM HG: CPT | Mod: CPTII,,, | Performed by: FAMILY MEDICINE

## 2024-07-02 PROCEDURE — 81003 URINALYSIS AUTO W/O SCOPE: CPT | Mod: QW,,, | Performed by: FAMILY MEDICINE

## 2024-07-02 PROCEDURE — 3061F NEG MICROALBUMINURIA REV: CPT | Mod: CPTII,,, | Performed by: FAMILY MEDICINE

## 2024-07-02 PROCEDURE — 80050 GENERAL HEALTH PANEL: CPT | Mod: ,,, | Performed by: CLINICAL MEDICAL LABORATORY

## 2024-07-02 PROCEDURE — 81001 URINALYSIS AUTO W/SCOPE: CPT | Mod: ,,, | Performed by: CLINICAL MEDICAL LABORATORY

## 2024-07-02 PROCEDURE — 3044F HG A1C LEVEL LT 7.0%: CPT | Mod: CPTII,,, | Performed by: FAMILY MEDICINE

## 2024-07-02 PROCEDURE — 3066F NEPHROPATHY DOC TX: CPT | Mod: CPTII,,, | Performed by: FAMILY MEDICINE

## 2024-07-02 PROCEDURE — 3074F SYST BP LT 130 MM HG: CPT | Mod: CPTII,,, | Performed by: FAMILY MEDICINE

## 2024-07-02 PROCEDURE — 3008F BODY MASS INDEX DOCD: CPT | Mod: CPTII,,, | Performed by: FAMILY MEDICINE

## 2024-07-02 PROCEDURE — 1159F MED LIST DOCD IN RCRD: CPT | Mod: CPTII,,, | Performed by: FAMILY MEDICINE

## 2024-07-02 PROCEDURE — 83036 HEMOGLOBIN GLYCOSYLATED A1C: CPT | Mod: ,,, | Performed by: CLINICAL MEDICAL LABORATORY

## 2024-07-02 PROCEDURE — 84439 ASSAY OF FREE THYROXINE: CPT | Mod: ,,, | Performed by: CLINICAL MEDICAL LABORATORY

## 2024-07-02 RX ORDER — FERROUS SULFATE 325(65) MG
325 TABLET ORAL
Qty: 30 TABLET | Refills: 5 | Status: SHIPPED | OUTPATIENT
Start: 2024-07-02

## 2024-07-02 RX ORDER — NITROFURANTOIN 25; 75 MG/1; MG/1
100 CAPSULE ORAL 2 TIMES DAILY
Qty: 14 CAPSULE | Refills: 0 | Status: SHIPPED | OUTPATIENT
Start: 2024-07-02

## 2024-07-02 RX ORDER — LEVOTHYROXINE SODIUM 137 UG/1
137 TABLET ORAL
Qty: 30 TABLET | Refills: 3 | Status: SHIPPED | OUTPATIENT
Start: 2024-07-02

## 2024-07-02 RX ORDER — HYDROXYZINE PAMOATE 25 MG/1
CAPSULE ORAL
Qty: 30 CAPSULE | Refills: 2 | Status: SHIPPED | OUTPATIENT
Start: 2024-07-02

## 2024-07-02 RX ORDER — ATORVASTATIN CALCIUM 10 MG/1
10 TABLET, FILM COATED ORAL NIGHTLY
Qty: 30 TABLET | Refills: 3 | Status: SHIPPED | OUTPATIENT
Start: 2024-07-02 | End: 2025-07-02

## 2024-07-02 RX ORDER — METOPROLOL TARTRATE 25 MG/1
25 TABLET, FILM COATED ORAL 2 TIMES DAILY
Qty: 60 TABLET | Refills: 5 | Status: SHIPPED | OUTPATIENT
Start: 2024-07-02

## 2024-07-02 NOTE — PROGRESS NOTES
Mary Magana is a 43 y.o. female seen today for follow-up on her diabetes and hypothyroidism patient reports her blood sugars have been a little elevated lately and she has had dysuria.  Patient is up-to-date on her diabetic eye exam.     Past Medical History:   Diagnosis Date    Diabetes mellitus     Hypertension     Thyroid disease     WPW (Ilana-Parkinson-White syndrome)      Family History   Problem Relation Name Age of Onset    Hypertension Mother      Diabetes Mother      Diabetes Father      Diabetes Sister      Leukemia Daughter       Current Outpatient Medications on File Prior to Visit   Medication Sig Dispense Refill    aspirin (ECOTRIN) 81 MG EC tablet TAKE 1 TABLET BY MOUTH DAILY WITH FOOD 30 tablet 5    buPROPion (WELLBUTRIN XL) 150 MG TB24 tablet Take 150 mg by mouth every morning.      clonazePAM (KLONOPIN) 1 MG tablet Take 1 mg by mouth nightly as needed for Anxiety.      famotidine (PEPCID) 40 MG tablet Take 1 tablet (40 mg total) by mouth once daily. 30 tablet 4    glimepiride (AMARYL) 1 MG tablet Take 1 mg by mouth every morning.      metFORMIN (GLUCOPHAGE) 1000 MG tablet Take 1 tablet (1,000 mg total) by mouth 2 (two) times daily with meals. 60 tablet 5    omeprazole (PRILOSEC) 40 MG capsule Take 1 capsule (40 mg total) by mouth once daily. 30 capsule 2    TRUEPLUS LANCETS 30 gauge Misc AS DIRECTED      [DISCONTINUED] atorvastatin (LIPITOR) 10 MG tablet Take 1 tablet (10 mg total) by mouth every evening. 30 tablet 3    [DISCONTINUED] ferrous sulfate (IRON) 325 mg (65 mg iron) Tab tablet Take 1 tablet (325 mg total) by mouth daily with breakfast. 30 tablet 5    [DISCONTINUED] hydrOXYzine pamoate (VISTARIL) 25 MG Cap Take 1 to 2 capsule up to tid prn for severe anxiety 30 capsule 2    [DISCONTINUED] levothyroxine (SYNTHROID) 137 MCG Tab tablet Take 1 tablet (137 mcg total) by mouth before breakfast. 30 tablet 3    [DISCONTINUED] metoprolol tartrate (LOPRESSOR) 25 MG tablet Take 1 tablet  (25 mg total) by mouth 2 (two) times daily. 60 tablet 5    empagliflozin (JARDIANCE) 25 mg tablet Take 1 tablet (25 mg total) by mouth once daily. (Patient not taking: Reported on 4/2/2024) 30 tablet 3     No current facility-administered medications on file prior to visit.       There is no immunization history on file for this patient.    Review of Systems   Constitutional:  Negative for fever, malaise/fatigue and weight loss.   Respiratory:  Negative for shortness of breath.    Cardiovascular:  Negative for chest pain and palpitations.   Gastrointestinal:  Negative for nausea and vomiting.   Genitourinary:  Positive for dysuria.   Psychiatric/Behavioral:  Negative for depression.         Vitals:    07/02/24 0834   BP: (!) 118/90   Pulse: 73   Resp: 16   Temp: 98.3 °F (36.8 °C)       Physical Exam  Vitals reviewed.   Constitutional:       Appearance: Normal appearance.   HENT:      Head: Normocephalic.   Eyes:      Extraocular Movements: Extraocular movements intact.      Conjunctiva/sclera: Conjunctivae normal.      Pupils: Pupils are equal, round, and reactive to light.   Neck:      Thyroid: No thyroid mass or thyromegaly.   Cardiovascular:      Rate and Rhythm: Normal rate and regular rhythm.      Heart sounds: Normal heart sounds. No murmur heard.     No gallop.   Pulmonary:      Effort: Pulmonary effort is normal. No respiratory distress.      Breath sounds: Normal breath sounds. No wheezing or rales.   Skin:     General: Skin is warm and dry.      Coloration: Skin is not jaundiced or pale.   Neurological:      Mental Status: She is alert.   Psychiatric:         Mood and Affect: Mood normal.         Behavior: Behavior normal.         Thought Content: Thought content normal.         Judgment: Judgment normal.          Assessment and Plan  1. Type 2 diabetes mellitus without complication, without long-term current use of insulin  -     Hemoglobin A1C; Future; Expected date: 07/02/2024    2. Hypertension,  unspecified type  -     CBC Auto Differential; Future; Expected date: 07/02/2024  -     Comprehensive Metabolic Panel; Future; Expected date: 07/02/2024  -     metoprolol tartrate (LOPRESSOR) 25 MG tablet; Take 1 tablet (25 mg total) by mouth 2 (two) times daily.  Dispense: 60 tablet; Refill: 5    3. Hypothyroidism, unspecified type  -     TSH; Future; Expected date: 07/02/2024  -     T4, Free; Future; Expected date: 07/02/2024  -     levothyroxine (SYNTHROID) 137 MCG Tab tablet; Take 1 tablet (137 mcg total) by mouth before breakfast.  Dispense: 30 tablet; Refill: 3    4. Anxiety  -     hydrOXYzine pamoate (VISTARIL) 25 MG Cap; Take 1 to 2 capsule up to tid prn for severe anxiety  Dispense: 30 capsule; Refill: 2    5. Iron deficiency anemia, unspecified iron deficiency anemia type  -     ferrous sulfate (IRON) 325 mg (65 mg iron) Tab tablet; Take 1 tablet (325 mg total) by mouth daily with breakfast.  Dispense: 30 tablet; Refill: 5    6. Hyperlipidemia, unspecified hyperlipidemia type  -     atorvastatin (LIPITOR) 10 MG tablet; Take 1 tablet (10 mg total) by mouth every evening.  Dispense: 30 tablet; Refill: 3                Patient's urine dip was positive for blood and for now we will treat with Macrodantin.  The patient reports she did just finish her menstrual cycle.  She will return to clinic in 1 week or as needed and a formal urinalysis has been ordered.                     Return to clinic in 1  week.    Health Maintenance Topics with due status: Not Due       Topic Last Completion Date    Cervical Cancer Screening 11/28/2022    Foot Exam 12/01/2023    Lipid Panel 12/01/2023    Mammogram 12/06/2023    Low Dose Statin 04/02/2024    Diabetes Urine Screening 04/02/2024    Hemoglobin A1c 04/02/2024

## 2024-07-03 ENCOUNTER — TELEPHONE (OUTPATIENT)
Dept: FAMILY MEDICINE | Facility: CLINIC | Age: 44
End: 2024-07-03
Payer: COMMERCIAL

## 2024-07-03 NOTE — TELEPHONE ENCOUNTER
Pt notified that urine did have blood likely secondary to her cycle and that Dr Clayton would review labs with her at her upcoming appointment next week and she voiced understanding.

## 2024-07-03 NOTE — TELEPHONE ENCOUNTER
----- Message from Huber lCayton MD sent at 7/3/2024  7:52 AM CDT -----  Patient does have blood in urine likely secondary to her cycle.  Office visit for diabetes and TSH.  We can discuss at her visit in next week

## 2024-07-09 ENCOUNTER — TELEPHONE (OUTPATIENT)
Dept: FAMILY MEDICINE | Facility: CLINIC | Age: 44
End: 2024-07-09
Payer: COMMERCIAL

## 2024-07-09 ENCOUNTER — OFFICE VISIT (OUTPATIENT)
Dept: FAMILY MEDICINE | Facility: CLINIC | Age: 44
End: 2024-07-09
Payer: COMMERCIAL

## 2024-07-09 VITALS
HEIGHT: 62 IN | DIASTOLIC BLOOD PRESSURE: 88 MMHG | HEART RATE: 76 BPM | OXYGEN SATURATION: 96 % | SYSTOLIC BLOOD PRESSURE: 135 MMHG | RESPIRATION RATE: 18 BRPM | TEMPERATURE: 98 F | BODY MASS INDEX: 48.03 KG/M2 | WEIGHT: 261 LBS

## 2024-07-09 DIAGNOSIS — Z09 FOLLOW-UP EXAM: Primary | ICD-10-CM

## 2024-07-09 DIAGNOSIS — E11.9 TYPE 2 DIABETES MELLITUS WITHOUT COMPLICATION, WITHOUT LONG-TERM CURRENT USE OF INSULIN: ICD-10-CM

## 2024-07-09 LAB
BILIRUB SERPL-MCNC: NORMAL MG/DL
BLOOD URINE, POC: NORMAL
COLOR, POC UA: YELLOW
GLUCOSE UR QL STRIP: NORMAL
KETONES UR QL STRIP: NORMAL
LEUKOCYTE ESTERASE URINE, POC: NORMAL
NITRITE, POC UA: NORMAL
PH, POC UA: 6
PROTEIN, POC: NORMAL
SPECIFIC GRAVITY, POC UA: >=1.03
UROBILINOGEN, POC UA: 0.2

## 2024-07-09 PROCEDURE — 3061F NEG MICROALBUMINURIA REV: CPT | Mod: CPTII,,, | Performed by: NURSE PRACTITIONER

## 2024-07-09 PROCEDURE — 1159F MED LIST DOCD IN RCRD: CPT | Mod: CPTII,,, | Performed by: NURSE PRACTITIONER

## 2024-07-09 PROCEDURE — 3079F DIAST BP 80-89 MM HG: CPT | Mod: CPTII,,, | Performed by: NURSE PRACTITIONER

## 2024-07-09 PROCEDURE — 3066F NEPHROPATHY DOC TX: CPT | Mod: CPTII,,, | Performed by: NURSE PRACTITIONER

## 2024-07-09 PROCEDURE — 1160F RVW MEDS BY RX/DR IN RCRD: CPT | Mod: CPTII,,, | Performed by: NURSE PRACTITIONER

## 2024-07-09 PROCEDURE — 3051F HG A1C>EQUAL 7.0%<8.0%: CPT | Mod: CPTII,,, | Performed by: NURSE PRACTITIONER

## 2024-07-09 PROCEDURE — 3075F SYST BP GE 130 - 139MM HG: CPT | Mod: CPTII,,, | Performed by: NURSE PRACTITIONER

## 2024-07-09 PROCEDURE — 81003 URINALYSIS AUTO W/O SCOPE: CPT | Mod: QW,,, | Performed by: NURSE PRACTITIONER

## 2024-07-09 PROCEDURE — 99213 OFFICE O/P EST LOW 20 MIN: CPT | Mod: ,,, | Performed by: NURSE PRACTITIONER

## 2024-07-09 PROCEDURE — 3008F BODY MASS INDEX DOCD: CPT | Mod: CPTII,,, | Performed by: NURSE PRACTITIONER

## 2024-07-09 RX ORDER — GLIMEPIRIDE 1 MG/1
1 TABLET ORAL EVERY MORNING
Qty: 90 TABLET | Refills: 0 | Status: SHIPPED | OUTPATIENT
Start: 2024-07-09

## 2024-07-09 NOTE — TELEPHONE ENCOUNTER
Patient was contacted  and notified as far as her diabetes medication she could restart her amaryl, per Dr Clayton, patient verbalized understanding. patient was asked if she had been taking or been out of her synthroid and notified that her TSH was elevated patient stated she had been taking it regularly and before breakfast.

## 2024-07-09 NOTE — PROGRESS NOTES
[facility] Medicine    Chief Complaint      Chief Complaint   Patient presents with    Urinary Tract Infection     Patient states she is no longer having any symptoms , states she has finished her antibiotics.        History of Present Illness      Mary Magana is a 43 y.o. female. She  has a past medical history of Diabetes mellitus, Hypertension, Thyroid disease, and WPW (Ilana-Parkinson-White syndrome)., who presents today for UTI f/u- last week she was seen by Dr. Clayton and had blood in her urine but was on her cycle at that time so he wanted her to repeat her urine today.  States she completed the abx and all symptoms are resolved.     Past Medical History:  Past Medical History:   Diagnosis Date    Diabetes mellitus     Hypertension     Thyroid disease     WPW (Ilana-Parkinson-White syndrome)        Past Surgical History:   has a past surgical history that includes Cholecystectomy and Tubal ligation.    Social History:  Social History     Tobacco Use    Smoking status: Some Days     Current packs/day: 0.25     Average packs/day: 0.3 packs/day for 15.0 years (3.8 ttl pk-yrs)     Types: Cigarettes, Vaping with nicotine     Last attempt to quit: 2022     Passive exposure: Past    Smokeless tobacco: Never    Tobacco comments:     1 pack per 2 weeks   Substance Use Topics    Alcohol use: Never    Drug use: Never       I personally reviewed all past medical, surgical, and social.     Review of Systems   Constitutional:  Negative for chills and fever.   Respiratory:  Negative for shortness of breath.    Cardiovascular: Negative.    Gastrointestinal:  Negative for abdominal pain, diarrhea, nausea and vomiting.   Genitourinary:  Negative for dysuria, frequency, hematuria, pelvic pain, urgency and vaginal discharge.   Musculoskeletal:  Negative for back pain.        Medications:  Outpatient Encounter Medications as of 7/9/2024   Medication Sig Dispense Refill    aspirin (ECOTRIN) 81 MG EC tablet TAKE 1  TABLET BY MOUTH DAILY WITH FOOD 30 tablet 5    atorvastatin (LIPITOR) 10 MG tablet Take 1 tablet (10 mg total) by mouth every evening. 30 tablet 3    buPROPion (WELLBUTRIN XL) 150 MG TB24 tablet Take 150 mg by mouth every morning.      clonazePAM (KLONOPIN) 1 MG tablet Take 1 mg by mouth nightly as needed for Anxiety.      famotidine (PEPCID) 40 MG tablet Take 1 tablet (40 mg total) by mouth once daily. 30 tablet 4    ferrous sulfate (IRON) 325 mg (65 mg iron) Tab tablet Take 1 tablet (325 mg total) by mouth daily with breakfast. 30 tablet 5    hydrOXYzine pamoate (VISTARIL) 25 MG Cap Take 1 to 2 capsule up to tid prn for severe anxiety 30 capsule 2    levothyroxine (SYNTHROID) 137 MCG Tab tablet Take 1 tablet (137 mcg total) by mouth before breakfast. 30 tablet 3    metFORMIN (GLUCOPHAGE) 1000 MG tablet Take 1 tablet (1,000 mg total) by mouth 2 (two) times daily with meals. 60 tablet 5    metoprolol tartrate (LOPRESSOR) 25 MG tablet Take 1 tablet (25 mg total) by mouth 2 (two) times daily. 60 tablet 5    omeprazole (PRILOSEC) 40 MG capsule Take 1 capsule (40 mg total) by mouth once daily. 30 capsule 2    TRUEPLUS LANCETS 30 gauge Misc AS DIRECTED      [DISCONTINUED] glimepiride (AMARYL) 1 MG tablet Take 1 mg by mouth every morning.      glimepiride (AMARYL) 1 MG tablet Take 1 tablet (1 mg total) by mouth every morning. 90 tablet 0    [DISCONTINUED] empagliflozin (JARDIANCE) 25 mg tablet Take 1 tablet (25 mg total) by mouth once daily. (Patient not taking: Reported on 4/2/2024) 30 tablet 3    [DISCONTINUED] nitrofurantoin, macrocrystal-monohydrate, (MACROBID) 100 MG capsule Take 1 capsule (100 mg total) by mouth 2 (two) times daily. (Patient not taking: Reported on 7/9/2024) 14 capsule 0     No facility-administered encounter medications on file as of 7/9/2024.       Allergies:  Review of patient's allergies indicates:   Allergen Reactions    Anesthesia s/i-40 (propofol) [propofol] Itching     Anesthesia, possibly  "propofol; made her feel like there were ants all over her       Health Maintenance:    There is no immunization history on file for this patient.   Health Maintenance   Topic Date Due    Eye Exam  Never done    TETANUS VACCINE  Never done    Foot Exam  12/01/2024    Lipid Panel  12/01/2024    Mammogram  12/06/2024    Hemoglobin A1c  01/02/2025    Low Dose Statin  07/09/2025    Hepatitis C Screening  Completed        Physical Exam      Vital Signs  Temp: 98.3 °F (36.8 °C)  Temp Source: Oral  Pulse: 76  Resp: 18  SpO2: 96 %  BP: 135/88  BP Location: Right arm  Patient Position: Sitting  Pain Score: 0-No pain  Height and Weight  Height: 5' 2" (157.5 cm)  Weight: 118.4 kg (261 lb)  BSA (Calculated - sq m): 2.28 sq meters  BMI (Calculated): 47.7  Weight in (lb) to have BMI = 25: 136.4]    Physical Exam  Vitals and nursing note reviewed.   Constitutional:       Appearance: Normal appearance. She is well-developed.   HENT:      Head: Normocephalic.      Right Ear: Hearing normal.      Left Ear: Hearing normal.      Nose: Nose normal.   Eyes:      General: Lids are normal.      Conjunctiva/sclera: Conjunctivae normal.   Cardiovascular:      Rate and Rhythm: Normal rate.   Pulmonary:      Effort: Pulmonary effort is normal. No respiratory distress.   Musculoskeletal:         General: Normal range of motion.      Cervical back: Normal range of motion and neck supple.   Skin:     General: Skin is warm and dry.   Neurological:      Mental Status: She is alert and oriented to person, place, and time.      Gait: Gait is intact.   Psychiatric:         Behavior: Behavior is cooperative.          Laboratory:  CBC:  Recent Labs   Lab 12/01/23  1108 02/27/24  1829 07/02/24  0852   WBC 9.71 6.89 8.56   RBC 5.73 H 5.75 H 5.57 H   Hemoglobin 13.4 13.0 12.6   Hematocrit 42.5 42.1 43.4   Platelet Count 250 223 251   MCV 74.2 L 73.2 L 77.9 L   MCH 23.4 L 22.6 L 22.6 L   MCHC 31.5 L 30.9 L 29.0 L     CMP:  Recent Labs   Lab 12/01/23  1108 " 02/27/24  1829 07/02/24  0852   Glucose 153 H 103 148 H   Calcium 8.9 9.0 8.6   Albumin 3.4 L 3.3 L 3.6   Total Protein 7.1 7.5 7.2   Sodium 136 139 139   Potassium 4.0 3.7 3.8   CO2 28 27 23   Chloride 106 108 H 106   BUN 10 8 14   Alk Phos 133 H 91 91   ALT 50 28 27   AST 21 23 23   Bilirubin, Total 0.5 0.5 0.4     LIPIDS:  Recent Labs   Lab 09/16/22  1500 03/16/23  0953 07/25/23  1058 12/01/23  1108 07/02/24  0852   TSH 11.800 H   < > 12.700 H 7.600 H 29.600 H   HDL Cholesterol 40  --  44 40  --    Cholesterol 116  --  150 112  --    Triglycerides 68  --  112 120  --    LDL Calculated 62  --  84 48  --    Cholesterol/HDL Ratio (Risk Factor) 2.9  --  3.4 2.8  --    Non-HDL 76  --  106 72  --     < > = values in this interval not displayed.     TSH:  Recent Labs   Lab 07/25/23  1058 12/01/23  1108 07/02/24  0852   TSH 12.700 H 7.600 H 29.600 H     A1C:  Recent Labs   Lab 09/23/21  1554 06/03/22  1520 09/16/22  1500 03/16/23  0953 07/25/23  1058 12/01/23  1108 04/02/24  1107 07/02/24  0852   Hemoglobin A1C 5.4 5.5 6.0 6.0 5.8 7.6 H 6.6 7.8 H       Assessment/Plan     Mary Magana is a 43 y.o.female with:     1. Follow-up exam  -     POCT URINALYSIS W/O SCOPE    2. Type 2 diabetes mellitus without complication, without long-term current use of insulin  -     glimepiride (AMARYL) 1 MG tablet; Take 1 tablet (1 mg total) by mouth every morning.  Dispense: 90 tablet; Refill: 0       Spoke to Dr. Clayton about patient's A1c and elevated glucose readings- her A1c at her last appt with him was 7.8%.  She was taken off her GLP1 medication due to gastroparesis. Dr. Clayton would like her Amaryl restarted and have her f/u with him with her glucose readings.       Total time spent face-to-face and non-face-to-face coordinating care for this encounter was: 20 minutes     Chronic conditions status updated as per HPI.  Other than changes above, cont current medications and maintain follow up with specialists.  Return  to clinic prn if symptoms worsen or fail to improve.    Ronna Boogie, FNP  [facility]

## 2024-07-17 ENCOUNTER — TELEPHONE (OUTPATIENT)
Dept: DERMATOLOGY | Facility: CLINIC | Age: 44
End: 2024-07-17
Payer: COMMERCIAL

## 2024-08-06 ENCOUNTER — PATIENT MESSAGE (OUTPATIENT)
Dept: FAMILY MEDICINE | Facility: CLINIC | Age: 44
End: 2024-08-06
Payer: COMMERCIAL

## 2024-08-07 ENCOUNTER — TELEPHONE (OUTPATIENT)
Dept: FAMILY MEDICINE | Facility: CLINIC | Age: 44
End: 2024-08-07
Payer: COMMERCIAL

## 2024-08-08 ENCOUNTER — OFFICE VISIT (OUTPATIENT)
Dept: FAMILY MEDICINE | Facility: CLINIC | Age: 44
End: 2024-08-08
Payer: COMMERCIAL

## 2024-08-08 VITALS
HEIGHT: 67 IN | WEIGHT: 164 LBS | HEART RATE: 78 BPM | SYSTOLIC BLOOD PRESSURE: 106 MMHG | BODY MASS INDEX: 25.74 KG/M2 | DIASTOLIC BLOOD PRESSURE: 64 MMHG

## 2024-08-08 DIAGNOSIS — F41.9 ANXIETY: ICD-10-CM

## 2024-08-08 DIAGNOSIS — F90.0 ATTENTION DEFICIT HYPERACTIVITY DISORDER (ADHD), PREDOMINANTLY INATTENTIVE TYPE: Primary | ICD-10-CM

## 2024-08-08 DIAGNOSIS — Z76.0 MEDICATION REFILL: ICD-10-CM

## 2024-08-08 RX ORDER — METHYLPHENIDATE HYDROCHLORIDE 18 MG/1
18 TABLET ORAL EVERY MORNING
Qty: 30 TABLET | Refills: 0 | Status: SHIPPED | OUTPATIENT
Start: 2024-08-08

## 2024-08-08 RX ORDER — TRIAMCINOLONE ACETONIDE 5 MG/G
1 CREAM TOPICAL 2 TIMES DAILY
Qty: 15 G | Refills: 2 | Status: SHIPPED | OUTPATIENT
Start: 2024-08-08

## 2024-08-08 RX ORDER — ALPRAZOLAM 0.5 MG/1
0.5 TABLET ORAL 3 TIMES DAILY
Qty: 20 TABLET | Refills: 0 | Status: SHIPPED | OUTPATIENT
Start: 2024-08-08

## 2024-08-08 RX ORDER — SEMAGLUTIDE 0.68 MG/ML
0.5 INJECTION, SOLUTION SUBCUTANEOUS
COMMUNITY

## 2024-09-15 ENCOUNTER — HOSPITAL ENCOUNTER (EMERGENCY)
Facility: HOSPITAL | Age: 44
Discharge: HOME | End: 2024-09-15
Attending: FAMILY MEDICINE
Payer: COMMERCIAL

## 2024-09-15 VITALS
BODY MASS INDEX: 43.06 KG/M2 | DIASTOLIC BLOOD PRESSURE: 77 MMHG | TEMPERATURE: 99 F | HEART RATE: 71 BPM | SYSTOLIC BLOOD PRESSURE: 134 MMHG | OXYGEN SATURATION: 98 % | HEIGHT: 62 IN | WEIGHT: 234 LBS | RESPIRATION RATE: 17 BRPM

## 2024-09-15 DIAGNOSIS — R07.9 CHEST PAIN: ICD-10-CM

## 2024-09-15 DIAGNOSIS — F41.9 ANXIETY: ICD-10-CM

## 2024-09-15 DIAGNOSIS — R07.89 ATYPICAL CHEST PAIN: Primary | ICD-10-CM

## 2024-09-15 LAB
ALBUMIN SERPL BCP-MCNC: 3.8 G/DL (ref 3.5–5)
ALBUMIN/GLOB SERPL: 0.9 {RATIO}
ALP SERPL-CCNC: 95 U/L (ref 37–98)
ALT SERPL W P-5'-P-CCNC: 23 U/L (ref 13–56)
ANION GAP SERPL CALCULATED.3IONS-SCNC: 7 MMOL/L (ref 7–16)
AST SERPL W P-5'-P-CCNC: 28 U/L (ref 15–37)
BASOPHILS # BLD AUTO: 0.05 K/UL (ref 0–0.2)
BASOPHILS NFR BLD AUTO: 0.5 % (ref 0–1)
BILIRUB SERPL-MCNC: 0.5 MG/DL (ref ?–1.2)
BUN SERPL-MCNC: 11 MG/DL (ref 7–18)
BUN/CREAT SERPL: 9 (ref 6–20)
CALCIUM SERPL-MCNC: 9.5 MG/DL (ref 8.5–10.1)
CHLORIDE SERPL-SCNC: 107 MMOL/L (ref 98–107)
CO2 SERPL-SCNC: 26 MMOL/L (ref 21–32)
CREAT SERPL-MCNC: 1.17 MG/DL (ref 0.55–1.02)
DIFFERENTIAL METHOD BLD: ABNORMAL
EGFR (NO RACE VARIABLE) (RUSH/TITUS): 60 ML/MIN/1.73M2
EOSINOPHIL # BLD AUTO: 0.43 K/UL (ref 0–0.5)
EOSINOPHIL NFR BLD AUTO: 4.3 % (ref 1–4)
ERYTHROCYTE [DISTWIDTH] IN BLOOD BY AUTOMATED COUNT: 13.5 % (ref 11.5–14.5)
GLOBULIN SER-MCNC: 4.4 G/DL (ref 2–4)
GLUCOSE SERPL-MCNC: 205 MG/DL (ref 74–106)
HCT VFR BLD AUTO: 44 % (ref 38–47)
HGB BLD-MCNC: 13.6 G/DL (ref 12–16)
HYPOCHROMIA BLD QL SMEAR: ABNORMAL
IMM GRANULOCYTES # BLD AUTO: 0.02 K/UL (ref 0–0.04)
IMM GRANULOCYTES NFR BLD: 0.2 % (ref 0–0.4)
LYMPHOCYTES # BLD AUTO: 4.69 K/UL (ref 1–4.8)
LYMPHOCYTES NFR BLD AUTO: 47.1 % (ref 27–41)
MCH RBC QN AUTO: 22.9 PG (ref 27–31)
MCHC RBC AUTO-ENTMCNC: 30.9 G/DL (ref 32–36)
MCV RBC AUTO: 74.1 FL (ref 80–96)
MICROCYTES BLD QL SMEAR: ABNORMAL
MONOCYTES # BLD AUTO: 0.62 K/UL (ref 0–0.8)
MONOCYTES NFR BLD AUTO: 6.2 % (ref 2–6)
MPC BLD CALC-MCNC: 12.2 FL (ref 9.4–12.4)
NEUTROPHILS # BLD AUTO: 4.14 K/UL (ref 1.8–7.7)
NEUTROPHILS NFR BLD AUTO: 41.7 % (ref 53–65)
NRBC # BLD AUTO: 0 X10E3/UL
NRBC, AUTO (.00): 0 %
NT-PROBNP SERPL-MCNC: 63 PG/ML (ref 1–125)
PLATELET # BLD AUTO: 198 K/UL (ref 150–400)
PLATELET MORPHOLOGY: ABNORMAL
POTASSIUM SERPL-SCNC: 3.8 MMOL/L (ref 3.5–5.1)
PROT SERPL-MCNC: 8.2 G/DL (ref 6.4–8.2)
RBC # BLD AUTO: 5.94 M/UL (ref 4.2–5.4)
SODIUM SERPL-SCNC: 136 MMOL/L (ref 136–145)
TROPONIN I SERPL DL<=0.01 NG/ML-MCNC: <4 PG/ML
WBC # BLD AUTO: 9.95 K/UL (ref 4.5–11)

## 2024-09-15 PROCEDURE — 93005 ELECTROCARDIOGRAM TRACING: CPT

## 2024-09-15 PROCEDURE — 36415 COLL VENOUS BLD VENIPUNCTURE: CPT | Performed by: FAMILY MEDICINE

## 2024-09-15 PROCEDURE — 96375 TX/PRO/DX INJ NEW DRUG ADDON: CPT

## 2024-09-15 PROCEDURE — 80053 COMPREHEN METABOLIC PANEL: CPT | Performed by: FAMILY MEDICINE

## 2024-09-15 PROCEDURE — 85025 COMPLETE CBC W/AUTO DIFF WBC: CPT | Performed by: FAMILY MEDICINE

## 2024-09-15 PROCEDURE — 96374 THER/PROPH/DIAG INJ IV PUSH: CPT

## 2024-09-15 PROCEDURE — 83880 ASSAY OF NATRIURETIC PEPTIDE: CPT | Performed by: FAMILY MEDICINE

## 2024-09-15 PROCEDURE — 63600175 PHARM REV CODE 636 W HCPCS: Performed by: FAMILY MEDICINE

## 2024-09-15 PROCEDURE — 84484 ASSAY OF TROPONIN QUANT: CPT | Performed by: FAMILY MEDICINE

## 2024-09-15 PROCEDURE — 99284 EMERGENCY DEPT VISIT MOD MDM: CPT | Mod: 25

## 2024-09-15 RX ORDER — ONDANSETRON HYDROCHLORIDE 2 MG/ML
4 INJECTION, SOLUTION INTRAVENOUS
Status: COMPLETED | OUTPATIENT
Start: 2024-09-15 | End: 2024-09-15

## 2024-09-15 RX ORDER — HYDROXYZINE PAMOATE 25 MG/1
25 CAPSULE ORAL 2 TIMES DAILY WITH MEALS
Qty: 30 CAPSULE | Refills: 2 | Status: SHIPPED | OUTPATIENT
Start: 2024-09-15 | End: 2024-09-30

## 2024-09-15 RX ORDER — MORPHINE SULFATE 4 MG/ML
4 INJECTION, SOLUTION INTRAMUSCULAR; INTRAVENOUS
Status: COMPLETED | OUTPATIENT
Start: 2024-09-15 | End: 2024-09-15

## 2024-09-15 RX ORDER — ONDANSETRON HYDROCHLORIDE 2 MG/ML
4 INJECTION, SOLUTION INTRAVENOUS ONCE
Status: DISCONTINUED | OUTPATIENT
Start: 2024-09-15 | End: 2024-09-15

## 2024-09-15 RX ADMIN — ONDANSETRON 4 MG: 2 INJECTION INTRAMUSCULAR; INTRAVENOUS at 02:09

## 2024-09-15 RX ADMIN — MORPHINE SULFATE 4 MG: 4 INJECTION, SOLUTION INTRAMUSCULAR; INTRAVENOUS at 02:09

## 2024-09-15 NOTE — Clinical Note
"Mary"Elina Magana was seen and treated in our emergency department on 9/15/2024.  She may return to work on 09/20/2024.       If you have any questions or concerns, please don't hesitate to call.      Ollie Leon, DO"

## 2024-09-16 ENCOUNTER — TELEPHONE (OUTPATIENT)
Dept: EMERGENCY MEDICINE | Facility: HOSPITAL | Age: 44
End: 2024-09-16
Payer: COMMERCIAL

## 2024-09-16 LAB
OHS QRS DURATION: 118 MS
OHS QTC CALCULATION: 389 MS

## 2024-09-17 NOTE — ED PROVIDER NOTES
Encounter Date: 9/15/2024       History     Chief Complaint   Patient presents with    Chest Pain     Patient comes in with chest pain no shortness a breath history of diabetes history of hypertension history of thyroid disease and will Parkinson's        Review of patient's allergies indicates:   Allergen Reactions    Anesthesia s/i-40 (propofol) [propofol] Itching     Anesthesia, possibly propofol; made her feel like there were ants all over her     Past Medical History:   Diagnosis Date    Diabetes mellitus     Hypertension     Thyroid disease     WPW (Ilana-Parkinson-White syndrome)      Past Surgical History:   Procedure Laterality Date    CHOLECYSTECTOMY      TUBAL LIGATION       Family History   Problem Relation Name Age of Onset    Hypertension Mother      Diabetes Mother      Diabetes Father      Diabetes Sister      Leukemia Daughter       Social History     Tobacco Use    Smoking status: Some Days     Current packs/day: 0.25     Average packs/day: 0.3 packs/day for 15.0 years (3.8 ttl pk-yrs)     Types: Cigarettes, Vaping with nicotine     Last attempt to quit: 2022     Passive exposure: Past    Smokeless tobacco: Never    Tobacco comments:     1 pack per 2 weeks   Substance Use Topics    Alcohol use: Never    Drug use: Never     Review of Systems   Constitutional: Negative.  Negative for fever.   HENT: Negative.  Negative for sore throat.    Eyes: Negative.    Respiratory: Negative.  Negative for shortness of breath.    Cardiovascular:  Positive for chest pain.   Gastrointestinal: Negative.  Negative for nausea.   Endocrine: Negative.    Genitourinary: Negative.  Negative for dysuria.   Musculoskeletal: Negative.  Negative for back pain.   Skin: Negative.  Negative for rash.   Allergic/Immunologic: Negative.    Neurological: Negative.  Negative for weakness.   Hematological: Negative.  Does not bruise/bleed easily.   Psychiatric/Behavioral: Negative.     All other systems reviewed and are  negative.      Physical Exam     Initial Vitals [09/15/24 0157]   BP Pulse Resp Temp SpO2   (!) 190/82 81 19 99.1 °F (37.3 °C) 99 %      MAP       --         Physical Exam    Constitutional: She appears well-developed and well-nourished.   HENT:   Head: Normocephalic and atraumatic.   Right Ear: External ear normal.   Left Ear: External ear normal.   Nose: Nose normal.   Mouth/Throat: Oropharynx is clear and moist.   Eyes: Conjunctivae and EOM are normal. Pupils are equal, round, and reactive to light.   Neck: Neck supple.   Normal range of motion.  Cardiovascular:  Normal rate, regular rhythm, normal heart sounds and intact distal pulses.           Pulmonary/Chest: Breath sounds normal.   Abdominal: Abdomen is soft. Bowel sounds are normal.   Genitourinary:    Vagina and uterus normal.     Musculoskeletal:         General: Normal range of motion.      Cervical back: Normal range of motion and neck supple.     Neurological: She is alert and oriented to person, place, and time. She has normal strength and normal reflexes.   Skin: Skin is warm. Capillary refill takes less than 2 seconds.   Psychiatric: She has a normal mood and affect. Her behavior is normal. Judgment and thought content normal.         Medical Screening Exam   See Full Note    ED Course   Procedures  Labs Reviewed   COMPREHENSIVE METABOLIC PANEL - Abnormal       Result Value    Sodium 136      Potassium 3.8      Chloride 107      CO2 26      Anion Gap 7      Glucose 205 (*)     BUN 11      Creatinine 1.17 (*)     BUN/Creatinine Ratio 9      Calcium 9.5      Total Protein 8.2      Albumin 3.8      Globulin 4.4 (*)     A/G Ratio 0.9      Bilirubin, Total 0.5      Alk Phos 95      ALT 23      AST 28      eGFR 60     CBC WITH DIFFERENTIAL - Abnormal    WBC 9.95      RBC 5.94 (*)     Hemoglobin 13.6      Hematocrit 44.0      MCV 74.1 (*)     MCH 22.9 (*)     MCHC 30.9 (*)     RDW 13.5      Platelet Count 198      MPV 12.2      Neutrophils % 41.7 (*)      Lymphocytes % 47.1 (*)     Monocytes % 6.2 (*)     Eosinophils % 4.3 (*)     Basophils % 0.5      Immature Granulocytes % 0.2      nRBC, Auto 0.0      Neutrophils, Abs 4.14      Lymphocytes, Absolute 4.69      Monocytes, Absolute 0.62      Eosinophils, Absolute 0.43      Basophils, Absolute 0.05      Immature Granulocytes, Absolute 0.02      nRBC, Absolute 0.00      Diff Type Scan Smear     CBC MORPHOLOGY - Abnormal    Platelet Morphology Few Large Platelets (*)     Microcytosis 1+      Hypochromic Few     TROPONIN I - Normal    Troponin I High Sensitivity <4.0     NT-PRO NATRIURETIC PEPTIDE - Normal    ProBNP 63     CBC W/ AUTO DIFFERENTIAL    Narrative:     The following orders were created for panel order CBC auto differential.  Procedure                               Abnormality         Status                     ---------                               -----------         ------                     CBC with Differential[[id number]]       Abnormal            Final result                 Please view results for these tests on the individual orders.        ECG Results              EKG 12-lead (Final result)        Collection Time Result Time QRS Duration OHS QTC Calculation    09/15/24 01:54:45 09/16/24 21:45:50 118 389                     Final result by Interface, Lab In [facility] (09/16/24 21:45:58)                   Narrative:    Test Reason : R07.9,    Vent. Rate : 079 BPM     Atrial Rate : 000 BPM     P-R Int : 078 ms          QRS Dur : 118 ms      QT Int : 356 ms       P-R-T Axes : 238 038 247 degrees     QTc Int : 389 ms    Possible ectopic atrial rhythm  Widespread ST-T abnormality, possible myocardial ischemia  Abnormal ECG    Confirmed by Kayla PRIDE, Antoni ([id number]) on 9/16/2024 9:45:48 PM    Referred By: MAT   SELF           Confirmed By:Antoni Garza MD                                  Imaging Results    None          Medications   morphine injection 4 mg (4 mg Intravenous Given 9/15/24 0203)   ondansetron  injection 4 mg (4 mg Intravenous Given 9/15/24 0203)     Medical Decision Making  Amount and/or Complexity of Data Reviewed  Labs: ordered.    Risk  Prescription drug management.                          Medical Decision Making:   Initial Assessment:   Patient comes in with chest pain no shortness a breath history of diabetes history of hypertension history of thyroid disease and will Parkinson's        Differential Diagnosis:   Patient discharged with a diagnosis of atypical chest pain and anxiety             Clinical Impression:   Final diagnoses:  [R07.9] Chest pain  [R07.89] Atypical chest pain (Primary)  [F41.9] Anxiety        ED Disposition Condition    Discharge Stable          ED Prescriptions       Medication Sig Dispense Start Date End Date Auth. Provider    hydrOXYzine pamoate (VISTARIL) 25 MG Cap Take 1 capsule (25 mg total) by mouth 2 (two) times daily with meals. for 30 doses 30 capsule 9/15/2024 9/30/2024 Ollie Leon,           Follow-up Information    None          Ollie Leon,   09/17/24 [id number]

## 2024-10-03 ENCOUNTER — OFFICE VISIT (OUTPATIENT)
Dept: DERMATOLOGY | Facility: CLINIC | Age: 44
End: 2024-10-03
Payer: COMMERCIAL

## 2024-10-03 DIAGNOSIS — L81.9 DYSCHROMIA: ICD-10-CM

## 2024-10-03 DIAGNOSIS — L70.0 ACNE VULGARIS: Primary | ICD-10-CM

## 2024-10-03 DIAGNOSIS — L98.9 ECZEMATOUS SKIN LESIONS: ICD-10-CM

## 2024-10-03 DIAGNOSIS — L30.9 DERMATITIS OF LIP: ICD-10-CM

## 2024-10-03 PROCEDURE — 99999 PR PBB SHADOW E&M-EST. PATIENT-LVL III: CPT | Mod: PBBFAC,,, | Performed by: DERMATOLOGY

## 2024-10-03 PROCEDURE — 99204 OFFICE O/P NEW MOD 45 MIN: CPT | Mod: S$GLB,,, | Performed by: DERMATOLOGY

## 2024-10-03 PROCEDURE — 1159F MED LIST DOCD IN RCRD: CPT | Mod: CPTII,S$GLB,, | Performed by: DERMATOLOGY

## 2024-10-03 PROCEDURE — G2211 COMPLEX E/M VISIT ADD ON: HCPCS | Mod: S$GLB,,, | Performed by: DERMATOLOGY

## 2024-10-03 RX ORDER — MOMETASONE FUROATE 1 MG/G
OINTMENT TOPICAL
Qty: 45 G | Refills: 1 | Status: SHIPPED | OUTPATIENT
Start: 2024-10-03

## 2024-10-03 RX ORDER — TRIAMCINOLONE ACETONIDE 0.25 MG/G
OINTMENT TOPICAL
Qty: 15 G | Refills: 1 | Status: SHIPPED | OUTPATIENT
Start: 2024-10-03

## 2024-10-03 RX ORDER — HYDROQUINONE 40 MG/G
CREAM TOPICAL
Qty: 28 G | Refills: 1 | Status: SHIPPED | OUTPATIENT
Start: 2024-10-03

## 2024-10-03 RX ORDER — ADAPALENE AND BENZOYL PEROXIDE GEL, 0.1%/2.5% 1; 25 MG/G; MG/G
GEL TOPICAL
Qty: 45 G | Refills: 5 | Status: SHIPPED | OUTPATIENT
Start: 2024-10-03

## 2024-10-03 NOTE — PROGRESS NOTES
Subjective:      Patient ID:  Yanira Lerma is a 43 y.o. female who presents for   Chief Complaint   Patient presents with    Hyperpigmentation     C/o of discoloration of face. Patient is using beoray , aveeno moisturizer in the day and remedy at night     Eczema     Uses triaciminolone      History of Present Illness: The patient presents with chief complaint of dark spots.  Location: facial   Duration: several months  Signs/Symptoms: discoloration    Prior treatments: remedy (kojic acid, retinol, tranexamic acid) product      History of Present Illness: The patient presents with chief complaint of dryness.  Location: right neck  Duration: several months  Signs/Symptoms: itching    Prior treatments: triamcinolone        Hyperpigmentation    Eczema        Review of Systems   Constitutional:  Negative for fever and chills.   Gastrointestinal:  Negative for nausea and vomiting.   Skin:  Positive for itching, rash, dry skin and activity-related sunscreen use. Negative for daily sunscreen use and recent sunburn.   Hematologic/Lymphatic: Does not bruise/bleed easily.       Objective:   Physical Exam   Constitutional: She appears well-developed and well-nourished. No distress.   Neurological: She is alert and oriented to person, place, and time. She is not disoriented.   Psychiatric: She has a normal mood and affect.   Skin:   Areas Examined (abnormalities noted in diagram):   Head / Face Inspection Performed  Neck Inspection Performed  RUE Inspected  LUE Inspection Performed  Nails and Digits Inspection Performed                Assessment / Plan:        Acne vulgaris  Dyschromia  -     adapalene-benzoyl peroxide (EPIDUO) 0.1-2.5 % GlwP; Apply pea-sized amount to entire face at bedtime.  Use every third night and increase as tolerated to nightly.  Dispense: 45 g; Refill: 5  -     hydroquinone 4 % Crea; Apply to dark spots once daily. Use with sunscreen if outdoors  Dispense: 28 g; Refill: 1  -     mild, will start  above meds with ceraVe hydrating cleanser and recommend daily sunscreen.    Eczematous skin lesions  -     mometasone (ELOCON) 0.1 % ointment; AAA bid prn.  Do not use on face, underarms or groin.  Dispense: 45 g; Refill: 1  -     discussed possibility of contact allergy to fragrance or nickel jewelry. Recommend avoidance and adding back in stepwise fashion. The patient acknowledged understanding.     Dermatitis of lip  -     triamcinolone acetonide 0.025% (KENALOG) 0.025 % Oint; AAA bid prn rash on lips. Use sparingly. Use for 1-2 weeks then taper. Mild steroid.  Dispense: 15 g; Refill: 1  -     reviewed sensitive skin care, recommend above med prn and vaseline for moisturization.                  Follow up in about 3 months (around 1/3/2025).

## 2024-10-03 NOTE — PATIENT INSTRUCTIONS
"ACNE INSTRUCTIONS  Use hydroquinone bleaching cream to dark spots only each morning.  Apply vitamin C serum or cream (optional). Use with a sunscreen with SPF 30.    Use epiduo gel (pea-sized amount) to entire face at bedtime. May cause irritation, start using every third night and gradually increase to every night.  You may also apply a non-comedogenic moisturizer prior to applying the epiduo to help reduce the risk of irritation and dryness.  Use a gentle cleanser twice daily such as CeraVe, Cetaphil, or Elta MD Gentle Foaming Cleanser.  Use CeraVe or Cetaphil lotion or Elta MD AM/PM therapy and use twice a day if dryness occurs.  All skin care products and cosmetics should have the label "non-comdeogenic" which means it will not clog your pores.      SHEER SUNSCREENS    Cetaphil Sheer Mineral Face Sunscreen SPF 50  CeraVe Sheer Hydrating Sunscreen SPF 30  Ignacio Brightening Moisturizer SPF 30  Umbra Sheer Physical Daily Defense SPF 30  Shiseido Ultimate Sun Protection Lotion WetForce SPF 50+  Supergoop! Unseen Sunscreen Broad Spectrum SPF 40  Neutrogena HydroBoost Water Gel Lotion SPF 50  Neutrogena Ultrasheer Face & Body Stick SPF 70      "

## 2024-10-07 ENCOUNTER — PATIENT MESSAGE (OUTPATIENT)
Dept: FAMILY MEDICINE | Facility: CLINIC | Age: 44
End: 2024-10-07
Payer: COMMERCIAL

## 2024-10-07 NOTE — TELEPHONE ENCOUNTER
Per dr hancock  ov scheduled with REAL Liu 10/10/24.  Patient declined urgent care states she is ok with waiting for in office appointment -DN

## 2024-10-09 ENCOUNTER — OFFICE VISIT (OUTPATIENT)
Dept: FAMILY MEDICINE | Facility: CLINIC | Age: 44
End: 2024-10-09
Payer: COMMERCIAL

## 2024-10-09 VITALS
TEMPERATURE: 98 F | DIASTOLIC BLOOD PRESSURE: 82 MMHG | RESPIRATION RATE: 19 BRPM | BODY MASS INDEX: 42.88 KG/M2 | HEIGHT: 62 IN | SYSTOLIC BLOOD PRESSURE: 122 MMHG | HEART RATE: 70 BPM | WEIGHT: 233 LBS

## 2024-10-09 DIAGNOSIS — E11.9 TYPE 2 DIABETES MELLITUS WITHOUT COMPLICATION, WITHOUT LONG-TERM CURRENT USE OF INSULIN: ICD-10-CM

## 2024-10-09 DIAGNOSIS — E03.9 HYPOTHYROIDISM, UNSPECIFIED TYPE: ICD-10-CM

## 2024-10-09 DIAGNOSIS — Z12.39 ENCOUNTER FOR SCREENING FOR MALIGNANT NEOPLASM OF BREAST, UNSPECIFIED SCREENING MODALITY: ICD-10-CM

## 2024-10-09 DIAGNOSIS — F41.9 ANXIETY: ICD-10-CM

## 2024-10-09 DIAGNOSIS — I10 HYPERTENSION, UNSPECIFIED TYPE: Primary | ICD-10-CM

## 2024-10-09 DIAGNOSIS — Z23 NEED FOR IMMUNIZATION AGAINST INFLUENZA: ICD-10-CM

## 2024-10-09 DIAGNOSIS — E78.5 HYPERLIPIDEMIA, UNSPECIFIED HYPERLIPIDEMIA TYPE: ICD-10-CM

## 2024-10-09 LAB
ALBUMIN SERPL BCP-MCNC: 3.6 G/DL (ref 3.5–5)
ALBUMIN/GLOB SERPL: 0.9 {RATIO}
ALP SERPL-CCNC: 100 U/L (ref 37–98)
ALT SERPL W P-5'-P-CCNC: 35 U/L (ref 13–56)
ANION GAP SERPL CALCULATED.3IONS-SCNC: 7 MMOL/L (ref 7–16)
AST SERPL W P-5'-P-CCNC: 23 U/L (ref 15–37)
BASOPHILS # BLD AUTO: 0.05 K/UL (ref 0–0.2)
BASOPHILS NFR BLD AUTO: 0.6 % (ref 0–1)
BILIRUB SERPL-MCNC: 0.2 MG/DL (ref ?–1.2)
BUN SERPL-MCNC: 13 MG/DL (ref 7–18)
BUN/CREAT SERPL: 13 (ref 6–20)
CALCIUM SERPL-MCNC: 9.1 MG/DL (ref 8.5–10.1)
CHLORIDE SERPL-SCNC: 107 MMOL/L (ref 98–107)
CO2 SERPL-SCNC: 29 MMOL/L (ref 21–32)
CREAT SERPL-MCNC: 1 MG/DL (ref 0.55–1.02)
DIFFERENTIAL METHOD BLD: ABNORMAL
EGFR (NO RACE VARIABLE) (RUSH/TITUS): 72 ML/MIN/1.73M2
EOSINOPHIL # BLD AUTO: 0.39 K/UL (ref 0–0.5)
EOSINOPHIL NFR BLD AUTO: 4.9 % (ref 1–4)
ERYTHROCYTE [DISTWIDTH] IN BLOOD BY AUTOMATED COUNT: 14 % (ref 11.5–14.5)
EST. AVERAGE GLUCOSE BLD GHB EST-MCNC: 154 MG/DL
GLOBULIN SER-MCNC: 3.9 G/DL (ref 2–4)
GLUCOSE SERPL-MCNC: 144 MG/DL (ref 74–106)
HBA1C MFR BLD HPLC: 7 % (ref 4.5–6.6)
HCT VFR BLD AUTO: 43.7 % (ref 38–47)
HGB BLD-MCNC: 13 G/DL (ref 12–16)
IMM GRANULOCYTES # BLD AUTO: 0.02 K/UL (ref 0–0.04)
IMM GRANULOCYTES NFR BLD: 0.3 % (ref 0–0.4)
LYMPHOCYTES # BLD AUTO: 3.22 K/UL (ref 1–4.8)
LYMPHOCYTES NFR BLD AUTO: 40.3 % (ref 27–41)
MCH RBC QN AUTO: 22.5 PG (ref 27–31)
MCHC RBC AUTO-ENTMCNC: 29.7 G/DL (ref 32–36)
MCV RBC AUTO: 75.7 FL (ref 80–96)
MONOCYTES # BLD AUTO: 0.49 K/UL (ref 0–0.8)
MONOCYTES NFR BLD AUTO: 6.1 % (ref 2–6)
MPC BLD CALC-MCNC: 12.2 FL (ref 9.4–12.4)
NEUTROPHILS # BLD AUTO: 3.83 K/UL (ref 1.8–7.7)
NEUTROPHILS NFR BLD AUTO: 47.8 % (ref 53–65)
NRBC # BLD AUTO: 0 X10E3/UL
NRBC, AUTO (.00): 0 %
PLATELET # BLD AUTO: 211 K/UL (ref 150–400)
POTASSIUM SERPL-SCNC: 4.1 MMOL/L (ref 3.5–5.1)
PROT SERPL-MCNC: 7.5 G/DL (ref 6.4–8.2)
RBC # BLD AUTO: 5.77 M/UL (ref 4.2–5.4)
SODIUM SERPL-SCNC: 139 MMOL/L (ref 136–145)
T4 FREE SERPL-MCNC: 0.84 NG/DL (ref 0.76–1.46)
TSH SERPL DL<=0.005 MIU/L-ACNC: 11.5 UIU/ML (ref 0.36–3.74)
WBC # BLD AUTO: 8 K/UL (ref 4.5–11)

## 2024-10-09 PROCEDURE — 3074F SYST BP LT 130 MM HG: CPT | Mod: CPTII,,, | Performed by: FAMILY MEDICINE

## 2024-10-09 PROCEDURE — 80053 COMPREHEN METABOLIC PANEL: CPT | Mod: ,,, | Performed by: CLINICAL MEDICAL LABORATORY

## 2024-10-09 PROCEDURE — 90661 CCIIV3 VAC ABX FR 0.5 ML IM: CPT | Mod: ,,, | Performed by: FAMILY MEDICINE

## 2024-10-09 PROCEDURE — 85025 COMPLETE CBC W/AUTO DIFF WBC: CPT | Mod: ,,, | Performed by: CLINICAL MEDICAL LABORATORY

## 2024-10-09 PROCEDURE — 1159F MED LIST DOCD IN RCRD: CPT | Mod: CPTII,,, | Performed by: FAMILY MEDICINE

## 2024-10-09 PROCEDURE — 3008F BODY MASS INDEX DOCD: CPT | Mod: CPTII,,, | Performed by: FAMILY MEDICINE

## 2024-10-09 PROCEDURE — 1160F RVW MEDS BY RX/DR IN RCRD: CPT | Mod: CPTII,,, | Performed by: FAMILY MEDICINE

## 2024-10-09 PROCEDURE — 83036 HEMOGLOBIN GLYCOSYLATED A1C: CPT | Mod: ,,, | Performed by: CLINICAL MEDICAL LABORATORY

## 2024-10-09 PROCEDURE — 3051F HG A1C>EQUAL 7.0%<8.0%: CPT | Mod: CPTII,,, | Performed by: FAMILY MEDICINE

## 2024-10-09 PROCEDURE — 84439 ASSAY OF FREE THYROXINE: CPT | Mod: ,,, | Performed by: CLINICAL MEDICAL LABORATORY

## 2024-10-09 PROCEDURE — 3066F NEPHROPATHY DOC TX: CPT | Mod: CPTII,,, | Performed by: FAMILY MEDICINE

## 2024-10-09 PROCEDURE — 84443 ASSAY THYROID STIM HORMONE: CPT | Mod: ,,, | Performed by: CLINICAL MEDICAL LABORATORY

## 2024-10-09 PROCEDURE — 90471 IMMUNIZATION ADMIN: CPT | Mod: ,,, | Performed by: FAMILY MEDICINE

## 2024-10-09 PROCEDURE — 3061F NEG MICROALBUMINURIA REV: CPT | Mod: CPTII,,, | Performed by: FAMILY MEDICINE

## 2024-10-09 PROCEDURE — 3079F DIAST BP 80-89 MM HG: CPT | Mod: CPTII,,, | Performed by: FAMILY MEDICINE

## 2024-10-09 PROCEDURE — 99214 OFFICE O/P EST MOD 30 MIN: CPT | Mod: 25,,, | Performed by: FAMILY MEDICINE

## 2024-10-09 RX ORDER — ATORVASTATIN CALCIUM 10 MG/1
10 TABLET, FILM COATED ORAL NIGHTLY
Qty: 90 TABLET | Refills: 1 | Status: SHIPPED | OUTPATIENT
Start: 2024-10-09 | End: 2025-04-07

## 2024-10-09 RX ORDER — HYDROXYZINE PAMOATE 25 MG/1
CAPSULE ORAL
Qty: 30 CAPSULE | Refills: 2 | Status: SHIPPED | OUTPATIENT
Start: 2024-10-09

## 2024-10-09 RX ORDER — METFORMIN HYDROCHLORIDE 1000 MG/1
1000 TABLET ORAL 2 TIMES DAILY WITH MEALS
Qty: 180 TABLET | Refills: 1 | Status: SHIPPED | OUTPATIENT
Start: 2024-10-09

## 2024-10-09 RX ORDER — LEVOTHYROXINE SODIUM 137 UG/1
137 TABLET ORAL
Qty: 90 TABLET | Refills: 1 | Status: SHIPPED | OUTPATIENT
Start: 2024-10-09 | End: 2024-10-10

## 2024-10-09 RX ORDER — GLIMEPIRIDE 1 MG/1
1 TABLET ORAL EVERY MORNING
Qty: 90 TABLET | Refills: 1 | Status: SHIPPED | OUTPATIENT
Start: 2024-10-09

## 2024-10-09 NOTE — PROGRESS NOTES
Mary Magana is a 43 y.o. female seen today for follow-up on her type 2 diabetes.  She reports her blood sugars are stable now that she is often GLP 1-2 gastroparesis.  Currently she is on Amaryl as well.  She is due for her flu vaccine today.  She is up-to-date on her diabetic eye exam.    Past Medical History:   Diagnosis Date    Diabetes mellitus     Hypertension     Thyroid disease     WPW (Ilana-Parkinson-White syndrome)      Family History   Problem Relation Name Age of Onset    Hypertension Mother      Diabetes Mother      Diabetes Father      Diabetes Sister      Leukemia Daughter       Current Outpatient Medications on File Prior to Visit   Medication Sig Dispense Refill    aspirin (ECOTRIN) 81 MG EC tablet TAKE 1 TABLET BY MOUTH DAILY WITH FOOD 30 tablet 5    buPROPion (WELLBUTRIN XL) 150 MG TB24 tablet Take 150 mg by mouth every morning.      clonazePAM (KLONOPIN) 1 MG tablet Take 1 mg by mouth nightly as needed for Anxiety.      famotidine (PEPCID) 40 MG tablet Take 1 tablet (40 mg total) by mouth once daily. 30 tablet 4    ferrous sulfate (IRON) 325 mg (65 mg iron) Tab tablet Take 1 tablet (325 mg total) by mouth daily with breakfast. 30 tablet 5    metoprolol tartrate (LOPRESSOR) 25 MG tablet Take 1 tablet (25 mg total) by mouth 2 (two) times daily. 60 tablet 5    omeprazole (PRILOSEC) 40 MG capsule Take 1 capsule (40 mg total) by mouth once daily. 30 capsule 2    TRUEPLUS LANCETS 30 gauge Misc AS DIRECTED      [DISCONTINUED] atorvastatin (LIPITOR) 10 MG tablet Take 1 tablet (10 mg total) by mouth every evening. 30 tablet 3    [DISCONTINUED] glimepiride (AMARYL) 1 MG tablet Take 1 tablet (1 mg total) by mouth every morning. 90 tablet 0    [DISCONTINUED] hydrOXYzine pamoate (VISTARIL) 25 MG Cap Take 1 to 2 capsule up to tid prn for severe anxiety 30 capsule 2    [DISCONTINUED] levothyroxine (SYNTHROID) 137 MCG Tab tablet Take 1 tablet (137 mcg total) by mouth before breakfast. 30 tablet 3     [DISCONTINUED] metFORMIN (GLUCOPHAGE) 1000 MG tablet Take 1 tablet (1,000 mg total) by mouth 2 (two) times daily with meals. 60 tablet 5     No current facility-administered medications on file prior to visit.     There is no immunization history for the selected administration types on file for this patient.    Review of Systems   Constitutional:  Negative for fever, malaise/fatigue and weight loss.   Respiratory:  Negative for shortness of breath.    Cardiovascular:  Negative for chest pain and palpitations.   Gastrointestinal:  Negative for nausea and vomiting.   Psychiatric/Behavioral:  Negative for depression. The patient is nervous/anxious.         Vitals:    10/09/24 0951   BP: 122/82   Pulse: 70   Resp: 19   Temp: 98.3 °F (36.8 °C)       Physical Exam  Vitals reviewed.   Constitutional:       Appearance: Normal appearance.   HENT:      Head: Normocephalic.   Eyes:      Extraocular Movements: Extraocular movements intact.      Conjunctiva/sclera: Conjunctivae normal.      Pupils: Pupils are equal, round, and reactive to light.   Neck:      Thyroid: No thyroid mass or thyromegaly.   Cardiovascular:      Rate and Rhythm: Normal rate and regular rhythm.      Heart sounds: Normal heart sounds. No murmur heard.     No gallop.   Pulmonary:      Effort: Pulmonary effort is normal. No respiratory distress.      Breath sounds: Normal breath sounds. No wheezing or rales.   Skin:     General: Skin is warm and dry.      Coloration: Skin is not jaundiced or pale.   Neurological:      Mental Status: She is alert.   Psychiatric:         Mood and Affect: Mood normal.         Behavior: Behavior normal.         Thought Content: Thought content normal.         Judgment: Judgment normal.          Assessment and Plan  1. Hypertension, unspecified type  -     CBC Auto Differential; Future; Expected date: 10/09/2024  -     Comprehensive Metabolic Panel; Future; Expected date: 10/09/2024    2. Anxiety  -     hydrOXYzine pamoate  (VISTARIL) 25 MG Cap; Take 1 to 2 capsule up to tid prn for severe anxiety  Dispense: 30 capsule; Refill: 2    3. Type 2 diabetes mellitus without complication, without long-term current use of insulin  -     glimepiride (AMARYL) 1 MG tablet; Take 1 tablet (1 mg total) by mouth every morning.  Dispense: 90 tablet; Refill: 1  -     metFORMIN (GLUCOPHAGE) 1000 MG tablet; Take 1 tablet (1,000 mg total) by mouth 2 (two) times daily with meals.  Dispense: 180 tablet; Refill: 1  -     Hemoglobin A1C; Future; Expected date: 10/09/2024    4. Hyperlipidemia, unspecified hyperlipidemia type  -     atorvastatin (LIPITOR) 10 MG tablet; Take 1 tablet (10 mg total) by mouth every evening.  Dispense: 90 tablet; Refill: 1    5. Hypothyroidism, unspecified type  -     levothyroxine (SYNTHROID) 137 MCG Tab tablet; Take 1 tablet (137 mcg total) by mouth before breakfast.  Dispense: 90 tablet; Refill: 1  -     TSH; Future; Expected date: 10/09/2024  -     T4, Free; Future; Expected date: 10/09/2024    6. Need for immunization against influenza  -     influenza (Egg-FREE) (Flucelvax) 45 mcg/0.5 mL IM vaccine (> or = 6 mo) (*contains preservatives) 0.5 mL             Return to clinic in 3 months or as needed once her lab work is in.  I have encouraged her to follow-up with her psychiatric nurse practitioner regarding her worsening anxiety.    Health Maintenance Topics with due status: Not Due       Topic Last Completion Date    Cervical Cancer Screening 11/28/2022    Lipid Panel 12/01/2023    Diabetes Urine Screening 04/02/2024    Hemoglobin A1c 07/02/2024    Low Dose Statin 07/09/2024    RSV Vaccine (Age 60+ and Pregnant patients) Not Due

## 2024-10-10 ENCOUNTER — PATIENT MESSAGE (OUTPATIENT)
Dept: FAMILY MEDICINE | Facility: CLINIC | Age: 44
End: 2024-10-10
Payer: COMMERCIAL

## 2024-10-10 RX ORDER — LEVOTHYROXINE SODIUM 150 UG/1
150 TABLET ORAL
Qty: 30 TABLET | Refills: 1 | Status: SHIPPED | OUTPATIENT
Start: 2024-10-10

## 2024-10-10 RX ORDER — LEVOTHYROXINE SODIUM 150 UG/1
150 TABLET ORAL
COMMUNITY
End: 2024-10-10 | Stop reason: SDUPTHER

## 2024-10-18 ENCOUNTER — HOSPITAL ENCOUNTER (EMERGENCY)
Facility: HOSPITAL | Age: 44
Discharge: HOME | End: 2024-10-19
Attending: EMERGENCY MEDICINE
Payer: COMMERCIAL

## 2024-10-18 DIAGNOSIS — R11.2 NAUSEA VOMITING AND DIARRHEA: Primary | ICD-10-CM

## 2024-10-18 DIAGNOSIS — R19.7 NAUSEA VOMITING AND DIARRHEA: Primary | ICD-10-CM

## 2024-10-18 LAB
ALBUMIN SERPL BCP-MCNC: 4.3 G/DL (ref 3.5–5.2)
ALP SERPL-CCNC: 34 U/L (ref 55–135)
ALT SERPL W/O P-5'-P-CCNC: 6 U/L (ref 10–44)
ANION GAP SERPL CALC-SCNC: 7 MMOL/L (ref 8–16)
AST SERPL-CCNC: 11 U/L (ref 10–40)
BASOPHILS # BLD AUTO: 0.02 K/UL (ref 0–0.2)
BASOPHILS NFR BLD: 0.3 % (ref 0–1.9)
BILIRUB SERPL-MCNC: 0.8 MG/DL (ref 0.1–1)
BUN SERPL-MCNC: 6 MG/DL (ref 6–20)
CALCIUM SERPL-MCNC: 9.4 MG/DL (ref 8.7–10.5)
CHLORIDE SERPL-SCNC: 103 MMOL/L (ref 95–110)
CO2 SERPL-SCNC: 28 MMOL/L (ref 23–29)
CREAT SERPL-MCNC: 0.6 MG/DL (ref 0.5–1.4)
DIFFERENTIAL METHOD BLD: ABNORMAL
EOSINOPHIL # BLD AUTO: 0.2 K/UL (ref 0–0.5)
EOSINOPHIL NFR BLD: 3.2 % (ref 0–8)
ERYTHROCYTE [DISTWIDTH] IN BLOOD BY AUTOMATED COUNT: 13.7 % (ref 11.5–14.5)
EST. GFR  (NO RACE VARIABLE): >60 ML/MIN/1.73 M^2
GLUCOSE SERPL-MCNC: 87 MG/DL (ref 70–110)
HCT VFR BLD AUTO: 35.1 % (ref 37–48.5)
HGB BLD-MCNC: 11.3 G/DL (ref 12–16)
IMM GRANULOCYTES # BLD AUTO: 0.01 K/UL (ref 0–0.04)
IMM GRANULOCYTES NFR BLD AUTO: 0.2 % (ref 0–0.5)
LIPASE SERPL-CCNC: 11 U/L (ref 4–60)
LYMPHOCYTES # BLD AUTO: 1.8 K/UL (ref 1–4.8)
LYMPHOCYTES NFR BLD: 30.6 % (ref 18–48)
MCH RBC QN AUTO: 26.6 PG (ref 27–31)
MCHC RBC AUTO-ENTMCNC: 32.2 G/DL (ref 32–36)
MCV RBC AUTO: 83 FL (ref 82–98)
MONOCYTES # BLD AUTO: 0.6 K/UL (ref 0.3–1)
MONOCYTES NFR BLD: 9.3 % (ref 4–15)
NEUTROPHILS # BLD AUTO: 3.3 K/UL (ref 1.8–7.7)
NEUTROPHILS NFR BLD: 56.4 % (ref 38–73)
NRBC BLD-RTO: 0 /100 WBC
PLATELET # BLD AUTO: 285 K/UL (ref 150–450)
PMV BLD AUTO: 9 FL (ref 9.2–12.9)
POTASSIUM SERPL-SCNC: 3.4 MMOL/L (ref 3.5–5.1)
PROT SERPL-MCNC: 7.4 G/DL (ref 6–8.4)
RBC # BLD AUTO: 4.25 M/UL (ref 4–5.4)
SODIUM SERPL-SCNC: 138 MMOL/L (ref 136–145)
WBC # BLD AUTO: 5.91 K/UL (ref 3.9–12.7)

## 2024-10-18 PROCEDURE — 83690 ASSAY OF LIPASE: CPT

## 2024-10-18 PROCEDURE — 80053 COMPREHEN METABOLIC PANEL: CPT

## 2024-10-18 PROCEDURE — 99284 EMERGENCY DEPT VISIT MOD MDM: CPT | Mod: 25

## 2024-10-18 PROCEDURE — 85025 COMPLETE CBC W/AUTO DIFF WBC: CPT

## 2024-10-18 PROCEDURE — 36415 COLL VENOUS BLD VENIPUNCTURE: CPT

## 2024-10-19 VITALS
HEIGHT: 66 IN | DIASTOLIC BLOOD PRESSURE: 74 MMHG | SYSTOLIC BLOOD PRESSURE: 132 MMHG | HEART RATE: 75 BPM | TEMPERATURE: 98 F | OXYGEN SATURATION: 100 % | RESPIRATION RATE: 16 BRPM | BODY MASS INDEX: 24.11 KG/M2 | WEIGHT: 150 LBS

## 2024-10-19 LAB
BACTERIA #/AREA URNS HPF: NORMAL /HPF
BILIRUB UR QL STRIP: NEGATIVE
CLARITY UR: CLEAR
COLOR UR: YELLOW
GLUCOSE UR QL STRIP: NEGATIVE
HGB UR QL STRIP: NEGATIVE
HYALINE CASTS #/AREA URNS LPF: 0 /LPF
KETONES UR QL STRIP: NEGATIVE
LEUKOCYTE ESTERASE UR QL STRIP: NEGATIVE
MICROSCOPIC COMMENT: NORMAL
NITRITE UR QL STRIP: NEGATIVE
PH UR STRIP: 6 [PH] (ref 5–8)
PROT UR QL STRIP: ABNORMAL
RBC #/AREA URNS HPF: 2 /HPF (ref 0–4)
SP GR UR STRIP: >1.03 (ref 1–1.03)
SQUAMOUS #/AREA URNS HPF: 4 /HPF
URN SPEC COLLECT METH UR: ABNORMAL
UROBILINOGEN UR STRIP-ACNC: NEGATIVE EU/DL
WBC #/AREA URNS HPF: 0 /HPF (ref 0–5)

## 2024-10-19 PROCEDURE — 25500020 PHARM REV CODE 255: Performed by: EMERGENCY MEDICINE

## 2024-10-19 PROCEDURE — 81001 URINALYSIS AUTO W/SCOPE: CPT

## 2024-10-19 RX ORDER — ONDANSETRON 4 MG/1
4 TABLET, ORALLY DISINTEGRATING ORAL EVERY 6 HOURS PRN
Qty: 30 TABLET | Refills: 0 | Status: SHIPPED | OUTPATIENT
Start: 2024-10-19

## 2024-10-19 RX ADMIN — IOHEXOL 100 ML: 350 INJECTION, SOLUTION INTRAVENOUS at 12:10

## 2024-10-19 NOTE — ED PROVIDER NOTES
Encounter Date: 10/18/2024       History     Chief Complaint   Patient presents with    Abdominal Pain     X2 days    Nausea    Vomiting     43-year-old female history hysterectomy,  x5 presents to the ER with 2 days of abdominal discomfort.  She reports vomiting x1, several episodes of diarrhea and a lot of belching.  She reports the belching has a very foul taste.  Describes a dull ache in her abdomen.  No fevers no chills.  Does not smoke or drink.  No urinary symptoms.    The history is provided by the patient.     Review of patient's allergies indicates:   Allergen Reactions    Diflucan [fluconazole] Hives    Griseofulvin Hives    Amoxicillin      Past Medical History:   Diagnosis Date    Abnormal Pap smear 10 years ago    colpo normal    ADHD (attention deficit hyperactivity disorder)     Allergy     Anxiety     Hidradenitis      Past Surgical History:   Procedure Laterality Date    BREAST SURGERY      Breast Reduction     SECTION  '02, '09    x4    DILATION AND CURETTAGE OF UTERUS  '01    after SAB    HYSTERECTOMY N/A 2022     Family History   Problem Relation Name Age of Onset    Miscarriages / Stillbirths Mother      Cancer Father      Hypertension Father      Breast cancer Neg Hx      Colon cancer Neg Hx      Ovarian cancer Neg Hx       Social History     Tobacco Use    Smoking status: Never    Smokeless tobacco: Never   Substance Use Topics    Alcohol use: No    Drug use: No     Review of Systems   Gastrointestinal:  Positive for abdominal pain, diarrhea, nausea and vomiting.       Physical Exam     Initial Vitals [10/18/24 2224]   BP Pulse Resp Temp SpO2   (!) 128/96 69 20 98.5 °F (36.9 °C) 100 %      MAP       --         Physical Exam    Nursing note and vitals reviewed.  Constitutional: She appears well-developed and well-nourished. She is not diaphoretic. No distress.   HENT:   Head: Normocephalic and atraumatic.   Eyes: EOM are normal.   Neck: Neck supple.   Normal range of  motion.  Cardiovascular:  Normal rate, regular rhythm and normal heart sounds.     Exam reveals no gallop and no friction rub.       No murmur heard.  Pulmonary/Chest: Breath sounds normal. No respiratory distress. She has no wheezes. She has no rhonchi. She has no rales.   Abdominal: There is generalized abdominal tenderness.   Generalized abdominal tenderness with no rebound or guarding.  Tenderness is more severe in the right lower quadrant. There is no rebound and no guarding.   Musculoskeletal:         General: Normal range of motion.      Cervical back: Normal range of motion and neck supple.     Neurological: She is alert and oriented to person, place, and time.   Skin: Skin is warm and dry.   Psychiatric: She has a normal mood and affect. Her behavior is normal. Judgment and thought content normal.         ED Course   Procedures  Labs Reviewed   CBC W/ AUTO DIFFERENTIAL - Abnormal       Result Value    WBC 5.91      RBC 4.25      Hemoglobin 11.3 (*)     Hematocrit 35.1 (*)     MCV 83      MCH 26.6 (*)     MCHC 32.2      RDW 13.7      Platelets 285      MPV 9.0 (*)     Immature Granulocytes 0.2      Gran # (ANC) 3.3      Immature Grans (Abs) 0.01      Lymph # 1.8      Mono # 0.6      Eos # 0.2      Baso # 0.02      nRBC 0      Gran % 56.4      Lymph % 30.6      Mono % 9.3      Eosinophil % 3.2      Basophil % 0.3      Differential Method Automated     COMPREHENSIVE METABOLIC PANEL - Abnormal    Sodium 138      Potassium 3.4 (*)     Chloride 103      CO2 28      Glucose 87      BUN 6      Creatinine 0.6      Calcium 9.4      Total Protein 7.4      Albumin 4.3      Total Bilirubin 0.8      Alkaline Phosphatase 34 (*)     AST 11      ALT 6 (*)     eGFR >60.0      Anion Gap 7 (*)    LIPASE   LIPASE    Lipase 11     URINALYSIS, REFLEX TO URINE CULTURE          Imaging Results              CT Abdomen Pelvis With IV Contrast NO Oral Contrast (Final result)  Result time 10/19/24 00:24:24      Final result by Libai  Alfredo LONDON MD (10/19/24 00:24:24)                   Impression:      Scattered liquid stool throughout the colon which may reflect a nonspecific diarrheal illness.  Clinical correlation advised.    Suspected cholelithiasis.  No definite CT evidence of acute cholecystitis.    Mild hepatomegaly.    Hysterectomy.  Several small left adnexal cysts measuring up to 1.7 cm.    Additional findings as above.      Electronically signed by: Alfredo Reza MD  Date:    10/19/2024  Time:    00:24               Narrative:    EXAMINATION:  CT ABDOMEN PELVIS WITH IV CONTRAST    CLINICAL HISTORY:  Abdominal pain, acute, nonlocalized;    TECHNIQUE:  Low dose axial images, sagittal and coronal reformations were obtained from the lung bases to the pubic symphysis following the IV administration of 100 mL of Omnipaque 350 .  Oral contrast was not given.    COMPARISON:  None.    FINDINGS:  The lung bases are unremarkable.  There is no pleural fluid present.  The visualized portions of the heart appear normal.    The liver is mildly enlarged.  There is a subcentimeter right hepatic lobe hypodensity, too small to accurately characterize.  The portal vein is patent.  Gallbladder is not significantly distended.  There is suspected cholelithiasis present.  There is no intra-or extrahepatic biliary ductal dilatation.    Stomach is mildly distended presumably with recently ingested oral contents.  The spleen, pancreas, and adrenal glands demonstrate no acute abnormality.    The kidneys are normal in size and location and enhance symmetrically.  There is no evidence of hydronephrosis. There is a subcentimeter cortical left renal hypodensity, too small to accurately characterize.  Urinary bladder appears within normal limits.  Uterus appears to be surgically absent.  There are several apparent small left adnexal cysts measuring up to 1.7 cm.  No significant free fluid appreciated within the pelvis.    The abdominal aorta is normal in course and  caliber without significant atherosclerotic calcifications.    There is no evidence to suggest small bowel obstruction.  There is scattered liquid stool throughout the colon which may reflect a nonspecific diarrheal illness.  No CT evidence of acute appendicitis.  There is no ascites, free fluid, or intraperitoneal free air. There are shotty mesenteric and retroperitoneal lymph nodes.  There is a small fat containing umbilical hernia.  There is a linear region of soft tissue induration within the anterior abdominal wall subcutaneous soft tissues which may relate to prior surgical incision.    Osseous structures demonstrate mild degenerative changes.                                       Medications   iohexoL (OMNIPAQUE 350) injection 100 mL (100 mLs Intravenous Given 10/19/24 0005)     Medical Decision Making  43-year-old female presents to the ER with several days of nausea vomiting and diarrhea.  She has not vomited in the ER.  Vomited only once at home.  Several episodes of diarrhea.  Reports vague abdominal discomfort.  Tender in the right lower quadrant on exam.  CT demonstrates liquid stool but no other significant abnormality.  Patient will be discharged home with Zofran, Imodium over the counter.  Likely viral illness.  No sign of serious or life-threatening etiology at this time.    Amount and/or Complexity of Data Reviewed  Labs:  Decision-making details documented in ED Course.  Radiology: ordered. Decision-making details documented in ED Course.    Risk  Prescription drug management.               ED Course as of 10/19/24 0148   Fri Oct 18, 2024   2304 WBC: 5.91 [EF]   2304 Hemoglobin(!): 11.3 [EF]   2304 Platelet Count: 285 [EF]   2304 BP(!): 128/96 [EF]   2304 Temp: 98.5 °F (36.9 °C) [EF]   2304 Pulse: 69 [EF]   2304 Resp: 20 [EF]   2304 SpO2: 100 % [EF]   Sat Oct 19, 2024   0038 CT Abdomen Pelvis With IV Contrast NO Oral Contrast [EF]      ED Course User Index  [EF] Abel Christie MD                            Clinical Impression:  Final diagnoses:  [R11.2, R19.7] Nausea vomiting and diarrhea (Primary)          ED Disposition Condition    Discharge Stable          ED Prescriptions       Medication Sig Dispense Start Date End Date Auth. Provider    ondansetron (ZOFRAN-ODT) 4 MG TbDL Take 1 tablet (4 mg total) by mouth every 6 (six) hours as needed (nausea or vomiting). 30 tablet 10/19/2024 -- Abel Christie MD          Follow-up Information       Follow up With Specialties Details Why Contact Info Additional Information    [facility] - Emergency Dept Emergency Medicine  As needed, If symptoms worsen [address] [facility] [address]  [phone] 1st floor    Blessing Hinson MD Family Medicine  As needed, If symptoms worsen [address] [facility]  [address]  [facility] [address]  [phone]                Abel Christie MD  10/19/24 0148

## 2024-11-30 ENCOUNTER — HOSPITAL ENCOUNTER (EMERGENCY)
Facility: HOSPITAL | Age: 44
Discharge: HOME | End: 2024-11-30
Attending: EMERGENCY MEDICINE
Payer: COMMERCIAL

## 2024-11-30 VITALS
OXYGEN SATURATION: 98 % | WEIGHT: 233.69 LBS | SYSTOLIC BLOOD PRESSURE: 155 MMHG | HEIGHT: 62 IN | DIASTOLIC BLOOD PRESSURE: 81 MMHG | HEART RATE: 74 BPM | RESPIRATION RATE: 16 BRPM | BODY MASS INDEX: 43 KG/M2 | TEMPERATURE: 98 F

## 2024-11-30 DIAGNOSIS — R05.9 COUGH: Primary | ICD-10-CM

## 2024-11-30 DIAGNOSIS — R07.9 CHEST PAIN: ICD-10-CM

## 2024-11-30 DIAGNOSIS — N39.0 URINARY TRACT INFECTION WITHOUT HEMATURIA, SITE UNSPECIFIED: ICD-10-CM

## 2024-11-30 LAB
ALBUMIN SERPL BCP-MCNC: 3.9 G/DL (ref 3.5–5)
ALBUMIN/GLOB SERPL: 1 {RATIO}
ALP SERPL-CCNC: 112 U/L (ref 40–150)
ALT SERPL W P-5'-P-CCNC: 38 U/L
ANION GAP SERPL CALCULATED.3IONS-SCNC: 14 MMOL/L (ref 7–16)
AST SERPL W P-5'-P-CCNC: 32 U/L (ref 5–34)
BACTERIA #/AREA URNS HPF: ABNORMAL /HPF
BASOPHILS # BLD AUTO: 0.07 K/UL (ref 0–0.2)
BASOPHILS NFR BLD AUTO: 0.7 % (ref 0–1)
BILIRUB SERPL-MCNC: 0.3 MG/DL
BILIRUB UR QL STRIP: NEGATIVE
BUN SERPL-MCNC: 12 MG/DL (ref 7–19)
BUN/CREAT SERPL: 12 (ref 6–20)
CALCIUM SERPL-MCNC: 9.2 MG/DL (ref 8.4–10.2)
CHLORIDE SERPL-SCNC: 102 MMOL/L (ref 98–107)
CLARITY UR: CLEAR
CO2 SERPL-SCNC: 25 MMOL/L (ref 22–29)
COLOR UR: ABNORMAL
CREAT SERPL-MCNC: 0.98 MG/DL (ref 0.55–1.02)
DIFFERENTIAL METHOD BLD: ABNORMAL
EGFR (NO RACE VARIABLE) (RUSH/TITUS): 74 ML/MIN/1.73M2
EOSINOPHIL # BLD AUTO: 0.4 K/UL (ref 0–0.5)
EOSINOPHIL NFR BLD AUTO: 4.1 % (ref 1–4)
ERYTHROCYTE [DISTWIDTH] IN BLOOD BY AUTOMATED COUNT: 13.7 % (ref 11.5–14.5)
GLOBULIN SER-MCNC: 4.1 G/DL (ref 2–4)
GLUCOSE SERPL-MCNC: 128 MG/DL (ref 74–100)
GLUCOSE UR STRIP-MCNC: NORMAL MG/DL
HCT VFR BLD AUTO: 44.4 % (ref 38–47)
HGB BLD-MCNC: 13.6 G/DL (ref 12–16)
HYPOCHROMIA BLD QL SMEAR: ABNORMAL
IMM GRANULOCYTES # BLD AUTO: 0.04 K/UL (ref 0–0.04)
IMM GRANULOCYTES NFR BLD: 0.4 % (ref 0–0.4)
INFLUENZA A MOLECULAR (OHS): NEGATIVE
INFLUENZA B MOLECULAR (OHS): NEGATIVE
KETONES UR STRIP-SCNC: NEGATIVE MG/DL
LEUKOCYTE ESTERASE UR QL STRIP: NEGATIVE
LYMPHOCYTES # BLD AUTO: 3.74 K/UL (ref 1–4.8)
LYMPHOCYTES NFR BLD AUTO: 38.1 % (ref 27–41)
MCH RBC QN AUTO: 22.6 PG (ref 27–31)
MCHC RBC AUTO-ENTMCNC: 30.6 G/DL (ref 32–36)
MCV RBC AUTO: 73.8 FL (ref 80–96)
MICROCYTES BLD QL SMEAR: ABNORMAL
MONOCYTES # BLD AUTO: 0.46 K/UL (ref 0–0.8)
MONOCYTES NFR BLD AUTO: 4.7 % (ref 2–6)
MPC BLD CALC-MCNC: 11.6 FL (ref 9.4–12.4)
MUCOUS, UA: ABNORMAL /LPF
NEUTROPHILS # BLD AUTO: 5.11 K/UL (ref 1.8–7.7)
NEUTROPHILS NFR BLD AUTO: 52 % (ref 53–65)
NITRITE UR QL STRIP: POSITIVE
NRBC # BLD AUTO: 0 X10E3/UL
NRBC, AUTO (.00): 0 %
PH UR STRIP: 6 PH UNITS
PLATELET # BLD AUTO: 252 K/UL (ref 150–400)
PLATELET MORPHOLOGY: ABNORMAL
POTASSIUM SERPL-SCNC: 3.7 MMOL/L (ref 3.5–5.1)
PROT SERPL-MCNC: 8 G/DL (ref 6.4–8.3)
PROT UR QL STRIP: NEGATIVE
RBC # BLD AUTO: 6.02 M/UL (ref 4.2–5.4)
RBC # UR STRIP: NEGATIVE /UL
RBC #/AREA URNS HPF: 2 /HPF
SARS-COV-2 RDRP RESP QL NAA+PROBE: NEGATIVE
SODIUM SERPL-SCNC: 137 MMOL/L (ref 136–145)
SP GR UR STRIP: 1.02
SQUAMOUS #/AREA URNS LPF: ABNORMAL /HPF
TROPONIN I SERPL HS-MCNC: <2.7 NG/L
TROPONIN I SERPL HS-MCNC: <2.7 NG/L
UROBILINOGEN UR STRIP-ACNC: NORMAL MG/DL
WBC # BLD AUTO: 9.82 K/UL (ref 4.5–11)
WBC #/AREA URNS HPF: 4 /HPF

## 2024-11-30 PROCEDURE — 63600175 PHARM REV CODE 636 W HCPCS: Performed by: EMERGENCY MEDICINE

## 2024-11-30 PROCEDURE — 36415 COLL VENOUS BLD VENIPUNCTURE: CPT | Performed by: EMERGENCY MEDICINE

## 2024-11-30 PROCEDURE — 25000003 PHARM REV CODE 250: Performed by: EMERGENCY MEDICINE

## 2024-11-30 PROCEDURE — 96374 THER/PROPH/DIAG INJ IV PUSH: CPT

## 2024-11-30 PROCEDURE — 87635 SARS-COV-2 COVID-19 AMP PRB: CPT | Performed by: EMERGENCY MEDICINE

## 2024-11-30 PROCEDURE — 84484 ASSAY OF TROPONIN QUANT: CPT | Performed by: EMERGENCY MEDICINE

## 2024-11-30 PROCEDURE — 81001 URINALYSIS AUTO W/SCOPE: CPT | Performed by: EMERGENCY MEDICINE

## 2024-11-30 PROCEDURE — 96372 THER/PROPH/DIAG INJ SC/IM: CPT | Performed by: EMERGENCY MEDICINE

## 2024-11-30 PROCEDURE — 96375 TX/PRO/DX INJ NEW DRUG ADDON: CPT

## 2024-11-30 PROCEDURE — 80053 COMPREHEN METABOLIC PANEL: CPT | Performed by: EMERGENCY MEDICINE

## 2024-11-30 PROCEDURE — 25000242 PHARM REV CODE 250 ALT 637 W/ HCPCS: Performed by: EMERGENCY MEDICINE

## 2024-11-30 PROCEDURE — 93005 ELECTROCARDIOGRAM TRACING: CPT

## 2024-11-30 PROCEDURE — 87502 INFLUENZA DNA AMP PROBE: CPT | Performed by: EMERGENCY MEDICINE

## 2024-11-30 PROCEDURE — 87077 CULTURE AEROBIC IDENTIFY: CPT | Performed by: EMERGENCY MEDICINE

## 2024-11-30 PROCEDURE — 93010 ELECTROCARDIOGRAM REPORT: CPT | Mod: ,,, | Performed by: HOSPITALIST

## 2024-11-30 PROCEDURE — 85025 COMPLETE CBC W/AUTO DIFF WBC: CPT | Performed by: EMERGENCY MEDICINE

## 2024-11-30 PROCEDURE — 99285 EMERGENCY DEPT VISIT HI MDM: CPT | Mod: 25

## 2024-11-30 RX ORDER — GUAIFENESIN 100 MG/5ML
200 SOLUTION ORAL
Status: COMPLETED | OUTPATIENT
Start: 2024-11-30 | End: 2024-11-30

## 2024-11-30 RX ORDER — ORPHENADRINE CITRATE 30 MG/ML
30 INJECTION INTRAMUSCULAR; INTRAVENOUS
Status: COMPLETED | OUTPATIENT
Start: 2024-11-30 | End: 2024-11-30

## 2024-11-30 RX ORDER — CODEINE PHOSPHATE AND GUAIFENESIN 10; 100 MG/5ML; MG/5ML
10 SOLUTION ORAL EVERY 8 HOURS PRN
Qty: 118 ML | Refills: 0 | Status: SHIPPED | OUTPATIENT
Start: 2024-11-30

## 2024-11-30 RX ORDER — HYDROCODONE BITARTRATE AND ACETAMINOPHEN 10; 325 MG/1; MG/1
1 TABLET ORAL
Status: COMPLETED | OUTPATIENT
Start: 2024-11-30 | End: 2024-11-30

## 2024-11-30 RX ORDER — CEFTRIAXONE 1 G/1
1 INJECTION, POWDER, FOR SOLUTION INTRAMUSCULAR; INTRAVENOUS
Status: COMPLETED | OUTPATIENT
Start: 2024-11-30 | End: 2024-11-30

## 2024-11-30 RX ORDER — SULFAMETHOXAZOLE AND TRIMETHOPRIM 800; 160 MG/1; MG/1
1 TABLET ORAL 2 TIMES DAILY
Qty: 14 TABLET | Refills: 0 | Status: SHIPPED | OUTPATIENT
Start: 2024-11-30 | End: 2024-12-07

## 2024-11-30 RX ORDER — KETOROLAC TROMETHAMINE 30 MG/ML
30 INJECTION, SOLUTION INTRAMUSCULAR; INTRAVENOUS
Status: COMPLETED | OUTPATIENT
Start: 2024-11-30 | End: 2024-11-30

## 2024-11-30 RX ORDER — ALBUTEROL SULFATE 90 UG/1
2 INHALANT RESPIRATORY (INHALATION)
Status: COMPLETED | OUTPATIENT
Start: 2024-11-30 | End: 2024-11-30

## 2024-11-30 RX ADMIN — KETOROLAC TROMETHAMINE 30 MG: 30 INJECTION, SOLUTION INTRAMUSCULAR at 02:11

## 2024-11-30 RX ADMIN — HYDROCODONE BITARTRATE AND ACETAMINOPHEN 1 TABLET: 10; 325 TABLET ORAL at 04:11

## 2024-11-30 RX ADMIN — ORPHENADRINE CITRATE 30 MG: 30 INJECTION, SOLUTION INTRAMUSCULAR; INTRAVENOUS at 02:11

## 2024-11-30 RX ADMIN — ALBUTEROL SULFATE 2 PUFF: 108 INHALANT RESPIRATORY (INHALATION) at 04:11

## 2024-11-30 RX ADMIN — CEFTRIAXONE SODIUM 1 G: 1 INJECTION, POWDER, FOR SOLUTION INTRAMUSCULAR; INTRAVENOUS at 04:11

## 2024-11-30 RX ADMIN — GUAIFENESIN 200 MG: 200 SOLUTION ORAL at 04:11

## 2024-11-30 NOTE — ED PROVIDER NOTES
Encounter Date: 11/30/2024    SCRIBE #1 NOTE: I, Cherie Knutson, am scribing for, and in the presence of,  Jorge Acharya MD. I have scribed the entire note.       History     Chief Complaint   Patient presents with    Chest Pain     Sharp pain that radiates to left side of neck. Started about 5 days ago.      This is a 45 y/o female,who presents to the ED with complaints of cough and congestion which started 5 days ago. She states her daughter started coughing a few weeks back and she notes she eventually got over it. She notes chest pain as well. She notes she believes it to be from her coughing. There is no hx of shortness of breath. She states she has a known hx of WPW and has not had any issues with until 2 years ago, when she lost her mom and . There is no Hx of a nausea, vomiting, fever, chills, heart palpations or leg swelling. There are no other complaints/pain in the ED at this time. She has a known hx of diabetes, HTN, and thyroid disorder. She is a current some day smoker.     The history is provided by the patient. No  was used.     Review of patient's allergies indicates:   Allergen Reactions    Anesthesia s/i-40 (propofol) [propofol] Itching     Anesthesia, possibly propofol; made her feel like there were ants all over her     Past Medical History:   Diagnosis Date    Diabetes mellitus     Hypertension     Thyroid disease     WPW (Ilana-Parkinson-White syndrome)      Past Surgical History:   Procedure Laterality Date    CHOLECYSTECTOMY      TUBAL LIGATION       Family History   Problem Relation Name Age of Onset    Hypertension Mother      Diabetes Mother      Diabetes Father      Diabetes Sister      Leukemia Daughter       Social History     Tobacco Use    Smoking status: Some Days     Current packs/day: 0.25     Average packs/day: 0.3 packs/day for 15.0 years (3.8 ttl pk-yrs)     Types: Cigarettes, Vaping with nicotine     Last attempt to quit: 2022     Passive  exposure: Past    Smokeless tobacco: Never    Tobacco comments:     1 pack per 2 weeks   Substance Use Topics    Alcohol use: Never    Drug use: Never     Review of Systems   Constitutional:  Negative for fever.   HENT:  Positive for congestion. Negative for sore throat.    Respiratory:  Positive for cough. Negative for shortness of breath.    Cardiovascular:  Positive for chest pain. Negative for palpitations and leg swelling.   Gastrointestinal:  Negative for nausea and vomiting.   Musculoskeletal:  Positive for back pain.   All other systems reviewed and are negative.      Physical Exam     Initial Vitals   BP Pulse Resp Temp SpO2   11/30/24 0141 11/30/24 0141 11/30/24 0141 11/30/24 0145 11/30/24 0141   (!) 140/95 84 10 97.7 °F (36.5 °C) 100 %      MAP       --                Physical Exam    Nursing note and vitals reviewed.  Constitutional: She appears well-developed and well-nourished.   HENT:   Head: Normocephalic and atraumatic.   Eyes: EOM are normal. Pupils are equal, round, and reactive to light.   Neck: Neck supple. No thyromegaly present.   Normal range of motion.  Cardiovascular:  Normal rate, regular rhythm, normal heart sounds and intact distal pulses.           No murmur heard.  Pulmonary/Chest: Breath sounds normal. No respiratory distress. She has no wheezes.   Abdominal: Abdomen is soft. Bowel sounds are normal. She exhibits no distension. There is no abdominal tenderness.   Musculoskeletal:         General: No tenderness (Right lumbar tenderness.) or edema. Normal range of motion.      Cervical back: Normal range of motion and neck supple.     Lymphadenopathy:     She has no cervical adenopathy.   Neurological: She is alert and oriented to person, place, and time. She has normal strength. No cranial nerve deficit or sensory deficit.   Skin: Skin is warm and dry. No rash noted.   Psychiatric: She has a normal mood and affect.         ED Course   Procedures  Labs Reviewed   COMPREHENSIVE METABOLIC  PANEL - Abnormal       Result Value    Sodium 137      Potassium 3.7      Chloride 102      CO2 25      Anion Gap 14      Glucose 128 (*)     BUN 12      Creatinine 0.98      BUN/Creatinine Ratio 12      Calcium 9.2      Total Protein 8.0      Albumin 3.9      Globulin 4.1 (*)     A/G Ratio 1.0      Bilirubin, Total 0.3      Alk Phos 112      ALT 38      AST 32      eGFR 74     URINALYSIS, REFLEX TO URINE CULTURE - Abnormal    Color, UA Light-Yellow      Clarity, UA Clear      pH, UA 6.0      Leukocytes, UA Negative      Nitrites, UA Positive (*)     Protein, UA Negative      Glucose, UA Normal      Ketones, UA Negative      Urobilinogen, UA Normal      Bilirubin, UA Negative      Blood, UA Negative      Specific Gravity, UA 1.022     CBC WITH DIFFERENTIAL - Abnormal    WBC 9.82      RBC 6.02 (*)     Hemoglobin 13.6      Hematocrit 44.4      MCV 73.8 (*)     MCH 22.6 (*)     MCHC 30.6 (*)     RDW 13.7      Platelet Count 252      MPV 11.6      Neutrophils % 52.0 (*)     Lymphocytes % 38.1      Monocytes % 4.7      Eosinophils % 4.1 (*)     Basophils % 0.7      Immature Granulocytes % 0.4      nRBC, Auto 0.0      Neutrophils, Abs 5.11      Lymphocytes, Absolute 3.74      Monocytes, Absolute 0.46      Eosinophils, Absolute 0.40      Basophils, Absolute 0.07      Immature Granulocytes, Absolute 0.04      nRBC, Absolute 0.00      Diff Type Scan Smear     CBC MORPHOLOGY - Abnormal    Platelet Morphology Few Large Platelets (*)     Microcytosis 1+      Hypochromic Few     URINALYSIS, MICROSCOPIC - Abnormal    WBC, UA 4      RBC, UA 2      Bacteria, UA Many (*)     Squamous Epithelial Cells, UA Occasional (*)     Mucous Occasional (*)    INFLUENZA A & B BY MOLECULAR - Normal    INFLUENZA A MOLECULAR Negative      INFLUENZA B MOLECULAR  Negative     TROPONIN I - Normal    Troponin I High Sensitivity <2.7     SARS-COV-2 RNA AMPLIFICATION, QUAL - Normal    SARS COV-2 Molecular Negative      Narrative:     Negative SARS-CoV  results should not be used as the sole basis for treatment or patient management decisions; negative results should be considered in the context of a patient's recent exposures, history and the presene of clinical signs and symptoms consistent with COVID-19.  Negative results should be treated as presumptive and confirmed by molecular assay, if necessary for patient management.   TROPONIN I - Normal    Troponin I High Sensitivity <2.7     CULTURE, URINE   CBC W/ AUTO DIFFERENTIAL    Narrative:     The following orders were created for panel order CBC auto differential.  Procedure                               Abnormality         Status                     ---------                               -----------         ------                     CBC with Differential[[id number]]       Abnormal            Final result                 Please view results for these tests on the individual orders.          Imaging Results              X-Ray Chest PA And Lateral (In process)  Result time 11/30/24 03:16:45                     Medications   ketorolac injection 30 mg (30 mg Intravenous Given 11/30/24 0204)   orphenadrine injection 30 mg (30 mg Intravenous Given 11/30/24 0204)   cefTRIAXone injection 1 g (1 g Intramuscular Given 11/30/24 0421)   albuterol inhaler 2 puff (2 puffs Inhalation Given 11/30/24 0421)   guaiFENesin 100 mg/5 ml syrup 200 mg (200 mg Oral Given 11/30/24 0421)   HYDROcodone-acetaminophen  mg per tablet 1 tablet (1 tablet Oral Given 11/30/24 0449)     Medical Decision Making            Attending Attestation:           Physician Attestation for Scribe:  Physician Attestation Statement for Scribe #1: I, Jorge Acharya MD, reviewed documentation, as scribed by Cherie Knutson in my presence, and it is both accurate and complete.             ED Course as of 11/30/24 0558   Sat Nov 30, 2024   0140 Compared EKGs to the EKGs September no significant change.  Some ST depression in the inferior leads  mainly leads II and AVF [PK]   0406 Chest x-ray shows no acute abnormality [PK]      ED Course User Index  [PK] Jorge Acharya MD                           Clinical Impression:  Final diagnoses:  [R07.9] Chest pain  [R05.9] Cough (Primary)  [N39.0] Urinary tract infection without hematuria, site unspecified          ED Disposition Condition    Discharge Stable          ED Prescriptions       Medication Sig Dispense Start Date End Date Auth. Provider    guaiFENesin-codeine 100-10 mg/5 ml (TUSSI-ORGANIDIN NR)  mg/5 mL syrup Take 10 mLs by mouth every 8 (eight) hours as needed for Cough. 118 mL 11/30/2024 -- Jorge Acharya MD    sulfamethoxazole-trimethoprim 800-160mg (BACTRIM DS) 800-160 mg Tab Take 1 tablet by mouth 2 (two) times daily. for 7 days 14 tablet 11/30/2024 12/7/2024 Jorge Acharya MD          Follow-up Information       Follow up With Specialties Details Why Contact Info    Huber Clayton MD Family Medicine Schedule an appointment as soon as possible for a visit   [address] [facility]  [facility] [place] [address] [phone]      [facility] - Emergency Department Emergency Medicine Go to  As needed, If symptoms worsen [address] [place] [id number] [phone]             Jorge Acharya MD  11/30/24 2530     Abd pain N/V since Friday with fevers  Last Tylenol 10am  LMP 3weeks ago

## 2024-11-30 NOTE — DISCHARGE INSTRUCTIONS
Use the supplied albuterol inhaler to help with coughing    Also use prescription cough medicine    Take antibiotic for urinary tract infection    Return the ER if symptoms worsen or new symptoms develop    Follow up with primary care

## 2024-12-02 ENCOUNTER — TELEPHONE (OUTPATIENT)
Dept: EMERGENCY MEDICINE | Facility: HOSPITAL | Age: 44
End: 2024-12-02
Payer: COMMERCIAL

## 2024-12-02 LAB
OHS QRS DURATION: 118 MS
OHS QTC CALCULATION: 432 MS
UA COMPLETE W REFLEX CULTURE PNL UR: ABNORMAL
UA COMPLETE W REFLEX CULTURE PNL UR: ABNORMAL

## 2024-12-02 NOTE — TELEPHONE ENCOUNTER
----- Message from HENRI Castanon sent at 11/30/2024  9:30 AM CST -----  Please have the patient follow up with her primary care provider in 1 week for repeat chest x-ray.

## 2024-12-11 ENCOUNTER — HOSPITAL ENCOUNTER (OUTPATIENT)
Dept: RADIOLOGY | Facility: HOSPITAL | Age: 44
Discharge: HOME | End: 2024-12-11
Attending: FAMILY MEDICINE
Payer: COMMERCIAL

## 2024-12-11 DIAGNOSIS — Z12.31 VISIT FOR SCREENING MAMMOGRAM: Primary | ICD-10-CM

## 2024-12-11 DIAGNOSIS — Z12.39 ENCOUNTER FOR SCREENING FOR MALIGNANT NEOPLASM OF BREAST, UNSPECIFIED SCREENING MODALITY: ICD-10-CM

## 2024-12-11 PROCEDURE — 77063 BREAST TOMOSYNTHESIS BI: CPT | Mod: TC

## 2024-12-11 PROCEDURE — 77063 BREAST TOMOSYNTHESIS BI: CPT | Mod: 26,,, | Performed by: RADIOLOGY

## 2024-12-11 PROCEDURE — 77067 SCR MAMMO BI INCL CAD: CPT | Mod: 26,,, | Performed by: RADIOLOGY

## 2024-12-13 ENCOUNTER — HOSPITAL ENCOUNTER (EMERGENCY)
Facility: HOSPITAL | Age: 44
Discharge: HOME | End: 2024-12-14
Attending: EMERGENCY MEDICINE
Payer: COMMERCIAL

## 2024-12-13 DIAGNOSIS — R07.9 CHEST PAIN: Primary | ICD-10-CM

## 2024-12-13 DIAGNOSIS — I45.6 WPW (WOLFF-PARKINSON-WHITE SYNDROME): ICD-10-CM

## 2024-12-13 LAB
ALBUMIN SERPL BCP-MCNC: 3.7 G/DL (ref 3.5–5)
ALBUMIN/GLOB SERPL: 0.9 {RATIO}
ALP SERPL-CCNC: 113 U/L (ref 40–150)
ALT SERPL W P-5'-P-CCNC: 58 U/L
ANION GAP SERPL CALCULATED.3IONS-SCNC: 12 MMOL/L (ref 7–16)
ANISOCYTOSIS BLD QL SMEAR: NORMAL
AST SERPL W P-5'-P-CCNC: 64 U/L (ref 5–34)
BASOPHILS # BLD AUTO: 0.07 K/UL (ref 0–0.2)
BASOPHILS NFR BLD AUTO: 0.7 % (ref 0–1)
BILIRUB SERPL-MCNC: 0.3 MG/DL
BUN SERPL-MCNC: 12 MG/DL (ref 7–19)
BUN/CREAT SERPL: 10 (ref 6–20)
CALCIUM SERPL-MCNC: 9.3 MG/DL (ref 8.4–10.2)
CHLORIDE SERPL-SCNC: 106 MMOL/L (ref 98–107)
CO2 SERPL-SCNC: 21 MMOL/L (ref 22–29)
CREAT SERPL-MCNC: 1.16 MG/DL (ref 0.55–1.02)
DIFFERENTIAL METHOD BLD: ABNORMAL
EGFR (NO RACE VARIABLE) (RUSH/TITUS): 60 ML/MIN/1.73M2
EOSINOPHIL # BLD AUTO: 0.45 K/UL (ref 0–0.5)
EOSINOPHIL NFR BLD AUTO: 4.3 % (ref 1–4)
ERYTHROCYTE [DISTWIDTH] IN BLOOD BY AUTOMATED COUNT: 13.8 % (ref 11.5–14.5)
GLOBULIN SER-MCNC: 4.2 G/DL (ref 2–4)
GLUCOSE SERPL-MCNC: 162 MG/DL (ref 74–100)
HCT VFR BLD AUTO: 44.2 % (ref 38–47)
HGB BLD-MCNC: 13.2 G/DL (ref 12–16)
HYPOCHROMIA BLD QL SMEAR: NORMAL
IMM GRANULOCYTES # BLD AUTO: 0.03 K/UL (ref 0–0.04)
IMM GRANULOCYTES NFR BLD: 0.3 % (ref 0–0.4)
INR BLD: 1.03
LYMPHOCYTES # BLD AUTO: 4.06 K/UL (ref 1–4.8)
LYMPHOCYTES NFR BLD AUTO: 39.1 % (ref 27–41)
MCH RBC QN AUTO: 22.2 PG (ref 27–31)
MCHC RBC AUTO-ENTMCNC: 29.9 G/DL (ref 32–36)
MCV RBC AUTO: 74.4 FL (ref 80–96)
MICROCYTES BLD QL SMEAR: NORMAL
MONOCYTES # BLD AUTO: 0.6 K/UL (ref 0–0.8)
MONOCYTES NFR BLD AUTO: 5.8 % (ref 2–6)
MPC BLD CALC-MCNC: 11.7 FL (ref 9.4–12.4)
NEUTROPHILS # BLD AUTO: 5.18 K/UL (ref 1.8–7.7)
NEUTROPHILS NFR BLD AUTO: 49.8 % (ref 53–65)
NRBC # BLD AUTO: 0 X10E3/UL
NRBC, AUTO (.00): 0 %
PLATELET # BLD AUTO: 225 K/UL (ref 150–400)
PLATELET MORPHOLOGY: NORMAL
POTASSIUM SERPL-SCNC: 4.6 MMOL/L (ref 3.5–5.1)
PROT SERPL-MCNC: 7.9 G/DL (ref 6.4–8.3)
PROTHROMBIN TIME: 13.4 SECONDS (ref 11.7–14.7)
RBC # BLD AUTO: 5.94 M/UL (ref 4.2–5.4)
SODIUM SERPL-SCNC: 134 MMOL/L (ref 136–145)
TROPONIN I SERPL HS-MCNC: <2.7 NG/L
WBC # BLD AUTO: 10.39 K/UL (ref 4.5–11)

## 2024-12-13 PROCEDURE — 93005 ELECTROCARDIOGRAM TRACING: CPT

## 2024-12-13 PROCEDURE — 85610 PROTHROMBIN TIME: CPT | Performed by: EMERGENCY MEDICINE

## 2024-12-13 PROCEDURE — 84484 ASSAY OF TROPONIN QUANT: CPT | Performed by: EMERGENCY MEDICINE

## 2024-12-13 PROCEDURE — 85025 COMPLETE CBC W/AUTO DIFF WBC: CPT | Performed by: EMERGENCY MEDICINE

## 2024-12-13 PROCEDURE — 80053 COMPREHEN METABOLIC PANEL: CPT | Performed by: EMERGENCY MEDICINE

## 2024-12-13 PROCEDURE — 99285 EMERGENCY DEPT VISIT HI MDM: CPT | Mod: 25

## 2024-12-13 PROCEDURE — 93010 ELECTROCARDIOGRAM REPORT: CPT | Mod: ,,, | Performed by: HOSPITALIST

## 2024-12-14 VITALS
SYSTOLIC BLOOD PRESSURE: 152 MMHG | BODY MASS INDEX: 43 KG/M2 | TEMPERATURE: 99 F | WEIGHT: 233.69 LBS | HEIGHT: 62 IN | HEART RATE: 70 BPM | OXYGEN SATURATION: 99 % | DIASTOLIC BLOOD PRESSURE: 97 MMHG | RESPIRATION RATE: 14 BRPM

## 2024-12-14 LAB
OHS QRS DURATION: 118 MS
OHS QTC CALCULATION: 428 MS
TROPONIN I SERPL HS-MCNC: <2.7 NG/L

## 2024-12-14 PROCEDURE — 36415 COLL VENOUS BLD VENIPUNCTURE: CPT | Performed by: EMERGENCY MEDICINE

## 2024-12-14 PROCEDURE — 84484 ASSAY OF TROPONIN QUANT: CPT | Performed by: EMERGENCY MEDICINE

## 2024-12-14 NOTE — ED PROVIDER NOTES
Encounter Date: 12/13/2024    SCRIBE #1 NOTE: I, Cherie Knutson, am scribing for, and in the presence of,  Azeem Navas MD. I have scribed the entire note.       History     Chief Complaint   Patient presents with    Chest Pain     This is a 45 y/o female,who presents to the ED with complaints of chest pain which started at 1900 tonight. She states the chest pain is more to the left side of the chest. There is no hx of her being short of breath. She also reports for the past 11 days she has noticed a heavy period and then spotting for every other day. She reports back pain which is worse to the lower back as well as nausea. There is no other complaints/pain in the ED at this time. She has a known hx of WPW, diabetes, HTN, and thyroid disorder. She is a current some day smoker.     The history is provided by the patient and the spouse. No  was used.     Review of patient's allergies indicates:   Allergen Reactions    Anesthesia s/i-40 (propofol) [propofol] Itching     Anesthesia, possibly propofol; made her feel like there were ants all over her     Past Medical History:   Diagnosis Date    Diabetes mellitus     Hypertension     Thyroid disease     WPW (Ilana-Parkinson-White syndrome)      Past Surgical History:   Procedure Laterality Date    CHOLECYSTECTOMY      TUBAL LIGATION       Family History   Problem Relation Name Age of Onset    Hypertension Mother      Diabetes Mother      Diabetes Father      Diabetes Sister      Leukemia Daughter       Social History     Tobacco Use    Smoking status: Some Days     Current packs/day: 0.25     Average packs/day: 0.3 packs/day for 15.0 years (3.8 ttl pk-yrs)     Types: Cigarettes, Vaping with nicotine     Last attempt to quit: 2022     Passive exposure: Past    Smokeless tobacco: Never    Tobacco comments:     1 pack per 2 weeks   Substance Use Topics    Alcohol use: Never    Drug use: Never     Review of Systems   Respiratory:  Negative for  cough and shortness of breath.    Cardiovascular:  Positive for chest pain. Negative for palpitations and leg swelling.   Gastrointestinal:  Positive for nausea. Negative for vomiting.   Genitourinary:  Positive for vaginal bleeding.   Musculoskeletal:  Positive for back pain.   All other systems reviewed and are negative.      Physical Exam     Initial Vitals   BP Pulse Resp Temp SpO2   12/13/24 2256 12/13/24 2245 12/13/24 2245 12/13/24 2245 12/13/24 2245   (!) 139/90 82 (!) 21 98.5 °F (36.9 °C) 97 %      MAP       --                Physical Exam    Nursing note and vitals reviewed.  Constitutional: She appears well-developed and well-nourished.   HENT:   Head: Normocephalic and atraumatic.   Eyes: EOM are normal. Pupils are equal, round, and reactive to light.   Neck: Neck supple. No thyromegaly present.   Normal range of motion.  Cardiovascular:  Normal rate, regular rhythm, normal heart sounds and intact distal pulses.           No murmur heard.  Pulmonary/Chest: Breath sounds normal. She has no wheezes.   Abdominal: Abdomen is soft. Bowel sounds are normal. There is no abdominal tenderness.   Musculoskeletal:         General: No tenderness or edema. Normal range of motion.      Cervical back: Normal range of motion and neck supple.     Lymphadenopathy:     She has no cervical adenopathy.   Neurological: She is alert and oriented to person, place, and time. She has normal strength and normal reflexes. No cranial nerve deficit or sensory deficit. GCS score is 15. GCS eye subscore is 4. GCS verbal subscore is 5. GCS motor subscore is 6.   Skin: Skin is warm and dry. Capillary refill takes less than 2 seconds. No rash noted.   Psychiatric: She has a normal mood and affect.         ED Course   Procedures  Labs Reviewed   COMPREHENSIVE METABOLIC PANEL - Abnormal       Result Value    Sodium 134 (*)     Potassium 4.6      Chloride 106      CO2 21 (*)     Anion Gap 12      Glucose 162 (*)     BUN 12      Creatinine  1.16 (*)     BUN/Creatinine Ratio 10      Calcium 9.3      Total Protein 7.9      Albumin 3.7      Globulin 4.2 (*)     A/G Ratio 0.9      Bilirubin, Total 0.3      Alk Phos 113      ALT 58 (*)     AST 64 (*)     eGFR 60     CBC WITH DIFFERENTIAL - Abnormal    WBC 10.39      RBC 5.94 (*)     Hemoglobin 13.2      Hematocrit 44.2      MCV 74.4 (*)     MCH 22.2 (*)     MCHC 29.9 (*)     RDW 13.8      Platelet Count 225      MPV 11.7      Neutrophils % 49.8 (*)     Lymphocytes % 39.1      Monocytes % 5.8      Eosinophils % 4.3 (*)     Basophils % 0.7      Immature Granulocytes % 0.3      nRBC, Auto 0.0      Neutrophils, Abs 5.18      Lymphocytes, Absolute 4.06      Monocytes, Absolute 0.60      Eosinophils, Absolute 0.45      Basophils, Absolute 0.07      Immature Granulocytes, Absolute 0.03      nRBC, Absolute 0.00      Diff Type Scan Smear     TROPONIN I - Normal    Troponin I High Sensitivity <2.7     PROTIME-INR - Normal    PT 13.4      INR 1.03     TROPONIN I - Normal    Troponin I High Sensitivity <2.7     CBC W/ AUTO DIFFERENTIAL    Narrative:     The following orders were created for panel order CBC auto differential.  Procedure                               Abnormality         Status                     ---------                               -----------         ------                     CBC with Differential[[id number]]       Abnormal            Final result                 Please view results for these tests on the individual orders.   CBC MORPHOLOGY    Platelet Morphology Normal      Anisocytosis 1+      Microcytosis 2+      Hypochromic Few       EKG Readings: (Independently Interpreted)   Heart Rate: 77.   Interpreted by Dr. Yosef MD:  Possible ectopic atrial rhythm  Small inferior Q waves: infarct cannot be excluded  Anterolateral ST-T abnormality suggest myocardial injury.ischemia       ECG Results              EKG 12-lead (Final result)        Collection Time Result Time QRS Duration OHS QTC  Calculation    12/13/24 22:46:13 12/14/24 17:36:10 118 428                     Final result by Interface, Lab In [facility] (12/14/24 17:36:15)                   Narrative:    Test Reason : R07.9,    Vent. Rate :  77 BPM     Atrial Rate :    BPM     P-R Int :  78 ms          QRS Dur : 118 ms      QT Int : 398 ms       P-R-T Axes : 229  33 266 degrees    QTcB Int : 428 ms      Possible ectopic atrial rhythm  Small inferior Q waves: infarct cannot be excluded  Anterolateral ST-T abnormality suggests myocardial injury/ischemia    Confirmed by Aminata Gallego ([id number]) on 12/14/2024 5:36:07 PM    Referred By: AAAREFERRAL SELF           Confirmed By: Aminata Gallego                                  Imaging Results              X-Ray Chest AP Portable (Final result)  Result time 12/13/24 23:06:20      Final result by Mario Carrion MD (12/13/24 23:06:20)                   Impression:      No acute abnormality.      Electronically signed by: Mario Carrion  Date:    12/13/2024  Time:    23:06               Narrative:    EXAMINATION:  XR CHEST AP PORTABLE    CLINICAL HISTORY:  Chest Pain;    TECHNIQUE:  Single frontal view of the chest was performed.    COMPARISON:  11/30/2024    FINDINGS:  The lungs are clear, with normal appearance of pulmonary vasculature and no pleural effusion or pneumothorax.    The cardiac silhouette is normal in size. The hilar and mediastinal contours are unremarkable.    Bones are intact.                                       Medications - No data to display  Medical Decision Making            Attending Attestation:           Physician Attestation for Scribe:  Physician Attestation Statement for Scribe #1: I, Azeem Navas MD, reviewed documentation, as scribed by Cherie Knutson in my presence, and it is both accurate and complete.             ED Course as of 12/15/24 0637   Sat Dec 14, 2024   4636 MDM:  A 44-year-old female patient came to the emergency department complaining of chest  pain.  The patient has history of WPW syndrome and she has not followed with cardiology.  Her high sensitivity troponin is negative twice.  We will discharge the patient home. [HK]      ED Course User Index  [HK] Azeem Navas MD                           Clinical Impression:  Final diagnoses:  [R07.9] Chest pain (Primary)  [I45.6] WPW (Ilana-Parkinson-White syndrome)          ED Disposition Condition    Discharge Stable          ED Prescriptions    None       Follow-up Information       Follow up With Specialties Details Why Contact Info    Huber Clayton MD Family Medicine In 3 days For follow up [address] [facility].  [facility] [place] [address] [phone]               Azeem Navas MD  12/15/24 7937

## 2024-12-20 ENCOUNTER — OFFICE VISIT (OUTPATIENT)
Dept: FAMILY MEDICINE | Facility: CLINIC | Age: 44
End: 2024-12-20
Payer: COMMERCIAL

## 2024-12-20 VITALS
WEIGHT: 150 LBS | HEIGHT: 66 IN | HEART RATE: 77 BPM | BODY MASS INDEX: 24.11 KG/M2 | OXYGEN SATURATION: 99 % | DIASTOLIC BLOOD PRESSURE: 70 MMHG | SYSTOLIC BLOOD PRESSURE: 104 MMHG

## 2024-12-20 DIAGNOSIS — F90.0 ATTENTION DEFICIT HYPERACTIVITY DISORDER (ADHD), PREDOMINANTLY INATTENTIVE TYPE: Primary | ICD-10-CM

## 2024-12-20 DIAGNOSIS — D05.11 DUCTAL CARCINOMA IN SITU (DCIS) OF RIGHT BREAST: ICD-10-CM

## 2024-12-20 DIAGNOSIS — F41.9 ANXIETY: ICD-10-CM

## 2024-12-20 DIAGNOSIS — Z23 INFLUENZA VACCINE ADMINISTERED: ICD-10-CM

## 2024-12-20 DIAGNOSIS — R16.0 HEPATOMEGALY: ICD-10-CM

## 2024-12-20 DIAGNOSIS — C50.411 MALIGNANT NEOPLASM OF UPPER-OUTER QUADRANT OF RIGHT BREAST IN FEMALE, ESTROGEN RECEPTOR POSITIVE: ICD-10-CM

## 2024-12-20 DIAGNOSIS — Z17.0 MALIGNANT NEOPLASM OF UPPER-OUTER QUADRANT OF RIGHT BREAST IN FEMALE, ESTROGEN RECEPTOR POSITIVE: ICD-10-CM

## 2024-12-20 PROBLEM — C50.911 MALIGNANT NEOPLASM OF RIGHT BREAST IN FEMALE, ESTROGEN RECEPTOR NEGATIVE: Status: ACTIVE | Noted: 2024-12-20

## 2024-12-20 PROBLEM — Z17.1 MALIGNANT NEOPLASM OF RIGHT BREAST IN FEMALE, ESTROGEN RECEPTOR NEGATIVE: Status: ACTIVE | Noted: 2024-12-20

## 2024-12-20 PROCEDURE — 3074F SYST BP LT 130 MM HG: CPT | Mod: CPTII,S$GLB,, | Performed by: FAMILY MEDICINE

## 2024-12-20 PROCEDURE — 90656 IIV3 VACC NO PRSV 0.5 ML IM: CPT | Mod: S$GLB,,, | Performed by: FAMILY MEDICINE

## 2024-12-20 PROCEDURE — 1159F MED LIST DOCD IN RCRD: CPT | Mod: CPTII,S$GLB,, | Performed by: FAMILY MEDICINE

## 2024-12-20 PROCEDURE — 90471 IMMUNIZATION ADMIN: CPT | Mod: S$GLB,,, | Performed by: FAMILY MEDICINE

## 2024-12-20 PROCEDURE — 1160F RVW MEDS BY RX/DR IN RCRD: CPT | Mod: CPTII,S$GLB,, | Performed by: FAMILY MEDICINE

## 2024-12-20 PROCEDURE — 3008F BODY MASS INDEX DOCD: CPT | Mod: CPTII,S$GLB,, | Performed by: FAMILY MEDICINE

## 2024-12-20 PROCEDURE — 99213 OFFICE O/P EST LOW 20 MIN: CPT | Mod: 25,S$GLB,, | Performed by: FAMILY MEDICINE

## 2024-12-20 PROCEDURE — 3078F DIAST BP <80 MM HG: CPT | Mod: CPTII,S$GLB,, | Performed by: FAMILY MEDICINE

## 2024-12-20 RX ORDER — METHYLPHENIDATE HYDROCHLORIDE 18 MG/1
18 TABLET ORAL EVERY MORNING
Qty: 30 TABLET | Refills: 0 | Status: SHIPPED | OUTPATIENT
Start: 2024-12-20

## 2024-12-20 NOTE — PROGRESS NOTES
SUBJECTIVE:    Patient ID: Yanira Lerma is a 44 y.o. female.    Chief Complaint: Follow-up (No bottles, right ear discomfort, discuss about breast mass, need refills,Flu vaccine ordered, abc )    Pt here to discuss meds and recent diagnosis    Has PMHx ADHD, Allergy, Anxiety, Hidradenitis.     Her ear has been stopped up but doesn't hurt, has been going on for about 1 week. Has not taken anything for it.     Found out 11/20/24 that she has breast cancer, had a biopsy in October. He took it out (Dr. Srinivas Arango at [facility]) with negative lymph nodes. Plans to have both breasts removed. (Katy)  is going to do the plastic surgery on [place], for bilateral mastectomy.  Unsure if she has to do chemo ([place]).  Has preop the week before the surgery.        Has been managing her anxiety under the circumstances.  Takes Xanax as needed for panic attacks.   (lexapro/zoloft),  Has seen psychiatrist in the past (2019, due to her moving) as well. Told has PTSD from having a stillborn at 39w.  Last one was in August 2023.     Has a hx ADD.   When she was taking the concerta it worked well helping her with school.      ---------------------------------------------------  S/p [facility] 11/2022  Works from home for [employer].         Admission on 10/18/2024, Discharged on 10/19/2024   Component Date Value Ref Range Status    WBC 10/18/2024 5.91  3.90 - 12.70 K/uL Final    RBC 10/18/2024 4.25  4.00 - 5.40 M/uL Final    Hemoglobin 10/18/2024 11.3 (L)  12.0 - 16.0 g/dL Final    Hematocrit 10/18/2024 35.1 (L)  37.0 - 48.5 % Final    MCV 10/18/2024 83  82 - 98 fL Final    MCH 10/18/2024 26.6 (L)  27.0 - 31.0 pg Final    MCHC 10/18/2024 32.2  32.0 - 36.0 g/dL Final    RDW 10/18/2024 13.7  11.5 - 14.5 % Final    Platelets 10/18/2024 285  150 - 450 K/uL Final    MPV 10/18/2024 9.0 (L)  9.2 - 12.9 fL Final    Immature Granulocytes 10/18/2024 0.2  0.0 - 0.5 % Final    Gran # (ANC) 10/18/2024 3.3  1.8 - 7.7 K/uL Final     Immature Grans (Abs) 10/18/2024 0.01  0.00 - 0.04 K/uL Final    Lymph # 10/18/2024 1.8  1.0 - 4.8 K/uL Final    Mono # 10/18/2024 0.6  0.3 - 1.0 K/uL Final    Eos # 10/18/2024 0.2  0.0 - 0.5 K/uL Final    Baso # 10/18/2024 0.02  0.00 - 0.20 K/uL Final    nRBC 10/18/2024 0  0 /100 WBC Final    Gran % 10/18/2024 56.4  38.0 - 73.0 % Final    Lymph % 10/18/2024 30.6  18.0 - 48.0 % Final    Mono % 10/18/2024 9.3  4.0 - 15.0 % Final    Eosinophil % 10/18/2024 3.2  0.0 - 8.0 % Final    Basophil % 10/18/2024 0.3  0.0 - 1.9 % Final    Differential Method 10/18/2024 Automated   Final    Sodium 10/18/2024 138  136 - 145 mmol/L Final    Potassium 10/18/2024 3.4 (L)  3.5 - 5.1 mmol/L Final    Chloride 10/18/2024 103  95 - 110 mmol/L Final    CO2 10/18/2024 28  23 - 29 mmol/L Final    Glucose 10/18/2024 87  70 - 110 mg/dL Final    BUN 10/18/2024 6  6 - 20 mg/dL Final    Creatinine 10/18/2024 0.6  0.5 - 1.4 mg/dL Final    Calcium 10/18/2024 9.4  8.7 - 10.5 mg/dL Final    Total Protein 10/18/2024 7.4  6.0 - 8.4 g/dL Final    Albumin 10/18/2024 4.3  3.5 - 5.2 g/dL Final    Total Bilirubin 10/18/2024 0.8  0.1 - 1.0 mg/dL Final    Alkaline Phosphatase 10/18/2024 34 (L)  55 - 135 U/L Final    AST 10/18/2024 11  10 - 40 U/L Final    ALT 10/18/2024 6 (L)  10 - 44 U/L Final    eGFR 10/18/2024 >60.0  >60 mL/min/1.73 m^2 Final    Anion Gap 10/18/2024 7 (L)  8 - 16 mmol/L Final    Specimen UA 10/19/2024 Urine, Clean Catch   Final    Color, UA 10/19/2024 Yellow  Yellow, Straw, Ale Final    Appearance, UA 10/19/2024 Clear  Clear Final    pH, UA 10/19/2024 6.0  5.0 - 8.0 Final    Specific Gravity, UA 10/19/2024 >1.030 (A)  1.005 - 1.030 Final    Protein, UA 10/19/2024 1+ (A)  Negative Final    Glucose, UA 10/19/2024 Negative  Negative Final    Ketones, UA 10/19/2024 Negative  Negative Final    Bilirubin (UA) 10/19/2024 Negative  Negative Final    Occult Blood UA 10/19/2024 Negative  Negative Final    Nitrite, UA 10/19/2024 Negative   Negative Final    Urobilinogen, UA 10/19/2024 Negative  Negative EU/dL Final    Leukocytes, UA 10/19/2024 Negative  Negative Final    Lipase 10/18/2024 11  4 - 60 U/L Final    RBC, UA 10/19/2024 2  0 - 4 /hpf Final    WBC, UA 10/19/2024 0  0 - 5 /hpf Final    Bacteria 10/19/2024 Rare  None-Occ /hpf Final    Squam Epithel, UA 10/19/2024 4  /hpf Final    Hyaline Casts, UA 10/19/2024 0  0-1/lpf /lpf Final    Microscopic Comment 10/19/2024 SEE COMMENT   Final       Past Medical History:   Diagnosis Date    Abnormal Pap smear 10 years ago    colpo normal    ADHD (attention deficit hyperactivity disorder)     Allergy     Anxiety     Hidradenitis      Social History     Socioeconomic History    Marital status:    Tobacco Use    Smoking status: Never    Smokeless tobacco: Never   Substance and Sexual Activity    Alcohol use: No    Drug use: No    Sexual activity: Yes     Partners: Male     Birth control/protection: OCP     Social Drivers of Health     Financial Resource Strain: Low Risk  (12/20/2024)    Overall Financial Resource Strain (CARDIA)     Difficulty of Paying Living Expenses: Not hard at all   Food Insecurity: No Food Insecurity (12/20/2024)    Hunger Vital Sign     Worried About Running Out of Food in the Last Year: Never true     Ran Out of Food in the Last Year: Never true   Transportation Needs: No Transportation Needs (12/17/2024)    [facility] from [facility]     Lack of Transportation (Medical): No     Lack of Transportation (Non-Medical): No   Physical Activity: Inactive (12/20/2024)    Exercise Vital Sign     Days of Exercise per Week: 0 days     Minutes of Exercise per Session: 0 min   Stress: Stress Concern Present (12/20/2024)    [employer] [place] [employer] - Occupational Stress Questionnaire     Feeling of Stress : To some extent   Housing Stability: Unknown (12/20/2024)    Housing Stability Vital Sign     Unable to Pay for Housing in the Last Year: No      Past Surgical History:   Procedure Laterality Date    BREAST SURGERY      Breast Reduction     SECTION  '02, '09    x4    DILATION AND CURETTAGE OF UTERUS  '01    after SAB    HYSTERECTOMY N/A 2022     Family History   Problem Relation Name Age of Onset    Miscarriages / Stillbirths Mother      Cancer Father      Hypertension Father      Breast cancer Neg Hx      Colon cancer Neg Hx      Ovarian cancer Neg Hx         Review of patient's allergies indicates:   Allergen Reactions    Diflucan [fluconazole] Hives    Griseofulvin Hives    Amoxicillin        Current Outpatient Medications:     adapalene-benzoyl peroxide (EPIDUO) 0.1-2.5 % GlwP, Apply pea-sized amount to entire face at bedtime.  Use every third night and increase as tolerated to nightly., Disp: 45 g, Rfl: 5    ALPRAZolam (XANAX) 0.5 MG tablet, Take 1 tablet (0.5 mg total) by mouth 3 (three) times daily., Disp: 20 tablet, Rfl: 0    hydroquinone 4 % Crea, Apply to dark spots once daily. Use with sunscreen if outdoors, Disp: 28 g, Rfl: 1    mometasone (ELOCON) 0.1 % ointment, AAA bid prn.  Do not use on face, underarms or groin., Disp: 45 g, Rfl: 1    ondansetron (ZOFRAN-ODT) 4 MG TbDL, Take 1 tablet (4 mg total) by mouth every 6 (six) hours as needed (nausea or vomiting)., Disp: 30 tablet, Rfl: 0    semaglutide (OZEMPIC) 0.25 mg or 0.5 mg (2 mg/3 mL) pen injector, Inject 0.5 mg into the skin every 7 days., Disp: , Rfl:     triamcinolone acetonide 0.025% (KENALOG) 0.025 % Oint, AAA bid prn rash on lips. Use sparingly. Use for 1-2 weeks then taper. Mild steroid., Disp: 15 g, Rfl: 1    triamcinolone acetonide 0.5% (KENALOG) 0.5 % Crea, Apply 1 Tube topically 2 (two) times daily., Disp: 15 g, Rfl: 2    methylphenidate HCl 18 MG CR tablet, Take 1 tablet (18 mg total) by mouth every morning., Disp: 30 tablet, Rfl: 0    Review of Systems   Constitutional:  Negative for activity change, appetite change, fatigue, fever and unexpected weight change.  "  HENT:  Negative for hearing loss, rhinorrhea and trouble swallowing.    Eyes:  Negative for discharge and visual disturbance.   Respiratory:  Negative for cough, chest tightness, shortness of breath and wheezing.    Cardiovascular:  Negative for chest pain, palpitations and leg swelling.   Gastrointestinal:  Negative for abdominal pain, blood in stool, constipation, diarrhea, nausea and vomiting.        -heartburn   Endocrine: Negative for polydipsia and polyuria.   Genitourinary:  Negative for difficulty urinating, dysuria, frequency, hematuria, menstrual problem and urgency.        +right nipple bleeding   Musculoskeletal:  Negative for arthralgias, back pain, joint swelling, myalgias and neck pain.   Skin:  Positive for rash.   Neurological:  Negative for dizziness, weakness, numbness and headaches.   Hematological:  Does not bruise/bleed easily.   Psychiatric/Behavioral:  Negative for confusion, dysphoric mood, sleep disturbance and suicidal ideas. The patient is nervous/anxious.    All other systems reviewed and are negative.         Objective:      Vitals:    12/20/24 1059   BP: 104/70   Pulse: 77   SpO2: 99%   Weight: 68 kg (150 lb)   Height: 5' 6" (1.676 m)       Wt Readings from Last 3 Encounters:   12/20/24 68 kg (150 lb)   10/18/24 68 kg (150 lb)   08/08/24 74.4 kg (164 lb)       Physical Exam  Vitals reviewed.   Constitutional:       General: She is not in acute distress.     Appearance: Normal appearance. She is well-developed.   HENT:      Head: Normocephalic and atraumatic.   Neck:      Thyroid: No thyromegaly.   Cardiovascular:      Rate and Rhythm: Normal rate and regular rhythm.      Heart sounds: Normal heart sounds. No murmur heard.     No friction rub.   Pulmonary:      Effort: Pulmonary effort is normal.      Breath sounds: Normal breath sounds. No wheezing or rales.   Abdominal:      General: Bowel sounds are normal. There is no distension.      Palpations: Abdomen is soft.      Tenderness: " There is no abdominal tenderness.   Musculoskeletal:      Cervical back: Neck supple.   Lymphadenopathy:      Cervical: No cervical adenopathy.   Skin:     General: Skin is warm and dry.      Findings: No rash.   Neurological:      Mental Status: She is alert and oriented to person, place, and time.   Psychiatric:         Attention and Perception: She is attentive.         Speech: Speech normal.         Behavior: Behavior normal.         Thought Content: Thought content normal.         Judgment: Judgment normal.           Assessment:       1. Attention deficit hyperactivity disorder (ADHD), predominantly inattentive type    2. Hepatomegaly    3. Anxiety    4. Malignant neoplasm of upper-outer quadrant of right breast in female, estrogen receptor positive    5. Ductal carcinoma in situ (DCIS) of right breast    6. Influenza vaccine administered           Plan:       Attention deficit hyperactivity disorder (ADHD), predominantly inattentive type  Comments:  Focus controlled. Will continue to monitor tolerability and effectiveness of concerta.  Orders:  -     methylphenidate HCl 18 MG CR tablet; Take 1 tablet (18 mg total) by mouth every morning.  Dispense: 30 tablet; Refill: 0    Hepatomegaly  Comments:  Mild. Will refer to GI for eval.  Orders:  -     Ambulatory referral/consult to Gastroenterology; Future; Expected date: 12/27/2024    Anxiety  Comments:  Controlled. Will continue to monitor symptoms    Malignant neoplasm of upper-outer quadrant of right breast in female, estrogen receptor positive  Comments:  Active. To follow with Oncology and plastics.    Ductal carcinoma in situ (DCIS) of right breast    Influenza vaccine administered  -     influenza (Flulaval, Fluzone, Fluarix) 45 mcg/0.5 mL IM vaccine (> or = 6 mo) 0.5 mL      Labs and/or tests have been ordered for the evaluation/monitoring of acute/chronic conditions, to be done just before next visit.    Follow up in about 3 months (around 3/20/2025) for  ADD, Anxiety.        12/22/2024 Blessing Hinson

## 2024-12-30 ENCOUNTER — OFFICE VISIT (OUTPATIENT)
Dept: FAMILY MEDICINE | Facility: CLINIC | Age: 44
End: 2024-12-30
Payer: COMMERCIAL

## 2024-12-30 DIAGNOSIS — Z13.220 SCREENING FOR LIPOID DISORDERS: ICD-10-CM

## 2024-12-30 DIAGNOSIS — E11.9 TYPE 2 DIABETES MELLITUS WITHOUT COMPLICATION, WITHOUT LONG-TERM CURRENT USE OF INSULIN: ICD-10-CM

## 2024-12-30 DIAGNOSIS — N30.00 ACUTE CYSTITIS WITHOUT HEMATURIA: ICD-10-CM

## 2024-12-30 DIAGNOSIS — Z13.1 SCREENING FOR DIABETES MELLITUS: ICD-10-CM

## 2024-12-30 DIAGNOSIS — Z00.00 ROUTINE GENERAL MEDICAL EXAMINATION AT A HEALTH CARE FACILITY: Primary | ICD-10-CM

## 2024-12-30 DIAGNOSIS — E03.9 HYPOTHYROIDISM, UNSPECIFIED TYPE: ICD-10-CM

## 2024-12-30 DIAGNOSIS — N93.9 VAGINAL BLEEDING: ICD-10-CM

## 2024-12-30 LAB
B-HCG UR QL: NEGATIVE
BILIRUB SERPL-MCNC: NORMAL MG/DL
BLOOD URINE, POC: NORMAL
CHOLEST SERPL-MCNC: 147 MG/DL
CHOLEST/HDLC SERPL: 3.7 {RATIO}
CLARITY, UA: NORMAL
COLOR, UA: YELLOW
CTP QC/QA: YES
EST. AVERAGE GLUCOSE BLD GHB EST-MCNC: 166 MG/DL
GLUCOSE SERPL-MCNC: 134 MG/DL (ref 70–100)
GLUCOSE UR QL STRIP: NORMAL
HBA1C MFR BLD HPLC: 7.4 %
HDLC SERPL-MCNC: 40 MG/DL (ref 35–60)
KETONES UR QL STRIP: NORMAL
LDLC SERPL CALC-MCNC: 85 MG/DL
LDLC/HDLC SERPL: 2.1 {RATIO}
LEUKOCYTE ESTERASE URINE, POC: NORMAL
NITRITE, POC UA: NORMAL
NONHDLC SERPL-MCNC: 107 MG/DL
PH, POC UA: 5.5
PROTEIN, POC: NORMAL
SPECIFIC GRAVITY, POC UA: >=1.03
T4 FREE SERPL-MCNC: 1 NG/DL (ref 0.7–1.48)
TRIGL SERPL-MCNC: 112 MG/DL (ref 37–140)
TSH SERPL DL<=0.005 MIU/L-ACNC: 6.87 UIU/ML (ref 0.35–4.94)
UROBILINOGEN, POC UA: 1
VLDLC SERPL-MCNC: 22 MG/DL

## 2024-12-30 PROCEDURE — 81025 URINE PREGNANCY TEST: CPT | Mod: QW,,, | Performed by: FAMILY MEDICINE

## 2024-12-30 PROCEDURE — 84443 ASSAY THYROID STIM HORMONE: CPT | Mod: ,,, | Performed by: CLINICAL MEDICAL LABORATORY

## 2024-12-30 PROCEDURE — 3061F NEG MICROALBUMINURIA REV: CPT | Mod: CPTII,,, | Performed by: FAMILY MEDICINE

## 2024-12-30 PROCEDURE — 82947 ASSAY GLUCOSE BLOOD QUANT: CPT | Mod: ,,, | Performed by: CLINICAL MEDICAL LABORATORY

## 2024-12-30 PROCEDURE — 84439 ASSAY OF FREE THYROXINE: CPT | Mod: ,,, | Performed by: CLINICAL MEDICAL LABORATORY

## 2024-12-30 PROCEDURE — 1159F MED LIST DOCD IN RCRD: CPT | Mod: CPTII,,, | Performed by: FAMILY MEDICINE

## 2024-12-30 PROCEDURE — 1160F RVW MEDS BY RX/DR IN RCRD: CPT | Mod: CPTII,,, | Performed by: FAMILY MEDICINE

## 2024-12-30 PROCEDURE — 99396 PREV VISIT EST AGE 40-64: CPT | Mod: ,,, | Performed by: FAMILY MEDICINE

## 2024-12-30 PROCEDURE — 3066F NEPHROPATHY DOC TX: CPT | Mod: CPTII,,, | Performed by: FAMILY MEDICINE

## 2024-12-30 PROCEDURE — 3051F HG A1C>EQUAL 7.0%<8.0%: CPT | Mod: CPTII,,, | Performed by: FAMILY MEDICINE

## 2024-12-30 PROCEDURE — 80061 LIPID PANEL: CPT | Mod: ,,, | Performed by: CLINICAL MEDICAL LABORATORY

## 2024-12-30 PROCEDURE — 83036 HEMOGLOBIN GLYCOSYLATED A1C: CPT | Mod: ,,, | Performed by: CLINICAL MEDICAL LABORATORY

## 2024-12-30 NOTE — PROGRESS NOTES
Mary Magana is a 44 y.o. female seen today for her wellness visit.  She also needs follow-up lab work regarding a recent urinary tract infection and vaginal bleeding.  She was evaluated in the emergency room but no pregnancy test appears to have been done.  Also, the patient will need follow-up on her diabetes and hypothyroidism.  She is up-to-date on her diabetic eye exam mammogram and flu vaccination.    Past Medical History:   Diagnosis Date    Diabetes mellitus     Hypertension     Thyroid disease     WPW (Ilana-Parkinson-White syndrome)      Family History   Problem Relation Name Age of Onset    Hypertension Mother      Diabetes Mother      Diabetes Father      Diabetes Sister      Leukemia Daughter       Current Outpatient Medications on File Prior to Visit   Medication Sig Dispense Refill    aspirin (ECOTRIN) 81 MG EC tablet TAKE 1 TABLET BY MOUTH DAILY WITH FOOD 30 tablet 5    atorvastatin (LIPITOR) 10 MG tablet Take 1 tablet (10 mg total) by mouth every evening. 90 tablet 1    buPROPion (WELLBUTRIN XL) 150 MG TB24 tablet Take 150 mg by mouth every morning.      clonazePAM (KLONOPIN) 1 MG tablet Take 1 mg by mouth nightly as needed for Anxiety.      famotidine (PEPCID) 40 MG tablet Take 1 tablet (40 mg total) by mouth once daily. 30 tablet 4    ferrous sulfate (IRON) 325 mg (65 mg iron) Tab tablet Take 1 tablet (325 mg total) by mouth daily with breakfast. 30 tablet 5    glimepiride (AMARYL) 1 MG tablet Take 1 tablet (1 mg total) by mouth every morning. 90 tablet 1    guaiFENesin-codeine 100-10 mg/5 ml (TUSSI-ORGANIDIN NR)  mg/5 mL syrup Take 10 mLs by mouth every 8 (eight) hours as needed for Cough. 118 mL 0    hydrOXYzine pamoate (VISTARIL) 25 MG Cap Take 1 to 2 capsule up to tid prn for severe anxiety 30 capsule 2    levothyroxine (SYNTHROID) 150 MCG tablet Take 1 tablet (150 mcg total) by mouth before breakfast. 30 tablet 1    metFORMIN (GLUCOPHAGE) 1000 MG tablet Take 1 tablet (1,000 mg  total) by mouth 2 (two) times daily with meals. 180 tablet 1    metoprolol tartrate (LOPRESSOR) 25 MG tablet Take 1 tablet (25 mg total) by mouth 2 (two) times daily. 60 tablet 5    omeprazole (PRILOSEC) 40 MG capsule Take 1 capsule (40 mg total) by mouth once daily. 30 capsule 2    TRUEPLUS LANCETS 30 gauge Misc AS DIRECTED       Current Facility-Administered Medications on File Prior to Visit   Medication Dose Route Frequency Provider Last Rate Last Admin    influenza (Egg-FREE) (Flucelvax) 45 mcg/0.5 mL IM vaccine (> or = 6 mo) (*contains preservatives) 0.5 mL  0.5 mL Intramuscular 1 time in Clinic/HOD Huber Clayton MD         Immunization History   Administered Date(s) Administered    Influenza - Trivalent - Flucelvax - PF 10/09/2024       Review of Systems   Constitutional:  Negative for fever, malaise/fatigue and weight loss.   Respiratory:  Negative for shortness of breath.    Cardiovascular:  Negative for chest pain and palpitations.   Gastrointestinal:  Negative for nausea and vomiting.   Genitourinary:  Negative for dysuria.   Psychiatric/Behavioral:  Negative for depression.         There were no vitals filed for this visit.    Physical Exam  Vitals reviewed.   Constitutional:       Appearance: Normal appearance.   HENT:      Head: Normocephalic.   Eyes:      Extraocular Movements: Extraocular movements intact.      Conjunctiva/sclera: Conjunctivae normal.      Pupils: Pupils are equal, round, and reactive to light.   Neck:      Thyroid: No thyroid mass or thyromegaly.   Cardiovascular:      Rate and Rhythm: Normal rate and regular rhythm.      Heart sounds: Normal heart sounds. No murmur heard.     No gallop.   Pulmonary:      Effort: Pulmonary effort is normal. No respiratory distress.      Breath sounds: Normal breath sounds. No wheezing or rales.   Skin:     General: Skin is warm and dry.      Coloration: Skin is not jaundiced or pale.   Neurological:      Mental Status: She is alert.    Psychiatric:         Mood and Affect: Mood normal.         Behavior: Behavior normal.         Thought Content: Thought content normal.         Judgment: Judgment normal.          Assessment and Plan  1. Routine general medical examination at a health care facility    2. Vaginal bleeding  -     POCT urine pregnancy    3. Screening for diabetes mellitus  -     Glucose, Fasting; Future; Expected date: 12/30/2024    4. Screening for lipoid disorders  -     Lipid Panel; Future; Expected date: 12/30/2024    5. Acute cystitis without hematuria  -     POCT URINALYSIS W/O SCOPE    6. Type 2 diabetes mellitus without complication, without long-term current use of insulin  -     Hemoglobin A1C; Future; Expected date: 12/30/2024    7. Hypothyroidism, unspecified type  -     T4, Free; Future; Expected date: 12/30/2024  -     TSH; Future; Expected date: 12/30/2024               HCG is negative and urinalysis is clear of infection.        Return to clinic in 3 months or as needed once her lab work is in.  We will set her up a follow-up appointment with Gynecology.    Health Maintenance Topics with due status: Not Due       Topic Last Completion Date    Cervical Cancer Screening 11/28/2022    Diabetes Urine Screening 04/02/2024    Low Dose Statin 10/09/2024    Hemoglobin A1c 10/09/2024    Mammogram 12/11/2024    RSV Vaccine (Age 60+ and Pregnant patients) Not Due

## 2025-01-08 ENCOUNTER — OFFICE VISIT (OUTPATIENT)
Dept: DERMATOLOGY | Facility: CLINIC | Age: 45
End: 2025-01-08
Payer: COMMERCIAL

## 2025-01-08 DIAGNOSIS — L30.9 DERMATITIS OF LIP: ICD-10-CM

## 2025-01-08 DIAGNOSIS — L21.9 SEBORRHEIC DERMATITIS: Primary | ICD-10-CM

## 2025-01-08 DIAGNOSIS — L98.9 ECZEMATOUS SKIN LESIONS: ICD-10-CM

## 2025-01-08 DIAGNOSIS — L70.0 ACNE VULGARIS: ICD-10-CM

## 2025-01-08 RX ORDER — KETOCONAZOLE 20 MG/G
CREAM TOPICAL
Qty: 60 G | Refills: 5 | Status: SHIPPED | OUTPATIENT
Start: 2025-01-08

## 2025-01-08 RX ORDER — TRIAMCINOLONE ACETONIDE 0.25 MG/G
CREAM TOPICAL 2 TIMES DAILY PRN
Qty: 80 G | Refills: 1 | Status: SHIPPED | OUTPATIENT
Start: 2025-01-08

## 2025-01-08 RX ORDER — FLUOCINOLONE ACETONIDE 0.11 MG/ML
OIL TOPICAL
Qty: 118 ML | Refills: 5 | Status: SHIPPED | OUTPATIENT
Start: 2025-01-08

## 2025-01-08 RX ORDER — CICLOPIROX 1 G/100ML
SHAMPOO TOPICAL
Qty: 120 ML | Refills: 11 | Status: SHIPPED | OUTPATIENT
Start: 2025-01-08

## 2025-01-08 NOTE — PROGRESS NOTES
Subjective:      Patient ID:  Yanira Lerma is a 44 y.o. female who presents for   No chief complaint on file.    The patient location is: home  The chief complaint leading to consultation is: dry scalp, acne f/u    Visit type: audiovisual    Face to Face time with patient: 15   20 minutes of total time spent on the encounter, which includes face to face time and non-face to face time preparing to see the patient (eg, review of tests), Obtaining and/or reviewing separately obtained history, Documenting clinical information in the electronic or other health record, Independently interpreting results (not separately reported) and communicating results to the patient/family/caregiver, or Care coordination (not separately reported).         Each patient to whom he or she provides medical services by telemedicine is:  (1) informed of the relationship between the physician and patient and the respective role of any other health care provider with respect to management of the patient; and (2) notified that he or she may decline to receive medical services by telemedicine and may withdraw from such care at any time.    Notes:        Hx of possible ACD to nickel, lip dermatitis, and acne, last seen on 10/3/24.  She is currently using TAC 0.025% cream for lip dermatitis with improvement.    For acne, she is using epiduo qD qOD, and HQ 8% prn. Improvement in acne.     For ACD, she has used triamcinolone, unsure about using mometasone.    She also c/o dry scalp. She is using blue magic hair grease and jessenia care anti-dandruff products.        Hyperpigmentation    Eczema        Review of Systems   Constitutional:  Negative for fever and chills.   Gastrointestinal:  Negative for nausea and vomiting.   Skin:  Positive for itching, rash, dry skin and activity-related sunscreen use. Negative for daily sunscreen use and recent sunburn.   Hematologic/Lymphatic: Does not bruise/bleed easily.       Objective:   Physical Exam    Constitutional: She appears well-developed and well-nourished. No distress.   Neurological: She is alert and oriented to person, place, and time. She is not disoriented.   Psychiatric: She has a normal mood and affect.   Skin:   Areas Examined (abnormalities noted in diagram):   Scalp / Hair Palpated and Inspected  Head / Face Inspection Performed  Neck Inspection Performed  RUE Inspected  LUE Inspection Performed                Assessment / Plan:        Seborrheic dermatitis  -     fluocinolone (DERMA-SMOOTHE) 0.01 % external oil; Apply oil to scalp once a day.  Dispense: 118 mL; Refill: 5  -     ketoconazole (NIZORAL) 2 % cream; AAA bid for dryness or scaling of face.  Non-steroid. Safe to use long-term.  Dispense: 60 g; Refill: 5  -     triamcinolone acetonide 0.025% (KENALOG) 0.025 % cream; Apply topically 2 (two) times daily as needed. Mild steroid. Use sparingly for 1-2 weeks if needed then stop.  Dispense: 80 g; Refill: 1  -     ciclopirox (LOPROX) 1 % shampoo; Apply to scalp at least once per week or once every other week and let sit 5-10 min prior to rinsing.  Dispense: 120 mL; Refill: 11  -     will start above meds, AVS released via patient portal.  Discussed diagnosis.    Acne vulgaris  Continue epiduo qOHS and HQ 8% prn.    Side effect of paradoxical darkening (exogenous ochronosis) with prolonged use of hydroquinone reviewed with the patient.  Patient acknowledged understanding and will only use the bleaching cream for the amount of time specified.      Eczematous skin lesions  Of the neck. Recommend continue TAC 0.1% cream or discussed okay to start mometasone bid prn.      Dermatitis of lip  Resolved, we will change to triamcinolone 0.025% cream, given patient will use prescription for seborrheic dermatitis.             Follow up in about 6 months (around 7/8/2025).

## 2025-01-16 ENCOUNTER — TELEPHONE (OUTPATIENT)
Dept: FAMILY MEDICINE | Facility: CLINIC | Age: 45
End: 2025-01-16
Payer: COMMERCIAL

## 2025-01-16 NOTE — TELEPHONE ENCOUNTER
Pt called clinic back. Notified patient of results. Patient voiced understanding. Pt has appt already scheduled for 03/28/25 with Dr Clayton, I advised her to keep that appt as he will discuss her lab results with her during that visit. Pt voiced understanding.

## 2025-01-16 NOTE — TELEPHONE ENCOUNTER
----- Message from Huber Clayton MD sent at 12/31/2024  7:57 AM CST -----  Office visit for thyroid A1c and LDL

## 2025-01-25 ENCOUNTER — HOSPITAL ENCOUNTER (EMERGENCY)
Facility: HOSPITAL | Age: 45
Discharge: HOME | End: 2025-01-25
Attending: EMERGENCY MEDICINE
Payer: COMMERCIAL

## 2025-01-25 VITALS
DIASTOLIC BLOOD PRESSURE: 104 MMHG | HEIGHT: 62 IN | SYSTOLIC BLOOD PRESSURE: 174 MMHG | RESPIRATION RATE: 12 BRPM | OXYGEN SATURATION: 100 % | TEMPERATURE: 99 F | WEIGHT: 244 LBS | BODY MASS INDEX: 44.9 KG/M2 | HEART RATE: 90 BPM

## 2025-01-25 DIAGNOSIS — M54.12 CERVICAL RADICULOPATHY: ICD-10-CM

## 2025-01-25 DIAGNOSIS — R07.9 CHEST PAIN: ICD-10-CM

## 2025-01-25 DIAGNOSIS — R07.89 ATYPICAL CHEST PAIN: Primary | ICD-10-CM

## 2025-01-25 PROCEDURE — 93005 ELECTROCARDIOGRAM TRACING: CPT

## 2025-01-25 PROCEDURE — 25000003 PHARM REV CODE 250: Performed by: EMERGENCY MEDICINE

## 2025-01-25 PROCEDURE — 93010 ELECTROCARDIOGRAM REPORT: CPT | Mod: ,,, | Performed by: HOSPITALIST

## 2025-01-25 PROCEDURE — 99284 EMERGENCY DEPT VISIT MOD MDM: CPT | Mod: 25

## 2025-01-25 RX ORDER — TIZANIDINE 4 MG/1
4 TABLET ORAL EVERY 6 HOURS PRN
Qty: 30 TABLET | Refills: 0 | Status: SHIPPED | OUTPATIENT
Start: 2025-01-25 | End: 2025-01-30 | Stop reason: ALTCHOICE

## 2025-01-25 RX ORDER — HYDROCODONE BITARTRATE AND ACETAMINOPHEN 10; 325 MG/1; MG/1
1 TABLET ORAL
Status: COMPLETED | OUTPATIENT
Start: 2025-01-25 | End: 2025-01-25

## 2025-01-25 RX ORDER — DICLOFENAC SODIUM 50 MG/1
50 TABLET, DELAYED RELEASE ORAL 3 TIMES DAILY
Qty: 30 TABLET | Refills: 0 | Status: SHIPPED | OUTPATIENT
Start: 2025-01-25

## 2025-01-25 RX ADMIN — HYDROCODONE BITARTRATE AND ACETAMINOPHEN 1 TABLET: 10; 325 TABLET ORAL at 09:01

## 2025-01-26 LAB
OHS QRS DURATION: 98 MS
OHS QTC CALCULATION: 412 MS

## 2025-01-26 NOTE — ED PROVIDER NOTES
Encounter Date: 1/25/2025    SCRIBE #1 NOTE: I, Kevin Mcneil, am scribing for, and in the presence of,  Rajat Zapata MD. I have scribed the entire note.       History     Chief Complaint   Patient presents with    Chest Pain     Radiates to right arm      Mary Magana is a 44 y.o. female presenting to the ED with c/o of neck and shoulder pain that radiates to her neck that started a few days ago. PT feels pain when she moves and is unable to sleep. She denies recent injury, chest pain, cough and SOB. Pt has Hx of anxiety, Diabetes mellitus, HTN, Thyroid disease, and WPW.    The history is provided by the patient. No  was used.     Review of patient's allergies indicates:   Allergen Reactions    Anesthesia s/i-40 (propofol) [propofol] Itching     Anesthesia, possibly propofol; made her feel like there were ants all over her     Past Medical History:   Diagnosis Date    Diabetes mellitus     Hypertension     Thyroid disease     WPW (Ilana-Parkinson-White syndrome)      Past Surgical History:   Procedure Laterality Date    CHOLECYSTECTOMY      TUBAL LIGATION       Family History   Problem Relation Name Age of Onset    Hypertension Mother      Diabetes Mother      Diabetes Father      Diabetes Sister      Leukemia Daughter       Social History     Tobacco Use    Smoking status: Some Days     Current packs/day: 0.25     Average packs/day: 0.3 packs/day for 15.0 years (3.8 ttl pk-yrs)     Types: Cigarettes, Vaping with nicotine     Last attempt to quit: 2022     Passive exposure: Past    Smokeless tobacco: Never    Tobacco comments:     1 pack per 2 weeks   Substance Use Topics    Alcohol use: Never    Drug use: Never     Review of Systems   Constitutional:         PT has right arm and shoulder pain.    Respiratory:  Negative for cough and shortness of breath.    Cardiovascular:  Negative for chest pain.   Musculoskeletal:  Positive for neck pain.   All other systems reviewed and are  negative.      Physical Exam     Initial Vitals   BP Pulse Resp Temp SpO2   01/25/25 2127 01/25/25 2119 01/25/25 2119 01/25/25 2119 01/25/25 2119   (!) 191/111 80 15 98.6 °F (37 °C) 100 %      MAP       --                Physical Exam    Vitals reviewed.  Constitutional: She appears well-developed and well-nourished.   HENT:   Head: Normocephalic and atraumatic.   Right Ear: External ear normal.   Left Ear: External ear normal.   Nose: Nose normal. Mouth/Throat: Oropharynx is clear and moist.   Eyes: Conjunctivae and EOM are normal. Pupils are equal, round, and reactive to light.   Neck: Neck supple.   PT's lower left neck is tender when she moves.    Cardiovascular:  Normal rate, regular rhythm and normal heart sounds.           Pulmonary/Chest: Breath sounds normal.   Abdominal: Abdomen is soft.   Musculoskeletal:         General: Normal range of motion.      Cervical back: Neck supple.     Neurological: She is alert and oriented to person, place, and time.   Skin: Skin is warm and dry.   Psychiatric: She has a normal mood and affect. Her behavior is normal.         ED Course   Procedures  Labs Reviewed - No data to display       Imaging Results    None          Medications   HYDROcodone-acetaminophen  mg per tablet 1 tablet (1 tablet Oral Given 1/25/25 2152)     Medical Decision Making  Risk  Prescription drug management.              Attending Attestation:           Physician Attestation for Scribe:  Physician Attestation Statement for Scribe #1: I, Rajat Zapata MD, reviewed documentation, as scribed by Kevin Mcneil in my presence, and it is both accurate and complete.             ED Course as of 01/26/25 0224   Sat Jan 25, 2025 2127 EKG by my interpretation shows sinus rhythm, rate of 85, no acute ST segment changes. [BB]   2140 Medical decision-making:  Differential diagnosis includes neck pain, arm pain, muscle strain, cervical radiculopathy.  Patient's blood pressure is elevated however she admits  she did not take her medication today.  Patient reports chest pain is intermittent and fleeting only lasting for a few seconds at a time.  Symptoms do not sound cardiac related at all. [BB]      ED Course User Index  [BB] Rajat Zapata MD                           Clinical Impression:  Final diagnoses:  [R07.89] Atypical chest pain (Primary)  [M54.12] Cervical radiculopathy  [R07.9] Chest pain          ED Disposition Condition    Discharge Stable          ED Prescriptions       Medication Sig Dispense Start Date End Date Auth. Provider    diclofenac (VOLTAREN) 50 MG EC tablet Take 1 tablet (50 mg total) by mouth 3 (three) times daily. P.r.n. pain 30 tablet 1/25/2025 -- Rajat Zapata MD    tiZANidine (ZANAFLEX) 4 MG tablet Take 1 tablet (4 mg total) by mouth every 6 (six) hours as needed (P.r.n. neck pain). 30 tablet 1/25/2025 2/4/2025 Rajat Zapata MD          Follow-up Information    None          Rajat Zapata MD  01/26/25 0223

## 2025-01-26 NOTE — DISCHARGE INSTRUCTIONS
Take medications as prescribed.  Take your blood pressure medication as soon as you get home.  Return to emergency department for any worsening or further problems.  Follow up in clinic with primary care provider in 2-3 days for recheck.

## 2025-01-27 ENCOUNTER — HOSPITAL ENCOUNTER (EMERGENCY)
Facility: HOSPITAL | Age: 45
Discharge: HOME | End: 2025-01-27
Attending: EMERGENCY MEDICINE
Payer: COMMERCIAL

## 2025-01-27 VITALS
RESPIRATION RATE: 14 BRPM | DIASTOLIC BLOOD PRESSURE: 70 MMHG | OXYGEN SATURATION: 99 % | HEIGHT: 62 IN | WEIGHT: 244 LBS | TEMPERATURE: 98 F | BODY MASS INDEX: 44.9 KG/M2 | HEART RATE: 52 BPM | SYSTOLIC BLOOD PRESSURE: 157 MMHG

## 2025-01-27 DIAGNOSIS — M54.12 CERVICAL RADICULOPATHY: Primary | ICD-10-CM

## 2025-01-27 DIAGNOSIS — R07.9 CHEST PAIN: ICD-10-CM

## 2025-01-27 DIAGNOSIS — R52 PAIN: ICD-10-CM

## 2025-01-27 PROCEDURE — 96374 THER/PROPH/DIAG INJ IV PUSH: CPT

## 2025-01-27 PROCEDURE — 96375 TX/PRO/DX INJ NEW DRUG ADDON: CPT

## 2025-01-27 PROCEDURE — 63600175 PHARM REV CODE 636 W HCPCS: Performed by: EMERGENCY MEDICINE

## 2025-01-27 PROCEDURE — 93005 ELECTROCARDIOGRAM TRACING: CPT

## 2025-01-27 PROCEDURE — 99284 EMERGENCY DEPT VISIT MOD MDM: CPT | Mod: 25

## 2025-01-27 PROCEDURE — 93010 ELECTROCARDIOGRAM REPORT: CPT | Mod: ,,, | Performed by: INTERNAL MEDICINE

## 2025-01-27 RX ORDER — DILTIAZEM HYDROCHLORIDE 5 MG/ML
20 INJECTION INTRAVENOUS
Status: DISCONTINUED | OUTPATIENT
Start: 2025-01-27 | End: 2025-01-27

## 2025-01-27 RX ORDER — PREDNISONE 20 MG/1
TABLET ORAL
Qty: 18 TABLET | Refills: 0 | Status: SHIPPED | OUTPATIENT
Start: 2025-01-27 | End: 2025-01-27

## 2025-01-27 RX ORDER — PREDNISONE 20 MG/1
TABLET ORAL
Qty: 18 TABLET | Refills: 0 | Status: SHIPPED | OUTPATIENT
Start: 2025-01-27

## 2025-01-27 RX ORDER — DEXAMETHASONE SODIUM PHOSPHATE 4 MG/ML
8 INJECTION, SOLUTION INTRA-ARTICULAR; INTRALESIONAL; INTRAMUSCULAR; INTRAVENOUS; SOFT TISSUE
Status: COMPLETED | OUTPATIENT
Start: 2025-01-27 | End: 2025-01-27

## 2025-01-27 RX ORDER — ONDANSETRON HYDROCHLORIDE 2 MG/ML
4 INJECTION, SOLUTION INTRAVENOUS
Status: COMPLETED | OUTPATIENT
Start: 2025-01-27 | End: 2025-01-27

## 2025-01-27 RX ORDER — MORPHINE SULFATE 4 MG/ML
4 INJECTION, SOLUTION INTRAMUSCULAR; INTRAVENOUS
Status: COMPLETED | OUTPATIENT
Start: 2025-01-27 | End: 2025-01-27

## 2025-01-27 RX ADMIN — MORPHINE SULFATE 4 MG: 4 INJECTION, SOLUTION INTRAMUSCULAR; INTRAVENOUS at 04:01

## 2025-01-27 RX ADMIN — DEXAMETHASONE SODIUM PHOSPHATE 8 MG: 4 INJECTION, SOLUTION INTRA-ARTICULAR; INTRALESIONAL; INTRAMUSCULAR; INTRAVENOUS; SOFT TISSUE at 04:01

## 2025-01-27 RX ADMIN — ONDANSETRON 4 MG: 2 INJECTION INTRAMUSCULAR; INTRAVENOUS at 04:01

## 2025-01-27 NOTE — ED PROVIDER NOTES
Encounter Date: 1/27/2025    SCRIBE #1 NOTE: I, Fatoumata Sharma, am scribing for, and in the presence of,  Nile Vang MD.       History     Chief Complaint   Patient presents with    Shoulder Pain     Pt presents to ed with c/o having right sided shoulder pain that radiates to her right chest and right side was seen and treated for same symptoms last night    Chest Pain     This 44 y.o. Female pt presents to the ED with c/o Shoulder pain. Pt reports having slept on her arm and it started to feel like it was tingling. Then 3 days ago she stated her arm started to hurt and having severe pain that radiates to her chest. Pt said it feel like it is out of place but she does not think it is.  The pt was here in the ED a few days ago and was given medications to take but pt states the medications are not helping the pain. Pt also mentioned when the cold air hits her shoulder it is in more pain.     The history is provided by the patient.     Review of patient's allergies indicates:   Allergen Reactions    Anesthesia s/i-40 (propofol) [propofol] Itching     Anesthesia, possibly propofol; made her feel like there were ants all over her     Past Medical History:   Diagnosis Date    Diabetes mellitus     Hypertension     Thyroid disease     WPW (Ilana-Parkinson-White syndrome)      Past Surgical History:   Procedure Laterality Date    CHOLECYSTECTOMY      TUBAL LIGATION       Family History   Problem Relation Name Age of Onset    Hypertension Mother      Diabetes Mother      Diabetes Father      Diabetes Sister      Leukemia Daughter       Social History     Tobacco Use    Smoking status: Some Days     Current packs/day: 0.25     Average packs/day: 0.3 packs/day for 15.0 years (3.8 ttl pk-yrs)     Types: Cigarettes, Vaping with nicotine     Last attempt to quit: 2022     Passive exposure: Past    Smokeless tobacco: Never    Tobacco comments:     1 pack per 2 weeks   Substance Use Topics    Alcohol use: Never    Drug use:  Never     Review of Systems   Constitutional:  Negative for fever.   HENT:  Negative for congestion.    Respiratory:  Negative for cough and shortness of breath.    Cardiovascular:  Positive for chest pain. Negative for leg swelling.   Gastrointestinal:  Negative for abdominal pain, nausea and vomiting.   Psychiatric/Behavioral:  Negative for agitation and behavioral problems.        Physical Exam     Initial Vitals [01/27/25 1607]   BP Pulse Resp Temp SpO2   (!) 188/142 76 18 98.3 °F (36.8 °C) 98 %      MAP       --         Physical Exam    Nursing note and vitals reviewed.  Constitutional: She appears well-developed and well-nourished.   HENT:   Head: Normocephalic and atraumatic.   Eyes: Conjunctivae and EOM are normal. Pupils are equal, round, and reactive to light.   Neck: Neck supple.   Normal range of motion.  Cardiovascular:  Normal rate, regular rhythm, normal heart sounds and intact distal pulses.           Pulmonary/Chest: Breath sounds normal.   Abdominal: Abdomen is soft. Bowel sounds are normal.   Musculoskeletal:         General: Tenderness present.      Right shoulder: Tenderness present. Decreased range of motion.      Left shoulder: Normal.        Arms:       Cervical back: Normal range of motion and neck supple.      Comments: Pt has severe right shoulder pain.     Neurological: She is alert and oriented to person, place, and time. She has normal strength.   Skin: Skin is warm and dry. Capillary refill takes less than 2 seconds.   Psychiatric: She has a normal mood and affect. Thought content normal.         ED Course   Procedures  Labs Reviewed - No data to display       Imaging Results              X-Ray Cervical Spine AP And Lateral (In process)  Result time 01/27/25 18:03:31                     X-Ray Shoulder Complete 2 View Right (In process)  Result time 01/27/25 18:03:25                     Medications   dexAMETHasone injection 8 mg (8 mg Intravenous Given 1/27/25 0204)   morphine injection  4 mg (4 mg Intravenous Given 1/27/25 1653)   ondansetron injection 4 mg (4 mg Intravenous Given 1/27/25 1654)     Medical Decision Making  Amount and/or Complexity of Data Reviewed  Radiology: ordered.    Risk  Prescription drug management.              Attending Attestation:           Physician Attestation for Scribe:  Physician Attestation Statement for Scribe #1: I, Nile Vang MD, reviewed documentation, as scribed by Fatoumata Sharma in my presence, and it is both accurate and complete.                                    Clinical Impression:  Final diagnoses:  [R52] Pain  [M54.12] Cervical radiculopathy (Primary)          ED Disposition Condition    Discharge Stable          ED Prescriptions       Medication Sig Dispense Start Date End Date Auth. Provider    predniSONE (DELTASONE) 20 MG tablet 3 PO DAILY X 3 DAYS, THEN 2 PO DAILY X 3 DAYS, THEN 1 PO DAILY X 3 DAYS, THEN STOP. 18 tablet 1/27/2025 -- Nile Vang MD          Follow-up Information       Follow up With Specialties Details Why Contact Info    Huber Clayton MD Family Medicine  As needed [address] [facility].  [facility] [place] [id number] [phone]               Nile Vang MD  01/27/25 9927

## 2025-01-27 NOTE — ED NOTES
Pt arrives with unchanged chest pain from ED visit two days ago .States she picked up her voltaren and zanaflex from the pharmacy. States she did not go see her PCP Dr Clayton

## 2025-01-28 NOTE — DISCHARGE INSTRUCTIONS
TAKE PREDNISONE AS DIRECTED.  FOLLOW UP WITH YOUR PRIMARY CARE PROVIDER.  RETURN TO THE EMERGENCY DEPARTMENT AS NEEDED.

## 2025-01-28 NOTE — PROGRESS NOTES
Subjective:         Patient ID: Mary Magana is a 44 y.o. female.    Chief Complaint: Back Pain, Arm Pain (Right ), and Neck Pain        Pain  This is a chronic problem. The current episode started more than 1 year ago. The problem occurs daily. The problem has been unchanged. Associated symptoms include arthralgias and neck pain. Pertinent negatives include no anorexia, change in bowel habit, chills, coughing, diaphoresis, fever, rash, sore throat, swollen glands, vertigo or vomiting.     Review of Systems   Constitutional:  Negative for activity change, appetite change, chills, diaphoresis, fever and unexpected weight change.   HENT:  Negative for drooling, ear discharge, ear pain, facial swelling, nosebleeds, sore throat, trouble swallowing, voice change and goiter.    Eyes:  Negative for photophobia, pain, discharge, redness and visual disturbance.   Respiratory:  Negative for apnea, cough, choking, chest tightness, shortness of breath, wheezing and stridor.    Cardiovascular:  Negative for palpitations and leg swelling.   Gastrointestinal:  Negative for abdominal distention, anorexia, change in bowel habit, diarrhea, rectal pain, vomiting and fecal incontinence.   Endocrine: Negative for cold intolerance, heat intolerance, polydipsia, polyphagia and polyuria.   Genitourinary:  Negative for flank pain, frequency and hot flashes.   Musculoskeletal:  Positive for arthralgias, back pain and neck pain.   Integumentary:  Negative for color change, pallor and rash.   Allergic/Immunologic: Negative for immunocompromised state.   Neurological:  Negative for dizziness, vertigo, seizures, syncope, facial asymmetry, speech difficulty, light-headedness, memory loss and coordination difficulties.   Hematological:  Negative for adenopathy. Does not bruise/bleed easily.   Psychiatric/Behavioral:  Negative for agitation, behavioral problems, confusion, decreased concentration, dysphoric mood, hallucinations,  self-injury and suicidal ideas. The patient is not nervous/anxious and is not hyperactive.            Past Medical History:   Diagnosis Date    Diabetes mellitus     Hypertension     Thyroid disease     WPW (Ilana-Parkinson-White syndrome)      Past Surgical History:   Procedure Laterality Date    CHOLECYSTECTOMY      TUBAL LIGATION       Social History     Socioeconomic History    Marital status: Single    Number of children: 6    Years of education: 12   Occupational History    Occupation: None   Tobacco Use    Smoking status: Some Days     Current packs/day: 0.25     Average packs/day: 0.3 packs/day for 15.0 years (3.8 ttl pk-yrs)     Types: Cigarettes, Vaping with nicotine     Last attempt to quit: 2022     Passive exposure: Past    Smokeless tobacco: Never    Tobacco comments:     1 pack per 2 weeks   Substance and Sexual Activity    Alcohol use: Never    Drug use: Never    Sexual activity: Not Currently     Social Drivers of Health     Financial Resource Strain: Medium Risk (11/9/2023)    Overall Financial Resource Strain (CARDIA)     Difficulty of Paying Living Expenses: Somewhat hard   Food Insecurity: No Food Insecurity (11/9/2023)    Hunger Vital Sign     Worried About Running Out of Food in the Last Year: Never true     Ran Out of Food in the Last Year: Never true   Transportation Needs: No Transportation Needs (11/9/2023)    PRAPARE - Transportation     Lack of Transportation (Medical): No     Lack of Transportation (Non-Medical): No   Physical Activity: Sufficiently Active (11/9/2023)    Exercise Vital Sign     Days of Exercise per Week: 7 days     Minutes of Exercise per Session: 30 min   Stress: Stress Concern Present (11/9/2023)    [employer] - Occupational Stress Questionnaire     Feeling of Stress : Very much   Housing Stability: Low Risk  (11/9/2023)    Housing Stability Vital Sign     Unable to Pay for Housing in the Last Year: No     Number of Places Lived in the  "Last Year: 1     Unstable Housing in the Last Year: No     Family History   Problem Relation Name Age of Onset    Hypertension Mother      Diabetes Mother      Diabetes Father      Diabetes Sister      Leukemia Daughter       Review of patient's allergies indicates:   Allergen Reactions    Anesthesia s/i-40 (propofol) [propofol] Itching     Anesthesia, possibly propofol; made her feel like there were ants all over her        Objective:  Vitals:    01/29/25 1256   BP: (!) 164/105   Pulse: 65   Resp: 16   Weight: 116.6 kg (257 lb)   Height: 5' 2" (1.575 m)   PainSc: 10-Worst pain ever           Physical Exam  Vitals and nursing note reviewed. Exam conducted with a chaperone present.   Constitutional:       General: She is awake. She is not in acute distress.     Appearance: Normal appearance. She is not ill-appearing.   HENT:      Head: Normocephalic and atraumatic.      Nose: Nose normal.      Mouth/Throat:      Mouth: Mucous membranes are moist.      Pharynx: Oropharynx is clear.   Eyes:      Conjunctiva/sclera: Conjunctivae normal.      Pupils: Pupils are equal, round, and reactive to light.   Pulmonary:      Effort: Pulmonary effort is normal. No respiratory distress.   Abdominal:      Palpations: Abdomen is soft.      Tenderness: There is no guarding.   Musculoskeletal:         General: Normal range of motion.      Cervical back: Normal range of motion and neck supple. No rigidity.   Skin:     General: Skin is warm and dry.      Coloration: Skin is not jaundiced or pale.   Neurological:      General: No focal deficit present.      Mental Status: She is alert and oriented to person, place, and time. Mental status is at baseline.      Cranial Nerves: No cranial nerve deficit (II-XII).   Psychiatric:         Mood and Affect: Mood normal.         Behavior: Behavior normal. Behavior is cooperative.         Thought Content: Thought content normal.           X-Ray Shoulder Complete 2 View Right  Narrative: " EXAMINATION:  XR SHOULDER COMPLETE 2 OR MORE VIEWS RIGHT    CLINICAL HISTORY:  Pain, unspecified    TECHNIQUE:  Two or three views of the right shoulder were performed.    COMPARISON:  None    FINDINGS:  The AC joint is intact.  The humeral head is normally positioned.  Right hemithorax is clear.  No osseous destruction.  No acute fracture, subluxation or dislocation.  Impression: No acute radiographic abnormality.    Electronically signed by: Mario Toro  Date:    01/27/2025  Time:    18:21  X-Ray Cervical Spine AP And Lateral  Narrative: EXAMINATION:  XR CERVICAL SPINE AP LATERAL    CLINICAL HISTORY:  PAIN;    TECHNIQUE:  AP, lateral and open mouth views of the cervical spine were performed.    COMPARISON:  07/08/2020    FINDINGS:  No acute fracture, subluxation or dislocation.  Neck is minimally flexed on lateral view.  The odontoid process appears normally positioned.  Disc spaces appear adequately maintained.  Soft tissues appear within normal limits.  Mild degenerative changes.  Impression: No acute radiographic abnormality.    Electronically signed by: Mario Toro  Date:    01/27/2025  Time:    18:18         Admission on 01/25/2025, Discharged on 01/25/2025   Component Date Value Ref Range Status    QRS Duration 01/25/2025 98  ms Final    [facility] QTC Calculation 01/25/2025 412  ms Final   Office Visit on 12/30/2024   Component Date Value Ref Range Status    POC Preg Test, Ur 12/30/2024 Negative  Negative Final     Acceptable 12/30/2024 Yes   Final    Color, UA POC 12/30/2024 yellow   Final    Spec Grav UA 12/30/2024 >=1.03   Final    pH, UA 12/30/2024 5.5   Final    WBC, UA 12/30/2024 neg   Final    Nitrite, UA 12/30/2024 neg   Final    Protein, POC 12/30/2024 neg   Final    Glucose, UA 12/30/2024 neg   Final    Ketones, UA 12/30/2024 trace   Final    Bilirubin, POC 12/30/2024 neg   Final    Urobilinogen, UA 12/30/2024 1.0   Final    Blood, UA 12/30/2024 neg   Final    Glucose, Fasting  12/30/2024 134 (H)  70 - 100 mg/dL Final    Triglycerides 12/30/2024 112  37 - 140 mg/dL Final    Cholesterol 12/30/2024 147  <=200 mg/dL Final    HDL Cholesterol 12/30/2024 40  35 - 60 mg/dL Final    Cholesterol/HDL Ratio (Risk Factor) 12/30/2024 3.7   Final    Non-HDL 12/30/2024 107  mg/dL Final    LDL Calculated 12/30/2024 85  mg/dL Final    LDL/HDL 12/30/2024 2.1   Final    VLDL 12/30/2024 22  mg/dL Final    TSH 12/30/2024 6.867 (H)  0.350 - 4.940 uIU/mL Final    Free T4 12/30/2024 1.00  0.70 - 1.48 ng/dL Final    Hemoglobin A1C 12/30/2024 7.4 (H)  <=7.0 % Final    Estimated Average Glucose 12/30/2024 166  mg/dL Final   Admission on 12/13/2024, Discharged on 12/14/2024   Component Date Value Ref Range Status    QRS Duration 12/13/2024 118  ms Final    [facility]HS QTC Calculation 12/13/2024 428  ms Final    Sodium 12/13/2024 134 (L)  136 - 145 mmol/L Final    Potassium 12/13/2024 4.6  3.5 - 5.1 mmol/L Final    Chloride 12/13/2024 106  98 - 107 mmol/L Final    CO2 12/13/2024 21 (L)  22 - 29 mmol/L Final    Anion Gap 12/13/2024 12  7 - 16 mmol/L Final    Glucose 12/13/2024 162 (H)  74 - 100 mg/dL Final    BUN 12/13/2024 12  7 - 19 mg/dL Final    Creatinine 12/13/2024 1.16 (H)  0.55 - 1.02 mg/dL Final    BUN/Creatinine Ratio 12/13/2024 10  6 - 20 Final    Calcium 12/13/2024 9.3  8.4 - 10.2 mg/dL Final    Total Protein 12/13/2024 7.9  6.4 - 8.3 g/dL Final    Albumin 12/13/2024 3.7  3.5 - 5.0 g/dL Final    Globulin 12/13/2024 4.2 (H)  2.0 - 4.0 g/dL Final    A/G Ratio 12/13/2024 0.9   Final    Bilirubin, Total 12/13/2024 0.3  <=1.5 mg/dL Final    Alk Phos 12/13/2024 113  40 - 150 U/L Final    ALT 12/13/2024 58 (H)  <=55 U/L Final    AST 12/13/2024 64 (H)  5 - 34 U/L Final    eGFR 12/13/2024 60  >=60 mL/min/1.73m2 Final    Troponin I High Sensitivity 12/13/2024 <2.7  <=14.0 ng/L Final    PT 12/13/2024 13.4  11.7 - 14.7 seconds Final    INR 12/13/2024 1.03  <=3.30 Final    WBC 12/13/2024 10.39  4.50 - 11.00 K/uL Final    RBC  12/13/2024 5.94 (H)  4.20 - 5.40 M/uL Final    Hemoglobin 12/13/2024 13.2  12.0 - 16.0 g/dL Final    Hematocrit 12/13/2024 44.2  38.0 - 47.0 % Final    MCV 12/13/2024 74.4 (L)  80.0 - 96.0 fL Final    MCH 12/13/2024 22.2 (L)  27.0 - 31.0 pg Final    MCHC 12/13/2024 29.9 (L)  32.0 - 36.0 g/dL Final    RDW 12/13/2024 13.8  11.5 - 14.5 % Final    Platelet Count 12/13/2024 225  150 - 400 K/uL Final    MPV 12/13/2024 11.7  9.4 - 12.4 fL Final    Neutrophils % 12/13/2024 49.8 (L)  53.0 - 65.0 % Final    Lymphocytes % 12/13/2024 39.1  27.0 - 41.0 % Final    Monocytes % 12/13/2024 5.8  2.0 - 6.0 % Final    Eosinophils % 12/13/2024 4.3 (H)  1.0 - 4.0 % Final    Basophils % 12/13/2024 0.7  0.0 - 1.0 % Final    Immature Granulocytes % 12/13/2024 0.3  0.0 - 0.4 % Final    nRBC, Auto 12/13/2024 0.0  <=0.0 % Final    Neutrophils, Abs 12/13/2024 5.18  1.80 - 7.70 K/uL Final    Lymphocytes, Absolute 12/13/2024 4.06  1.00 - 4.80 K/uL Final    Monocytes, Absolute 12/13/2024 0.60  0.00 - 0.80 K/uL Final    Eosinophils, Absolute 12/13/2024 0.45  0.00 - 0.50 K/uL Final    Basophils, Absolute 12/13/2024 0.07  0.00 - 0.20 K/uL Final    Immature Granulocytes, Absolute 12/13/2024 0.03  0.00 - 0.04 K/uL Final    nRBC, Absolute 12/13/2024 0.00  <=0.00 x10e3/uL Final    Diff Type 12/13/2024 Scan Smear   Final    Platelet Morphology 12/13/2024 Normal  Normal Final    Anisocytosis 12/13/2024 1+   Final    Microcytosis 12/13/2024 2+   Final    Hypochromic 12/13/2024 Few   Final    Troponin I High Sensitivity 12/14/2024 <2.7  <=14.0 ng/L Final   Admission on 11/30/2024, Discharged on 11/30/2024   Component Date Value Ref Range Status    QRS Duration 11/30/2024 118  ms Final    [facility] QTC Calculation 11/30/2024 432  ms Final    Sodium 11/30/2024 137  136 - 145 mmol/L Final    Potassium 11/30/2024 3.7  3.5 - 5.1 mmol/L Final    Chloride 11/30/2024 102  98 - 107 mmol/L Final    CO2 11/30/2024 25  22 - 29 mmol/L Final    Anion Gap 11/30/2024 14  7 - 16  mmol/L Final    Glucose 11/30/2024 128 (H)  74 - 100 mg/dL Final    BUN 11/30/2024 12  7 - 19 mg/dL Final    Creatinine 11/30/2024 0.98  0.55 - 1.02 mg/dL Final    BUN/Creatinine Ratio 11/30/2024 12  6 - 20 Final    Calcium 11/30/2024 9.2  8.4 - 10.2 mg/dL Final    Total Protein 11/30/2024 8.0  6.4 - 8.3 g/dL Final    Albumin 11/30/2024 3.9  3.5 - 5.0 g/dL Final    Globulin 11/30/2024 4.1 (H)  2.0 - 4.0 g/dL Final    A/G Ratio 11/30/2024 1.0   Final    Bilirubin, Total 11/30/2024 0.3  <=1.5 mg/dL Final    Alk Phos 11/30/2024 112  40 - 150 U/L Final    ALT 11/30/2024 38  <=55 U/L Final    AST 11/30/2024 32  5 - 34 U/L Final    eGFR 11/30/2024 74  >=60 mL/min/1.73m2 Final    Troponin I High Sensitivity 11/30/2024 <2.7  <=14.0 ng/L Final    Color, UA 11/30/2024 Light-Yellow  Colorless, Straw, Yellow, Light Yellow, Dark Yellow Final    Clarity, UA 11/30/2024 Clear  Clear Final    pH, UA 11/30/2024 6.0  5.0 to 8.0 pH Units Final    Leukocytes, UA 11/30/2024 Negative  Negative Final    Nitrites, UA 11/30/2024 Positive (A)  Negative Final    Protein, UA 11/30/2024 Negative  Negative Final    Glucose, UA 11/30/2024 Normal  Normal mg/dL Final    Ketones, UA 11/30/2024 Negative  Negative mg/dL Final    Urobilinogen, UA 11/30/2024 Normal  0.2, 1.0, Normal mg/dL Final    Bilirubin, UA 11/30/2024 Negative  Negative Final    Blood, UA 11/30/2024 Negative  Negative Final    Specific [place], UA 11/30/2024 1.022  <=1.030 Final    INFLUENZA A MOLECULAR 11/30/2024 Negative  Negative Final    INFLUENZA B MOLECULAR  11/30/2024 Negative  Negative Final    SARS COV-2 Molecular 11/30/2024 Negative  Negative, Invalid Final    WBC 11/30/2024 9.82  4.50 - 11.00 K/uL Final    RBC 11/30/2024 6.02 (H)  4.20 - 5.40 M/uL Final    Hemoglobin 11/30/2024 13.6  12.0 - 16.0 g/dL Final    Hematocrit 11/30/2024 44.4  38.0 - 47.0 % Final    MCV 11/30/2024 73.8 (L)  80.0 - 96.0 fL Final    MCH 11/30/2024 22.6 (L)  27.0 - 31.0 pg Final    MCHC 11/30/2024  30.6 (L)  32.0 - 36.0 g/dL Final    RDW 11/30/2024 13.7  11.5 - 14.5 % Final    Platelet Count 11/30/2024 252  150 - 400 K/uL Final    MPV 11/30/2024 11.6  9.4 - 12.4 fL Final    Neutrophils % 11/30/2024 52.0 (L)  53.0 - 65.0 % Final    Lymphocytes % 11/30/2024 38.1  27.0 - 41.0 % Final    Monocytes % 11/30/2024 4.7  2.0 - 6.0 % Final    Eosinophils % 11/30/2024 4.1 (H)  1.0 - 4.0 % Final    Basophils % 11/30/2024 0.7  0.0 - 1.0 % Final    Immature Granulocytes % 11/30/2024 0.4  0.0 - 0.4 % Final    nRBC, Auto 11/30/2024 0.0  <=0.0 % Final    Neutrophils, Abs 11/30/2024 5.11  1.80 - 7.70 K/uL Final    Lymphocytes, Absolute 11/30/2024 3.74  1.00 - 4.80 K/uL Final    Monocytes, Absolute 11/30/2024 0.46  0.00 - 0.80 K/uL Final    Eosinophils, Absolute 11/30/2024 0.40  0.00 - 0.50 K/uL Final    Basophils, Absolute 11/30/2024 0.07  0.00 - 0.20 K/uL Final    Immature Granulocytes, Absolute 11/30/2024 0.04  0.00 - 0.04 K/uL Final    nRBC, Absolute 11/30/2024 0.00  <=0.00 x10e3/uL Final    Diff Type 11/30/2024 Scan Smear   Final    Platelet Morphology 11/30/2024 Few Large Platelets (A)  Normal Final    Microcytosis 11/30/2024 1+   Final    Hypochromic 11/30/2024 Few   Final    Troponin I High Sensitivity 11/30/2024 <2.7  <=14.0 ng/L Final    WBC, UA 11/30/2024 4  <=5 /hpf Final    RBC, UA 11/30/2024 2  <=3 /hpf Final    Bacteria, UA 11/30/2024 Many (A)  None Seen /hpf Final    Squamous Epithelial Cells, UA 11/30/2024 Occasional (A)  None Seen /HPF Final    Mucous 11/30/2024 Occasional (A)  None Seen /LPF Final    Culture, Urine 11/30/2024 >100,000 Escherichia coli (A)   Final    Culture, Urine 11/30/2024 50,000 Streptococcus agalactiae (Group B) (A)   Final   Office Visit on 10/09/2024   Component Date Value Ref Range Status    Sodium 10/09/2024 139  136 - 145 mmol/L Final    Potassium 10/09/2024 4.1  3.5 - 5.1 mmol/L Final    Chloride 10/09/2024 107  98 - 107 mmol/L Final    CO2 10/09/2024 29  21 - 32 mmol/L Final    Anion  Gap 10/09/2024 7  7 - 16 mmol/L Final    Glucose 10/09/2024 144 (H)  74 - 106 mg/dL Final    BUN 10/09/2024 13  7 - 18 mg/dL Final    Creatinine 10/09/2024 1.00  0.55 - 1.02 mg/dL Final    BUN/Creatinine Ratio 10/09/2024 13  6 - 20 Final    Calcium 10/09/2024 9.1  8.5 - 10.1 mg/dL Final    Total Protein 10/09/2024 7.5  6.4 - 8.2 g/dL Final    Albumin 10/09/2024 3.6  3.5 - 5.0 g/dL Final    Globulin 10/09/2024 3.9  2.0 - 4.0 g/dL Final    A/G Ratio 10/09/2024 0.9   Final    Bilirubin, Total 10/09/2024 0.2  >0.0 - 1.2 mg/dL Final    Alk Phos 10/09/2024 100 (H)  37 - 98 U/L Final    ALT 10/09/2024 35  13 - 56 U/L Final    AST 10/09/2024 23  15 - 37 U/L Final    eGFR 10/09/2024 72  >=60 mL/min/1.73m2 Final    Hemoglobin A1C 10/09/2024 7.0 (H)  4.5 - 6.6 % Final    Estimated Average Glucose 10/09/2024 154  mg/dL Final    TSH 10/09/2024 11.500 (H)  0.358 - 3.740 uIU/mL Final    Free T4 10/09/2024 0.84  0.76 - 1.46 ng/dL Final    WBC 10/09/2024 8.00  4.50 - 11.00 K/uL Final    RBC 10/09/2024 5.77 (H)  4.20 - 5.40 M/uL Final    Hemoglobin 10/09/2024 13.0  12.0 - 16.0 g/dL Final    Hematocrit 10/09/2024 43.7  38.0 - 47.0 % Final    MCV 10/09/2024 75.7 (L)  80.0 - 96.0 fL Final    MCH 10/09/2024 22.5 (L)  27.0 - 31.0 pg Final    MCHC 10/09/2024 29.7 (L)  32.0 - 36.0 g/dL Final    RDW 10/09/2024 14.0  11.5 - 14.5 % Final    Platelet Count 10/09/2024 211  150 - 400 K/uL Final    MPV 10/09/2024 12.2  9.4 - 12.4 fL Final    Neutrophils % 10/09/2024 47.8 (L)  53.0 - 65.0 % Final    Lymphocytes % 10/09/2024 40.3  27.0 - 41.0 % Final    Monocytes % 10/09/2024 6.1 (H)  2.0 - 6.0 % Final    Eosinophils % 10/09/2024 4.9 (H)  1.0 - 4.0 % Final    Basophils % 10/09/2024 0.6  0.0 - 1.0 % Final    Immature Granulocytes % 10/09/2024 0.3  0.0 - 0.4 % Final    nRBC, Auto 10/09/2024 0.0  <=0.0 % Final    Neutrophils, Abs 10/09/2024 3.83  1.80 - 7.70 K/uL Final    Lymphocytes, Absolute 10/09/2024 3.22  1.00 - 4.80 K/uL Final    Monocytes,  Absolute 10/09/2024 0.49  0.00 - 0.80 K/uL Final    Eosinophils, Absolute 10/09/2024 0.39  0.00 - 0.50 K/uL Final    Basophils, Absolute 10/09/2024 0.05  0.00 - 0.20 K/uL Final    Immature Granulocytes, Absolute 10/09/2024 0.02  0.00 - 0.04 K/uL Final    nRBC, Absolute 10/09/2024 0.00  <=0.00 x10e3/uL Final    Diff Type 10/09/2024 Auto   Final   Admission on 09/15/2024, Discharged on 09/15/2024   Component Date Value Ref Range Status    QRS Duration 09/15/2024 118  ms Final    [facility]HS QTC Calculation 09/15/2024 389  ms Final    Sodium 09/15/2024 136  136 - 145 mmol/L Final    Potassium 09/15/2024 3.8  3.5 - 5.1 mmol/L Final    Chloride 09/15/2024 107  98 - 107 mmol/L Final    CO2 09/15/2024 26  21 - 32 mmol/L Final    Anion Gap 09/15/2024 7  7 - 16 mmol/L Final    Glucose 09/15/2024 205 (H)  74 - 106 mg/dL Final    BUN 09/15/2024 11  7 - 18 mg/dL Final    Creatinine 09/15/2024 1.17 (H)  0.55 - 1.02 mg/dL Final    BUN/Creatinine Ratio 09/15/2024 9  6 - 20 Final    Calcium 09/15/2024 9.5  8.5 - 10.1 mg/dL Final    Total Protein 09/15/2024 8.2  6.4 - 8.2 g/dL Final    Albumin 09/15/2024 3.8  3.5 - 5.0 g/dL Final    Globulin 09/15/2024 4.4 (H)  2.0 - 4.0 g/dL Final    A/G Ratio 09/15/2024 0.9   Final    Bilirubin, Total 09/15/2024 0.5  >0.0 - 1.2 mg/dL Final    Alk Phos 09/15/2024 95  37 - 98 U/L Final    ALT 09/15/2024 23  13 - 56 U/L Final    AST 09/15/2024 28  15 - 37 U/L Final    eGFR 09/15/2024 60  >=60 mL/min/1.73m2 Final    Troponin I High Sensitivity 09/15/2024 <4.0  <=60.4 pg/mL Final    ProBNP 09/15/2024 63  1 - 125 pg/mL Final    WBC 09/15/2024 9.95  4.50 - 11.00 K/uL Final    RBC 09/15/2024 5.94 (H)  4.20 - 5.40 M/uL Final    Hemoglobin 09/15/2024 13.6  12.0 - 16.0 g/dL Final    Hematocrit 09/15/2024 44.0  38.0 - 47.0 % Final    MCV 09/15/2024 74.1 (L)  80.0 - 96.0 fL Final    MCH 09/15/2024 22.9 (L)  27.0 - 31.0 pg Final    MCHC 09/15/2024 30.9 (L)  32.0 - 36.0 g/dL Final    RDW 09/15/2024 13.5  11.5 - 14.5 %  Final    Platelet Count 09/15/2024 198  150 - 400 K/uL Final    MPV 09/15/2024 12.2  9.4 - 12.4 fL Final    Neutrophils % 09/15/2024 41.7 (L)  53.0 - 65.0 % Final    Lymphocytes % 09/15/2024 47.1 (H)  27.0 - 41.0 % Final    Monocytes % 09/15/2024 6.2 (H)  2.0 - 6.0 % Final    Eosinophils % 09/15/2024 4.3 (H)  1.0 - 4.0 % Final    Basophils % 09/15/2024 0.5  0.0 - 1.0 % Final    Immature Granulocytes % 09/15/2024 0.2  0.0 - 0.4 % Final    nRBC, Auto 09/15/2024 0.0  <=0.0 % Final    Neutrophils, Abs 09/15/2024 4.14  1.80 - 7.70 K/uL Final    Lymphocytes, Absolute 09/15/2024 4.69  1.00 - 4.80 K/uL Final    Monocytes, Absolute 09/15/2024 0.62  0.00 - 0.80 K/uL Final    Eosinophils, Absolute 09/15/2024 0.43  0.00 - 0.50 K/uL Final    Basophils, Absolute 09/15/2024 0.05  0.00 - 0.20 K/uL Final    Immature Granulocytes, Absolute 09/15/2024 0.02  0.00 - 0.04 K/uL Final    nRBC, Absolute 09/15/2024 0.00  <=0.00 x10e3/uL Final    Diff Type 09/15/2024 Scan Smear   Final    Platelet Morphology 09/15/2024 Few Large Platelets (A)  Normal Final    Microcytosis 09/15/2024 1+   Final    Hypochromic 09/15/2024 Few   Final         Orders Placed This Encounter   Procedures    MRI Cervical Spine Without Contrast     Standing Status:   Future     Standing Expiration Date:   1/29/2026     Order Specific Question:   Does the patient have or ever had a pacemaker or a defibrillator (Note: Some facilities may not be able to schedule an MRI for patients with pacemakers and defibrillators. You should contact your local radiology dept to determine if this is the case.)?     Answer:   No     Order Specific Question:   Does the patient have an aneurysm or surgical clip, pump, nerve/brain stimulator, middle/inner ear prosthesis, or other metal implant or foreign object (bullet, shrapnel)? If they have a card related to their implant, ask them to bring it. Issues related to the implant may cause the MRI to be delayed.     Answer:   No     Order  Specific Question:   Is the patient claustrophobic?     Answer:   No     Order Specific Question:   Will the patient require po anxiolysis or sedation?     Answer:   No     Order Specific Question:   Does the patient have any of the following conditions? Diabetes, History of Renal Disease or Hypertension requiring medical therapy?     Answer:   No     Order Specific Question:   Is the patient pregnant?     Answer:   No     Order Specific Question:   May the Radiologist modify the order per protocol to meet the clinical needs of the patient?     Answer:   Yes     Order Specific Question:   Is this part of a Research Study?     Answer:   No     Order Specific Question:   Recist criteria?     Answer:   No     Order Specific Question:   Will this service be billed to a Worker's Comp policy?     Answer:   No     Order Specific Question:   Does the patient have on a skin patch for medication with aluminized backing?     Answer:   No         Requested Prescriptions      No prescriptions requested or ordered in this encounter       Assessment:     1. Radiculopathy, cervical region    2. Lumbar spondylosis           X-ray cervical spine [facility] January 27, 2025  No acute fracture, subluxation or dislocation.  Neck is minimally flexed on lateral view.  The odontoid process appears normally positioned.  Disc spaces appear adequately maintained.  Soft tissues appear within normal limits.  Mild degenerative changes.  Impression:  No acute radiographic abnormality.    X-ray right shoulder [facility] January 27, 2025  FINDINGS:  The AC joint is intact.  The humeral head is normally positioned.  Right hemithorax is clear.  No osseous destruction.  No acute fracture, subluxation or dislocation.  Impression:  No acute radiographic abnormality.    MRI lumbar spine [facility] August 2022, multiple level degenerative changes mild neuroforaminal stenosis L5/S1    X-ray lumbar spine July 20, 2022 degenerative changes    X-ray  sacroiliac joint July 20, 2022 degenerative changes no fracture noted        Plan:      Not using narcotics from our office  Daily benzodiazepine use    Was evaluated emergency room January 27, 2025 cervical radiculopathy    Complaint right arm pain numbness and tingling radicular in nature worse with increased activity better with short periods resting or changing positions     She states her chronic joint back pain control conservative    Denies loss of bowel or bladder function    Neurologically intact    Requesting further evaluation for cervical radiculopathy she states emergency room physician recommended MRI cervical spine for cervical radiculopathy    Order MRI cervical spine no contrast cervical radiculopathy  MRI for consideration procedure/surgery    I have given the patient medically directed home exercise program    Patient has tried NSAIDs and neuromodulators (neurontin, lyrica, elavil, or cymbalta)    Follow-up after MRI cervical spine discuss options    Dr. Jimenez, July 2023

## 2025-01-29 ENCOUNTER — OFFICE VISIT (OUTPATIENT)
Dept: PAIN MEDICINE | Facility: CLINIC | Age: 45
End: 2025-01-29
Payer: COMMERCIAL

## 2025-01-29 VITALS
BODY MASS INDEX: 47.29 KG/M2 | HEIGHT: 62 IN | HEART RATE: 65 BPM | RESPIRATION RATE: 16 BRPM | SYSTOLIC BLOOD PRESSURE: 164 MMHG | DIASTOLIC BLOOD PRESSURE: 105 MMHG | WEIGHT: 257 LBS

## 2025-01-29 DIAGNOSIS — M54.12 RADICULOPATHY, CERVICAL REGION: Primary | ICD-10-CM

## 2025-01-29 DIAGNOSIS — M47.816 LUMBAR SPONDYLOSIS: Chronic | ICD-10-CM

## 2025-01-29 PROCEDURE — 3080F DIAST BP >= 90 MM HG: CPT | Mod: CPTII,,, | Performed by: PHYSICIAN ASSISTANT

## 2025-01-29 PROCEDURE — 1159F MED LIST DOCD IN RCRD: CPT | Mod: CPTII,,, | Performed by: PHYSICIAN ASSISTANT

## 2025-01-29 PROCEDURE — 99215 OFFICE O/P EST HI 40 MIN: CPT | Mod: PBBFAC | Performed by: PHYSICIAN ASSISTANT

## 2025-01-29 PROCEDURE — 99999 PR PBB SHADOW E&M-EST. PATIENT-LVL V: CPT | Mod: PBBFAC,,, | Performed by: PHYSICIAN ASSISTANT

## 2025-01-29 PROCEDURE — 99213 OFFICE O/P EST LOW 20 MIN: CPT | Mod: S$PBB,,, | Performed by: PHYSICIAN ASSISTANT

## 2025-01-29 PROCEDURE — 3008F BODY MASS INDEX DOCD: CPT | Mod: CPTII,,, | Performed by: PHYSICIAN ASSISTANT

## 2025-01-29 PROCEDURE — 3077F SYST BP >= 140 MM HG: CPT | Mod: CPTII,,, | Performed by: PHYSICIAN ASSISTANT

## 2025-01-30 ENCOUNTER — TELEPHONE (OUTPATIENT)
Dept: PAIN MEDICINE | Facility: CLINIC | Age: 45
End: 2025-01-30
Payer: COMMERCIAL

## 2025-01-30 ENCOUNTER — OFFICE VISIT (OUTPATIENT)
Dept: FAMILY MEDICINE | Facility: CLINIC | Age: 45
End: 2025-01-30
Payer: COMMERCIAL

## 2025-01-30 VITALS
SYSTOLIC BLOOD PRESSURE: 175 MMHG | RESPIRATION RATE: 18 BRPM | HEIGHT: 62 IN | HEART RATE: 60 BPM | WEIGHT: 254 LBS | DIASTOLIC BLOOD PRESSURE: 84 MMHG | TEMPERATURE: 98 F | OXYGEN SATURATION: 99 % | BODY MASS INDEX: 46.74 KG/M2

## 2025-01-30 DIAGNOSIS — M54.2 NECK PAIN: Primary | ICD-10-CM

## 2025-01-30 DIAGNOSIS — Z79.899 HIGH RISK MEDICATION USE: ICD-10-CM

## 2025-01-30 LAB

## 2025-01-30 PROCEDURE — G0482 DRUG TEST DEF 15-21 CLASSES: HCPCS | Mod: 90,,, | Performed by: CLINICAL MEDICAL LABORATORY

## 2025-01-30 PROCEDURE — 1159F MED LIST DOCD IN RCRD: CPT | Mod: CPTII,,, | Performed by: FAMILY MEDICINE

## 2025-01-30 PROCEDURE — 80305 DRUG TEST PRSMV DIR OPT OBS: CPT | Mod: QW,,, | Performed by: FAMILY MEDICINE

## 2025-01-30 PROCEDURE — 3079F DIAST BP 80-89 MM HG: CPT | Mod: CPTII,,, | Performed by: FAMILY MEDICINE

## 2025-01-30 PROCEDURE — 99213 OFFICE O/P EST LOW 20 MIN: CPT | Mod: 25,,, | Performed by: FAMILY MEDICINE

## 2025-01-30 PROCEDURE — 96372 THER/PROPH/DIAG INJ SC/IM: CPT | Mod: ,,, | Performed by: FAMILY MEDICINE

## 2025-01-30 PROCEDURE — 3008F BODY MASS INDEX DOCD: CPT | Mod: CPTII,,, | Performed by: FAMILY MEDICINE

## 2025-01-30 PROCEDURE — 1160F RVW MEDS BY RX/DR IN RCRD: CPT | Mod: CPTII,,, | Performed by: FAMILY MEDICINE

## 2025-01-30 PROCEDURE — 3077F SYST BP >= 140 MM HG: CPT | Mod: CPTII,,, | Performed by: FAMILY MEDICINE

## 2025-01-30 PROCEDURE — 80307 DRUG TEST PRSMV CHEM ANLYZR: CPT | Mod: 90,,, | Performed by: CLINICAL MEDICAL LABORATORY

## 2025-01-30 RX ORDER — KETOROLAC TROMETHAMINE 30 MG/ML
30 INJECTION, SOLUTION INTRAMUSCULAR; INTRAVENOUS
Status: DISCONTINUED | OUTPATIENT
Start: 2025-01-30 | End: 2025-01-30

## 2025-01-30 RX ORDER — BACLOFEN 10 MG/1
10 TABLET ORAL NIGHTLY PRN
Qty: 10 TABLET | Refills: 0 | Status: SHIPPED | OUTPATIENT
Start: 2025-01-30 | End: 2025-02-13 | Stop reason: SDUPTHER

## 2025-01-30 RX ORDER — KETOROLAC TROMETHAMINE 30 MG/ML
30 INJECTION, SOLUTION INTRAMUSCULAR; INTRAVENOUS
Status: COMPLETED | OUTPATIENT
Start: 2025-01-30 | End: 2025-01-30

## 2025-01-30 RX ORDER — MELOXICAM 7.5 MG/1
TABLET ORAL
Qty: 14 TABLET | Refills: 0 | Status: SHIPPED | OUTPATIENT
Start: 2025-01-30 | End: 2025-02-13 | Stop reason: SDUPTHER

## 2025-01-30 RX ADMIN — KETOROLAC TROMETHAMINE 30 MG: 30 INJECTION, SOLUTION INTRAMUSCULAR; INTRAVENOUS at 04:01

## 2025-01-30 NOTE — TELEPHONE ENCOUNTER
Dr. Clayton's office contacted about treatment plan, pt is in their office complaining of severe pain. Informed their nurse that DBrandyn Shows PA has left for day. Informed them that we do not prescribe narcotics due to daily benzos. Explained their office could give soemthing short acting if they chose to do so. Nurse voiced understanding.         1651 Dr. Clayton's office called back and stated they could do a drug screen on patient now in clinic. Informed their office I would see about getting her an appt sooner than 2/19. Will address this more tomorrow when our person who has access to override schedule is here.

## 2025-01-30 NOTE — PROGRESS NOTES
Mary Magana is a 44 y.o. female seen today for severe neck pain radiating to her right shoulder.  She has been in the emergency room and started on Zanaflex with no improvement on her symptoms.  Also steroids have not relieved her symptoms.  She was seen by the pain clinic and an MRI was ordered recently but is still pending.  We discussed a Toradol injection and baclofen at night until she can follow-up with pain management.  We also discussed meloxicam b.i.d. with food and water for the next week or so.  She will start the meloxicam tomorrow after the Toradol injection has cleared her system.    Past Medical History:   Diagnosis Date    Diabetes mellitus     Hypertension     Thyroid disease     WPW (Ilana-Parkinson-White syndrome)      Family History   Problem Relation Name Age of Onset    Hypertension Mother      Diabetes Mother      Diabetes Father      Diabetes Sister      Leukemia Daughter       Current Outpatient Medications on File Prior to Visit   Medication Sig Dispense Refill    aspirin (ECOTRIN) 81 MG EC tablet TAKE 1 TABLET BY MOUTH DAILY WITH FOOD 30 tablet 5    atorvastatin (LIPITOR) 10 MG tablet Take 1 tablet (10 mg total) by mouth every evening. 90 tablet 1    buPROPion (WELLBUTRIN XL) 150 MG TB24 tablet Take 150 mg by mouth every morning.      clonazePAM (KLONOPIN) 1 MG tablet Take 1 mg by mouth nightly as needed for Anxiety.      diclofenac (VOLTAREN) 50 MG EC tablet Take 1 tablet (50 mg total) by mouth 3 (three) times daily. P.r.n. pain 30 tablet 0    ferrous sulfate (IRON) 325 mg (65 mg iron) Tab tablet Take 1 tablet (325 mg total) by mouth daily with breakfast. 30 tablet 5    glimepiride (AMARYL) 1 MG tablet Take 1 tablet (1 mg total) by mouth every morning. 90 tablet 1    guaiFENesin-codeine 100-10 mg/5 ml (TUSSI-ORGANIDIN NR)  mg/5 mL syrup Take 10 mLs by mouth every 8 (eight) hours as needed for Cough. 118 mL 0    hydrOXYzine pamoate (VISTARIL) 25 MG Cap Take 1 to 2 capsule  up to tid prn for severe anxiety 30 capsule 2    levothyroxine (SYNTHROID) 150 MCG tablet Take 1 tablet (150 mcg total) by mouth before breakfast. 30 tablet 1    metFORMIN (GLUCOPHAGE) 1000 MG tablet Take 1 tablet (1,000 mg total) by mouth 2 (two) times daily with meals. 180 tablet 1    metoprolol tartrate (LOPRESSOR) 25 MG tablet Take 1 tablet (25 mg total) by mouth 2 (two) times daily. 60 tablet 5    predniSONE (DELTASONE) 20 MG tablet 3 PO DAILY X 3 DAYS, THEN 2 PO DAILY X 3 DAYS, THEN 1 PO DAILY X 3 DAYS, THEN STOP. 18 tablet 0    TRUEPLUS LANCETS 30 gauge Misc AS DIRECTED      [DISCONTINUED] tiZANidine (ZANAFLEX) 4 MG tablet Take 1 tablet (4 mg total) by mouth every 6 (six) hours as needed (P.r.n. neck pain). 30 tablet 0    famotidine (PEPCID) 40 MG tablet Take 1 tablet (40 mg total) by mouth once daily. 30 tablet 4    omeprazole (PRILOSEC) 40 MG capsule Take 1 capsule (40 mg total) by mouth once daily. 30 capsule 2     Current Facility-Administered Medications on File Prior to Visit   Medication Dose Route Frequency Provider Last Rate Last Admin    influenza (Egg-FREE) (Flucelvax) 45 mcg/0.5 mL IM vaccine (> or = 6 mo) (*contains preservatives) 0.5 mL  0.5 mL Intramuscular 1 time in Clinic/HOD Huber Clayton MD         Immunization History   Administered Date(s) Administered    Influenza - Trivalent - Flucelvax - PF 10/09/2024       Review of Systems   Constitutional:  Positive for malaise/fatigue.   Musculoskeletal:  Positive for myalgias and neck pain.   Psychiatric/Behavioral:  The patient is nervous/anxious and has insomnia.         Vitals:    01/30/25 1533   BP: (!) 175/84   Pulse: 60   Resp: 18   Temp: 98 °F (36.7 °C)       Physical Exam     Assessment and Plan  1. Neck pain  -     Discontinue: ketorolac injection 30 mg  -     baclofen (LIORESAL) 10 MG tablet; Take 1 tablet (10 mg total) by mouth nightly as needed (spasm).  Dispense: 10 tablet; Refill: 0  -     meloxicam (MOBIC) 7.5 MG tablet; Take  1 po bid with plenty of food and water  Dispense: 14 tablet; Refill: 0  -     ketorolac injection 30 mg             Return to clinic in 2 weeks for follow-up on her blood pressure in his soon as possible with pain management.    Health Maintenance Topics with due status: Not Due       Topic Last Completion Date    Cervical Cancer Screening 11/28/2022    Diabetes Urine Screening 04/02/2024    Mammogram 12/11/2024    Lipid Panel 12/30/2024    Hemoglobin A1c 12/30/2024    Low Dose Statin 01/27/2025    RSV Vaccine (Age 60+ and Pregnant patients) Not Due

## 2025-01-31 LAB
OHS QRS DURATION: 110 MS
OHS QTC CALCULATION: 427 MS

## 2025-02-04 ENCOUNTER — TELEPHONE (OUTPATIENT)
Dept: FAMILY MEDICINE | Facility: CLINIC | Age: 45
End: 2025-02-04
Payer: COMMERCIAL

## 2025-02-04 NOTE — TELEPHONE ENCOUNTER
"----- Message from Tran sent at 2/3/2025 11:19 AM CST -----  Regarding: Pt Call Back  Pt called for a call back as soon as possible from nurse. Pt only mention asking what the "pain center was talking about." Pt phone :# [phone]  "

## 2025-02-05 ENCOUNTER — TELEPHONE (OUTPATIENT)
Dept: FAMILY MEDICINE | Facility: CLINIC | Age: 45
End: 2025-02-05
Payer: COMMERCIAL

## 2025-02-05 LAB
1OH-MIDAZOLAM UR QL SCN: NOT DETECTED NG/ML
2-OH-ETHYLFLURAZ UR QL SCN: NOT DETECTED NG/ML
6MAM UR QL SCN: NOT DETECTED NG/ML
7AMINOCLONAZEPAM UR QL SCN: NOT DETECTED NG/ML
7AMINOFLUNITRAZEPAM UR QL SCN: NOT DETECTED NG/ML
A-OH ALPRAZ GLUC UR QL SCN: NOT DETECTED NG/ML
A-OH ALPRAZ UR QL SCN: NOT DETECTED NG/ML
A-OH-TRIAZOLAM UR QL SCN: NOT DETECTED NG/ML
ALPRAZ UR QL SCN: NOT DETECTED NG/ML
AMPHET UR QL SCN: NOT DETECTED NG/ML
ANNOTATION COMMENT IMP: NORMAL
BARBITURATES UR QL SCN>200 NG/ML: NEGATIVE NG/ML
BUPRENORPHINE UR QL SCN: NOT DETECTED NG/ML
C6G UR QL SCN: NOT DETECTED NG/ML
CHLORDIAZEP UR QL SCN: NOT DETECTED NG/ML
CLOBAZAM UR QL SCN: NOT DETECTED NG/ML
CLONAZEPAM UR QL SCN: NOT DETECTED NG/ML
COCAINE UR QL SCN: NEGATIVE NG/ML
CODEINE UR QL SCN: NOT DETECTED NG/ML
CREAT UR-MCNC: 105 MG/DL
DHC UR QL SCN: NOT DETECTED NG/ML
DIAZEPAM UR QL SCN: NOT DETECTED NG/ML
DRUG SCREEN COMMENT UR-IMP: NORMAL
EDDP UR QL SCN: NOT DETECTED NG/ML
EPHEDRIN UR QL SCN: NOT DETECTED NG/ML
FENTANYL UR QL SCN: NOT DETECTED NG/ML
FLUNITRAZEPAM UR QL SCN: NOT DETECTED NG/ML
FLURAZEPAM UR QL SCN: NOT DETECTED NG/ML
H3G UR QL SCN: NOT DETECTED NG/ML
HYDROCODONE UR QL SCN: NOT DETECTED NG/ML
HYDROMORPHONE UR QL SCN: NOT DETECTED NG/ML
LORAZEPAM GLUCURONIDE UR QL SCN: NOT DETECTED NG/ML
LORAZEPAM UR QL SCN: NOT DETECTED NG/ML
MDA UR QL SCN: NOT DETECTED NG/ML
MDEA UR QL SCN: NOT DETECTED NG/ML
MDMA UR QL SCN: NOT DETECTED NG/ML
ME-PHENIDATE UR QL SCN: NOT DETECTED NG/ML
MEDICATIONS MEDICATION.CURRENT: NORMAL
MEPERIDINE UR QL SCN: NOT DETECTED NG/ML
METHADONE UR QL SCN: NOT DETECTED NG/ML
METHAMPHET UR QL SCN: NOT DETECTED NG/ML
MIDAZOLAM UR QL SCN: NOT DETECTED NG/ML
MORPHINE UR QL SCN: NOT DETECTED NG/ML
MORPHINE-6-GLUCURONIDE UR QL SCN: NOT DETECTED NG/ML
N3G UR QL SCN: NOT DETECTED NG/ML
NALOXONE UR QL SCN: NOT DETECTED NG/ML
NALOXONE-3G UR QL SCN: NOT DETECTED NG/ML
NORBUPRENORPHINE UR QL SCN: NOT DETECTED NG/ML
NORCLOBAZAM UR QL SCN: NOT DETECTED NG/ML
NORDIAZEPAM UR QL SCN: NOT DETECTED NG/ML
NORFENTANYL UR QL: NOT DETECTED NG/ML
NORHYDROCODONE UR QL SCN: NOT DETECTED NG/ML
NORMEPERIDINE UR QL: NOT DETECTED NG/ML
NOROXYCODONE UR QL SCN: NOT DETECTED NG/ML
NOROXYMORPHONE UR QL SCN: NOT DETECTED NG/ML
NORPROPOXYPH UR QL SCN: NOT DETECTED NG/ML
NORTAPENTADOL UR QL SCN: NOT DETECTED NG/ML
O-NORTRAMADOL UR QL: NOT DETECTED NG/ML
O3G UR QL SCN: NOT DETECTED NG/ML
OXAZEPAM GLUCURONIDE UR QL SCN: NOT DETECTED NG/ML
OXAZEPAM UR QL SCN: NOT DETECTED NG/ML
OXIDANTS UR QL: NEGATIVE
OXYCODONE UR QL SCN: NOT DETECTED NG/ML
OXYMORPHONE UR QL SCN: NOT DETECTED NG/ML
PCP UR QL SCN: NOT DETECTED NG/ML
PH UR: 6.5 [PH]
PHENTERMINE UR QL SCN: NOT DETECTED NG/ML
PPAA UR QL SCN: NOT DETECTED NG/ML
PRAZEPAM UR QL SCN: NOT DETECTED NG/ML
PROPOXYPH UR QL SCN: NOT DETECTED NG/ML
PSEUDOEPHEDRINE UR QL SCN: NOT DETECTED NG/ML
SP GR UR REFRACTOMETRY: 1.01
TAPEN GLUC UR QL SCN: NOT DETECTED NG/ML
TAPENTADOL UR QL SCN: NOT DETECTED NG/ML
TEMAZEPAM GLUCURONIDE UR QL SCN: NOT DETECTED NG/ML
TEMAZEPAM UR QL SCN: NOT DETECTED NG/ML
THC UR QL SCN: NEGATIVE NG/ML
TOXICOLOGIST REVIEW: NORMAL
TOXICOLOGIST REVIEW: NORMAL
TRAMADOL UR QL SCN: NOT DETECTED NG/ML
TRIAZOLAM UR QL SCN: NOT DETECTED NG/ML
ZOLPIDEM PHENYL-4-CARB UR QL SCN: NOT DETECTED NG/ML
ZOLPIDEM UR QL SCN: NOT DETECTED NG/ML

## 2025-02-05 NOTE — TELEPHONE ENCOUNTER
Patient contacted, she reports pain tx scheduled her a follow up but she was not able to get the MRI. Voiced understanding and encouraged her to keep the follow up with pain tx, she voiced understanding.

## 2025-02-05 NOTE — TELEPHONE ENCOUNTER
----- Message from Chuy Jackson sent at 2/5/2025 11:53 AM CST -----  Please give pt a call at .  Pt says she missed a call from our office.

## 2025-02-12 ENCOUNTER — OFFICE VISIT (OUTPATIENT)
Dept: OBSTETRICS AND GYNECOLOGY | Facility: CLINIC | Age: 45
End: 2025-02-12
Payer: COMMERCIAL

## 2025-02-12 VITALS
SYSTOLIC BLOOD PRESSURE: 136 MMHG | HEART RATE: 103 BPM | OXYGEN SATURATION: 100 % | BODY MASS INDEX: 45.18 KG/M2 | DIASTOLIC BLOOD PRESSURE: 89 MMHG | WEIGHT: 247 LBS

## 2025-02-12 DIAGNOSIS — N93.9 ABNORMAL UTERINE BLEEDING: Primary | ICD-10-CM

## 2025-02-12 DIAGNOSIS — Z12.4 CERVICAL CANCER SCREENING: ICD-10-CM

## 2025-02-12 PROCEDURE — 3008F BODY MASS INDEX DOCD: CPT | Mod: CPTII,,, | Performed by: STUDENT IN AN ORGANIZED HEALTH CARE EDUCATION/TRAINING PROGRAM

## 2025-02-12 PROCEDURE — 88142 CYTOPATH C/V THIN LAYER: CPT | Mod: TC,GCY | Performed by: STUDENT IN AN ORGANIZED HEALTH CARE EDUCATION/TRAINING PROGRAM

## 2025-02-12 PROCEDURE — 99999 PR PBB SHADOW E&M-EST. PATIENT-LVL IV: CPT | Mod: PBBFAC,,, | Performed by: STUDENT IN AN ORGANIZED HEALTH CARE EDUCATION/TRAINING PROGRAM

## 2025-02-12 PROCEDURE — 99214 OFFICE O/P EST MOD 30 MIN: CPT | Mod: PBBFAC | Performed by: STUDENT IN AN ORGANIZED HEALTH CARE EDUCATION/TRAINING PROGRAM

## 2025-02-12 PROCEDURE — 99213 OFFICE O/P EST LOW 20 MIN: CPT | Mod: S$PBB,,, | Performed by: STUDENT IN AN ORGANIZED HEALTH CARE EDUCATION/TRAINING PROGRAM

## 2025-02-12 PROCEDURE — 3075F SYST BP GE 130 - 139MM HG: CPT | Mod: CPTII,,, | Performed by: STUDENT IN AN ORGANIZED HEALTH CARE EDUCATION/TRAINING PROGRAM

## 2025-02-12 PROCEDURE — 87624 HPV HI-RISK TYP POOLED RSLT: CPT | Mod: ,,, | Performed by: CLINICAL MEDICAL LABORATORY

## 2025-02-12 PROCEDURE — 3079F DIAST BP 80-89 MM HG: CPT | Mod: CPTII,,, | Performed by: STUDENT IN AN ORGANIZED HEALTH CARE EDUCATION/TRAINING PROGRAM

## 2025-02-12 NOTE — PROGRESS NOTES
Return Gyn Office Visit    Assessment/Plan  Problem List Items Addressed This Visit    None  Visit Diagnoses       Abnormal uterine bleeding    -  Primary    Relevant Orders    US Pelvis Comp with Transvag NON-OB (xpd              CC:   Chief Complaint   Patient presents with    Gynecologic Exam     Pt stated she bled from 12/5 to 12/19. It was heavy for about 9 or 10 days. She also states last Saturday when she wiped it hurt and she felt something but she does not feel it now.     HPI:  44 y.o. who presents to office for ***    Review of Systems - The following ROS was otherwise negative, except as noted in the HPI:  constitutional, respiratory, cardiovascular, gastrointestinal, genitourinary    Objective:  /89 (BP Location: Right arm, Patient Position: Sitting)   Pulse 103   Wt 112 kg (247 lb)   LMP 12/05/2024 (Approximate)   SpO2 100%   BMI 45.18 kg/m²   General: Alert, well appearing, no acute distress  Abdomen: Soft, nontender, nondistended   Pelvic: *** External genitalia without masses or lesions.  Moist, pink vagina with physiologic discharge.  Cervix without polyps or masses.  Uterus mobile and midline without masses.  Adnexa palpated bilaterally without masses or tenderness.  Bimanual examination without masses or tenderness.  Exam chaperoned by female assistant  Extremities: No redness or tenderness, neg Alma's sign  Osteopathic: no TART changes

## 2025-02-13 ENCOUNTER — OFFICE VISIT (OUTPATIENT)
Dept: FAMILY MEDICINE | Facility: CLINIC | Age: 45
End: 2025-02-13
Payer: COMMERCIAL

## 2025-02-13 ENCOUNTER — PATIENT OUTREACH (OUTPATIENT)
Facility: HOSPITAL | Age: 45
End: 2025-02-13
Payer: COMMERCIAL

## 2025-02-13 VITALS
HEART RATE: 93 BPM | TEMPERATURE: 99 F | BODY MASS INDEX: 37.71 KG/M2 | RESPIRATION RATE: 18 BRPM | DIASTOLIC BLOOD PRESSURE: 96 MMHG | WEIGHT: 248.81 LBS | SYSTOLIC BLOOD PRESSURE: 144 MMHG | HEIGHT: 68 IN | OXYGEN SATURATION: 96 %

## 2025-02-13 DIAGNOSIS — Z28.21 REFUSED DIPHTHERIA-TETANUS VACCINE: ICD-10-CM

## 2025-02-13 DIAGNOSIS — E78.5 HYPERLIPIDEMIA, UNSPECIFIED HYPERLIPIDEMIA TYPE: ICD-10-CM

## 2025-02-13 DIAGNOSIS — E11.9 TYPE 2 DIABETES MELLITUS WITHOUT COMPLICATION, WITHOUT LONG-TERM CURRENT USE OF INSULIN: Primary | ICD-10-CM

## 2025-02-13 DIAGNOSIS — M54.2 NECK PAIN: ICD-10-CM

## 2025-02-13 DIAGNOSIS — Z28.21 COVID-19 VACCINATION REFUSED: ICD-10-CM

## 2025-02-13 DIAGNOSIS — E03.9 HYPOTHYROIDISM, UNSPECIFIED TYPE: ICD-10-CM

## 2025-02-13 PROCEDURE — 1159F MED LIST DOCD IN RCRD: CPT | Mod: CPTII,,, | Performed by: FAMILY MEDICINE

## 2025-02-13 PROCEDURE — 1160F RVW MEDS BY RX/DR IN RCRD: CPT | Mod: CPTII,,, | Performed by: FAMILY MEDICINE

## 2025-02-13 PROCEDURE — 99214 OFFICE O/P EST MOD 30 MIN: CPT | Mod: ,,, | Performed by: FAMILY MEDICINE

## 2025-02-13 PROCEDURE — 3077F SYST BP >= 140 MM HG: CPT | Mod: CPTII,,, | Performed by: FAMILY MEDICINE

## 2025-02-13 PROCEDURE — 3080F DIAST BP >= 90 MM HG: CPT | Mod: CPTII,,, | Performed by: FAMILY MEDICINE

## 2025-02-13 PROCEDURE — 3008F BODY MASS INDEX DOCD: CPT | Mod: CPTII,,, | Performed by: FAMILY MEDICINE

## 2025-02-13 RX ORDER — LEVOTHYROXINE SODIUM 175 UG/1
175 TABLET ORAL
Qty: 30 TABLET | Refills: 11 | Status: SHIPPED | OUTPATIENT
Start: 2025-02-13 | End: 2026-02-13

## 2025-02-13 RX ORDER — MELOXICAM 7.5 MG/1
TABLET ORAL
Qty: 60 TABLET | Refills: 0 | Status: SHIPPED | OUTPATIENT
Start: 2025-02-13

## 2025-02-13 RX ORDER — BACLOFEN 10 MG/1
10 TABLET ORAL NIGHTLY PRN
Qty: 30 TABLET | Refills: 0 | Status: SHIPPED | OUTPATIENT
Start: 2025-02-13 | End: 2026-02-13

## 2025-02-13 NOTE — PROGRESS NOTES
Population Health Chart Review & Patient Outreach Details    Updates Requested / Reviewed:  [x]  Care Team Updated      Health Maintenance Topics Addressed and Outreach Outcomes / Actions Taken:  Blood Pressure Control [x] TEAGAN sent to [facility]

## 2025-02-13 NOTE — LETTER
AUTHORIZATION FOR RELEASE OF   CONFIDENTIAL INFORMATION    Dear [facility],    We are seeing Mary Magana, date of birth 1980, in the clinic at [facility]. Huber Clayton MD is the patient's PCP. Mary Magana has an outstanding lab/procedure at the time we reviewed her chart. In order to help keep her health information updated, she has authorized us to request the following medical record(s):        (  )  MAMMOGRAM                                      (  )  COLONOSCOPY      (  )  PAP SMEAR                                          (  )  OUTSIDE LAB RESULTS     (  )  DEXA SCAN                                          ( X )  EYE EXAM            (  )  FOOT EXAM                                          (  )  ENTIRE RECORD     (  )  OUTSIDE IMMUNIZATIONS                 (  )  _______________         Please fax records to Nataliia Mayes LPN Care Coordinator at [phone].       If you have any questions, please call [phone]          Patient Name: Mary Magana  : 1980  Patient Phone #: [phone]              Mary Magana  MRN: [id number]  : 1980  Age: 43 y.o.  Sex: female         Patient/Legal Guardian Signature  This signature was collected at 2024    Self       _______________________________   Printed Name/Relationship to Patient      Consent for Examination and Treatment: I hereby authorize the providers and employees of [facility] ([facility]) to provide medical treatment/services which includes, but is not limited to, performing and administering tests and diagnostic procedures that are deemed necessary, including, but not limited to, imaging examinations, blood tests and other laboratory procedures as may be required by the hospital, clinic, or may be ordered by my physician(s) or persons working under the general and/or special instructions of my physician(s).      I understand and agree that this  consent covers all authorized persons, including but not limited to physicians, residents, nurse practitioners, physicians' assistants, specialists, consultants, student nurses, and independently contracted physicians, who are called upon by the physician in charge, to carry out the diagnostic procedures and medical or surgical treatment.     I hereby authorize [facility] to retain or dispose of any specimens or tissue, should there be such remaining from any test or procedure.     I hereby authorize and give consent for [facility] providers and employees to take photographs, images or videotapes of such diagnostic, surgical or treatment procedures of Patient as may be required by [facility] or as may be ordered by a physician. I further acknowledge and agree that [facility] may use cameras or other devices for patient monitoring.     I am aware that the practice of medicine is not an exact science, and I acknowledge that no guarantees have been made to me as to the outcome of any tests, procedures or treatment.     Authorization for Release of Information: I understand that my insurance company and/or their agents may need information necessary to make determinations about payment/reimbursement. I hereby provide authorization to release to all insurance companies, their successors, assignees, other parties with whom they may have contracted, or others acting on their behalf, that are involved with payment for any hospital and/or clinic charges incurred by the patient, any information that they request and deem necessary for payment/reimbursement, and/or quality review.  I further authorize the release of my health information to physicians or other health care practitioners on staff who are involved in my health care now and in the future, and to other health care providers, entities, or institutions for the purpose of my continued care and treatment, including referrals.     REGISTRATION AUTHORIZATION  Form No. 61152 (Rev.  3/25/2024)    Page 1 of 3                       Medicare Patient's Certification and Authorization to Release Information and Payment Request:  I certify that the information given by me in applying for payment under Title XVIII of the Social Security Act is correct. I authorize any irvin of medical or other information about me to release to the Social SecurityAdministration, or its intermediaries or carriers, any information needed for this or a related Medicare claim. I request that payment of authorized benefits be made on my behalf.     Assignment of Insurance Benefits:   I hereby authorize any and all insurance companies, health plans, defined   benefit plans, health insurers or any entity that is or may be responsible for payment of my medical expenses to pay all hospital and medical benefits now due, and to become due and payable to me under any hospital benefits, sick benefits, injury benefits or any other benefit for services rendered to me, including Major Medical Benefits, direct to [facility] and all independently contracted physicians. I assign any and all rights that I may have against any and all insurance companies, health plans, defined benefit plans, health insurers or any entity that is or may be responsible for payment of my medical expenses, including, but not limited to any right to appeal a denial of a claim, any right to bring any action, lawsuit, administrative proceeding, or other cause of action on my behalf. I specifically assign my right to pursue litigation against any and all insurance companies, health plans, defined benefit plans, health insurers or any entity that is or may be responsible for payment of my medical expenses based upon a refusal to pay charges.            E. Valuables: It is understood and agreed that [facility] is not liable for the damage to or loss of any money, jewelry,   documents, dentures, eye glasses, hearing aids, prosthetics, or other property of value.     F.  Computer Equipment: I understand and agree that should I choose to use computer equipment owned by [facility] or if I choose to access the Internet via [facility]s network, I do so at my own risk. [facility] is not responsible for any damage to my computer equipment or to any damages of any type that might arise from my loss of equipment or data.     G. Acceptance of Financial Responsibility:  I agree that in consideration of the services and   supplies that have been   or will be furnished to the patient, I am hereby obligated to pay all charges made for or on the account of the patient according to the standard rates (in effect at the time the services and supplies are delivered) established by [facility], including its Patient Financial Assistance Policy to the extent it is applicable. I understand that I am responsible for all charges, or portions thereof, not covered by insurance or other sources. Patient refunds will be distributed only after balances at all [facility] facilities are paid.     H. Communication Authorization:  I hereby authorize [facility] and its representatives, along with any billing service   or  who may work on their behalf, to contact me on   my cell phone and/or home phone using pre- recorded messages, artificial voice messages, automatic telephone dialing devices or other computer assisted technology, or by electronic      mail, text messaging, or by any other form of electronic communication. This includes, but is not limited to, appointment reminders, yearly physical exam reminders, preventive care reminders, patient campaigns, welcome calls, and calls about account balances on my account or any account on which I am listed as a guarantor. I understand I have the right to opt out of these communications at any time.      Relationship  Between  Facility and  Provider:      I understand that some, but not all, providers furnishing services to the patient are not employees or agents of  [facility]. The patient is under the care and supervision of his/her attending physician, and it is the responsibility of the facility and its nursing staff to carry out the instructions of such physicians. It is the responsibility of the patient's physician/designee to obtain the patient's informed consent, when required, for medical or surgical treatment, special diagnostic or therapeutic procedures, or hospital services rendered for the patient under the special instructions of the physician/designee.           REGISTRATION AUTHORIZATION  Form No. 36872 (Rev. 3/25/2024)    Page 2 of 3                       Immunizations: [facility] shares immunization information with state sponsored health departments to help you and your doctor keep track of your immunization records. By signing, you consent to have this information shared with the health department in your state:                                [place] - LINKS ([place] Immunization Network for Kids Statewide)                                [place] - MIIX ([place] Immunization Information eXchange)                                [place] - ImmPRINT (Immunization Patient Registry with Integrated Technology)     TERM: This authorization is valid for this and subsequent care/treatment I receive at [facility] and will remain valid unless/until revoked in writing by me.     [facility]: As used in this document, [facility] means all [facility] owned and managed facilities, including, but not limited to, all health centers, surgery centers, clinics, urgent care centers, and hospitals.         [facility] complies with applicable Federal civil rights laws and does not discriminate on the basis of race, color, national origin, age, disability, or sex.  ATENCIÓN: si habla hectorañol, tiene a beckman disposición servicios gratuitos de asistencia lingüística. Bhavana al [phone].  CHÚ Ý: N?u b?n nói Ti?ng Vi?t, có các d?ch v? h? tr? ngôn ng? mi?n phí  dành cho b?n. G?i s? [phone].        REGISTRATION AUTHORIZATION  Form No. 51938 (Rev. 3/25/2024)   Page 3 of 3

## 2025-02-13 NOTE — PROGRESS NOTES
Mary Magana is a 44 y.o. female seen today for follow-up on her severe neck pain.  She was seen by pain management and an MRI has been ordered.  She reports the meloxicam and the baclofen are helping in the meantime continue until her next visit with pain management.  We also discussed her lab work and will plan for follow-up lipids CBC CMP and A1c at the next visit in proximally 2 months.    Past Medical History:   Diagnosis Date    Diabetes mellitus     Hypertension     Thyroid disease     WPW (Ilana-Parkinson-White syndrome)      Family History   Problem Relation Name Age of Onset    Hypertension Mother      Diabetes Mother      Diabetes Father      Diabetes Sister      Leukemia Daughter       Current Outpatient Medications on File Prior to Visit   Medication Sig Dispense Refill    aspirin (ECOTRIN) 81 MG EC tablet TAKE 1 TABLET BY MOUTH DAILY WITH FOOD 30 tablet 5    atorvastatin (LIPITOR) 10 MG tablet Take 1 tablet (10 mg total) by mouth every evening. 90 tablet 1    buPROPion (WELLBUTRIN XL) 150 MG TB24 tablet Take 150 mg by mouth every morning.      clonazePAM (KLONOPIN) 1 MG tablet Take 1 mg by mouth nightly as needed for Anxiety.      famotidine (PEPCID) 40 MG tablet Take 1 tablet (40 mg total) by mouth once daily. 30 tablet 4    ferrous sulfate (IRON) 325 mg (65 mg iron) Tab tablet Take 1 tablet (325 mg total) by mouth daily with breakfast. 30 tablet 5    glimepiride (AMARYL) 1 MG tablet Take 1 tablet (1 mg total) by mouth every morning. 90 tablet 1    hydrOXYzine pamoate (VISTARIL) 25 MG Cap Take 1 to 2 capsule up to tid prn for severe anxiety 30 capsule 2    metFORMIN (GLUCOPHAGE) 1000 MG tablet Take 1 tablet (1,000 mg total) by mouth 2 (two) times daily with meals. 180 tablet 1    metoprolol tartrate (LOPRESSOR) 25 MG tablet Take 1 tablet (25 mg total) by mouth 2 (two) times daily. 60 tablet 5    omeprazole (PRILOSEC) 40 MG capsule Take 1 capsule (40 mg total) by mouth once daily. 30 capsule  2    predniSONE (DELTASONE) 20 MG tablet 3 PO DAILY X 3 DAYS, THEN 2 PO DAILY X 3 DAYS, THEN 1 PO DAILY X 3 DAYS, THEN STOP. 18 tablet 0    TRUEPLUS LANCETS 30 gauge Misc AS DIRECTED      [DISCONTINUED] baclofen (LIORESAL) 10 MG tablet Take 1 tablet (10 mg total) by mouth nightly as needed (spasm). 10 tablet 0    [DISCONTINUED] diclofenac (VOLTAREN) 50 MG EC tablet Take 1 tablet (50 mg total) by mouth 3 (three) times daily. P.r.n. pain 30 tablet 0    [DISCONTINUED] levothyroxine (SYNTHROID) 150 MCG tablet Take 1 tablet (150 mcg total) by mouth before breakfast. 30 tablet 1    [DISCONTINUED] meloxicam (MOBIC) 7.5 MG tablet Take 1 po bid with plenty of food and water 14 tablet 0    [DISCONTINUED] guaiFENesin-codeine 100-10 mg/5 ml (TUSSI-ORGANIDIN NR)  mg/5 mL syrup Take 10 mLs by mouth every 8 (eight) hours as needed for Cough. (Patient not taking: Reported on 2/13/2025) 118 mL 0     Current Facility-Administered Medications on File Prior to Visit   Medication Dose Route Frequency Provider Last Rate Last Admin    influenza (Egg-FREE) (Flucelvax) 45 mcg/0.5 mL IM vaccine (> or = 6 mo) (*contains preservatives) 0.5 mL  0.5 mL Intramuscular 1 time in Clinic/HOD uHber Clayton MD         Immunization History   Administered Date(s) Administered    Influenza - Trivalent - Flucelvax - PF 10/09/2024       Review of Systems   Constitutional:  Negative for fever, malaise/fatigue and weight loss.   Respiratory:  Negative for shortness of breath.    Cardiovascular:  Negative for chest pain and palpitations.   Gastrointestinal:  Negative for nausea and vomiting.   Musculoskeletal:  Positive for myalgias and neck pain.   Psychiatric/Behavioral:  Negative for depression.         Vitals:    02/13/25 1537   BP: (!) 144/96   Pulse: 93   Resp: 18   Temp: 98.7 °F (37.1 °C)       Physical Exam  Vitals reviewed.   Eyes:      Conjunctiva/sclera: Conjunctivae normal.   Pulmonary:      Effort: Pulmonary effort is normal.    Neurological:      Mental Status: She is alert.   Psychiatric:         Mood and Affect: Mood normal.         Behavior: Behavior normal.         Thought Content: Thought content normal.         Judgment: Judgment normal.          Assessment and Plan  1. Type 2 diabetes mellitus without complication, without long-term current use of insulin  -     CBC Auto Differential; Future; Expected date: 05/13/2025  -     Comprehensive Metabolic Panel; Future; Expected date: 05/13/2025  -     Hemoglobin A1C; Future; Expected date: 05/13/2025    2. Refused diphtheria-tetanus vaccine    3. COVID-19 vaccination refused    4. Neck pain  -     baclofen (LIORESAL) 10 MG tablet; Take 1 tablet (10 mg total) by mouth nightly as needed (spasm).  Dispense: 30 tablet; Refill: 0  -     meloxicam (MOBIC) 7.5 MG tablet; Take 1 po bid with plenty of food and water  Dispense: 60 tablet; Refill: 0    5. Hypothyroidism, unspecified type  -     levothyroxine (SYNTHROID) 175 MCG tablet; Take 1 tablet (175 mcg total) by mouth before breakfast.  Dispense: 30 tablet; Refill: 11  -     TSH; Future; Expected date: 05/13/2025  -     T4, Free; Future; Expected date: 05/13/2025    6. Hyperlipidemia, unspecified hyperlipidemia type  -     Lipid Panel; Future; Expected date: 05/13/2025             Return to clinic in 2 months for fasting lab work and then an office visit or as needed.    Health Maintenance Topics with due status: Not Due       Topic Last Completion Date    Cervical Cancer Screening 11/28/2022    Diabetes Urine Screening 04/02/2024    Mammogram 12/11/2024    Lipid Panel 12/30/2024    Hemoglobin A1c 12/30/2024    Low Dose Statin 02/13/2025    RSV Vaccine (Age 60+ and Pregnant patients) Not Due

## 2025-02-17 NOTE — PROGRESS NOTES
Subjective:         Patient ID: Mary Magana is a 44 y.o. female.    Chief Complaint: Low-back Pain (Patient is having low back pain, right neck pain, right arm pain.)        Pain  This is a chronic problem. The current episode started more than 1 year ago. The problem occurs daily. The problem has been unchanged. Associated symptoms include arthralgias and neck pain. Pertinent negatives include no anorexia, change in bowel habit, chills, coughing, diaphoresis, fever, rash, sore throat, swollen glands, vertigo or vomiting.     Review of Systems   Constitutional:  Negative for activity change, appetite change, chills, diaphoresis, fever and unexpected weight change.   HENT:  Negative for drooling, ear discharge, ear pain, facial swelling, nosebleeds, sore throat, trouble swallowing, voice change and goiter.    Eyes:  Negative for photophobia, pain, discharge, redness and visual disturbance.   Respiratory:  Negative for apnea, cough, choking, chest tightness, shortness of breath, wheezing and stridor.    Cardiovascular:  Negative for palpitations and leg swelling.   Gastrointestinal:  Negative for abdominal distention, anorexia, change in bowel habit, diarrhea, rectal pain, vomiting and fecal incontinence.   Endocrine: Negative for cold intolerance, heat intolerance, polydipsia, polyphagia and polyuria.   Genitourinary:  Negative for flank pain, frequency and hot flashes.   Musculoskeletal:  Positive for arthralgias, back pain and neck pain.   Integumentary:  Negative for color change, pallor and rash.   Allergic/Immunologic: Negative for immunocompromised state.   Neurological:  Negative for dizziness, vertigo, seizures, syncope, facial asymmetry, speech difficulty, light-headedness, memory loss and coordination difficulties.   Hematological:  Negative for adenopathy. Does not bruise/bleed easily.   Psychiatric/Behavioral:  Negative for agitation, behavioral problems, confusion, decreased concentration,  dysphoric mood, hallucinations, self-injury and suicidal ideas. The patient is not nervous/anxious and is not hyperactive.            Past Medical History:   Diagnosis Date    Diabetes mellitus     Hypertension     Thyroid disease     WPW (Ilana-Parkinson-White syndrome)      Past Surgical History:   Procedure Laterality Date    CHOLECYSTECTOMY      TUBAL LIGATION       Social History     Socioeconomic History    Marital status: Single    Number of children: 6    Years of education: 12   Occupational History    Occupation: None   Tobacco Use    Smoking status: Some Days     Current packs/day: 0.25     Average packs/day: 0.3 packs/day for 15.0 years (3.8 ttl pk-yrs)     Types: Cigarettes, Vaping with nicotine     Last attempt to quit: 2022     Passive exposure: Past    Smokeless tobacco: Never    Tobacco comments:     1 pack per 2 weeks   Substance and Sexual Activity    Alcohol use: Never    Drug use: Never    Sexual activity: Not Currently     Social Drivers of Health     Financial Resource Strain: Medium Risk (11/9/2023)    Overall Financial Resource Strain (CARDIA)     Difficulty of Paying Living Expenses: Somewhat hard   Food Insecurity: No Food Insecurity (11/9/2023)    Hunger Vital Sign     Worried About Running Out of Food in the Last Year: Never true     Ran Out of Food in the Last Year: Never true   Transportation Needs: No Transportation Needs (11/9/2023)    PRAPARE - Transportation     Lack of Transportation (Medical): No     Lack of Transportation (Non-Medical): No   Physical Activity: Sufficiently Active (11/9/2023)    Exercise Vital Sign     Days of Exercise per Week: 7 days     Minutes of Exercise per Session: 30 min   Stress: Stress Concern Present (11/9/2023)    [employer] [place] [employer] [employer] - Occupational Stress Questionnaire     Feeling of Stress : Very much   Housing Stability: Low Risk  (11/9/2023)    Housing Stability Vital Sign     Unable to Pay for Housing in the Last Year: No      "Number of Places Lived in the Last Year: 1     Unstable Housing in the Last Year: No     Family History   Problem Relation Name Age of Onset    Hypertension Mother      Diabetes Mother      Diabetes Father      Diabetes Sister      Leukemia Daughter       Review of patient's allergies indicates:   Allergen Reactions    Anesthesia s/i-40 (propofol) [propofol] Itching     Anesthesia, possibly propofol; made her feel like there were ants all over her        Objective:  Vitals:    02/25/25 1057 02/25/25 1058   BP: 133/85    Pulse: 66    Resp: 18    Weight: 112.9 kg (249 lb)    Height: 5' 2" (1.575 m)    PainSc: 10-Worst pain ever 10-Worst pain ever   PainLoc: Back              Physical Exam  Vitals and nursing note reviewed. Exam conducted with a chaperone present.   Constitutional:       General: She is awake. She is not in acute distress.     Appearance: Normal appearance. She is not ill-appearing.   HENT:      Head: Normocephalic and atraumatic.      Nose: Nose normal.      Mouth/Throat:      Mouth: Mucous membranes are moist.      Pharynx: Oropharynx is clear.   Eyes:      Conjunctiva/sclera: Conjunctivae normal.      Pupils: Pupils are equal, round, and reactive to light.   Pulmonary:      Effort: Pulmonary effort is normal. No respiratory distress.   Abdominal:      Palpations: Abdomen is soft.      Tenderness: There is no guarding.   Musculoskeletal:         General: Normal range of motion.      Cervical back: Normal range of motion and neck supple. No rigidity.   Skin:     General: Skin is warm and dry.      Coloration: Skin is not jaundiced or pale.   Neurological:      General: No focal deficit present.      Mental Status: She is alert and oriented to person, place, and time. Mental status is at baseline.      Cranial Nerves: No cranial nerve deficit (II-XII).   Psychiatric:         Mood and Affect: Mood normal.         Behavior: Behavior normal. Behavior is cooperative.         Thought Content: Thought " content normal.           X-Ray Shoulder Complete 2 View Right  Narrative: EXAMINATION:  XR SHOULDER COMPLETE 2 OR MORE VIEWS RIGHT    CLINICAL HISTORY:  Pain, unspecified    TECHNIQUE:  Two or three views of the right shoulder were performed.    COMPARISON:  None    FINDINGS:  The AC joint is intact.  The humeral head is normally positioned.  Right hemithorax is clear.  No osseous destruction.  No acute fracture, subluxation or dislocation.  Impression: No acute radiographic abnormality.    Electronically signed by: Mario Toro  Date:    01/27/2025  Time:    18:21  X-Ray Cervical Spine AP And Lateral  Narrative: EXAMINATION:  XR CERVICAL SPINE AP LATERAL    CLINICAL HISTORY:  PAIN;    TECHNIQUE:  AP, lateral and open mouth views of the cervical spine were performed.    COMPARISON:  07/08/2020    FINDINGS:  No acute fracture, subluxation or dislocation.  Neck is minimally flexed on lateral view.  The odontoid process appears normally positioned.  Disc spaces appear adequately maintained.  Soft tissues appear within normal limits.  Mild degenerative changes.  Impression: No acute radiographic abnormality.    Electronically signed by: Mario Toro  Date:    01/27/2025  Time:    18:18         Office Visit on 02/12/2025   Component Date Value Ref Range Status    Case Report 02/12/2025    Final                    Value:Pap Cytology                                      Case: [id number]                                   Authorizing Provider:  Prabhu Hernandez DO  Collected:           02/12/2025 11:14 AM          Ordering Location:     [facility] Received:            02/13/2025 09:25 AM                                 - Obstetrics And                                                                                    Gynecology                                                                   First Screen:          Aminata Mendez CT(ASCP)                                                    Specimen:    Liquid-Based Pap Test, Screening, Cervix                                                   Interpretation 02/12/2025 Negative              Final    General Categorization 02/12/2025 Negative for intraepithelial lesion or malignancy   Final    Specimen Adequacy 02/12/2025 Satisfactory for evaluation   Final    Clinical Information 02/12/2025    Final                    Value:pap    Disclaimer 02/12/2025    Final                    Value:Notice: An irreducible false negative rate exists with pap smears, therefore a negative result does not absolutely exclude malignancy.      HPV 16 02/12/2025 Negative  Negative, Invalid Final    HPV 18 02/12/2025 Negative  Negative, Invalid Final    HPV Other 02/12/2025 Negative  Negative, Invalid Final   Office Visit on 01/30/2025   Component Date Value Ref Range Status    POC Amphetamines 01/30/2025 Negative  Negative, Inconclusive Final    POC Barbiturates 01/30/2025 Negative  Negative, Inconclusive Final    POC Benzodiazepines 01/30/2025 Negative  Negative, Inconclusive Final    POC Cocaine 01/30/2025 Negative  Negative, Inconclusive Final    POC THC 01/30/2025 Negative  Negative, Inconclusive Final    POC Methadone 01/30/2025 Negative  Negative, Inconclusive Final    POC Methamphetamine 01/30/2025 Negative  Negative, Inconclusive Final    POC Opiates 01/30/2025 Negative  Negative, Inconclusive Final    POC Oxycodone 01/30/2025 Negative  Negative, Inconclusive Final    POC Phencyclidine 01/30/2025 Negative  Negative, Inconclusive Final    POC Methylenedioxymethamphetamine * 01/30/2025 Negative  Negative, Inconclusive Final    POC Tricyclic Antidepressants 01/30/2025 Negative  Negative, Inconclusive Final    POC Buprenorphine 01/30/2025 Negative   Final     Acceptable 01/30/2025 Yes   Final    POC Temperature (Urine) 01/30/2025 93   Final    Creatinine, U 01/30/2025 105.0  mg/dL Final    Specific Gravity 01/30/2025 1.009   Final    pH 01/30/2025 6.5    Final    Oxidants 01/30/2025 Negative  Cutoff: 200 mg/L Final    Comment 01/30/2025 Normal   Final    Codeine 01/30/2025 Not Detected  Cutoff: 25 ng/mL Final    C6BG 01/30/2025 Not Detected  Cutoff: 100 ng/mL Final    Morphine 01/30/2025 Not Detected  Cutoff: 25 ng/mL Final    M6BG 01/30/2025 Not Detected  Cutoff: 100 ng/mL Final    6 Monoacetylmorphine 01/30/2025 Not Detected  Cutoff: 25 ng/mL Final    Hydrocodone 01/30/2025 Not Detected  Cutoff: 25 ng/mL Final    Norhydrocodone 01/30/2025 Not Detected  Cutoff: 25 ng/mL Final    Dihydrocodeine 01/30/2025 Not Detected  Cutoff: 25 ng/mL Final    Hydromorphone 01/30/2025 Not Detected  Cutoff: 25 ng/mL Final    Hydromorphone-3-beta-glucuronide 01/30/2025 Not Detected  Cutoff: 100 ng/mL Final    Oxycodone 01/30/2025 Not Detected  Cutoff: 25 ng/mL Final    Noroxycodone 01/30/2025 Not Detected  Cutoff: 25 ng/mL Final    Oxymorphone 01/30/2025 Not Detected  Cutoff: 25 ng/mL Final    Oxymorphone-3-beta-glucuronid 01/30/2025 Not Detected  Cutoff: 100 ng/mL Final    Noroxymorphone 01/30/2025 Not Detected  Cutoff: 25 ng/mL Final    Fentanyl 01/30/2025 Not Detected  Cutoff: 2 ng/mL Final    Norfentanyl 01/30/2025 Not Detected  Cutoff: 2 ng/mL Final    Meperidine 01/30/2025 Not Detected  Cutoff: 25 ng/mL Final    Normeperidine 01/30/2025 Not Detected  Cutoff: 25 ng/mL Final    Naloxone 01/30/2025 Not Detected  Cutoff: 25 ng/mL Final    Naloxone-3-beta-glucuronide 01/30/2025 Not Detected  Cutoff: 100 ng/mL Final    Methadone 01/30/2025 Not Detected  Cutoff: 25 ng/mL Final    EDDP 01/30/2025 Not Detected  Cutoff: 25 ng/mL Final    Propoxyphene 01/30/2025 Not Detected  Cutoff: 25 ng/mL Final    Norpropoxyphene 01/30/2025 Not Detected  Cutoff: 25 ng/mL Final    Tramadol 01/30/2025 Not Detected  Cutoff: 25 ng/mL Final    O-desmethyltramadol 01/30/2025 Not Detected  Cutoff: 25 ng/mL Final    Tapentadol 01/30/2025 Not Detected  Cutoff: 25 ng/mL Final    N-Desmethyltapentadol 01/30/2025  Not Detected  Cutoff: 50 ng/mL Final    M TAPENTADOL-BETA-GLUCURONIDE 01/30/2025 Not Detected  Cutoff: 100 ng/mL Final    Buprenorphine 01/30/2025 Not Detected  Cutoff: 5 ng/mL Final    Norbuprenorphine 01/30/2025 Not Detected  Cutoff: 5 ng/mL Final    Norbuprenorphine glucuronide 01/30/2025 Not Detected  Cutoff: 20 ng/mL Final    Opioid Interpretation 01/30/2025 SEE COMMENTS   Final    Cocaine 01/30/2025 Negative  Cutoff: 150 ng/mL Final    Tetrahydrocannabinol 01/30/2025 Negative  Cutoff: 50 ng/mL Final    Alprazolam 01/30/2025 Not Detected  Cutoff: 10 ng/mL Final    Alpha-Hydroxyalprazolam 01/30/2025 Not Detected  Cutoff: 10 ng/mL Final    Alpha-Hydroxyalprazolam Glucuronide 01/30/2025 Not Detected  Cutoff: 50 ng/mL Final    Chlordiazepoxide 01/30/2025 Not Detected  Cutoff: 10 ng/mL Final    Clobazam 01/30/2025 Not Detected  Cutoff: 10 ng/mL Final    N-Desmethylclobazam 01/30/2025 Not Detected  Cutoff: 200 ng/mL Final    Clonazepam 01/30/2025 Not Detected  Cutoff: 10 ng/mL Final    7-aminoclonazepam 01/30/2025 Not Detected  Cutoff: 10 ng/mL Final    Diazepam 01/30/2025 Not Detected  Cutoff: 10 ng/mL Final    Nordiazepam 01/30/2025 Not Detected  Cutoff: 10 ng/mL Final    Flunitrazepam 01/30/2025 Not Detected  Cutoff: 10 ng/mL Final    7-aminoflunitrazepam 01/30/2025 Not Detected  Cutoff: 10 ng/mL Final    Flurazepam 01/30/2025 Not Detected  Cutoff: 10 ng/mL Final    2-Hydroxy Ethyl Flurazepam 01/30/2025 Not Detected  Cutoff: 10 ng/mL Final    Lorazepam 01/30/2025 Not Detected  Cutoff: 10 ng/mL Final    Lorazepam Glucuronide 01/30/2025 Not Detected  Cutoff: 50 ng/mL Final    Midazolam 01/30/2025 Not Detected  Cutoff: 10 ng/mL Final    Alpha-Hydroxy Midazolam 01/30/2025 Not Detected  Cutoff: 10 ng/mL Final    Oxazepam 01/30/2025 Not Detected  Cutoff: 10 ng/mL Final    Oxazepam Glucuronide 01/30/2025 Not Detected  Cutoff: 50 ng/mL Final    Prazepam 01/30/2025 Not Detected  Cutoff: 10 ng/mL Final    Temazepam  01/30/2025 Not Detected  Cutoff: 10 ng/mL Final    Temazepam Glucuronide 01/30/2025 Not Detected  Cutoff: 50 ng/mL Final    Triazolam 01/30/2025 Not Detected  Cutoff: 10 ng/mL Final    Alpha-Hydroxy Triazolam 01/30/2025 Not Detected  Cutoff: 10 ng/mL Final    Zolpidem 01/30/2025 Not Detected  Cutoff: 10 ng/mL Final    Zolpidem Phenyl-4-Carboxylic acid 01/30/2025 Not Detected  Cutoff: 10 ng/mL Final    Benzodiazepine Interpretation 01/30/2025 SEE COMMENTS   Final    List Patient's Current Medications 01/30/2025 ----------   Final    Methamphetamine 01/30/2025 Not Detected  Cutoff: 100 ng/mL Final    Amphetamine 01/30/2025 Not Detected  Cutoff: 100 ng/mL Final    3,4-methylenedioxymethamphetamine * 01/30/2025 Not Detected  Cutoff: 100 ng/mL Final    3,7-oxarqjhvegbpki-O-ethylamphetam* 01/30/2025 Not Detected  Cutoff: 100 ng/mL Final    3,4-methylenedioxyamphetamine (MDA) 01/30/2025 Not Detected  Cutoff: 100 ng/mL Final    Ephedrine 01/30/2025 Not Detected  Cutoff: 100 ng/mL Final    Pseudoephedrine 01/30/2025 Not Detected  Cutoff: 100 ng/mL Final    Phentermine 01/30/2025 Not Detected  Cutoff: 100 ng/mL Final    Phencyclidine (PCP) 01/30/2025 Not Detected  Cutoff: 20 ng/mL Final    Methylphenidate 01/30/2025 Not Detected  Cutoff: 20 ng/mL Final    Ritalinic acid 01/30/2025 Not Detected  Cutoff: 100 ng/mL Final    Stimulant Interpretation 01/30/2025 SEE COMMENTS   Final    Barbiturates 01/30/2025 Negative  Cutoff: 200 ng/mL Final   Admission on 01/27/2025, Discharged on 01/27/2025   Component Date Value Ref Range Status    QRS Duration 01/27/2025 110  ms Final    [facility] QTC Calculation 01/27/2025 427  ms Final   Admission on 01/25/2025, Discharged on 01/25/2025   Component Date Value Ref Range Status    QRS Duration 01/25/2025 98  ms Final    [facility] QTC Calculation 01/25/2025 412  ms Final   Office Visit on 12/30/2024   Component Date Value Ref Range Status    POC Preg Test, Ur 12/30/2024 Negative  Negative Final    Quality  Control Acceptable 12/30/2024 Yes   Final    Color, UA POC 12/30/2024 yellow   Final    Spec Grav UA 12/30/2024 >=1.03   Final    pH, UA 12/30/2024 5.5   Final    WBC, UA 12/30/2024 neg   Final    Nitrite, UA 12/30/2024 neg   Final    Protein, POC 12/30/2024 neg   Final    Glucose, UA 12/30/2024 neg   Final    Ketones, UA 12/30/2024 trace   Final    Bilirubin, POC 12/30/2024 neg   Final    Urobilinogen, UA 12/30/2024 1.0   Final    Blood, UA 12/30/2024 neg   Final    Glucose, Fasting 12/30/2024 134 (H)  70 - 100 mg/dL Final    Triglycerides 12/30/2024 112  37 - 140 mg/dL Final    Cholesterol 12/30/2024 147  <=200 mg/dL Final    HDL Cholesterol 12/30/2024 40  35 - 60 mg/dL Final    Cholesterol/HDL Ratio (Risk Factor) 12/30/2024 3.7   Final    Non-HDL 12/30/2024 107  mg/dL Final    LDL Calculated 12/30/2024 85  mg/dL Final    LDL/HDL 12/30/2024 2.1   Final    VLDL 12/30/2024 22  mg/dL Final    TSH 12/30/2024 6.867 (H)  0.350 - 4.940 uIU/mL Final    Free T4 12/30/2024 1.00  0.70 - 1.48 ng/dL Final    Hemoglobin A1C 12/30/2024 7.4 (H)  <=7.0 % Final    Estimated Average Glucose 12/30/2024 166  mg/dL Final   Admission on 12/13/2024, Discharged on 12/14/2024   Component Date Value Ref Range Status    QRS Duration 12/13/2024 118  ms Final    [facility]HS QTC Calculation 12/13/2024 428  ms Final    Sodium 12/13/2024 134 (L)  136 - 145 mmol/L Final    Potassium 12/13/2024 4.6  3.5 - 5.1 mmol/L Final    Chloride 12/13/2024 106  98 - 107 mmol/L Final    CO2 12/13/2024 21 (L)  22 - 29 mmol/L Final    Anion Gap 12/13/2024 12  7 - 16 mmol/L Final    Glucose 12/13/2024 162 (H)  74 - 100 mg/dL Final    BUN 12/13/2024 12  7 - 19 mg/dL Final    Creatinine 12/13/2024 1.16 (H)  0.55 - 1.02 mg/dL Final    BUN/Creatinine Ratio 12/13/2024 10  6 - 20 Final    Calcium 12/13/2024 9.3  8.4 - 10.2 mg/dL Final    Total Protein 12/13/2024 7.9  6.4 - 8.3 g/dL Final    Albumin 12/13/2024 3.7  3.5 - 5.0 g/dL Final    Globulin 12/13/2024 4.2 (H)  2.0 - 4.0  g/dL Final    A/G Ratio 12/13/2024 0.9   Final    Bilirubin, Total 12/13/2024 0.3  <=1.5 mg/dL Final    Alk Phos 12/13/2024 113  40 - 150 U/L Final    ALT 12/13/2024 58 (H)  <=55 U/L Final    AST 12/13/2024 64 (H)  5 - 34 U/L Final    eGFR 12/13/2024 60  >=60 mL/min/1.73m2 Final    Troponin I High Sensitivity 12/13/2024 <2.7  <=14.0 ng/L Final    PT 12/13/2024 13.4  11.7 - 14.7 seconds Final    INR 12/13/2024 1.03  <=3.30 Final    WBC 12/13/2024 10.39  4.50 - 11.00 K/uL Final    RBC 12/13/2024 5.94 (H)  4.20 - 5.40 M/uL Final    Hemoglobin 12/13/2024 13.2  12.0 - 16.0 g/dL Final    Hematocrit 12/13/2024 44.2  38.0 - 47.0 % Final    MCV 12/13/2024 74.4 (L)  80.0 - 96.0 fL Final    MCH 12/13/2024 22.2 (L)  27.0 - 31.0 pg Final    MCHC 12/13/2024 29.9 (L)  32.0 - 36.0 g/dL Final    RDW 12/13/2024 13.8  11.5 - 14.5 % Final    Platelet Count 12/13/2024 225  150 - 400 K/uL Final    MPV 12/13/2024 11.7  9.4 - 12.4 fL Final    Neutrophils % 12/13/2024 49.8 (L)  53.0 - 65.0 % Final    Lymphocytes % 12/13/2024 39.1  27.0 - 41.0 % Final    Monocytes % 12/13/2024 5.8  2.0 - 6.0 % Final    Eosinophils % 12/13/2024 4.3 (H)  1.0 - 4.0 % Final    Basophils % 12/13/2024 0.7  0.0 - 1.0 % Final    Immature Granulocytes % 12/13/2024 0.3  0.0 - 0.4 % Final    nRBC, Auto 12/13/2024 0.0  <=0.0 % Final    Neutrophils, Abs 12/13/2024 5.18  1.80 - 7.70 K/uL Final    Lymphocytes, Absolute 12/13/2024 4.06  1.00 - 4.80 K/uL Final    Monocytes, Absolute 12/13/2024 0.60  0.00 - 0.80 K/uL Final    Eosinophils, Absolute 12/13/2024 0.45  0.00 - 0.50 K/uL Final    Basophils, Absolute 12/13/2024 0.07  0.00 - 0.20 K/uL Final    Immature Granulocytes, Absolute 12/13/2024 0.03  0.00 - 0.04 K/uL Final    nRBC, Absolute 12/13/2024 0.00  <=0.00 x10e3/uL Final    Diff Type 12/13/2024 Scan Smear   Final    Platelet Morphology 12/13/2024 Normal  Normal Final    Anisocytosis 12/13/2024 1+   Final    Microcytosis 12/13/2024 2+   Final    Hypochromic 12/13/2024  Few   Final    Troponin I High Sensitivity 12/14/2024 <2.7  <=14.0 ng/L Final   Admission on 11/30/2024, Discharged on 11/30/2024   Component Date Value Ref Range Status    QRS Duration 11/30/2024 118  ms Final    [facility] QTC Calculation 11/30/2024 432  ms Final    Sodium 11/30/2024 137  136 - 145 mmol/L Final    Potassium 11/30/2024 3.7  3.5 - 5.1 mmol/L Final    Chloride 11/30/2024 102  98 - 107 mmol/L Final    CO2 11/30/2024 25  22 - 29 mmol/L Final    Anion Gap 11/30/2024 14  7 - 16 mmol/L Final    Glucose 11/30/2024 128 (H)  74 - 100 mg/dL Final    BUN 11/30/2024 12  7 - 19 mg/dL Final    Creatinine 11/30/2024 0.98  0.55 - 1.02 mg/dL Final    BUN/Creatinine Ratio 11/30/2024 12  6 - 20 Final    Calcium 11/30/2024 9.2  8.4 - 10.2 mg/dL Final    Total Protein 11/30/2024 8.0  6.4 - 8.3 g/dL Final    Albumin 11/30/2024 3.9  3.5 - 5.0 g/dL Final    Globulin 11/30/2024 4.1 (H)  2.0 - 4.0 g/dL Final    A/G Ratio 11/30/2024 1.0   Final    Bilirubin, Total 11/30/2024 0.3  <=1.5 mg/dL Final    Alk Phos 11/30/2024 112  40 - 150 U/L Final    ALT 11/30/2024 38  <=55 U/L Final    AST 11/30/2024 32  5 - 34 U/L Final    eGFR 11/30/2024 74  >=60 mL/min/1.73m2 Final    Troponin I High Sensitivity 11/30/2024 <2.7  <=14.0 ng/L Final    Color, UA 11/30/2024 Light-Yellow  Colorless, Straw, Yellow, Light Yellow, Dark Yellow Final    Clarity, UA 11/30/2024 Clear  Clear Final    pH, UA 11/30/2024 6.0  5.0 to 8.0 pH Units Final    Leukocytes, UA 11/30/2024 Negative  Negative Final    Nitrites, UA 11/30/2024 Positive (A)  Negative Final    Protein, UA 11/30/2024 Negative  Negative Final    Glucose, UA 11/30/2024 Normal  Normal mg/dL Final    Ketones, UA 11/30/2024 Negative  Negative mg/dL Final    Urobilinogen, UA 11/30/2024 Normal  0.2, 1.0, Normal mg/dL Final    Bilirubin, UA 11/30/2024 Negative  Negative Final    Blood, UA 11/30/2024 Negative  Negative Final    Specific [place], UA 11/30/2024 1.022  <=1.030 Final    INFLUENZA A MOLECULAR  11/30/2024 Negative  Negative Final    INFLUENZA B MOLECULAR  11/30/2024 Negative  Negative Final    SARS COV-2 Molecular 11/30/2024 Negative  Negative, Invalid Final    WBC 11/30/2024 9.82  4.50 - 11.00 K/uL Final    RBC 11/30/2024 6.02 (H)  4.20 - 5.40 M/uL Final    Hemoglobin 11/30/2024 13.6  12.0 - 16.0 g/dL Final    Hematocrit 11/30/2024 44.4  38.0 - 47.0 % Final    MCV 11/30/2024 73.8 (L)  80.0 - 96.0 fL Final    MCH 11/30/2024 22.6 (L)  27.0 - 31.0 pg Final    MCHC 11/30/2024 30.6 (L)  32.0 - 36.0 g/dL Final    RDW 11/30/2024 13.7  11.5 - 14.5 % Final    Platelet Count 11/30/2024 252  150 - 400 K/uL Final    MPV 11/30/2024 11.6  9.4 - 12.4 fL Final    Neutrophils % 11/30/2024 52.0 (L)  53.0 - 65.0 % Final    Lymphocytes % 11/30/2024 38.1  27.0 - 41.0 % Final    Monocytes % 11/30/2024 4.7  2.0 - 6.0 % Final    Eosinophils % 11/30/2024 4.1 (H)  1.0 - 4.0 % Final    Basophils % 11/30/2024 0.7  0.0 - 1.0 % Final    Immature Granulocytes % 11/30/2024 0.4  0.0 - 0.4 % Final    nRBC, Auto 11/30/2024 0.0  <=0.0 % Final    Neutrophils, Abs 11/30/2024 5.11  1.80 - 7.70 K/uL Final    Lymphocytes, Absolute 11/30/2024 3.74  1.00 - 4.80 K/uL Final    Monocytes, Absolute 11/30/2024 0.46  0.00 - 0.80 K/uL Final    Eosinophils, Absolute 11/30/2024 0.40  0.00 - 0.50 K/uL Final    Basophils, Absolute 11/30/2024 0.07  0.00 - 0.20 K/uL Final    Immature Granulocytes, Absolute 11/30/2024 0.04  0.00 - 0.04 K/uL Final    nRBC, Absolute 11/30/2024 0.00  <=0.00 x10e3/uL Final    Diff Type 11/30/2024 Scan Smear   Final    Platelet Morphology 11/30/2024 Few Large Platelets (A)  Normal Final    Microcytosis 11/30/2024 1+   Final    Hypochromic 11/30/2024 Few   Final    Troponin I High Sensitivity 11/30/2024 <2.7  <=14.0 ng/L Final    WBC, UA 11/30/2024 4  <=5 /hpf Final    RBC, UA 11/30/2024 2  <=3 /hpf Final    Bacteria, UA 11/30/2024 Many (A)  None Seen /hpf Final    Squamous Epithelial Cells, UA 11/30/2024 Occasional (A)  None Seen  /HPF Final    Mucous 11/30/2024 Occasional (A)  None Seen /LPF Final    Culture, Urine 11/30/2024 >100,000 Escherichia coli (A)   Final    Culture, Urine 11/30/2024 50,000 Streptococcus agalactiae (Group B) (A)   Final   Office Visit on 10/09/2024   Component Date Value Ref Range Status    Sodium 10/09/2024 139  136 - 145 mmol/L Final    Potassium 10/09/2024 4.1  3.5 - 5.1 mmol/L Final    Chloride 10/09/2024 107  98 - 107 mmol/L Final    CO2 10/09/2024 29  21 - 32 mmol/L Final    Anion Gap 10/09/2024 7  7 - 16 mmol/L Final    Glucose 10/09/2024 144 (H)  74 - 106 mg/dL Final    BUN 10/09/2024 13  7 - 18 mg/dL Final    Creatinine 10/09/2024 1.00  0.55 - 1.02 mg/dL Final    BUN/Creatinine Ratio 10/09/2024 13  6 - 20 Final    Calcium 10/09/2024 9.1  8.5 - 10.1 mg/dL Final    Total Protein 10/09/2024 7.5  6.4 - 8.2 g/dL Final    Albumin 10/09/2024 3.6  3.5 - 5.0 g/dL Final    Globulin 10/09/2024 3.9  2.0 - 4.0 g/dL Final    A/G Ratio 10/09/2024 0.9   Final    Bilirubin, Total 10/09/2024 0.2  >0.0 - 1.2 mg/dL Final    Alk Phos 10/09/2024 100 (H)  37 - 98 U/L Final    ALT 10/09/2024 35  13 - 56 U/L Final    AST 10/09/2024 23  15 - 37 U/L Final    eGFR 10/09/2024 72  >=60 mL/min/1.73m2 Final    Hemoglobin A1C 10/09/2024 7.0 (H)  4.5 - 6.6 % Final    Estimated Average Glucose 10/09/2024 154  mg/dL Final    TSH 10/09/2024 11.500 (H)  0.358 - 3.740 uIU/mL Final    Free T4 10/09/2024 0.84  0.76 - 1.46 ng/dL Final    WBC 10/09/2024 8.00  4.50 - 11.00 K/uL Final    RBC 10/09/2024 5.77 (H)  4.20 - 5.40 M/uL Final    Hemoglobin 10/09/2024 13.0  12.0 - 16.0 g/dL Final    Hematocrit 10/09/2024 43.7  38.0 - 47.0 % Final    MCV 10/09/2024 75.7 (L)  80.0 - 96.0 fL Final    MCH 10/09/2024 22.5 (L)  27.0 - 31.0 pg Final    MCHC 10/09/2024 29.7 (L)  32.0 - 36.0 g/dL Final    RDW 10/09/2024 14.0  11.5 - 14.5 % Final    Platelet Count 10/09/2024 211  150 - 400 K/uL Final    MPV 10/09/2024 12.2  9.4 - 12.4 fL Final    Neutrophils % 10/09/2024  47.8 (L)  53.0 - 65.0 % Final    Lymphocytes % 10/09/2024 40.3  27.0 - 41.0 % Final    Monocytes % 10/09/2024 6.1 (H)  2.0 - 6.0 % Final    Eosinophils % 10/09/2024 4.9 (H)  1.0 - 4.0 % Final    Basophils % 10/09/2024 0.6  0.0 - 1.0 % Final    Immature Granulocytes % 10/09/2024 0.3  0.0 - 0.4 % Final    nRBC, Auto 10/09/2024 0.0  <=0.0 % Final    Neutrophils, Abs 10/09/2024 3.83  1.80 - 7.70 K/uL Final    Lymphocytes, Absolute 10/09/2024 3.22  1.00 - 4.80 K/uL Final    Monocytes, Absolute 10/09/2024 0.49  0.00 - 0.80 K/uL Final    Eosinophils, Absolute 10/09/2024 0.39  0.00 - 0.50 K/uL Final    Basophils, Absolute 10/09/2024 0.05  0.00 - 0.20 K/uL Final    Immature Granulocytes, Absolute 10/09/2024 0.02  0.00 - 0.04 K/uL Final    nRBC, Absolute 10/09/2024 0.00  <=0.00 x10e3/uL Final    Diff Type 10/09/2024 Auto   Final   Admission on 09/15/2024, Discharged on 09/15/2024   Component Date Value Ref Range Status    QRS Duration 09/15/2024 118  ms Final    [facility] QTC Calculation 09/15/2024 389  ms Final    Sodium 09/15/2024 136  136 - 145 mmol/L Final    Potassium 09/15/2024 3.8  3.5 - 5.1 mmol/L Final    Chloride 09/15/2024 107  98 - 107 mmol/L Final    CO2 09/15/2024 26  21 - 32 mmol/L Final    Anion Gap 09/15/2024 7  7 - 16 mmol/L Final    Glucose 09/15/2024 205 (H)  74 - 106 mg/dL Final    BUN 09/15/2024 11  7 - 18 mg/dL Final    Creatinine 09/15/2024 1.17 (H)  0.55 - 1.02 mg/dL Final    BUN/Creatinine Ratio 09/15/2024 9  6 - 20 Final    Calcium 09/15/2024 9.5  8.5 - 10.1 mg/dL Final    Total Protein 09/15/2024 8.2  6.4 - 8.2 g/dL Final    Albumin 09/15/2024 3.8  3.5 - 5.0 g/dL Final    Globulin 09/15/2024 4.4 (H)  2.0 - 4.0 g/dL Final    A/G Ratio 09/15/2024 0.9   Final    Bilirubin, Total 09/15/2024 0.5  >0.0 - 1.2 mg/dL Final    Alk Phos 09/15/2024 95  37 - 98 U/L Final    ALT 09/15/2024 23  13 - 56 U/L Final    AST 09/15/2024 28  15 - 37 U/L Final    eGFR 09/15/2024 60  >=60 mL/min/1.73m2 Final    Troponin I High  Sensitivity 09/15/2024 <4.0  <=60.4 pg/mL Final    ProBNP 09/15/2024 63  1 - 125 pg/mL Final    WBC 09/15/2024 9.95  4.50 - 11.00 K/uL Final    RBC 09/15/2024 5.94 (H)  4.20 - 5.40 M/uL Final    Hemoglobin 09/15/2024 13.6  12.0 - 16.0 g/dL Final    Hematocrit 09/15/2024 44.0  38.0 - 47.0 % Final    MCV 09/15/2024 74.1 (L)  80.0 - 96.0 fL Final    MCH 09/15/2024 22.9 (L)  27.0 - 31.0 pg Final    MCHC 09/15/2024 30.9 (L)  32.0 - 36.0 g/dL Final    RDW 09/15/2024 13.5  11.5 - 14.5 % Final    Platelet Count 09/15/2024 198  150 - 400 K/uL Final    MPV 09/15/2024 12.2  9.4 - 12.4 fL Final    Neutrophils % 09/15/2024 41.7 (L)  53.0 - 65.0 % Final    Lymphocytes % 09/15/2024 47.1 (H)  27.0 - 41.0 % Final    Monocytes % 09/15/2024 6.2 (H)  2.0 - 6.0 % Final    Eosinophils % 09/15/2024 4.3 (H)  1.0 - 4.0 % Final    Basophils % 09/15/2024 0.5  0.0 - 1.0 % Final    Immature Granulocytes % 09/15/2024 0.2  0.0 - 0.4 % Final    nRBC, Auto 09/15/2024 0.0  <=0.0 % Final    Neutrophils, Abs 09/15/2024 4.14  1.80 - 7.70 K/uL Final    Lymphocytes, Absolute 09/15/2024 4.69  1.00 - 4.80 K/uL Final    Monocytes, Absolute 09/15/2024 0.62  0.00 - 0.80 K/uL Final    Eosinophils, Absolute 09/15/2024 0.43  0.00 - 0.50 K/uL Final    Basophils, Absolute 09/15/2024 0.05  0.00 - 0.20 K/uL Final    Immature Granulocytes, Absolute 09/15/2024 0.02  0.00 - 0.04 K/uL Final    nRBC, Absolute 09/15/2024 0.00  <=0.00 x10e3/uL Final    Diff Type 09/15/2024 Scan Smear   Final    Platelet Morphology 09/15/2024 Few Large Platelets (A)  Normal Final    Microcytosis 09/15/2024 1+   Final    Hypochromic 09/15/2024 Few   Final         Orders Placed This Encounter   Procedures    Ambulatory Referral/Consult to Physical Therapy/Occupational Therapy     Standing Status:   Future     Expected Date:   3/4/2025     Expiration Date:   3/25/2026     Referral Priority:   Routine     Referral Type:   Physical Medicine     Referral Reason:   Specialty Services Required      Number of Visits Requested:   1         Requested Prescriptions      No prescriptions requested or ordered in this encounter       Assessment:     1. Radiculopathy, cervical region    2. Lumbar spondylosis             X-ray cervical spine [facility] January 27, 2025  No acute fracture, subluxation or dislocation.  Neck is minimally flexed on lateral view.  The odontoid process appears normally positioned.  Disc spaces appear adequately maintained.  Soft tissues appear within normal limits.  Mild degenerative changes.  Impression:  No acute radiographic abnormality.    X-ray right shoulder [facility] January 27, 2025  FINDINGS:  The AC joint is intact.  The humeral head is normally positioned.  Right hemithorax is clear.  No osseous destruction.  No acute fracture, subluxation or dislocation.  Impression:  No acute radiographic abnormality.    MRI lumbar spine [facility] August 2022, multiple level degenerative changes mild neuroforaminal stenosis L5/S1    X-ray lumbar spine July 20, 2022 degenerative changes    X-ray sacroiliac joint July 20, 2022 degenerative changes no fracture noted        Plan:      Not using narcotics from our office  Daily benzodiazepine use    Was evaluated emergency room January 27, 2025 cervical radiculopathy    Complaint right arm pain numbness and tingling radicular in nature worse with increased activity better with short periods resting or changing positions     Requesting Toradol injection    Requesting physical therapy for cervical radiculopathy low back pain    Denies loss of bowel or bladder function    Neurologically intact    Toradol 60 mg IM, tolerated well    Start physical therapy [facility]    Follow-up 2 months    Dr. Jimenez, July 2023

## 2025-02-18 LAB
HPV 16: NEGATIVE
HPV 18: NEGATIVE
HPV OTHER: NEGATIVE

## 2025-02-25 ENCOUNTER — OFFICE VISIT (OUTPATIENT)
Dept: PAIN MEDICINE | Facility: CLINIC | Age: 45
End: 2025-02-25
Payer: COMMERCIAL

## 2025-02-25 VITALS
RESPIRATION RATE: 18 BRPM | SYSTOLIC BLOOD PRESSURE: 133 MMHG | WEIGHT: 249 LBS | DIASTOLIC BLOOD PRESSURE: 85 MMHG | HEIGHT: 62 IN | HEART RATE: 66 BPM | BODY MASS INDEX: 45.82 KG/M2

## 2025-02-25 DIAGNOSIS — M54.12 RADICULOPATHY, CERVICAL REGION: Primary | Chronic | ICD-10-CM

## 2025-02-25 DIAGNOSIS — M47.816 LUMBAR SPONDYLOSIS: Chronic | ICD-10-CM

## 2025-02-25 PROCEDURE — 3075F SYST BP GE 130 - 139MM HG: CPT | Mod: CPTII,,, | Performed by: PHYSICIAN ASSISTANT

## 2025-02-25 PROCEDURE — 3008F BODY MASS INDEX DOCD: CPT | Mod: CPTII,,, | Performed by: PHYSICIAN ASSISTANT

## 2025-02-25 PROCEDURE — 99215 OFFICE O/P EST HI 40 MIN: CPT | Mod: PBBFAC | Performed by: PHYSICIAN ASSISTANT

## 2025-02-25 PROCEDURE — 1159F MED LIST DOCD IN RCRD: CPT | Mod: CPTII,,, | Performed by: PHYSICIAN ASSISTANT

## 2025-02-25 PROCEDURE — 96372 THER/PROPH/DIAG INJ SC/IM: CPT | Mod: PBBFAC | Performed by: PHYSICIAN ASSISTANT

## 2025-02-25 PROCEDURE — 99999 PR PBB SHADOW E&M-EST. PATIENT-LVL V: CPT | Mod: PBBFAC,,, | Performed by: PHYSICIAN ASSISTANT

## 2025-02-25 PROCEDURE — 99999PBSHW PR PBB SHADOW TECHNICAL ONLY FILED TO HB: Mod: PBBFAC,JZ,,

## 2025-02-25 PROCEDURE — 99214 OFFICE O/P EST MOD 30 MIN: CPT | Mod: S$PBB,25,, | Performed by: PHYSICIAN ASSISTANT

## 2025-02-25 PROCEDURE — 3079F DIAST BP 80-89 MM HG: CPT | Mod: CPTII,,, | Performed by: PHYSICIAN ASSISTANT

## 2025-02-25 RX ORDER — KETOROLAC TROMETHAMINE 30 MG/ML
60 INJECTION, SOLUTION INTRAMUSCULAR; INTRAVENOUS
Status: COMPLETED | OUTPATIENT
Start: 2025-02-25 | End: 2025-02-25

## 2025-02-25 RX ADMIN — KETOROLAC TROMETHAMINE 60 MG: 30 INJECTION, SOLUTION INTRAMUSCULAR at 11:02

## 2025-02-26 ENCOUNTER — RESULTS FOLLOW-UP (OUTPATIENT)
Dept: OBSTETRICS AND GYNECOLOGY | Facility: CLINIC | Age: 45
End: 2025-02-26

## 2025-02-26 ENCOUNTER — HOSPITAL ENCOUNTER (OUTPATIENT)
Dept: RADIOLOGY | Facility: HOSPITAL | Age: 45
Discharge: HOME | End: 2025-02-26
Attending: STUDENT IN AN ORGANIZED HEALTH CARE EDUCATION/TRAINING PROGRAM
Payer: COMMERCIAL

## 2025-02-26 ENCOUNTER — OFFICE VISIT (OUTPATIENT)
Dept: OBSTETRICS AND GYNECOLOGY | Facility: CLINIC | Age: 45
End: 2025-02-26
Attending: STUDENT IN AN ORGANIZED HEALTH CARE EDUCATION/TRAINING PROGRAM
Payer: COMMERCIAL

## 2025-02-26 VITALS
HEART RATE: 72 BPM | DIASTOLIC BLOOD PRESSURE: 86 MMHG | SYSTOLIC BLOOD PRESSURE: 141 MMHG | WEIGHT: 251 LBS | BODY MASS INDEX: 46.19 KG/M2 | HEIGHT: 62 IN

## 2025-02-26 DIAGNOSIS — N93.9 ABNORMAL UTERINE BLEEDING: ICD-10-CM

## 2025-02-26 DIAGNOSIS — N91.1 SECONDARY AMENORRHEA: Primary | ICD-10-CM

## 2025-02-26 PROCEDURE — 99999 PR PBB SHADOW E&M-EST. PATIENT-LVL III: CPT | Mod: PBBFAC,,, | Performed by: STUDENT IN AN ORGANIZED HEALTH CARE EDUCATION/TRAINING PROGRAM

## 2025-02-26 PROCEDURE — 99213 OFFICE O/P EST LOW 20 MIN: CPT | Mod: PBBFAC,25 | Performed by: STUDENT IN AN ORGANIZED HEALTH CARE EDUCATION/TRAINING PROGRAM

## 2025-02-26 PROCEDURE — 3079F DIAST BP 80-89 MM HG: CPT | Mod: CPTII,,, | Performed by: STUDENT IN AN ORGANIZED HEALTH CARE EDUCATION/TRAINING PROGRAM

## 2025-02-26 PROCEDURE — 99213 OFFICE O/P EST LOW 20 MIN: CPT | Mod: S$PBB,,, | Performed by: STUDENT IN AN ORGANIZED HEALTH CARE EDUCATION/TRAINING PROGRAM

## 2025-02-26 PROCEDURE — 3008F BODY MASS INDEX DOCD: CPT | Mod: CPTII,,, | Performed by: STUDENT IN AN ORGANIZED HEALTH CARE EDUCATION/TRAINING PROGRAM

## 2025-02-26 PROCEDURE — 3077F SYST BP >= 140 MM HG: CPT | Mod: CPTII,,, | Performed by: STUDENT IN AN ORGANIZED HEALTH CARE EDUCATION/TRAINING PROGRAM

## 2025-02-26 PROCEDURE — 76830 TRANSVAGINAL US NON-OB: CPT | Mod: TC

## 2025-03-01 NOTE — PROGRESS NOTES
"  Return Gyn Office Visit    Assessment/Plan  Problem List Items Addressed This Visit    None  Visit Diagnoses         Secondary amenorrhea    -  Primary    Relevant Orders    Follicle Stimulating Hormone (Completed)    Estradiol (Completed)              CC:   Chief Complaint   Patient presents with    Follow-up     Pt states she is here is following up from last visit and has no concerns.      HPI:  44 y.o. who presents to office for follow up. Bled for two weeks in December, no cycle since then.    Review of Systems - The following ROS was otherwise negative, except as noted in the HPI:  constitutional, respiratory, cardiovascular, gastrointestinal, genitourinary    Objective:  BP (!) 141/86 (BP Location: Right arm, Patient Position: Sitting)   Pulse 72   Ht 5' 2" (1.575 m)   Wt 113.9 kg (251 lb)   LMP 12/05/2024 (Approximate)   BMI 45.91 kg/m²   General: Alert, well appearing, no acute distress  Abdomen: Soft, nontender, nondistended   Pelvic: deferred  Extremities: No redness or tenderness, neg Alma's sign  Osteopathic: no TART changes    US without etiology  Will check hormone levels, add on to labs ordered by PCP  Likely becoming perimenopausal  "

## 2025-03-05 ENCOUNTER — PATIENT MESSAGE (OUTPATIENT)
Dept: DERMATOLOGY | Facility: CLINIC | Age: 45
End: 2025-03-05
Payer: COMMERCIAL

## 2025-03-11 ENCOUNTER — CLINICAL SUPPORT (OUTPATIENT)
Dept: REHABILITATION | Facility: HOSPITAL | Age: 45
End: 2025-03-11
Payer: COMMERCIAL

## 2025-03-11 DIAGNOSIS — M47.816 LUMBAR SPONDYLOSIS: Chronic | ICD-10-CM

## 2025-03-11 DIAGNOSIS — M54.12 RADICULOPATHY, CERVICAL REGION: Chronic | ICD-10-CM

## 2025-03-11 PROCEDURE — 97161 PT EVAL LOW COMPLEX 20 MIN: CPT

## 2025-03-12 NOTE — PROGRESS NOTES
Outpatient Rehab    Physical Therapy Evaluation    Patient Name: Mary Magana  MRN: [id number]  YOB: 1980  Encounter Date: 3/11/2025    Therapy Diagnosis:   Encounter Diagnoses   Name Primary?    Lumbar spondylosis     Radiculopathy, cervical region      Physician: Mario Alberto Claire PA    Physician Orders: Eval and Treat  Medical Diagnosis: Lumbar spondylosis / Radiculopathy, cervical region    Visit # / Visits Authorized:  1 / pending auth   Date of Evaluation:  3/11/2025   Insurance Authorization Period: 2/25/25 to 2/25/26  Plan of Care Certification:  3/11/2025 to 6/4/25      Time In: 1445   Time Out: 1512  Total Time: 27   Total Billable Time: 27    Intake Outcome Measure for FOTO Survey    Therapist reviewed FOTO scores for Mary Magana on 3/11/2025.   FOTO report - see Media section or FOTO account episode details.     Intake Score: 42%         Subjective   History of Present Illness  Mary is a 44 y.o. female who reports to physical therapy with a chief concern of Neck/Back Pain.     The patient reports a medical diagnosis of Lumbar spondylosis /  Radiculopathy, cervical region.    Diagnostic tests related to this condition: X-ray.   X-Ray Details: XR CERVICAL SPINE AP LATERAL CLINICAL HISTORY: PAIN;  TECHNIQUE: AP, lateral and open mouth views of the cervical spine were performed.  COMPARISON: 07/08/2020  FINDINGS: No acute fracture, subluxation or dislocation.  Neck is minimally flexed on lateral view.  The odontoid process appears normally positioned.  Disc spaces appear adequately maintained.  Soft tissues appear within normal limits.  Mild degenerative changes.  Impression:  No acute radiographic abnormality.    Dominant Hand: Left  History of Present Condition/Illness: Chronic low back that is an intermittent dull ache/throbbing pain. Worsens with returning to upright after being bent forward. Feel stuck. Unable to sleep through the night secondary to pain with neck  and low back. Relief with hot showers, muscle relaxers.  ~ 2 months ago the right side of the neck has been hurting and pain center states it is a pinched nerve. Intermittent throbbing in the right shoulder that worsens with any movement. Head tilt to the left makes the radicular symptoms worse in the right arm but holding arm overhead relieves it. No jaw pain, no trouble swallowing and no headaches.  Intermittent tingling in the right thumb. Currently, not working. Unable to participate in activities with grandchildren because of the pain. The left leg will buckle. No falls. Daughter will help with dressing when the pain is bad.     Activities of Daily Living  Social history was obtained from Patient.               Previously independent with activities of daily living? Yes     Currently independent with activities of daily living? No  Activities currently needing assistance include Dressing - lower body.            Pain     Patient reports a current pain level of 8/10. Pain at best is reported as 5/10. Pain at worst is reported as 10/10.   Clinical Progression (since onset): Worsening  Pain Qualities: Aching, Dull, Needle-like, Throbbing  Pain-Relieving Factors: Lying down, Medications - prescription, Heat  Pain-Aggravating Factors: Bending, Lifting, Driving, Standing, Walking, Movement         Review of Systems  Patient reports: Cardiac History and Diabetes        Treatment History  Treatments  Previously Received Treatments: No  Currently Receiving Treatments: No    Living Arrangements  Living Situation  Living Arrangements: Children        Employment  Employment Status: Not employed          Past Medical History/Physical Systems Review:   Mary Magana  has a past medical history of Diabetes mellitus, Hypertension, Thyroid disease, and WPW (Ilana-Parkinson-White syndrome).    Mary Padilla [place]  has a past surgical history that includes Cholecystectomy and Tubal ligation.    Mary has a current  medication list which includes the following prescription(s): aspirin, atorvastatin, baclofen, bupropion, clonazepam, famotidine, ferrous sulfate, glimepiride, hydroxyzine pamoate, levothyroxine, meloxicam, metformin, metoprolol tartrate, omeprazole, prednisone, and trueplus lancets, and the following Facility-Administered Medications: influenza (egg-free).    Review of patient's allergies indicates:   Allergen Reactions    Anesthesia s/i-40 (propofol) [propofol] Itching     Anesthesia, possibly propofol; made her feel like there were ants all over her        Objective   Posture  Patient presents with a Forward and Tilted left head position.     Shoulders are Rounded and Depressed.        Right knee position is: Genu Valgus       Cervical Thoracic Sensation  Right Cervical/Thoracic Sensation  Intact: Light Touch       Left Cervical/Thoracic Sensation  Intact: Light Touch            Lower Extremity Sensation  Right Lumbar/Lower Extremity Sensation  Intact: Light Touch       Left Lumbar/Lower Extremity Sensation  Intact: Light Touch                Right Lower Extremity Reflexes  Patellar, L4: Normal (2+)                   Left Lower Extremity Reflexes  Patellar, L4: Normal (2+)                        Spinal Muscle Palpation  Right Spinal Muscle Palpation  Abnormal: Lumbar/Sacral     Tenderness with palpation over the right piriformis and right lumbar paraspinals            Hip Palpation  Right Hip Palpation  Abnormal: Lumbar/Sacral Muscle                             Hip Strength - Planes of Motion   Right Strength Right Pain Left Strength Left  Pain   Flexion (L2) 5   5     Extension 4- Yes 4- Yes   ABduction 5   5     ADduction           Internal Rotation           External Rotation               Knee Strength   Right Strength Right Pain Left Strength Left  Pain   Flexion (S2) 5   5     Prone Flexion           Extension (L3) 5   5                     Cervical Screen  Tests  Positive: Right Spurling's A and Left  Spurling's A  Negative: Right Distraction and Left Distraction  Cervical Range of Motion           Thoracic Range of Motion             Cervical/Thoracic Special Tests            Cervical Tests  Positive: Right Spurling's A and Left Spurling's A  Negative: Right Distraction and Left Distraction       Thoracic Tests  Negative: Slump         Lumbar/Pelvic Girdle Special Tests       Lumbar Tests - SLR and Tension  Negative: Right Passive Straight Leg Raise and Left Passive Straight Leg Raise                 Pelvic Girdle / Sacrum Tests  Negative: Right TEODORA and Left TEODORA         Hip Special Tests  Intra-Articular/Impingement Tests  Negative: Right TEODORA and Left TEODORA              Fall Risk  Functional mobility test results suggest the patient is not: At Risk for Falls  Four Stage Balance Test                 Single Leg Stand - Right Foot: 10 sec  Single Leg Stand - Left Foot: 10 sec           Ambulation Assistance Required  Surface With  Assistive Device Without Assistive Device Details   Level   Independent      Uneven         Curb                  Assessment & Plan   Assessment  Mary presents with a condition of Low complexity.   Presentation of Symptoms: Stable  Will Comorbidities Impact Care: No       Functional Limitations: Participating in leisure activities, Disrupted sleep pattern, Functional mobility, Activity tolerance, Decreased ambulation distance/endurance  Impairments: Lack of appropriate home exercise program, Pain with functional activity    Prognosis: Fair  Assessment Details: Patient is a 44 year old female coming to therapy with low back pain and right upper extremity radicular pain. Patient is positive with cervical discogenic symptoms having radicular symptoms into the right arm. Patient has difficulty sleeping through the night secondary to the pain. Patient has intermittent low back pain mostly with returning to upright position from a forward trunk bend. Patient has general lower  extremities and lumbar muscular tightness and core and posterior chain weakness for the lumbar spine. Patient will benefit from skilled physical therapy to address the cervical radiculopathy with traction and scapular stabilization/postural correction exercises and the low back with core/hip stabilization and stretching exercises.     Plan  From a physical therapy perspective, the patient would benefit from: Skilled Rehab Services    Planned therapy interventions include: Therapeutic exercise, Therapeutic activities, Neuromuscular re-education, Manual therapy, and Aquatic therapy.    Planned modalities to include: Cryotherapy (cold pack), Electrical stimulation - passive/unattended, Mechanical traction, Thermotherapy (hot pack), and Ultrasound.        Visit Frequency: 2 times Per Week for 12 Weeks.       This plan was discussed with Patient.   Discussion participants: Agreed Upon Plan of Care             Patient's spiritual, cultural, and educational needs considered and patient agreeable to plan of care and goals.     Education  Education was done with Patient. The patient's learning style includes Listening. The patient Verbalizes understanding.                 Goals:   Active       Ambulation/movement       Patient will walk 10 minutes with 0/10 in the low back       Start:  03/12/25    Expected End:  04/23/25               Function       Patient will report no right arm radicular symptoms by D/C       Start:  03/12/25    Expected End:  06/04/25               Functional outcome       Patient will show a significant change in FOTO by 10 point increase for patient-reported outcome tool to demonstrate subjective improvement       Start:  03/12/25    Expected End:  06/04/25            Patient will demonstrate independence in home program for support of progression       Start:  03/12/25    Expected End:  06/04/25               Pain       Patient will report pain of 0/10 demonstrating a reduction of overall pain        Start:  03/12/25    Expected End:  06/04/25               Strength       Patient will achieve bilateral hip extension strength of 5/5       Start:  03/12/25    Expected End:  04/23/25              Physical Therapy supervisory visit performed with Holli Ramos PTA to discuss plan of care and goals     Clinical Information for Insurance Authorization     Dates of Services: 03/12/25 to 06/04/25  Discharge Plan: Patient will be discharged from skilled physical therapy treatment once all goals have been met, patient has plateaued, or physician/insurance requests discontinuation of care. Patient will be discharged with a home exercise program.   Type of therapy: Rehabilitative  ICD-10 Diagnosis Code(s): M47.816 / M54.12   Which side is symptomatic? Not applicable and/or symptoms are not localized  Surgical: No  Surgical procedure: N/A  Surgery date: N/A.  Presenting symptoms/diagnoses are unspecified in nature.  Presenting symptoms are radiating in nature.   The rehabilitation is not related to a diagnosis of cancer.  The rehabilitation is not related to a diagnosis of lymphedema.  Patient's clinical presentation is:  Moderate objective and functional deficits: consistent symptoms and/or symptoms that are intensified with activity with moderate loss of range of motion, strength, or ability to perform daily tasks  CPT Codes Requested:  22481 [therapeutic exercise], 56109 [neuromuscular re-education], 98897 [manual therapy], 45158 [therapeutic activities], 42311 [aquatic therapy], 81032 [ultrasound], 19621 [unattended electrical stimulation], and 43334 [mechanical traction]    KARLOS JEFFRIES, PT

## 2025-03-13 ENCOUNTER — CLINICAL SUPPORT (OUTPATIENT)
Dept: REHABILITATION | Facility: HOSPITAL | Age: 45
End: 2025-03-13
Payer: COMMERCIAL

## 2025-03-13 DIAGNOSIS — M54.12 RADICULOPATHY, CERVICAL REGION: ICD-10-CM

## 2025-03-13 DIAGNOSIS — M47.816 LUMBAR SPONDYLOSIS: Primary | ICD-10-CM

## 2025-03-13 PROCEDURE — 97112 NEUROMUSCULAR REEDUCATION: CPT | Mod: CQ

## 2025-03-13 PROCEDURE — 97110 THERAPEUTIC EXERCISES: CPT | Mod: CQ

## 2025-03-13 PROCEDURE — 97014 ELECTRIC STIMULATION THERAPY: CPT | Mod: CQ

## 2025-03-13 NOTE — PROGRESS NOTES
Outpatient Rehab    Physical Therapy Visit    Patient Name: Mary Magana  MRN: [id number]  YOB: 1980  Encounter Date: 3/13/2025    Therapy Diagnosis: No diagnosis found.  Physician: Mario Alberto Claire PA    Physician Orders: Eval and Treat  Medical Diagnosis: Lumbar spondylosis / Radiculopathy, cervical region     Visit # / Visits Authorized:  2 / 11 (evaluation including)  Date of Evaluation:  3/11/2025   Insurance Authorization Period: 2/25/25 to 2/25/26  Plan of Care Certification:  3/11/2025 to 6/4/25   Time In: 1345   Time Out: 1442  Total Time: 57   Total Billable Time: 57    FOTO:  Intake Score:  %  Survey Score 1:  %  Survey Score 2:  %         Subjective   I have pain in my lower back and right shoulder..  Pain reported as 5/10.      Objective            Treatment:  Therapeutic Exercise  Therapeutic Exercise Activity 1: bike 5 minutes  Therapeutic Exercise Activity 2: Slant board 4 x 15 second hold  Therapeutic Exercise Activity 3: [place]sting curls 4 x 15 second hold    Balance/Neuromuscular Re-Education  Balance/Neuromuscular Re-Education Activity 1: pelvic tilt 2 x 10  Balance/Neuromuscular Re-Education Activity 2: bridges 2 x 10  Balance/Neuromuscular Re-Education Activity 3: Straight leg praise 2 x 10  Balance/Neuromuscular Re-Education Activity 4: clamshell 2 x 10 blue TB  Balance/Neuromuscular Re-Education Activity 5: lower truck rotation 2 x 10    Modalities  Moist Heat (min): heat pack x 15 minutes to low back  Electrical Stimulation (Parameters): pre-mod x 15 minutes 12 intensity    Assessment & Plan   Assessment: case conference with Kira Broussard DPT. Ravi was informed on correct body mechanics to perfrom exercises correctly. She was unable to perform ball rolls due to increased pain. She was given an HEP today. she verbalized and demonstrated understanding. ended session with pre-mod and heat back to increased pain. may try cervial traction fro numbness and  tingling in lrft arm.  Evaluation/Treatment Tolerance: Patient tolerated treatment well    Patient will continue to benefit from skilled outpatient physical therapy to address the deficits listed in the problem list box on initial evaluation, provide pt/family education and to maximize pt's level of independence in the home and community environment.     Patient's spiritual, cultural, and educational needs considered and patient agreeable to plan of care and goals.     Education  Education was done with Patient. The patient's learning style includes Demonstration and Listening. The patient Demonstrates understanding and Verbalizes understanding.                 Plan: continue current POC    Goals:   Active       Ambulation/movement       Patient will walk 10 minutes with 0/10 in the low back       Start:  03/12/25    Expected End:  04/23/25               Function       Patient will report no right arm radicular symptoms by D/C       Start:  03/12/25    Expected End:  06/04/25               Functional outcome       Patient will show a significant change in FOTO by 10 point increase for patient-reported outcome tool to demonstrate subjective improvement       Start:  03/12/25    Expected End:  06/04/25            Patient will demonstrate independence in home program for support of progression       Start:  03/12/25    Expected End:  06/04/25               Pain       Patient will report pain of 0/10 demonstrating a reduction of overall pain       Start:  03/12/25    Expected End:  06/04/25               Strength       Patient will achieve bilateral hip extension strength of 5/5       Start:  03/12/25    Expected End:  04/23/25                Holli Ramos PTA

## 2025-03-18 ENCOUNTER — OFFICE VISIT (OUTPATIENT)
Dept: FAMILY MEDICINE | Facility: CLINIC | Age: 45
End: 2025-03-18
Payer: COMMERCIAL

## 2025-03-18 ENCOUNTER — CLINICAL SUPPORT (OUTPATIENT)
Dept: REHABILITATION | Facility: HOSPITAL | Age: 45
End: 2025-03-18
Payer: COMMERCIAL

## 2025-03-18 VITALS
WEIGHT: 243 LBS | HEIGHT: 62 IN | BODY MASS INDEX: 44.72 KG/M2 | SYSTOLIC BLOOD PRESSURE: 135 MMHG | RESPIRATION RATE: 14 BRPM | DIASTOLIC BLOOD PRESSURE: 85 MMHG | HEART RATE: 76 BPM | TEMPERATURE: 98 F

## 2025-03-18 DIAGNOSIS — R35.0 URINE FREQUENCY: Primary | ICD-10-CM

## 2025-03-18 DIAGNOSIS — E11.9 TYPE 2 DIABETES MELLITUS WITHOUT COMPLICATION, WITHOUT LONG-TERM CURRENT USE OF INSULIN: ICD-10-CM

## 2025-03-18 DIAGNOSIS — E03.9 HYPOTHYROIDISM, UNSPECIFIED TYPE: ICD-10-CM

## 2025-03-18 DIAGNOSIS — M47.816 LUMBAR SPONDYLOSIS: Primary | ICD-10-CM

## 2025-03-18 DIAGNOSIS — E78.5 HYPERLIPIDEMIA, UNSPECIFIED HYPERLIPIDEMIA TYPE: ICD-10-CM

## 2025-03-18 LAB
ALBUMIN SERPL BCP-MCNC: 4 G/DL (ref 3.5–5)
ALBUMIN/GLOB SERPL: 1.1 {RATIO}
ALP SERPL-CCNC: 119 U/L (ref 40–150)
ALT SERPL W P-5'-P-CCNC: 52 U/L
ANION GAP SERPL CALCULATED.3IONS-SCNC: 12 MMOL/L (ref 7–16)
AST SERPL W P-5'-P-CCNC: 39 U/L (ref 11–45)
BACTERIA #/AREA URNS HPF: ABNORMAL /HPF
BASOPHILS # BLD AUTO: 0.07 K/UL (ref 0–0.2)
BASOPHILS NFR BLD AUTO: 0.9 % (ref 0–1)
BILIRUB SERPL-MCNC: 0.7 MG/DL
BILIRUB UR QL STRIP: NEGATIVE
BUN SERPL-MCNC: 12 MG/DL (ref 7–19)
BUN/CREAT SERPL: 10 (ref 6–20)
CALCIUM SERPL-MCNC: 9.5 MG/DL (ref 8.4–10.2)
CHLORIDE SERPL-SCNC: 103 MMOL/L (ref 98–107)
CHOLEST SERPL-MCNC: 144 MG/DL
CHOLEST/HDLC SERPL: 3.2 {RATIO}
CLARITY UR: CLEAR
CO2 SERPL-SCNC: 27 MMOL/L (ref 22–29)
COLOR UR: YELLOW
CREAT SERPL-MCNC: 1.22 MG/DL (ref 0.55–1.02)
DIFFERENTIAL METHOD BLD: ABNORMAL
EGFR (NO RACE VARIABLE) (RUSH/TITUS): 57 ML/MIN/1.73M2
EOSINOPHIL # BLD AUTO: 0.2 K/UL (ref 0–0.5)
EOSINOPHIL NFR BLD AUTO: 2.5 % (ref 1–4)
ERYTHROCYTE [DISTWIDTH] IN BLOOD BY AUTOMATED COUNT: 14 % (ref 11.5–14.5)
EST. AVERAGE GLUCOSE BLD GHB EST-MCNC: 232 MG/DL
GLOBULIN SER-MCNC: 3.7 G/DL (ref 2–4)
GLUCOSE SERPL-MCNC: 225 MG/DL (ref 74–100)
GLUCOSE UR STRIP-MCNC: >1000 MG/DL
HBA1C MFR BLD HPLC: 9.7 %
HCT VFR BLD AUTO: 48.9 % (ref 38–47)
HDLC SERPL-MCNC: 45 MG/DL (ref 35–60)
HGB BLD-MCNC: 14.4 G/DL (ref 12–16)
HYALINE CASTS #/AREA URNS LPF: ABNORMAL /LPF
IMM GRANULOCYTES # BLD AUTO: 0.03 K/UL (ref 0–0.04)
IMM GRANULOCYTES NFR BLD: 0.4 % (ref 0–0.4)
KETONES UR STRIP-SCNC: 10 MG/DL
LDLC SERPL CALC-MCNC: 77 MG/DL
LDLC/HDLC SERPL: 1.7 {RATIO}
LEUKOCYTE ESTERASE UR QL STRIP: ABNORMAL
LYMPHOCYTES # BLD AUTO: 2.58 K/UL (ref 1–4.8)
LYMPHOCYTES NFR BLD AUTO: 32.1 % (ref 27–41)
MCH RBC QN AUTO: 22.2 PG (ref 27–31)
MCHC RBC AUTO-ENTMCNC: 29.4 G/DL (ref 32–36)
MCV RBC AUTO: 75.3 FL (ref 80–96)
MONOCYTES # BLD AUTO: 0.45 K/UL (ref 0–0.8)
MONOCYTES NFR BLD AUTO: 5.6 % (ref 2–6)
MPC BLD CALC-MCNC: 12.2 FL (ref 9.4–12.4)
MUCOUS, UA: ABNORMAL /LPF
NEUTROPHILS # BLD AUTO: 4.7 K/UL (ref 1.8–7.7)
NEUTROPHILS NFR BLD AUTO: 58.5 % (ref 53–65)
NITRITE UR QL STRIP: NEGATIVE
NONHDLC SERPL-MCNC: 99 MG/DL
NRBC # BLD AUTO: 0 X10E3/UL
NRBC, AUTO (.00): 0 %
PH UR STRIP: 5.5 PH UNITS
PLATELET # BLD AUTO: 237 K/UL (ref 150–400)
POTASSIUM SERPL-SCNC: 3.9 MMOL/L (ref 3.5–5.1)
PROT SERPL-MCNC: 7.7 G/DL (ref 6.4–8.3)
PROT UR QL STRIP: 30
RBC # BLD AUTO: 6.49 M/UL (ref 4.2–5.4)
RBC # UR STRIP: NEGATIVE /UL
RBC #/AREA URNS HPF: 7 /HPF
SODIUM SERPL-SCNC: 138 MMOL/L (ref 136–145)
SP GR UR STRIP: 1.04
SQUAMOUS #/AREA URNS LPF: ABNORMAL /HPF
T4 FREE SERPL-MCNC: 1.24 NG/DL (ref 0.7–1.48)
TRIGL SERPL-MCNC: 112 MG/DL (ref 37–140)
TSH SERPL DL<=0.005 MIU/L-ACNC: 1.21 UIU/ML (ref 0.35–4.94)
UROBILINOGEN UR STRIP-ACNC: NORMAL MG/DL
VLDLC SERPL-MCNC: 22 MG/DL
WBC # BLD AUTO: 8.03 K/UL (ref 4.5–11)
WBC #/AREA URNS HPF: 24 /HPF

## 2025-03-18 PROCEDURE — 3079F DIAST BP 80-89 MM HG: CPT | Mod: CPTII,,, | Performed by: NURSE PRACTITIONER

## 2025-03-18 PROCEDURE — 83036 HEMOGLOBIN GLYCOSYLATED A1C: CPT | Mod: ,,, | Performed by: CLINICAL MEDICAL LABORATORY

## 2025-03-18 PROCEDURE — 80061 LIPID PANEL: CPT | Mod: ,,, | Performed by: CLINICAL MEDICAL LABORATORY

## 2025-03-18 PROCEDURE — 80050 GENERAL HEALTH PANEL: CPT | Mod: ,,, | Performed by: CLINICAL MEDICAL LABORATORY

## 2025-03-18 PROCEDURE — 84439 ASSAY OF FREE THYROXINE: CPT | Mod: ,,, | Performed by: CLINICAL MEDICAL LABORATORY

## 2025-03-18 PROCEDURE — 3008F BODY MASS INDEX DOCD: CPT | Mod: CPTII,,, | Performed by: NURSE PRACTITIONER

## 2025-03-18 PROCEDURE — 99213 OFFICE O/P EST LOW 20 MIN: CPT | Mod: ,,, | Performed by: NURSE PRACTITIONER

## 2025-03-18 PROCEDURE — 87086 URINE CULTURE/COLONY COUNT: CPT | Mod: ,,, | Performed by: CLINICAL MEDICAL LABORATORY

## 2025-03-18 PROCEDURE — 81001 URINALYSIS AUTO W/SCOPE: CPT | Mod: ,,, | Performed by: CLINICAL MEDICAL LABORATORY

## 2025-03-18 PROCEDURE — 3046F HEMOGLOBIN A1C LEVEL >9.0%: CPT | Mod: CPTII,,, | Performed by: NURSE PRACTITIONER

## 2025-03-18 PROCEDURE — 97110 THERAPEUTIC EXERCISES: CPT

## 2025-03-18 PROCEDURE — 97012 MECHANICAL TRACTION THERAPY: CPT

## 2025-03-18 PROCEDURE — 3075F SYST BP GE 130 - 139MM HG: CPT | Mod: CPTII,,, | Performed by: NURSE PRACTITIONER

## 2025-03-18 RX ORDER — NITROFURANTOIN 25; 75 MG/1; MG/1
100 CAPSULE ORAL 2 TIMES DAILY
Qty: 14 CAPSULE | Refills: 0 | Status: SHIPPED | OUTPATIENT
Start: 2025-03-18

## 2025-03-18 NOTE — PROGRESS NOTES
[facility]    Chief Complaint      Chief Complaint   Patient presents with    Urinary Frequency     Denies burning    Cyst     Vaginal cyst. Comes and goes away. Doesn't hurt at all. Described as firm.        History of Present Illness      Mary Magana is a 44 y.o. female. She  has a past medical history of Diabetes mellitus, Hypertension, Thyroid disease, and WPW (Ilana-Parkinson-White syndrome)., who presents today for c/o possible UTI and a knot in her vaginal area.    Past Medical History:  Past Medical History:   Diagnosis Date    Diabetes mellitus     Hypertension     Thyroid disease     WPW (Ilana-Parkinson-White syndrome)        Past Surgical History:   has a past surgical history that includes Cholecystectomy and Tubal ligation.    Social History:  Social History[1]    I personally reviewed all past medical, surgical, and social.     Review of Systems   Constitutional:  Negative for chills and fever.   Gastrointestinal:  Negative for abdominal pain.   Genitourinary:  Positive for frequency, urgency and vaginal pain. Negative for difficulty urinating, dysuria, genital sores, pelvic pain and vaginal discharge.        Medications:  Encounter Medications[2]    Allergies:  Review of patient's allergies indicates:   Allergen Reactions    Anesthesia s/i-40 (propofol) [propofol] Itching     Anesthesia, possibly propofol; made her feel like there were ants all over her       Health Maintenance:  Immunization History   Administered Date(s) Administered    Influenza - Trivalent - Flucelvax - PF 10/09/2024      Health Maintenance   Topic Date Due    Diabetic Eye Exam  Never done    TETANUS VACCINE  Never done    COVID-19 Vaccine (1 - 2024-25 season) Never done    Foot Exam  12/01/2024    Diabetes Urine Screening  04/02/2025    Hemoglobin A1c  06/18/2025    Mammogram  12/11/2025    Low Dose Statin  02/13/2026    Lipid Panel  03/18/2026    Cervical Cancer Screening  02/12/2030    RSV Vaccine (Age 60+  "and Pregnant patients) (1 - 1-dose 75+ series) 11/22/2056    Hepatitis C Screening  Completed    HIV Screening  Completed    Influenza Vaccine  Discontinued    Pneumococcal Vaccines (Age 0-49)  Discontinued        Physical Exam      Vital Signs  Temp: 98 °F (36.7 °C)  Temp Source: Oral  Pulse: 76  Resp: 14  SpO2:  (unable to obtain due to nails)  BP: 135/85  Pain Score:   7  Pain Loc: Back  Height and Weight  Height: 5' 2" (157.5 cm)  Weight: 110.2 kg (243 lb)  BSA (Calculated - sq m): 2.2 sq meters  BMI (Calculated): 44.4  Weight in (lb) to have BMI = 25: 136.4]    Physical Exam  Vitals and nursing note reviewed.   Constitutional:       Appearance: Normal appearance. She is well-developed.   HENT:      Head: Normocephalic.      Right Ear: Hearing normal.      Left Ear: Hearing normal.      Nose: Nose normal.   Eyes:      General: Lids are normal.      Conjunctiva/sclera: Conjunctivae normal.   Cardiovascular:      Rate and Rhythm: Normal rate and regular rhythm.      Heart sounds: Normal heart sounds.   Pulmonary:      Effort: Pulmonary effort is normal.      Breath sounds: Normal breath sounds.   Genitourinary:     General: Normal vulva.      Labia:         Right: No tenderness or lesion.         Left: No tenderness or lesion.       Comments: No abnormalities noted on exam  Musculoskeletal:         General: Normal range of motion.      Cervical back: Normal range of motion and neck supple.      Right lower leg: No edema.      Left lower leg: No edema.   Skin:     General: Skin is warm and dry.   Neurological:      Mental Status: She is alert and oriented to person, place, and time.      Gait: Gait is intact.   Psychiatric:         Behavior: Behavior is cooperative.          Laboratory:  CBC:  Recent Labs   Lab 12/13/24  2258 03/18/25  1643 03/22/25  0444   WBC 10.39 8.03 9.27   RBC 5.94 H 6.49 H 5.78 H   Hemoglobin 13.2 14.4 13.0   Hematocrit 44.2 48.9 H 43.1   Platelet Count 225 237 228   MCV 74.4 L 75.3 L 74.6 L "   MCH 22.2 L 22.2 L 22.5 L   MCHC 29.9 L 29.4 L 30.2 L     CMP:  Recent Labs   Lab 12/13/24  2258 03/18/25  1643 03/22/25  0444   Glucose 162 H 225 H 257 H   Calcium 9.3 9.5 9.0   Albumin 3.7 4.0 3.8   Total Protein 7.9 7.7 7.4   Sodium 134 L 138 135 L   Potassium 4.6 3.9 4.2   CO2 21 L 27 20 L   Chloride 106 103 105   BUN 12 12 16   Alk Phos 113 119 130   ALT 58 H 52 31   AST 64 H 39 29   Bilirubin, Total 0.3 0.7 0.2     LIPIDS:  Recent Labs   Lab 12/01/23  1108 07/02/24  0852 10/09/24  1016 12/30/24  0940 03/18/25  1643   TSH 7.600 H   < > 11.500 H 6.867 H 1.214   HDL Cholesterol 40  --   --  40 45   Cholesterol 112  --   --  147 144   Triglycerides 120  --   --  112 112   LDL Calculated 48  --   --  85 77   Cholesterol/HDL Ratio (Risk Factor) 2.8  --   --  3.7 3.2   Non-HDL 72  --   --  107 99    < > = values in this interval not displayed.     TSH:  Recent Labs   Lab 10/09/24  1016 12/30/24  0940 03/18/25  1643   TSH 11.500 H 6.867 H 1.214     A1C:  Recent Labs   Lab 06/03/22  1520 09/16/22  1500 03/16/23  0953 07/25/23  1058 12/01/23  1108 04/02/24  1107 07/02/24  0852 10/09/24  1016 12/30/24  0940 03/18/25  1643   Hemoglobin A1C 5.5 6.0 6.0 5.8 7.6 H 6.6 7.8 H 7.0 H 7.4 H 9.7 H       Assessment/Plan     Mary Magana is a 44 y.o.female with:     1. Urine frequency  -     Urinalysis, Reflex to Urine Culture  -     nitrofurantoin, macrocrystal-monohydrate, (MACROBID) 100 MG capsule; Take 1 capsule (100 mg total) by mouth 2 (two) times daily.  Dispense: 14 capsule; Refill: 0  -     Urinalysis, Microscopic  -     Urine culture    2. Type 2 diabetes mellitus without complication, without long-term current use of insulin  -     CBC Auto Differential  -     Comprehensive Metabolic Panel  -     Hemoglobin A1C    3. Hyperlipidemia, unspecified hyperlipidemia type  -     Lipid Panel    4. Hypothyroidism, unspecified type  -     TSH  -     T4, Free         Total time spent face-to-face and non-face-to-face  coordinating care for this encounter was: 20 minutes    Chronic conditions status updated as per HPI.  Other than changes above, cont current medications and maintain follow up with specialists.  Return to clinic prn if symptoms worsen or fail to improve.    Ronna Boogie, [facility]         [1]   Social History  Tobacco Use    Smoking status: Some Days     Current packs/day: 0.25     Average packs/day: 0.3 packs/day for 15.0 years (3.8 ttl pk-yrs)     Types: Cigarettes, Vaping with nicotine     Last attempt to quit: 2022     Passive exposure: Past    Smokeless tobacco: Never    Tobacco comments:     1 pack per 2 weeks   Substance Use Topics    Alcohol use: Never    Drug use: Never   [2]   Outpatient Encounter Medications as of 3/18/2025   Medication Sig Dispense Refill    aspirin (ECOTRIN) 81 MG EC tablet TAKE 1 TABLET BY MOUTH DAILY WITH FOOD 30 tablet 5    atorvastatin (LIPITOR) 10 MG tablet Take 1 tablet (10 mg total) by mouth every evening. 90 tablet 1    baclofen (LIORESAL) 10 MG tablet Take 1 tablet (10 mg total) by mouth nightly as needed (spasm). 30 tablet 0    buPROPion (WELLBUTRIN XL) 150 MG TB24 tablet Take 150 mg by mouth every morning.      clonazePAM (KLONOPIN) 1 MG tablet Take 1 mg by mouth nightly as needed for Anxiety.      famotidine (PEPCID) 40 MG tablet Take 1 tablet (40 mg total) by mouth once daily. 30 tablet 4    ferrous sulfate (IRON) 325 mg (65 mg iron) Tab tablet Take 1 tablet (325 mg total) by mouth daily with breakfast. 30 tablet 5    glimepiride (AMARYL) 1 MG tablet Take 1 tablet (1 mg total) by mouth every morning. 90 tablet 1    hydrOXYzine pamoate (VISTARIL) 25 MG Cap Take 1 to 2 capsule up to tid prn for severe anxiety 30 capsule 2    levothyroxine (SYNTHROID) 175 MCG tablet Take 1 tablet (175 mcg total) by mouth before breakfast. 30 tablet 11    meloxicam (MOBIC) 7.5 MG tablet Take 1 po bid with plenty of food and water 60 tablet 0    metFORMIN (GLUCOPHAGE) 1000 MG  tablet Take 1 tablet (1,000 mg total) by mouth 2 (two) times daily with meals. 180 tablet 1    metoprolol tartrate (LOPRESSOR) 25 MG tablet Take 1 tablet (25 mg total) by mouth 2 (two) times daily. 60 tablet 5    nitrofurantoin, macrocrystal-monohydrate, (MACROBID) 100 MG capsule Take 1 capsule (100 mg total) by mouth 2 (two) times daily. 14 capsule 0    omeprazole (PRILOSEC) 40 MG capsule Take 1 capsule (40 mg total) by mouth once daily. 30 capsule 2    predniSONE (DELTASONE) 20 MG tablet 3 PO DAILY X 3 DAYS, THEN 2 PO DAILY X 3 DAYS, THEN 1 PO DAILY X 3 DAYS, THEN STOP. 18 tablet 0    TRUEPLUS LANCETS 30 gauge Misc AS DIRECTED       Facility-Administered Encounter Medications as of 3/18/2025   Medication Dose Route Frequency Provider Last Rate Last Admin    influenza (Egg-FREE) (Flucelvax) 45 mcg/0.5 mL IM vaccine (> or = 6 mo) (*contains preservatives) 0.5 mL  0.5 mL Intramuscular 1 time in Clinic/HOD Huber Clayton MD

## 2025-03-18 NOTE — PROGRESS NOTES
Outpatient Rehab    Physical Therapy Visit    Patient Name: Mary Magana  MRN: [id number]  YOB: 1980  Encounter Date: 3/18/2025    Therapy Diagnosis: No diagnosis found.  Physician: Mario Alberto Claire PA    Physician Orders: Eval and Treat  Medical Diagnosis: Lumbar spondylosis  Radiculopathy, cervical region    Visit # / Visits Authorized:  3 / 11 including eval  Date of Evaluation:  3/11/2025   Insurance Authorization Period: 2/25/25 to 2/25/26   Plan of Care Certification:  3/11/2025 to 6/4/25        PT/PTA:     Number of PTA visits since last PT visit:   Time In: 1056   Time Out: 1135  Total Time: 39   Total Billable Time: 39    FOTO:  Intake Score:  %  Survey Score 1:  %  Survey Score 2:  %         Subjective   I felt better after last session and was able to sleep better. Currently the low back is burning..  Pain reported as 9/10.      Objective            Treatment:  Therapeutic Exercise  TE 1: bike 5 minutes  TE 2: Slant board 4 x 15 second hold  TE 4: Sitting ball trunk stretch x 3 - had pain and increased symptoms so this exercise was stopped.  TE 5: Standing HS stretch on stairs 3 x 15 seconds  Other Activities  Activity 1: Mechanical lumbar traction x 15 minutes 45 hold/15 second rest 60lb pull    Time Entry(in minutes):  Mechanical Traction Time Entry: 15  Therapeutic Exercise Time Entry: 15    Assessment & Plan   Assessment: Patient arrived with a very high pain level. The sitting ball trunk stretch and most of the exercises increased the pain so patient was placed on mechanical traction to attempt to relieve symptoms. Patient did not have a big change after traction but will re-assess traction outcome next session.  Evaluation/Treatment Tolerance: Patient limited by pain    Patient will continue to benefit from skilled outpatient physical therapy to address the deficits listed in the problem list box on initial evaluation, provide pt/family education and to maximize pt's level  of independence in the home and community environment.     Patient's spiritual, cultural, and educational needs considered and patient agreeable to plan of care and goals.     Education  Education was done with Patient. The patient's learning style includes Listening. The patient Verbalizes understanding.                 Plan: continue current POC    Goals:   Active       Ambulation/movement       Patient will walk 10 minutes with 0/10 in the low back (Progressing)       Start:  03/12/25    Expected End:  04/23/25               Function       Patient will report no right arm radicular symptoms by D/C (Progressing)       Start:  03/12/25    Expected End:  06/04/25               Functional outcome       Patient will show a significant change in FOTO by 10 point increase for patient-reported outcome tool to demonstrate subjective improvement (Progressing)       Start:  03/12/25    Expected End:  06/04/25            Patient will demonstrate independence in home program for support of progression (Progressing)       Start:  03/12/25    Expected End:  06/04/25               Pain       Patient will report pain of 0/10 demonstrating a reduction of overall pain (Progressing)       Start:  03/12/25    Expected End:  06/04/25               Strength       Patient will achieve bilateral hip extension strength of 5/5 (Progressing)       Start:  03/12/25    Expected End:  04/23/25                KARLOS JEFFRIES, PT

## 2025-03-20 ENCOUNTER — CLINICAL SUPPORT (OUTPATIENT)
Dept: REHABILITATION | Facility: HOSPITAL | Age: 45
End: 2025-03-20
Payer: COMMERCIAL

## 2025-03-20 ENCOUNTER — TELEPHONE (OUTPATIENT)
Dept: FAMILY MEDICINE | Facility: CLINIC | Age: 45
End: 2025-03-20
Payer: COMMERCIAL

## 2025-03-20 ENCOUNTER — RESULTS FOLLOW-UP (OUTPATIENT)
Dept: FAMILY MEDICINE | Facility: CLINIC | Age: 45
End: 2025-03-20

## 2025-03-20 DIAGNOSIS — M47.816 LUMBAR SPONDYLOSIS: Primary | ICD-10-CM

## 2025-03-20 LAB — UA COMPLETE W REFLEX CULTURE PNL UR: NORMAL

## 2025-03-20 PROCEDURE — 97112 NEUROMUSCULAR REEDUCATION: CPT

## 2025-03-20 PROCEDURE — 97110 THERAPEUTIC EXERCISES: CPT

## 2025-03-20 NOTE — PROGRESS NOTES
Outpatient Rehab    Physical Therapy Visit    Patient Name: Mary Magana  MRN: [id number]  YOB: 1980  Encounter Date: 3/20/2025    Therapy Diagnosis: No diagnosis found.  Physician: Mario Alberto Claire PA    Physician Orders: Eval and Treat  Medical Diagnosis: Lumbar spondylosis  Radiculopathy, cervical region    Visit # / Visits Authorized:  3 / 10  Date of Evaluation:  3/11/2025   Insurance Authorization Period: 2/25/25 to 2/25/26  Plan of Care Certification:  3/11/2025 to 6/4/25     PT/PTA:     Number of PTA visits since last PT visit:   Time In: 1133   Time Out: 1200  Total Time: 27   Total Billable Time: 27    FOTO:  Intake Score:  %  Survey Score 1:  %  Survey Score 2:  %         Subjective   The low back pain is still there but I did sleep better after last session..  Pain reported as 6/10.      Objective            Treatment:  Therapeutic Exercise  TE 1: bike 5 minutes  TE 2: Slant board 4 x 15 second hold  TE 4: Sitting ball trunk stretch x 3 - had pain and increased symptoms so this exercise was stopped.  TE 6: Sitting bilateral piriformis stretch - stopped secondary to increase pain  Balance/Neuromuscular Re-Education  NMR 6: Cybex hip abduction 2pl x 20 bilateral  NMR 7: Cybex hip flexion 2pl x 20 bilateral  NMR 8: Cybex hip extension 2pl x 20 bilateral    Time Entry(in minutes):  Neuromuscular Re-Education Time Entry: 10  Therapeutic Exercise Time Entry: 17    Assessment & Plan   Assessment: Patient completed stretching and core/hip stabilization exericses. Patient has increased symptoms with lying on back during mat exercises and also has increased pain with any activity that requires forward trunk flexion. The sitting ball stretch, piriformis stretch and the cybex back extension are unable to be completed at this time.  Evaluation/Treatment Tolerance: Patient limited by pain    Patient will continue to benefit from skilled outpatient physical therapy to address the deficits  listed in the problem list box on initial evaluation, provide pt/family education and to maximize pt's level of independence in the home and community environment.     Patient's spiritual, cultural, and educational needs considered and patient agreeable to plan of care and goals.     Education  Education was done with Patient. The patient's learning style includes Listening. The patient Verbalizes understanding.                 Plan: continue current POC    Goals:   Active       Ambulation/movement       Patient will walk 10 minutes with 0/10 in the low back (Progressing)       Start:  03/12/25    Expected End:  04/23/25               Function       Patient will report no right arm radicular symptoms by D/C (Progressing)       Start:  03/12/25    Expected End:  06/04/25               Functional outcome       Patient will show a significant change in FOTO by 10 point increase for patient-reported outcome tool to demonstrate subjective improvement (Progressing)       Start:  03/12/25    Expected End:  06/04/25            Patient will demonstrate independence in home program for support of progression (Progressing)       Start:  03/12/25    Expected End:  06/04/25               Pain       Patient will report pain of 0/10 demonstrating a reduction of overall pain (Progressing)       Start:  03/12/25    Expected End:  06/04/25               Strength       Patient will achieve bilateral hip extension strength of 5/5 (Progressing)       Start:  03/12/25    Expected End:  04/23/25                KARLOS JEFFRIES, PT

## 2025-03-20 NOTE — TELEPHONE ENCOUNTER
Notified patient of results. Patient voiced understanding.    Patient has an appointment scheduled already.

## 2025-03-20 NOTE — TELEPHONE ENCOUNTER
----- Message from Huber Clayton MD sent at 3/20/2025  8:08 AM CDT -----  Office visit for diabetes and declining renal function.  We need to discuss her elevated hematocrit but are thyroid studies are normal.  Lipids are good.  ----- Message -----  From: Lab, Background User  Sent: 3/18/2025   8:05 PM CDT  To: Huber Clayton MD

## 2025-03-22 ENCOUNTER — HOSPITAL ENCOUNTER (EMERGENCY)
Facility: HOSPITAL | Age: 45
Discharge: HOME | End: 2025-03-22
Attending: EMERGENCY MEDICINE
Payer: COMMERCIAL

## 2025-03-22 VITALS
OXYGEN SATURATION: 98 % | HEIGHT: 62 IN | BODY MASS INDEX: 44.72 KG/M2 | HEART RATE: 73 BPM | RESPIRATION RATE: 16 BRPM | TEMPERATURE: 99 F | DIASTOLIC BLOOD PRESSURE: 83 MMHG | WEIGHT: 243 LBS | SYSTOLIC BLOOD PRESSURE: 129 MMHG

## 2025-03-22 DIAGNOSIS — F41.9 ANXIETY: ICD-10-CM

## 2025-03-22 DIAGNOSIS — R07.9 CHEST PAIN: ICD-10-CM

## 2025-03-22 DIAGNOSIS — R07.89 ATYPICAL CHEST PAIN: Primary | ICD-10-CM

## 2025-03-22 LAB
ACETONE SERPL QL SCN: NEGATIVE
ALBUMIN SERPL BCP-MCNC: 3.8 G/DL (ref 3.5–5)
ALBUMIN/GLOB SERPL: 1.1 {RATIO}
ALP SERPL-CCNC: 130 U/L (ref 40–150)
ALT SERPL W P-5'-P-CCNC: 31 U/L
ANION GAP SERPL CALCULATED.3IONS-SCNC: 14 MMOL/L (ref 7–16)
AST SERPL W P-5'-P-CCNC: 29 U/L (ref 11–45)
BASOPHILS # BLD AUTO: 0.08 K/UL (ref 0–0.2)
BASOPHILS NFR BLD AUTO: 0.9 % (ref 0–1)
BILIRUB SERPL-MCNC: 0.2 MG/DL
BUN SERPL-MCNC: 16 MG/DL (ref 7–19)
BUN/CREAT SERPL: 14 (ref 6–20)
CALCIUM SERPL-MCNC: 9 MG/DL (ref 8.4–10.2)
CHLORIDE SERPL-SCNC: 105 MMOL/L (ref 98–107)
CO2 SERPL-SCNC: 20 MMOL/L (ref 22–29)
CREAT SERPL-MCNC: 1.13 MG/DL (ref 0.55–1.02)
DIFFERENTIAL METHOD BLD: ABNORMAL
EGFR (NO RACE VARIABLE) (RUSH/TITUS): 62 ML/MIN/1.73M2
EOSINOPHIL # BLD AUTO: 0.35 K/UL (ref 0–0.5)
EOSINOPHIL NFR BLD AUTO: 3.8 % (ref 1–4)
ERYTHROCYTE [DISTWIDTH] IN BLOOD BY AUTOMATED COUNT: 13.5 % (ref 11.5–14.5)
GLOBULIN SER-MCNC: 3.6 G/DL (ref 2–4)
GLUCOSE SERPL-MCNC: 247 MG/DL (ref 70–105)
GLUCOSE SERPL-MCNC: 257 MG/DL (ref 74–100)
HCT VFR BLD AUTO: 43.1 % (ref 38–47)
HGB BLD-MCNC: 13 G/DL (ref 12–16)
HYPOCHROMIA BLD QL SMEAR: ABNORMAL
IMM GRANULOCYTES # BLD AUTO: 0.03 K/UL (ref 0–0.04)
IMM GRANULOCYTES NFR BLD: 0.3 % (ref 0–0.4)
LYMPHOCYTES # BLD AUTO: 3.78 K/UL (ref 1–4.8)
LYMPHOCYTES NFR BLD AUTO: 40.8 % (ref 27–41)
MCH RBC QN AUTO: 22.5 PG (ref 27–31)
MCHC RBC AUTO-ENTMCNC: 30.2 G/DL (ref 32–36)
MCV RBC AUTO: 74.6 FL (ref 80–96)
MICROCYTES BLD QL SMEAR: ABNORMAL
MONOCYTES # BLD AUTO: 0.6 K/UL (ref 0–0.8)
MONOCYTES NFR BLD AUTO: 6.5 % (ref 2–6)
MPC BLD CALC-MCNC: 11.6 FL (ref 9.4–12.4)
NEUTROPHILS # BLD AUTO: 4.43 K/UL (ref 1.8–7.7)
NEUTROPHILS NFR BLD AUTO: 47.7 % (ref 53–65)
NRBC # BLD AUTO: 0 X10E3/UL
NRBC, AUTO (.00): 0 %
NT-PROBNP SERPL-MCNC: 39 PG/ML (ref 1–125)
PLATELET # BLD AUTO: 228 K/UL (ref 150–400)
PLATELET MORPHOLOGY: ABNORMAL
POTASSIUM SERPL-SCNC: 4.2 MMOL/L (ref 3.5–5.1)
PROT SERPL-MCNC: 7.4 G/DL (ref 6.4–8.3)
RBC # BLD AUTO: 5.78 M/UL (ref 4.2–5.4)
SODIUM SERPL-SCNC: 135 MMOL/L (ref 136–145)
TARGETS BLD QL SMEAR: ABNORMAL
TROPONIN I SERPL HS-MCNC: <2.7 NG/L
WBC # BLD AUTO: 9.27 K/UL (ref 4.5–11)

## 2025-03-22 PROCEDURE — 82009 KETONE BODYS QUAL: CPT | Performed by: EMERGENCY MEDICINE

## 2025-03-22 PROCEDURE — 82962 GLUCOSE BLOOD TEST: CPT

## 2025-03-22 PROCEDURE — 80053 COMPREHEN METABOLIC PANEL: CPT | Performed by: EMERGENCY MEDICINE

## 2025-03-22 PROCEDURE — 99285 EMERGENCY DEPT VISIT HI MDM: CPT | Mod: 25

## 2025-03-22 PROCEDURE — 85025 COMPLETE CBC W/AUTO DIFF WBC: CPT | Performed by: EMERGENCY MEDICINE

## 2025-03-22 PROCEDURE — 84484 ASSAY OF TROPONIN QUANT: CPT | Performed by: EMERGENCY MEDICINE

## 2025-03-22 PROCEDURE — 96374 THER/PROPH/DIAG INJ IV PUSH: CPT

## 2025-03-22 PROCEDURE — 93005 ELECTROCARDIOGRAM TRACING: CPT

## 2025-03-22 PROCEDURE — 63600175 PHARM REV CODE 636 W HCPCS: Performed by: EMERGENCY MEDICINE

## 2025-03-22 PROCEDURE — 83880 ASSAY OF NATRIURETIC PEPTIDE: CPT | Performed by: EMERGENCY MEDICINE

## 2025-03-22 RX ORDER — MIDAZOLAM HYDROCHLORIDE 2 MG/2ML
2 INJECTION, SOLUTION INTRAMUSCULAR; INTRAVENOUS
Status: COMPLETED | OUTPATIENT
Start: 2025-03-22 | End: 2025-03-22

## 2025-03-22 RX ADMIN — MIDAZOLAM HYDROCHLORIDE 2 MG: 1 INJECTION, SOLUTION INTRAMUSCULAR; INTRAVENOUS at 04:03

## 2025-03-22 NOTE — ED PROVIDER NOTES
Encounter Date: 3/22/2025       History     Chief Complaint   Patient presents with    Chest Pain     ONSET TONIGHT WHILE SITTING DOWN; DESCRIBES AS DULL, RIGHT SIDED, NO AGGRAVATING OR ALLEVIATING FACTORS, ONSET ONE HOUR PTA     This is a 44-year-old female with history of obesity, diabetes, hypertension, anxiety.  Patient states that she was watching TV 9 hour ago when she began having Drew right-sided chest pain that lasted for just a few minutes.  Patient states she had about 3 different episodes like that and has had no symptoms since.  No fever, no cough, no nausea, no vomiting, no diaphoresis, no radiation of pain.  Patient states that her blood sugar has been running higher than normal lately and she is worried about that.  Patient states she has had similar episodes like this in the past that have been associated with anxiety.  Patient has previously been on Klonopin for anxiety but has been out of that for about a month.        Review of patient's allergies indicates:   Allergen Reactions    Anesthesia s/i-40 (propofol) [propofol] Itching     Anesthesia, possibly propofol; made her feel like there were ants all over her     Past Medical History:   Diagnosis Date    Diabetes mellitus     Hypertension     Thyroid disease     WPW (Ilana-Parkinson-White syndrome)      Past Surgical History:   Procedure Laterality Date    CHOLECYSTECTOMY      TUBAL LIGATION       Family History   Problem Relation Name Age of Onset    Hypertension Mother      Diabetes Mother      Diabetes Father      Diabetes Sister      Leukemia Daughter       Social History[1]  Review of Systems   Cardiovascular:  Positive for chest pain.   All other systems reviewed and are negative.      Physical Exam     Initial Vitals   BP Pulse Resp Temp SpO2   03/22/25 0408 03/22/25 0408 03/22/25 0408 03/22/25 0417 03/22/25 0408   (!) 166/93 86 20 98.8 °F (37.1 °C) 100 %      MAP       --                Physical Exam    Nursing note and vitals  reviewed.  Constitutional: She appears well-developed and well-nourished.   HENT:   Head: Normocephalic and atraumatic.   Eyes: Pupils are equal, round, and reactive to light.   Cardiovascular:  Normal rate.           Pulmonary/Chest: Breath sounds normal.   Abdominal: Abdomen is soft.   Musculoskeletal:         General: Normal range of motion.     Neurological: She is alert.   Skin: Skin is warm. Capillary refill takes less than 2 seconds.   Psychiatric: She has a normal mood and affect.         Medical Screening Exam   See Full Note    ED Course   Procedures  Labs Reviewed   COMPREHENSIVE METABOLIC PANEL - Abnormal       Result Value    Sodium 135 (*)     Potassium 4.2      Chloride 105      CO2 20 (*)     Anion Gap 14      Glucose 257 (*)     BUN 16      Creatinine 1.13 (*)     BUN/Creatinine Ratio 14      Calcium 9.0      Total Protein 7.4      Albumin 3.8      Globulin 3.6      A/G Ratio 1.1      Bilirubin, Total 0.2      Alk Phos 130      ALT 31      AST 29      eGFR 62     CBC WITH DIFFERENTIAL - Abnormal    WBC 9.27      RBC 5.78 (*)     Hemoglobin 13.0      Hematocrit 43.1      MCV 74.6 (*)     MCH 22.5 (*)     MCHC 30.2 (*)     RDW 13.5      Platelet Count 228      MPV 11.6      Neutrophils % 47.7 (*)     Lymphocytes % 40.8      Monocytes % 6.5 (*)     Eosinophils % 3.8      Basophils % 0.9      Immature Granulocytes % 0.3      nRBC, Auto 0.0      Neutrophils, Abs 4.43      Lymphocytes, Absolute 3.78      Monocytes, Absolute 0.60      Eosinophils, Absolute 0.35      Basophils, Absolute 0.08      Immature Granulocytes, Absolute 0.03      nRBC, Absolute 0.00      Diff Type Scan Smear     POCT GLUCOSE MONITORING CONTINUOUS - Abnormal    POC Glucose 247 (*)    TROPONIN I - Normal    Troponin I High Sensitivity <2.7     NT-PRO NATRIURETIC PEPTIDE - Normal    ProBNP 39     CBC W/ AUTO DIFFERENTIAL    Narrative:     The following orders were created for panel order CBC auto differential.  Procedure                                Abnormality         Status                     ---------                               -----------         ------                     CBC with Differential[[id number]]       Abnormal            Final result                 Please view results for these tests on the individual orders.   ACETONE    Acetone Negative            Imaging Results              X-Ray Chest AP Portable (In process)  Result time 03/22/25 04:45:33                     Medications   midazolam (PF) (VERSED) 1 mg/mL injection 2 mg (2 mg Intravenous Given 3/22/25 0800)     Medical Decision Making  Amount and/or Complexity of Data Reviewed  Labs: ordered.  Radiology: ordered.    Risk  Prescription drug management.               ED Course as of 03/22/25 0539   Sat Mar 22, 2025   6044 Medical decision-making:  Differential diagnosis includes chest pain, anxiety, hyperglycemia.  All testing ordered and interpreted by me.   [BB]   0434 EKG by my interpretation shows sinus rhythm, rate of 84, no nonspecific ST changes. [BB]   0537 Initial troponin is normal.  CMP is unremarkable except hyperglycemia.  CBC is normal.  Pro BNP is normal. [BB]   0537 Chest x-ray by my interpretation shows no acute disease. [BB]   0538 On repeat exam after Versed 2 mg IV patient states she is feeling better and ready for discharge home. [BB]      ED Course User Index  [BB] Rajat Zapata MD                           Clinical Impression:   Final diagnoses:  [R07.9] Chest pain  [R07.89] Atypical chest pain (Primary)  [F41.9] Anxiety        ED Disposition Condition    Discharge Stable          ED Prescriptions    None       Follow-up Information    None            Rajat Zapata MD  03/22/25 0319       [1]   Social History  Tobacco Use    Smoking status: Some Days     Current packs/day: 0.25     Average packs/day: 0.3 packs/day for 15.0 years (3.8 ttl pk-yrs)     Types: Cigarettes, Vaping with nicotine     Last attempt to quit: 2022     Passive exposure: Past     Smokeless tobacco: Never    Tobacco comments:     1 pack per 2 weeks   Substance Use Topics    Alcohol use: Never    Drug use: Never        Rajat Zapata MD  03/22/25 0583

## 2025-03-24 ENCOUNTER — TELEPHONE (OUTPATIENT)
Dept: EMERGENCY MEDICINE | Facility: HOSPITAL | Age: 45
End: 2025-03-24
Payer: COMMERCIAL

## 2025-03-24 NOTE — PROGRESS NOTES
Urine culture was negative.  She did have some white blood cells and bacteria.  This could be due to her excessive glucose in her urine from her diabetes.  Patient has been advised by Dr. Clayton to come in and see him for f/u.

## 2025-03-25 ENCOUNTER — TELEPHONE (OUTPATIENT)
Dept: FAMILY MEDICINE | Facility: CLINIC | Age: 45
End: 2025-03-25
Payer: COMMERCIAL

## 2025-03-25 ENCOUNTER — CLINICAL SUPPORT (OUTPATIENT)
Dept: REHABILITATION | Facility: HOSPITAL | Age: 45
End: 2025-03-25
Payer: COMMERCIAL

## 2025-03-25 DIAGNOSIS — M47.816 LUMBAR SPONDYLOSIS: Primary | ICD-10-CM

## 2025-03-25 PROCEDURE — 97110 THERAPEUTIC EXERCISES: CPT

## 2025-03-25 PROCEDURE — 97112 NEUROMUSCULAR REEDUCATION: CPT

## 2025-03-25 NOTE — PROGRESS NOTES
Outpatient Rehab    Physical Therapy Visit    Patient Name: Mary Magana  MRN: [id number]  YOB: 1980  Encounter Date: 3/25/2025    Therapy Diagnosis: No diagnosis found.  Physician: Mario Alberto Claire PA    Physician Orders: Eval and Treat  Medical Diagnosis: Lumbar spondylosis  Radiculopathy, cervical region    Visit # / Visits Authorized:  4 / 10  Insurance Authorization Period: 3/11/2025 to 6/10/2025  Date of Evaluation:  3/11/2025   Plan of Care Certification:  3/11/2025 to 6/4/25      PT/PTA:     Number of PTA visits since last PT visit:   Time In: 1135   Time Out: 1200  Total Time: 25   Total Billable Time:      FOTO:  Intake Score:  %  Survey Score 1:  %  Survey Score 2:  %         Subjective   Hurting more after last session. Not sure what it was that caused it..  Pain reported as 7/10.      Objective            Treatment:  Therapeutic Exercise  TE 1: bike 5 minutes  TE 2: Slant board 4 x 15 second hold  TE 5: Standing HS stretch on stairs 3 x 15 seconds  Balance/Neuromuscular Re-Education  NMR 2: bridges on ball  2 x 10  NMR 4: clamshell 3 x 10 gray TB  NMR 5: lower truck rotation w/ball 2 x 10  NMR 9: HS roll w/ball x 20  NMR 10: Supine bilateral arm press with marching into ball x 20    Time Entry(in minutes):  Neuromuscular Re-Education Time Entry: 8  Therapeutic Exercise Time Entry: 17    Assessment & Plan   Assessment: It was noted that the cybex hip dynamic exercises were added last session which is most likely what caused an increase in pain. This exercise was avoided today and patient was returned to mat exercises for stability. Patient felt abdominal strengthening during the supine ball press. Did not feel the core during supine shoulder extension so this was eliminated. Patient did fatigue but will be progressed as able.  Evaluation/Treatment Tolerance: Patient limited by fatigue    Patient will continue to benefit from skilled outpatient physical therapy to address the  deficits listed in the problem list box on initial evaluation, provide pt/family education and to maximize pt's level of independence in the home and community environment.     Patient's spiritual, cultural, and educational needs considered and patient agreeable to plan of care and goals.     Education  Education was done with Patient. The patient's learning style includes Listening. The patient Verbalizes understanding.                 Plan: continue current POC    Goals:   Active       Ambulation/movement       Patient will walk 10 minutes with 0/10 in the low back (Progressing)       Start:  03/12/25    Expected End:  04/23/25               Function       Patient will report no right arm radicular symptoms by D/C (Progressing)       Start:  03/12/25    Expected End:  06/04/25               Functional outcome       Patient will show a significant change in FOTO by 10 point increase for patient-reported outcome tool to demonstrate subjective improvement (Progressing)       Start:  03/12/25    Expected End:  06/04/25            Patient will demonstrate independence in home program for support of progression (Progressing)       Start:  03/12/25    Expected End:  06/04/25               Pain       Patient will report pain of 0/10 demonstrating a reduction of overall pain (Progressing)       Start:  03/12/25    Expected End:  06/04/25               Strength       Patient will achieve bilateral hip extension strength of 5/5 (Progressing)       Start:  03/12/25    Expected End:  04/23/25                KARLOS JEFFRIES, PT

## 2025-03-25 NOTE — TELEPHONE ENCOUNTER
----- Message from HENRI Pérez sent at 3/24/2025 11:45 AM CDT -----  Urine culture was negative.  She did have some white blood cells and bacteria.  This could be due to her excessive glucose in her urine from her diabetes.  Patient has been advised by Dr. Clayton   to come in and see him for f/u.   ----- Message -----  From: Lab, Background User  Sent: 3/18/2025  10:04 PM CDT  To: HENRI Shane

## 2025-03-27 ENCOUNTER — PATIENT OUTREACH (OUTPATIENT)
Facility: OTHER | Age: 45
End: 2025-03-27
Payer: COMMERCIAL

## 2025-03-27 NOTE — PROGRESS NOTES
Janine Peck LPN  ED Navigator  Emergency Department    Project: [facility] ED Navigator  Role: Community Health Worker    Date: 03/27/2025  Patient Name: Mary Magana  MRN: [id number]  PCP: Huber Clayton MD    Assessment:     Mary Magana is a 44 y.o. female who has presented to ED for chest pain. Patient has visited the ED 3 times in the past 3 months. Patient did not contact PCP.     ED Navigator Initial Assessment    ED Navigator Enrollment Documentation  Consent to Services  Does patient consent to completing the assessment?: Yes  Contact  Method of Initial Contact: Phone  Transportation  Insurance Coverage  Do you have coverage/adequate coverage?: Yes  Specialist Appointment  Did the patient come to the ED to see a specialist?: No  Does the patient have a pending specialist referral?: No  Does the patient have a specialist appointment made?: No  PCP Follow Up Appointment  Medications  Is patient able to afford medication?: Yes  Psychological  Food  Communication/Education  Other Financial Concerns  Other Social Barriers/Concerns  Primary Barrier  Plan: Provided information for [facility] 24/7 Nurse triage line, [phone] or 1-866-[facility] ([phone])         Social History     Socioeconomic History    Marital status: Single    Number of children: 6    Years of education: 12   Occupational History    Occupation: None   Tobacco Use    Smoking status: Some Days     Current packs/day: 0.25     Average packs/day: 0.3 packs/day for 15.0 years (3.8 ttl pk-yrs)     Types: Cigarettes, Vaping with nicotine     Last attempt to quit: 2022     Passive exposure: Past    Smokeless tobacco: Never    Tobacco comments:     1 pack per 2 weeks   Substance and Sexual Activity    Alcohol use: Never    Drug use: Never    Sexual activity: Not Currently     Social Drivers of Health     Financial Resource Strain: Low Risk  (3/27/2025)    Overall Financial Resource Strain (CARDIA)     Difficulty of Paying Living  Expenses: Not hard at all   Food Insecurity: No Food Insecurity (3/27/2025)    Hunger Vital Sign     Worried About Running Out of Food in the Last Year: Never true     Ran Out of Food in the Last Year: Never true   Transportation Needs: No Transportation Needs (3/27/2025)    PRAPARE - Transportation     Lack of Transportation (Medical): No     Lack of Transportation (Non-Medical): No   Physical Activity: Sufficiently Active (3/27/2025)    Exercise Vital Sign     Days of Exercise per Week: 7 days     Minutes of Exercise per Session: 30 min   Stress: Stress Concern Present (3/27/2025)    [employer] - Occupational Stress Questionnaire     Feeling of Stress : Rather much   Housing Stability: Low Risk  (3/27/2025)    Housing Stability Vital Sign     Unable to Pay for Housing in the Last Year: No     Homeless in the Last Year: No       Plan:   ED navigator spoke with patient about ED visit. Patient states that she hasn't had anymore chest pain since ED visit. Patient states that she had an appointment tomorrow with PCP but they called and rescheduled appointment. Patient has an appointment with Huber Chavarria on 4-10-25 at 10:00 am. Patient states that she is going to wait until then to follow up with PCP. Patient denies any new needs or needing any additional assistance at this time. ED Navigator gave [facility]7 Nurse triage line ([phone] or 1-866-[facility] ([phone]) contact information, in addition to office contact information if further assistance is needed in the future. ED navigator provided patient with the following via [employer]. ED navigator plans to follow-up with patient on/around 4-17-25.    Janine Peck ED Navigator   [phone]

## 2025-03-31 ENCOUNTER — OFFICE VISIT (OUTPATIENT)
Dept: FAMILY MEDICINE | Facility: CLINIC | Age: 45
End: 2025-03-31
Payer: COMMERCIAL

## 2025-03-31 VITALS
HEART RATE: 80 BPM | SYSTOLIC BLOOD PRESSURE: 137 MMHG | TEMPERATURE: 98 F | OXYGEN SATURATION: 99 % | WEIGHT: 242.81 LBS | HEIGHT: 62 IN | BODY MASS INDEX: 44.68 KG/M2 | DIASTOLIC BLOOD PRESSURE: 91 MMHG | RESPIRATION RATE: 15 BRPM

## 2025-03-31 DIAGNOSIS — E03.9 HYPOTHYROIDISM, UNSPECIFIED TYPE: ICD-10-CM

## 2025-03-31 DIAGNOSIS — F41.9 ANXIETY: ICD-10-CM

## 2025-03-31 DIAGNOSIS — I10 HYPERTENSION, UNSPECIFIED TYPE: ICD-10-CM

## 2025-03-31 DIAGNOSIS — M54.2 NECK PAIN: ICD-10-CM

## 2025-03-31 DIAGNOSIS — E78.5 HYPERLIPIDEMIA, UNSPECIFIED HYPERLIPIDEMIA TYPE: ICD-10-CM

## 2025-03-31 DIAGNOSIS — D50.9 IRON DEFICIENCY ANEMIA, UNSPECIFIED IRON DEFICIENCY ANEMIA TYPE: ICD-10-CM

## 2025-03-31 DIAGNOSIS — E11.9 TYPE 2 DIABETES MELLITUS WITHOUT COMPLICATION, WITHOUT LONG-TERM CURRENT USE OF INSULIN: Primary | ICD-10-CM

## 2025-03-31 PROCEDURE — 3046F HEMOGLOBIN A1C LEVEL >9.0%: CPT | Mod: CPTII,,, | Performed by: NURSE PRACTITIONER

## 2025-03-31 PROCEDURE — 3008F BODY MASS INDEX DOCD: CPT | Mod: CPTII,,, | Performed by: NURSE PRACTITIONER

## 2025-03-31 PROCEDURE — 3075F SYST BP GE 130 - 139MM HG: CPT | Mod: CPTII,,, | Performed by: NURSE PRACTITIONER

## 2025-03-31 PROCEDURE — 1160F RVW MEDS BY RX/DR IN RCRD: CPT | Mod: CPTII,,, | Performed by: NURSE PRACTITIONER

## 2025-03-31 PROCEDURE — 1159F MED LIST DOCD IN RCRD: CPT | Mod: CPTII,,, | Performed by: NURSE PRACTITIONER

## 2025-03-31 PROCEDURE — 99214 OFFICE O/P EST MOD 30 MIN: CPT | Mod: ,,, | Performed by: NURSE PRACTITIONER

## 2025-03-31 PROCEDURE — 3080F DIAST BP >= 90 MM HG: CPT | Mod: CPTII,,, | Performed by: NURSE PRACTITIONER

## 2025-04-01 ENCOUNTER — CLINICAL SUPPORT (OUTPATIENT)
Dept: REHABILITATION | Facility: HOSPITAL | Age: 45
End: 2025-04-01
Payer: COMMERCIAL

## 2025-04-01 DIAGNOSIS — M47.816 LUMBAR SPONDYLOSIS: Primary | ICD-10-CM

## 2025-04-01 PROCEDURE — 97012 MECHANICAL TRACTION THERAPY: CPT

## 2025-04-01 PROCEDURE — 97110 THERAPEUTIC EXERCISES: CPT

## 2025-04-01 NOTE — PROGRESS NOTES
Outpatient Rehab    Physical Therapy Visit    Patient Name: Mary Magana  MRN: [id number]  YOB: 1980  Encounter Date: 4/1/2025    Therapy Diagnosis: No diagnosis found.  Physician: Mario Alberto Claire PA    Physician Orders: Eval and Treat  Medical Diagnosis: Lumbar spondylosis  Radiculopathy, cervical region    Visit # / Visits Authorized:  5 / 10  Insurance Authorization Period: 3/11/2025 to 6/10/2025  Date of Evaluation:  3/11/2025   Plan of Care Certification:  3/11/2025 to 6/4/25      PT/PTA:     Number of PTA visits since last PT visit:   Time In: 1130   Time Out: 1210  Total Time: 40   Total Billable Time:      FOTO:  Intake Score:  %  Survey Score 1:  %  Survey Score 2:  %         Subjective   Hurting more after last session into the next day. I don't know why and not sure what caused it..  Pain reported as 7/10.      Objective            Treatment:  Therapeutic Exercise  TE 1: bike 5 minutes  TE 2: Slant board 4 x 15 second hold  TE 5: Standing HS stretch on stairs 3 x 15 seconds  TE 6: Sitting bilateral piriformis stretch - stopped secondary to increase pain  Other Activities  Activity 1: Mechanical lumbar traction x 15 minutes 45 hold/15 second rest 60lb pull    Time Entry(in minutes):  Mechanical Traction Time Entry: 15  Therapeutic Exercise Time Entry: 15    Assessment & Plan   Assessment: Patient states the mat work and the dyanmic stability exercises had made her feel worse. Patient states the traction allowed her to sleep better at night. After the stretches the patient received mechanical traction and patient states she felt better after the traction. Will assess traction next session and attempt to progress patient as able.  Evaluation/Treatment Tolerance: Patient limited by pain    Patient will continue to benefit from skilled outpatient physical therapy to address the deficits listed in the problem list box on initial evaluation, provide pt/family education and to maximize  pt's level of independence in the home and community environment.     Patient's spiritual, cultural, and educational needs considered and patient agreeable to plan of care and goals.     Education  Education was done with Patient. The patient's learning style includes Listening. The patient Verbalizes understanding.                 Plan: continue current POC    Goals:   Active       Ambulation/movement       Patient will walk 10 minutes with 0/10 in the low back (Progressing)       Start:  03/12/25    Expected End:  04/23/25               Function       Patient will report no right arm radicular symptoms by D/C (Progressing)       Start:  03/12/25    Expected End:  06/04/25               Functional outcome       Patient will show a significant change in FOTO by 10 point increase for patient-reported outcome tool to demonstrate subjective improvement (Progressing)       Start:  03/12/25    Expected End:  06/04/25            Patient will demonstrate independence in home program for support of progression (Progressing)       Start:  03/12/25    Expected End:  06/04/25               Pain       Patient will report pain of 0/10 demonstrating a reduction of overall pain (Progressing)       Start:  03/12/25    Expected End:  06/04/25               Strength       Patient will achieve bilateral hip extension strength of 5/5 (Progressing)       Start:  03/12/25    Expected End:  04/23/25                KARLOS JEFFRIES, PT

## 2025-04-03 RX ORDER — MELOXICAM 7.5 MG/1
TABLET ORAL
Qty: 60 TABLET | Refills: 0 | Status: SHIPPED | OUTPATIENT
Start: 2025-04-03

## 2025-04-03 RX ORDER — ATORVASTATIN CALCIUM 10 MG/1
10 TABLET, FILM COATED ORAL NIGHTLY
Qty: 90 TABLET | Refills: 1 | Status: SHIPPED | OUTPATIENT
Start: 2025-04-03 | End: 2025-09-30

## 2025-04-03 RX ORDER — METFORMIN HYDROCHLORIDE 1000 MG/1
1000 TABLET ORAL 2 TIMES DAILY WITH MEALS
Qty: 180 TABLET | Refills: 1 | Status: SHIPPED | OUTPATIENT
Start: 2025-04-03

## 2025-04-03 RX ORDER — METOPROLOL TARTRATE 25 MG/1
25 TABLET, FILM COATED ORAL 2 TIMES DAILY
Qty: 60 TABLET | Refills: 5 | Status: SHIPPED | OUTPATIENT
Start: 2025-04-03

## 2025-04-03 RX ORDER — GLIMEPIRIDE 1 MG/1
1 TABLET ORAL EVERY MORNING
Qty: 90 TABLET | Refills: 1 | Status: SHIPPED | OUTPATIENT
Start: 2025-04-03

## 2025-04-03 RX ORDER — HYDROXYZINE PAMOATE 25 MG/1
CAPSULE ORAL
Qty: 30 CAPSULE | Refills: 2 | Status: SHIPPED | OUTPATIENT
Start: 2025-04-03

## 2025-04-03 RX ORDER — LEVOTHYROXINE SODIUM 175 UG/1
175 TABLET ORAL
Qty: 30 TABLET | Refills: 11 | Status: SHIPPED | OUTPATIENT
Start: 2025-04-03 | End: 2026-04-03

## 2025-04-03 RX ORDER — FERROUS SULFATE 325(65) MG
325 TABLET ORAL
Qty: 30 TABLET | Refills: 5 | Status: SHIPPED | OUTPATIENT
Start: 2025-04-03

## 2025-04-03 RX ORDER — BACLOFEN 10 MG/1
10 TABLET ORAL NIGHTLY PRN
Qty: 30 TABLET | Refills: 0 | Status: SHIPPED | OUTPATIENT
Start: 2025-04-03 | End: 2026-04-03

## 2025-04-03 NOTE — PROGRESS NOTES
[facility] Medicine    Chief Complaint      Chief Complaint   Patient presents with    Diabetes     Patient reports she is checking her glucose levels three times daily and it is staying above 200        History of Present Illness      Mary Magana is a 44 y.o. female. She  has a past medical history of Diabetes mellitus, Hypertension, Thyroid disease, and WPW (Ilana-Parkinson-White syndrome)., who presents today for f/u of her diabetes and HTN.  Reports concerns of her blood sugars being consistently over 200 per patient.     Past Medical History:  Past Medical History:   Diagnosis Date    Diabetes mellitus     Hypertension     Thyroid disease     WPW (Ilana-Parkinson-White syndrome)        Past Surgical History:   has a past surgical history that includes Cholecystectomy and Tubal ligation.    Social History:  Social History[1]    I personally reviewed all past medical, surgical, and social.     Review of Systems   Constitutional:  Negative for fatigue.   HENT:  Negative for ear pain and sore throat.    Eyes:  Negative for visual disturbance.   Respiratory:  Negative for cough and shortness of breath.    Cardiovascular: Negative.    Gastrointestinal:  Negative for abdominal pain, constipation and diarrhea.   Genitourinary:  Negative for difficulty urinating.   Musculoskeletal:  Negative for gait problem.   Skin: Negative.    Neurological:  Negative for dizziness and light-headedness.        Medications:  Encounter Medications[2]    Allergies:  Review of patient's allergies indicates:   Allergen Reactions    Anesthesia s/i-40 (propofol) [propofol] Itching     Anesthesia, possibly propofol; made her feel like there were ants all over her       Health Maintenance:  Immunization History   Administered Date(s) Administered    Influenza - Trivalent - Flucelvax - PF 10/09/2024      Health Maintenance   Topic Date Due    Diabetic Eye Exam  Never done    TETANUS VACCINE  Never done    COVID-19 Vaccine (1 -  "2024-25 season) Never done    Foot Exam  12/01/2024    Diabetes Urine Screening  04/02/2025    Hemoglobin A1c  06/18/2025    Mammogram  12/11/2025    Lipid Panel  03/18/2026    Low Dose Statin  03/31/2026    Cervical Cancer Screening  02/12/2030    RSV Vaccine (Age 60+ and Pregnant patients) (1 - 1-dose 75+ series) 11/22/2056    Hepatitis C Screening  Completed    HIV Screening  Completed    Influenza Vaccine  Discontinued    Pneumococcal Vaccines (Age 0-49)  Discontinued        Physical Exam      Vital Signs  Temp: 97.9 °F (36.6 °C)  Temp Source: Oral  Pulse: 80  Resp: 15  SpO2: 99 %  BP: (!) 137/91  BP Location: Left arm  Patient Position: Sitting  Pain Score: 0-No pain  Height and Weight  Height: 5' 2" (157.5 cm)  Weight: 110.1 kg (242 lb 12.8 oz)  BSA (Calculated - sq m): 2.19 sq meters  BMI (Calculated): 44.4  Weight in (lb) to have BMI = 25: 136.4]    Physical Exam  Vitals and nursing note reviewed.   Constitutional:       Appearance: Normal appearance. She is well-developed.   HENT:      Head: Normocephalic.      Right Ear: Hearing normal.      Left Ear: Hearing normal.      Nose: Nose normal.   Eyes:      General: Lids are normal.      Extraocular Movements: Extraocular movements intact.      Conjunctiva/sclera: Conjunctivae normal.      Pupils: Pupils are equal, round, and reactive to light.   Cardiovascular:      Rate and Rhythm: Normal rate and regular rhythm.      Heart sounds: Normal heart sounds.   Pulmonary:      Effort: Pulmonary effort is normal.      Breath sounds: Normal breath sounds.   Musculoskeletal:         General: Normal range of motion.      Cervical back: Normal range of motion and neck supple.      Right lower leg: No edema.      Left lower leg: No edema.   Skin:     General: Skin is warm and dry.   Neurological:      Mental Status: She is alert and oriented to person, place, and time.      Gait: Gait is intact.   Psychiatric:         Behavior: Behavior is cooperative.      "     Laboratory:  CBC:  Recent Labs   Lab 12/13/24 2258 03/18/25  1643 03/22/25  0444   WBC 10.39 8.03 9.27   RBC 5.94 H 6.49 H 5.78 H   Hemoglobin 13.2 14.4 13.0   Hematocrit 44.2 48.9 H 43.1   Platelet Count 225 237 228   MCV 74.4 L 75.3 L 74.6 L   MCH 22.2 L 22.2 L 22.5 L   MCHC 29.9 L 29.4 L 30.2 L     CMP:  Recent Labs   Lab 12/13/24 2258 03/18/25  1643 03/22/25  0444   Glucose 162 H 225 H 257 H   Calcium 9.3 9.5 9.0   Albumin 3.7 4.0 3.8   Total Protein 7.9 7.7 7.4   Sodium 134 L 138 135 L   Potassium 4.6 3.9 4.2   CO2 21 L 27 20 L   Chloride 106 103 105   BUN 12 12 16   Alk Phos 113 119 130   ALT 58 H 52 31   AST 64 H 39 29   Bilirubin, Total 0.3 0.7 0.2     LIPIDS:  Recent Labs   Lab 12/01/23  1108 07/02/24  0852 10/09/24  1016 12/30/24  0940 03/18/25  1643   TSH 7.600 H   < > 11.500 H 6.867 H 1.214   HDL Cholesterol 40  --   --  40 45   Cholesterol 112  --   --  147 144   Triglycerides 120  --   --  112 112   LDL Calculated 48  --   --  85 77   Cholesterol/HDL Ratio (Risk Factor) 2.8  --   --  3.7 3.2   Non-HDL 72  --   --  107 99    < > = values in this interval not displayed.     TSH:  Recent Labs   Lab 10/09/24  1016 12/30/24  0940 03/18/25  1643   TSH 11.500 H 6.867 H 1.214     A1C:  Recent Labs   Lab 06/03/22  1520 09/16/22  1500 03/16/23  0953 07/25/23  1058 12/01/23  1108 04/02/24  1107 07/02/24  0852 10/09/24  1016 12/30/24  0940 03/18/25  1643   Hemoglobin A1C 5.5 6.0 6.0 5.8 7.6 H 6.6 7.8 H 7.0 H 7.4 H 9.7 H       Assessment/Plan     Mary Magana is a 44 y.o.female with:     1. Type 2 diabetes mellitus without complication, without long-term current use of insulin  -     glimepiride (AMARYL) 1 MG tablet; Take 1 tablet (1 mg total) by mouth every morning.  Dispense: 90 tablet; Refill: 1  -     metFORMIN (GLUCOPHAGE) 1000 MG tablet; Take 1 tablet (1,000 mg total) by mouth 2 (two) times daily with meals.  Dispense: 180 tablet; Refill: 1    2. Hypothyroidism, unspecified type  -      levothyroxine (SYNTHROID) 175 MCG tablet; Take 1 tablet (175 mcg total) by mouth before breakfast.  Dispense: 30 tablet; Refill: 11    3. Hyperlipidemia, unspecified hyperlipidemia type  -     atorvastatin (LIPITOR) 10 MG tablet; Take 1 tablet (10 mg total) by mouth every evening.  Dispense: 90 tablet; Refill: 1    4. Neck pain  -     baclofen (LIORESAL) 10 MG tablet; Take 1 tablet (10 mg total) by mouth nightly as needed (spasm).  Dispense: 30 tablet; Refill: 0  -     meloxicam (MOBIC) 7.5 MG tablet; Take 1 po bid with plenty of food and water  Dispense: 60 tablet; Refill: 0    5. Iron deficiency anemia, unspecified iron deficiency anemia type  -     ferrous sulfate (IRON) 325 mg (65 mg iron) Tab tablet; Take 1 tablet (325 mg total) by mouth daily with breakfast.  Dispense: 30 tablet; Refill: 5    6. Anxiety  -     hydrOXYzine pamoate (VISTARIL) 25 MG Cap; Take 1 to 2 capsule up to tid prn for severe anxiety  Dispense: 30 capsule; Refill: 2    7. Hypertension, unspecified type  -     metoprolol tartrate (LOPRESSOR) 25 MG tablet; Take 1 tablet (25 mg total) by mouth 2 (two) times daily.  Dispense: 60 tablet; Refill: 5       Reviewed the most recent lab work- A1c 9.7%; TSH WNL; Lipid panel normal      Total time spent face-to-face and non-face-to-face coordinating care for this encounter was: 30 minutes     Chronic conditions status updated as per HPI.  Other than changes above, cont current medications and maintain follow up with specialists.  Return to clinic in 3 month(s).    Ronna Boogie, EUGENEP  [facility]       [1]   Social History  Tobacco Use    Smoking status: Some Days     Current packs/day: 0.25     Average packs/day: 0.3 packs/day for 15.0 years (3.8 ttl pk-yrs)     Types: Cigarettes, Vaping with nicotine     Last attempt to quit: 2022     Passive exposure: Past    Smokeless tobacco: Never    Tobacco comments:     1 pack per 2 weeks   Substance Use Topics    Alcohol use: Never    Drug use: Never    [2]   Outpatient Encounter Medications as of 3/31/2025   Medication Sig Dispense Refill    aspirin (ECOTRIN) 81 MG EC tablet TAKE 1 TABLET BY MOUTH DAILY WITH FOOD 30 tablet 5    buPROPion (WELLBUTRIN XL) 150 MG TB24 tablet Take 150 mg by mouth every morning.      clonazePAM (KLONOPIN) 1 MG tablet Take 1 mg by mouth nightly as needed for Anxiety.      nitrofurantoin, macrocrystal-monohydrate, (MACROBID) 100 MG capsule Take 1 capsule (100 mg total) by mouth 2 (two) times daily. 14 capsule 0    predniSONE (DELTASONE) 20 MG tablet 3 PO DAILY X 3 DAYS, THEN 2 PO DAILY X 3 DAYS, THEN 1 PO DAILY X 3 DAYS, THEN STOP. 18 tablet 0    TRUEPLUS LANCETS 30 gauge Misc AS DIRECTED      atorvastatin (LIPITOR) 10 MG tablet Take 1 tablet (10 mg total) by mouth every evening. 90 tablet 1    baclofen (LIORESAL) 10 MG tablet Take 1 tablet (10 mg total) by mouth nightly as needed (spasm). 30 tablet 0    famotidine (PEPCID) 40 MG tablet Take 1 tablet (40 mg total) by mouth once daily. 30 tablet 4    ferrous sulfate (IRON) 325 mg (65 mg iron) Tab tablet Take 1 tablet (325 mg total) by mouth daily with breakfast. 30 tablet 5    glimepiride (AMARYL) 1 MG tablet Take 1 tablet (1 mg total) by mouth every morning. 90 tablet 1    hydrOXYzine pamoate (VISTARIL) 25 MG Cap Take 1 to 2 capsule up to tid prn for severe anxiety 30 capsule 2    levothyroxine (SYNTHROID) 175 MCG tablet Take 1 tablet (175 mcg total) by mouth before breakfast. 30 tablet 11    meloxicam (MOBIC) 7.5 MG tablet Take 1 po bid with plenty of food and water 60 tablet 0    metFORMIN (GLUCOPHAGE) 1000 MG tablet Take 1 tablet (1,000 mg total) by mouth 2 (two) times daily with meals. 180 tablet 1    metoprolol tartrate (LOPRESSOR) 25 MG tablet Take 1 tablet (25 mg total) by mouth 2 (two) times daily. 60 tablet 5    omeprazole (PRILOSEC) 40 MG capsule Take 1 capsule (40 mg total) by mouth once daily. 30 capsule 2    [DISCONTINUED] atorvastatin (LIPITOR) 10 MG tablet Take 1 tablet  (10 mg total) by mouth every evening. 90 tablet 1    [DISCONTINUED] baclofen (LIORESAL) 10 MG tablet Take 1 tablet (10 mg total) by mouth nightly as needed (spasm). 30 tablet 0    [DISCONTINUED] ferrous sulfate (IRON) 325 mg (65 mg iron) Tab tablet Take 1 tablet (325 mg total) by mouth daily with breakfast. 30 tablet 5    [DISCONTINUED] glimepiride (AMARYL) 1 MG tablet Take 1 tablet (1 mg total) by mouth every morning. 90 tablet 1    [DISCONTINUED] hydrOXYzine pamoate (VISTARIL) 25 MG Cap Take 1 to 2 capsule up to tid prn for severe anxiety 30 capsule 2    [DISCONTINUED] levothyroxine (SYNTHROID) 175 MCG tablet Take 1 tablet (175 mcg total) by mouth before breakfast. 30 tablet 11    [DISCONTINUED] meloxicam (MOBIC) 7.5 MG tablet Take 1 po bid with plenty of food and water 60 tablet 0    [DISCONTINUED] metFORMIN (GLUCOPHAGE) 1000 MG tablet Take 1 tablet (1,000 mg total) by mouth 2 (two) times daily with meals. 180 tablet 1    [DISCONTINUED] metoprolol tartrate (LOPRESSOR) 25 MG tablet Take 1 tablet (25 mg total) by mouth 2 (two) times daily. 60 tablet 5     Facility-Administered Encounter Medications as of 3/31/2025   Medication Dose Route Frequency Provider Last Rate Last Admin    influenza (Egg-FREE) (Flucelvax) 45 mcg/0.5 mL IM vaccine (> or = 6 mo) (*contains preservatives) 0.5 mL  0.5 mL Intramuscular 1 time in Clinic/HOD Huber Clayton MD

## 2025-04-09 ENCOUNTER — PATIENT OUTREACH (OUTPATIENT)
Facility: OTHER | Age: 45
End: 2025-04-09
Payer: COMMERCIAL

## 2025-04-09 NOTE — PROGRESS NOTES
ED navigator contacted patient to remind of her appointment tomorrow on 4-10-25 with Huber Chavarria at 10:00 am. Patient states that she plans to go to her appointment tomorrow.     Janine Peck ED Navigator   [phone]

## 2025-04-10 ENCOUNTER — OFFICE VISIT (OUTPATIENT)
Dept: FAMILY MEDICINE | Facility: CLINIC | Age: 45
End: 2025-04-10
Payer: COMMERCIAL

## 2025-04-10 ENCOUNTER — CLINICAL SUPPORT (OUTPATIENT)
Dept: REHABILITATION | Facility: HOSPITAL | Age: 45
End: 2025-04-10
Payer: COMMERCIAL

## 2025-04-10 VITALS
TEMPERATURE: 99 F | OXYGEN SATURATION: 100 % | WEIGHT: 249.19 LBS | HEART RATE: 88 BPM | HEIGHT: 62 IN | DIASTOLIC BLOOD PRESSURE: 90 MMHG | SYSTOLIC BLOOD PRESSURE: 132 MMHG | RESPIRATION RATE: 18 BRPM | BODY MASS INDEX: 45.86 KG/M2

## 2025-04-10 DIAGNOSIS — R35.0 URINARY FREQUENCY: ICD-10-CM

## 2025-04-10 DIAGNOSIS — M47.816 LUMBAR SPONDYLOSIS: Primary | ICD-10-CM

## 2025-04-10 DIAGNOSIS — E11.9 TYPE 2 DIABETES MELLITUS WITHOUT COMPLICATION, WITHOUT LONG-TERM CURRENT USE OF INSULIN: Primary | ICD-10-CM

## 2025-04-10 DIAGNOSIS — Z23 NEED FOR TDAP VACCINATION: ICD-10-CM

## 2025-04-10 DIAGNOSIS — Z11.3 SCREENING EXAMINATION FOR STI: ICD-10-CM

## 2025-04-10 DIAGNOSIS — R82.90 ABNORMAL URINE ODOR: ICD-10-CM

## 2025-04-10 DIAGNOSIS — D50.9 IRON DEFICIENCY ANEMIA, UNSPECIFIED IRON DEFICIENCY ANEMIA TYPE: ICD-10-CM

## 2025-04-10 LAB
BACTERIA #/AREA URNS HPF: ABNORMAL /HPF
BILIRUB UR QL STRIP: NEGATIVE
CLARITY UR: ABNORMAL
COLOR UR: YELLOW
CREAT UR-MCNC: 145 MG/DL (ref 15–325)
GLUCOSE UR STRIP-MCNC: NORMAL MG/DL
KETONES UR STRIP-SCNC: NEGATIVE MG/DL
LEUKOCYTE ESTERASE UR QL STRIP: ABNORMAL
MICROALBUMIN UR-MCNC: 6.2 MG/DL
MICROALBUMIN/CREAT RATIO PNL UR: 42.8 MG/G (ref 0–30)
MUCOUS, UA: ABNORMAL /LPF
NITRITE UR QL STRIP: NEGATIVE
PH UR STRIP: 6 PH UNITS
PROT UR QL STRIP: 20
RBC # UR STRIP: ABNORMAL /UL
RBC #/AREA URNS HPF: 2 /HPF
SP GR UR STRIP: 1.02
SQUAMOUS #/AREA URNS LPF: ABNORMAL /HPF
UROBILINOGEN UR STRIP-ACNC: 2 MG/DL
WBC #/AREA URNS HPF: >182 /HPF
WBC CLUMPS, UA: ABNORMAL /HPF

## 2025-04-10 PROCEDURE — 90715 TDAP VACCINE 7 YRS/> IM: CPT | Mod: ,,, | Performed by: FAMILY MEDICINE

## 2025-04-10 PROCEDURE — 82043 UR ALBUMIN QUANTITATIVE: CPT | Mod: ,,, | Performed by: CLINICAL MEDICAL LABORATORY

## 2025-04-10 PROCEDURE — 87186 SC STD MICRODIL/AGAR DIL: CPT | Mod: ,,, | Performed by: CLINICAL MEDICAL LABORATORY

## 2025-04-10 PROCEDURE — 82570 ASSAY OF URINE CREATININE: CPT | Mod: ,,, | Performed by: CLINICAL MEDICAL LABORATORY

## 2025-04-10 PROCEDURE — 3075F SYST BP GE 130 - 139MM HG: CPT | Mod: CPTII,,, | Performed by: FAMILY MEDICINE

## 2025-04-10 PROCEDURE — 87086 URINE CULTURE/COLONY COUNT: CPT | Mod: ,,, | Performed by: CLINICAL MEDICAL LABORATORY

## 2025-04-10 PROCEDURE — 90471 IMMUNIZATION ADMIN: CPT | Mod: ,,, | Performed by: FAMILY MEDICINE

## 2025-04-10 PROCEDURE — 99214 OFFICE O/P EST MOD 30 MIN: CPT | Mod: 25,,, | Performed by: FAMILY MEDICINE

## 2025-04-10 PROCEDURE — 97112 NEUROMUSCULAR REEDUCATION: CPT

## 2025-04-10 PROCEDURE — 1160F RVW MEDS BY RX/DR IN RCRD: CPT | Mod: CPTII,,, | Performed by: FAMILY MEDICINE

## 2025-04-10 PROCEDURE — 3080F DIAST BP >= 90 MM HG: CPT | Mod: CPTII,,, | Performed by: FAMILY MEDICINE

## 2025-04-10 PROCEDURE — 87077 CULTURE AEROBIC IDENTIFY: CPT | Mod: ,,, | Performed by: CLINICAL MEDICAL LABORATORY

## 2025-04-10 PROCEDURE — 1159F MED LIST DOCD IN RCRD: CPT | Mod: CPTII,,, | Performed by: FAMILY MEDICINE

## 2025-04-10 PROCEDURE — 3046F HEMOGLOBIN A1C LEVEL >9.0%: CPT | Mod: CPTII,,, | Performed by: FAMILY MEDICINE

## 2025-04-10 PROCEDURE — 81001 URINALYSIS AUTO W/SCOPE: CPT | Mod: ,,, | Performed by: CLINICAL MEDICAL LABORATORY

## 2025-04-10 PROCEDURE — 3008F BODY MASS INDEX DOCD: CPT | Mod: CPTII,,, | Performed by: FAMILY MEDICINE

## 2025-04-10 RX ORDER — LANCETS
EACH MISCELLANEOUS
Qty: 100 EACH | Refills: 5 | Status: SHIPPED | OUTPATIENT
Start: 2025-04-10

## 2025-04-10 RX ORDER — INSULIN PUMP SYRINGE, 3 ML
EACH MISCELLANEOUS
Qty: 1 EACH | Refills: 0 | Status: SHIPPED | OUTPATIENT
Start: 2025-04-10 | End: 2026-04-10

## 2025-04-10 RX ORDER — FERROUS SULFATE 325(65) MG
325 TABLET ORAL
Qty: 30 TABLET | Refills: 5 | Status: SHIPPED | OUTPATIENT
Start: 2025-04-10

## 2025-04-10 NOTE — PROGRESS NOTES
Mary Magana is a 44 y.o. female seen today for lab follow-up.  Unfortunately her A1c is now over 9 since she had to discontinue her GLP 1 medication due to gastroparesis.  The patient also has potentially been exposed to an STD.  Patient unfortunately did have recurrent UTIs on an SGLT2 inhibitor and we discussed a trial of Januvia..       Past Medical History:   Diagnosis Date    Diabetes mellitus     Hypertension     Thyroid disease     WPW (Ilana-Parkinson-White syndrome)      Family History   Problem Relation Name Age of Onset    Hypertension Mother      Diabetes Mother      Diabetes Father      Diabetes Sister      Leukemia Daughter       Medications Ordered Prior to Encounter[1]  Immunization History   Administered Date(s) Administered    Influenza - Trivalent - Flucelvax - PF 10/09/2024       Review of Systems   Constitutional:  Negative for fever, malaise/fatigue and weight loss.   Respiratory:  Negative for shortness of breath.    Cardiovascular:  Negative for chest pain and palpitations.   Gastrointestinal:  Negative for nausea and vomiting.   Genitourinary:  Negative for dysuria, flank pain, frequency, hematuria and urgency.   Psychiatric/Behavioral:  Negative for depression.         Vitals:    04/10/25 1058   BP: (!) 132/90   Pulse:    Resp:    Temp:        Physical Exam  Vitals reviewed.   Eyes:      Conjunctiva/sclera: Conjunctivae normal.   Pulmonary:      Effort: Pulmonary effort is normal.   Musculoskeletal:      Right foot: No bunion.      Left foot: No bunion.   Feet:      Right foot:      Protective Sensation: 7 sites tested.  7 sites sensed.      Skin integrity: No ulcer or blister.      Toenail Condition: Right toenails are normal.      Left foot:      Protective Sensation: 7 sites tested.  7 sites sensed.      Skin integrity: No ulcer or blister.      Toenail Condition: Left toenails are normal.   Neurological:      Mental Status: She is alert.   Psychiatric:         Mood and  Affect: Mood normal.         Behavior: Behavior normal.         Thought Content: Thought content normal.         Judgment: Judgment normal.          Assessment and Plan  1. Type 2 diabetes mellitus without complication, without long-term current use of insulin  -     Foot Exam Performed  -     Microalbumin/Creatinine Ratio, Urine  -     blood-glucose meter kit; To check BG once daily, to use with insurance preferred meter  Dispense: 1 each; Refill: 0  -     lancets Misc; To check BG once daily, to use with insurance preferred meter  Dispense: 100 each; Refill: 5  -     blood sugar diagnostic Strp; To check BG once daily, to use with insurance preferred meter  Dispense: 100 strip; Refill: 5  -     SITagliptin phosphate (JANUVIA) 100 MG Tab; Take 1 tablet (100 mg total) by mouth once daily.  Dispense: 30 tablet; Refill: 5    2. Abnormal urine odor  -     Urinalysis, Reflex to Urine Culture    3. Urinary frequency  -     Urinalysis, Reflex to Urine Culture    4. Screening examination for STI  -     Chlamydia/GC, PCR  -     HIV 1/2 Ag/Ab (4th Gen); Future; Expected date: 04/10/2025  -     Syphilis Antibody with reflex to RPR; Future; Expected date: 04/10/2025  -     Trichomonas vaginalis by PCR; Future; Expected date: 04/10/2025  -     Hepatitis B Surface Antigen; Future; Expected date: 04/10/2025  -     Hepatitis B Core Antibody, Total; Future; Expected date: 04/10/2025  -     Hepatitis C Antibody; Future; Expected date: 04/10/2025    5. Need for Tdap vaccination  -     Tdap (BOOSTRIX) vaccine injection 0.5 mL    6. Iron deficiency anemia, unspecified iron deficiency anemia type  -     ferrous sulfate (IRON) 325 mg (65 mg iron) Tab tablet; Take 1 tablet (325 mg total) by mouth daily with breakfast.  Dispense: 30 tablet; Refill: 5             Return to clinic in 2 weeks for blood pressure check and a nurse visit and then in 1 month with home glucose log.    Health Maintenance Topics with due status: Not Due       Topic  Last Completion Date    Mammogram 12/11/2024    Cervical Cancer Screening 02/12/2025    Lipid Panel 03/18/2025    Hemoglobin A1c 03/18/2025    Low Dose Statin 04/03/2025    Foot Exam 04/10/2025    RSV Vaccine (Age 60+ and Pregnant patients) Not Due              [1]   Current Outpatient Medications on File Prior to Visit   Medication Sig Dispense Refill    atorvastatin (LIPITOR) 10 MG tablet Take 1 tablet (10 mg total) by mouth every evening. 90 tablet 1    baclofen (LIORESAL) 10 MG tablet Take 1 tablet (10 mg total) by mouth nightly as needed (spasm). 30 tablet 0    buPROPion (WELLBUTRIN XL) 150 MG TB24 tablet Take 150 mg by mouth every morning.      clonazePAM (KLONOPIN) 1 MG tablet Take 1 mg by mouth nightly as needed for Anxiety.      famotidine (PEPCID) 40 MG tablet Take 1 tablet (40 mg total) by mouth once daily. 30 tablet 4    glimepiride (AMARYL) 1 MG tablet Take 1 tablet (1 mg total) by mouth every morning. 90 tablet 1    hydrOXYzine pamoate (VISTARIL) 25 MG Cap Take 1 to 2 capsule up to tid prn for severe anxiety 30 capsule 2    levothyroxine (SYNTHROID) 175 MCG tablet Take 1 tablet (175 mcg total) by mouth before breakfast. 30 tablet 11    meloxicam (MOBIC) 7.5 MG tablet Take 1 po bid with plenty of food and water 60 tablet 0    metFORMIN (GLUCOPHAGE) 1000 MG tablet Take 1 tablet (1,000 mg total) by mouth 2 (two) times daily with meals. 180 tablet 1    metoprolol tartrate (LOPRESSOR) 25 MG tablet Take 1 tablet (25 mg total) by mouth 2 (two) times daily. 60 tablet 5    omeprazole (PRILOSEC) 40 MG capsule Take 1 capsule (40 mg total) by mouth once daily. 30 capsule 2    [DISCONTINUED] ferrous sulfate (IRON) 325 mg (65 mg iron) Tab tablet Take 1 tablet (325 mg total) by mouth daily with breakfast. 30 tablet 5    aspirin (ECOTRIN) 81 MG EC tablet TAKE 1 TABLET BY MOUTH DAILY WITH FOOD (Patient not taking: Reported on 4/10/2025) 30 tablet 5    nitrofurantoin, macrocrystal-monohydrate, (MACROBID) 100 MG capsule  Take 1 capsule (100 mg total) by mouth 2 (two) times daily. (Patient not taking: Reported on 4/10/2025) 14 capsule 0    predniSONE (DELTASONE) 20 MG tablet 3 PO DAILY X 3 DAYS, THEN 2 PO DAILY X 3 DAYS, THEN 1 PO DAILY X 3 DAYS, THEN STOP. (Patient not taking: Reported on 4/10/2025) 18 tablet 0    TRUEPLUS LANCETS 30 gauge Misc AS DIRECTED (Patient not taking: Reported on 4/10/2025)       Current Facility-Administered Medications on File Prior to Visit   Medication Dose Route Frequency Provider Last Rate Last Admin    influenza (Egg-FREE) (Flucelvax) 45 mcg/0.5 mL IM vaccine (> or = 6 mo) (*contains preservatives) 0.5 mL  0.5 mL Intramuscular 1 time in Clinic/HOD Huber Clayton MD

## 2025-04-10 NOTE — PROGRESS NOTES
Outpatient Rehab    Physical Therapy Visit    Patient Name: Mary Magana  MRN: [id number]  YOB: 1980  Encounter Date: 4/10/2025    Therapy Diagnosis: No diagnosis found.  Physician: Mario Alberto Claire PA    Physician Orders: Eval and Treat  Medical Diagnosis: Lumbar spondylosis  Radiculopathy, cervical region    Visit # / Visits Authorized:  6 / 10  Insurance Authorization Period: 3/11/2025 to 6/10/2025  Date of Evaluation:  3/11/2025   Plan of Care Certification:  3/11/2025 to 6/4/25      PT/PTA:     Number of PTA visits since last PT visit:   Time In: 1144   Time Out: 1201  Total Time: 17   Total Billable Time:      FOTO:  Intake Score:  %  Survey Score 1:  %  Survey Score 2:  %         Subjective   I am feeling better today. Been stretching myself too..  Pain reported as 5/10.      Objective            Treatment:  Therapeutic Exercise  TE 2: Slant board 4 x 15 second hold  TE 5: Standing HS stretch on stairs 3 x 15 seconds  TE 6: Sitting bilateral piriformis stretch - stopped secondary to increase pain  Balance/Neuromuscular Re-Education  NMR 2: bridges on ball  2 x 10  NMR 4: clamshell 3 x 10 gray TB  NMR 5: lower truck rotation w/ball 2 x 10  NMR 9: HS roll w/ball x 20  NMR 10: Supine bilateral arm press with marching into ball x 20    Time Entry(in minutes):  Neuromuscular Re-Education Time Entry: 9  Therapeutic Exercise Time Entry: 8    Assessment & Plan   Assessment: Patient arrived 15 minutes late but states she is feeling better today. Patient already went walking this am. Patient finished all repetitions quickly but did fatigue. Patient mentioned she is sleepy tired and is good with the session with no increase in symptoms.  Evaluation/Treatment Tolerance: Patient limited by fatigue    Patient will continue to benefit from skilled outpatient physical therapy to address the deficits listed in the problem list box on initial evaluation, provide pt/family education and to maximize  pt's level of independence in the home and community environment.     Patient's spiritual, cultural, and educational needs considered and patient agreeable to plan of care and goals.     Education  Education was done with Patient. The patient's learning style includes Listening. The patient Verbalizes understanding.                 Plan: continue current POC    Goals:   Active       Ambulation/movement       Patient will walk 10 minutes with 0/10 in the low back (Progressing)       Start:  03/12/25    Expected End:  04/23/25               Function       Patient will report no right arm radicular symptoms by D/C (Progressing)       Start:  03/12/25    Expected End:  06/04/25               Functional outcome       Patient will show a significant change in FOTO by 10 point increase for patient-reported outcome tool to demonstrate subjective improvement (Progressing)       Start:  03/12/25    Expected End:  06/04/25            Patient will demonstrate independence in home program for support of progression (Progressing)       Start:  03/12/25    Expected End:  06/04/25               Pain       Patient will report pain of 0/10 demonstrating a reduction of overall pain (Progressing)       Start:  03/12/25    Expected End:  06/04/25               Strength       Patient will achieve bilateral hip extension strength of 5/5 (Progressing)       Start:  03/12/25    Expected End:  04/23/25                KARLOS JEFFRIES, PT, DPT

## 2025-04-11 ENCOUNTER — TELEPHONE (OUTPATIENT)
Dept: FAMILY MEDICINE | Facility: CLINIC | Age: 45
End: 2025-04-11
Payer: COMMERCIAL

## 2025-04-11 DIAGNOSIS — N30.00 ACUTE CYSTITIS WITHOUT HEMATURIA: Primary | ICD-10-CM

## 2025-04-11 RX ORDER — NITROFURANTOIN 25; 75 MG/1; MG/1
100 CAPSULE ORAL 2 TIMES DAILY
Qty: 14 CAPSULE | Refills: 0 | Status: SHIPPED | OUTPATIENT
Start: 2025-04-11

## 2025-04-11 NOTE — TELEPHONE ENCOUNTER
Patient notified that Dr Clayton reviewed her UA and CX and he prescribed her macrobid and sent the medication to [facility], she voiced undestanding. Encouraged her to rtc in 1 week after starting abx for nurse visit to follow up for her UTI, she voiced understanding and agreed.

## 2025-04-12 LAB — UA COMPLETE W REFLEX CULTURE PNL UR: ABNORMAL

## 2025-04-14 ENCOUNTER — RESULTS FOLLOW-UP (OUTPATIENT)
Dept: FAMILY MEDICINE | Facility: CLINIC | Age: 45
End: 2025-04-14

## 2025-04-15 ENCOUNTER — CLINICAL SUPPORT (OUTPATIENT)
Dept: REHABILITATION | Facility: HOSPITAL | Age: 45
End: 2025-04-15
Payer: COMMERCIAL

## 2025-04-15 DIAGNOSIS — M54.12 RADICULOPATHY, CERVICAL REGION: Primary | ICD-10-CM

## 2025-04-15 PROCEDURE — 97012 MECHANICAL TRACTION THERAPY: CPT

## 2025-04-15 PROCEDURE — 97112 NEUROMUSCULAR REEDUCATION: CPT

## 2025-04-15 NOTE — PROGRESS NOTES
Outpatient Rehab    Physical Therapy Visit    Patient Name: Mary Magana  MRN: [id number]  YOB: 1980  Encounter Date: 4/15/2025    Therapy Diagnosis: No diagnosis found.  Physician: Mario Alberto Claire PA    Physician Orders: Eval and Treat  Medical Diagnosis: Lumbar spondylosis  Radiculopathy, cervical region    Visit # / Visits Authorized:  7 / 10  Insurance Authorization Period: 3/11/2025 to 6/10/2025    Date of Evaluation:  3/11/2025   Plan of Care Certification:  3/11/2025 to 6/4/25      PT/PTA:     Number of PTA visits since last PT visit:   Time In: 1130   Time Out: 1205  Total Time: 35   Total Billable Time:      FOTO:  Intake Score:  %  Survey Score 1:  %  Survey Score 2:  %         Subjective   Nothing new to report. Last session made me hurt worse. Still having nerve pain in the right neck/shoulder.  Pain reported as 7/10.      Objective            Treatment:  Therapeutic Exercise  TE 2: Slant board 4 x 15 second hold  TE 5: Standing HS stretch on stairs 3 x 15 seconds  Balance/Neuromuscular Re-Education  NMR 2: bridges on ball  2 x 10  NMR 5: lower truck rotation w/ball 2 x 10  NMR 9: HS roll w/ball x 20  Modalities  Mechanical Traction: Lumbar mechanical traction x 15 minutes 45 hold / 15 rest 65lb pull    Time Entry(in minutes):  Mechanical Traction Time Entry: 15  Neuromuscular Re-Education Time Entry: 10  Therapeutic Exercise Time Entry: 8    Assessment & Plan   Assessment: Patient had increased symptoms after last therapy session. Completed stretching and mat exercises. Patient states her back pain increased after the mat lumbar mobility exercises. Discussed with patient to discharge secondary to therapy making pain worse. Patient has had 6 weeks of therapy required for the next step in care. Finished with mechanical lumbar traction for spinal decompression. Will assess traction outcome next session and discuss discharge again  Evaluation/Treatment Tolerance: Patient limited  by pain    Patient will continue to benefit from skilled outpatient physical therapy to address the deficits listed in the problem list box on initial evaluation, provide pt/family education and to maximize pt's level of independence in the home and community environment.     Patient's spiritual, cultural, and educational needs considered and patient agreeable to plan of care and goals.     Education  Education was done with Patient. The patient's learning style includes Listening. The patient Verbalizes understanding.                 Plan: continue current POC    Goals:   Active       Ambulation/movement       Patient will walk 10 minutes with 0/10 in the low back (Not Progressing)       Start:  03/12/25    Expected End:  04/23/25               Function       Patient will report no right arm radicular symptoms by D/C (Not Progressing)       Start:  03/12/25    Expected End:  06/04/25               Functional outcome       Patient will show a significant change in FOTO by 10 point increase for patient-reported outcome tool to demonstrate subjective improvement (Not Progressing)       Start:  03/12/25    Expected End:  06/04/25            Patient will demonstrate independence in home program for support of progression (Not Progressing)       Start:  03/12/25    Expected End:  06/04/25               Pain       Patient will report pain of 0/10 demonstrating a reduction of overall pain (Not Progressing)       Start:  03/12/25    Expected End:  06/04/25               Strength       Patient will achieve bilateral hip extension strength of 5/5 (Not Progressing)       Start:  03/12/25    Expected End:  04/23/25                KARLOS JEFFRIES, PT, DPT

## 2025-04-17 ENCOUNTER — PATIENT OUTREACH (OUTPATIENT)
Facility: OTHER | Age: 45
End: 2025-04-17
Payer: COMMERCIAL

## 2025-04-23 ENCOUNTER — TELEPHONE (OUTPATIENT)
Dept: FAMILY MEDICINE | Facility: CLINIC | Age: 45
End: 2025-04-23
Payer: COMMERCIAL

## 2025-04-23 NOTE — TELEPHONE ENCOUNTER
Pt notified that Dr Clayton would review her microalbumin with her at her follow up on 5/12/25 and she voiced understanding and agreed.

## 2025-04-23 NOTE — TELEPHONE ENCOUNTER
----- Message from Huber Clayton MD sent at 4/14/2025  8:02 AM CDT -----  Office visit for microalbuminuria  ----- Message -----  From: Lab, Background User  Sent: 4/10/2025   8:41 PM CDT  To: Huber Clayton MD

## 2025-05-12 ENCOUNTER — PATIENT OUTREACH (OUTPATIENT)
Facility: HOSPITAL | Age: 45
End: 2025-05-12
Payer: COMMERCIAL

## 2025-05-12 ENCOUNTER — OFFICE VISIT (OUTPATIENT)
Dept: FAMILY MEDICINE | Facility: CLINIC | Age: 45
End: 2025-05-12
Payer: COMMERCIAL

## 2025-05-12 VITALS
DIASTOLIC BLOOD PRESSURE: 96 MMHG | SYSTOLIC BLOOD PRESSURE: 158 MMHG | HEIGHT: 62 IN | OXYGEN SATURATION: 98 % | TEMPERATURE: 98 F | RESPIRATION RATE: 17 BRPM | HEART RATE: 74 BPM | BODY MASS INDEX: 46.01 KG/M2 | WEIGHT: 250 LBS

## 2025-05-12 DIAGNOSIS — E11.9 TYPE 2 DIABETES MELLITUS WITHOUT COMPLICATION, WITHOUT LONG-TERM CURRENT USE OF INSULIN: ICD-10-CM

## 2025-05-12 DIAGNOSIS — I10 HYPERTENSION, UNSPECIFIED TYPE: ICD-10-CM

## 2025-05-12 DIAGNOSIS — R30.0 DYSURIA: Primary | ICD-10-CM

## 2025-05-12 DIAGNOSIS — R80.9 MICROALBUMINURIA: ICD-10-CM

## 2025-05-12 LAB
BACTERIA #/AREA URNS HPF: ABNORMAL /HPF
BILIRUB SERPL-MCNC: NORMAL MG/DL
BILIRUB UR QL STRIP: NEGATIVE
BLOOD URINE, POC: NORMAL
CLARITY UR: CLEAR
CLARITY, UA: NORMAL
COLOR UR: ABNORMAL
COLOR, UA: YELLOW
GLUCOSE UR QL STRIP: NORMAL
GLUCOSE UR STRIP-MCNC: NORMAL MG/DL
KETONES UR QL STRIP: NORMAL
KETONES UR STRIP-SCNC: NEGATIVE MG/DL
LEUKOCYTE ESTERASE UR QL STRIP: ABNORMAL
LEUKOCYTE ESTERASE URINE, POC: NORMAL
MUCOUS, UA: ABNORMAL /LPF
NITRITE UR QL STRIP: NEGATIVE
NITRITE, POC UA: NORMAL
PH UR STRIP: 6 PH UNITS
PH, POC UA: 6
PROT UR QL STRIP: NEGATIVE
PROTEIN, POC: NORMAL
RBC # UR STRIP: NEGATIVE /UL
RBC #/AREA URNS HPF: 1 /HPF
SP GR UR STRIP: 1.02
SPECIFIC GRAVITY, POC UA: 1.02
SQUAMOUS #/AREA URNS LPF: ABNORMAL /HPF
UROBILINOGEN UR STRIP-ACNC: NORMAL MG/DL
UROBILINOGEN, POC UA: 0.2
WBC #/AREA URNS HPF: 9 /HPF

## 2025-05-12 PROCEDURE — 81001 URINALYSIS AUTO W/SCOPE: CPT | Mod: ,,, | Performed by: CLINICAL MEDICAL LABORATORY

## 2025-05-12 PROCEDURE — 1160F RVW MEDS BY RX/DR IN RCRD: CPT | Mod: CPTII,,, | Performed by: FAMILY MEDICINE

## 2025-05-12 PROCEDURE — 3046F HEMOGLOBIN A1C LEVEL >9.0%: CPT | Mod: CPTII,,, | Performed by: FAMILY MEDICINE

## 2025-05-12 PROCEDURE — 3077F SYST BP >= 140 MM HG: CPT | Mod: CPTII,,, | Performed by: FAMILY MEDICINE

## 2025-05-12 PROCEDURE — 1159F MED LIST DOCD IN RCRD: CPT | Mod: CPTII,,, | Performed by: FAMILY MEDICINE

## 2025-05-12 PROCEDURE — 3008F BODY MASS INDEX DOCD: CPT | Mod: CPTII,,, | Performed by: FAMILY MEDICINE

## 2025-05-12 PROCEDURE — 3066F NEPHROPATHY DOC TX: CPT | Mod: CPTII,,, | Performed by: FAMILY MEDICINE

## 2025-05-12 PROCEDURE — 99213 OFFICE O/P EST LOW 20 MIN: CPT | Mod: ,,, | Performed by: FAMILY MEDICINE

## 2025-05-12 PROCEDURE — 3060F POS MICROALBUMINURIA REV: CPT | Mod: CPTII,,, | Performed by: FAMILY MEDICINE

## 2025-05-12 PROCEDURE — 3080F DIAST BP >= 90 MM HG: CPT | Mod: CPTII,,, | Performed by: FAMILY MEDICINE

## 2025-05-12 RX ORDER — TRAMADOL HYDROCHLORIDE 50 MG/1
50 TABLET, FILM COATED ORAL EVERY 6 HOURS PRN
COMMUNITY
Start: 2025-05-04

## 2025-05-12 RX ORDER — CALCIUM CARB/VITAMIN D3/VIT K1 500-500-40
TABLET,CHEWABLE ORAL
COMMUNITY
Start: 2025-04-10

## 2025-05-12 RX ORDER — METFORMIN HYDROCHLORIDE 1000 MG/1
1000 TABLET ORAL 2 TIMES DAILY WITH MEALS
Qty: 180 TABLET | Refills: 1 | Status: SHIPPED | OUTPATIENT
Start: 2025-05-12

## 2025-05-12 RX ORDER — LOSARTAN POTASSIUM 25 MG/1
25 TABLET ORAL DAILY
Qty: 30 TABLET | Refills: 3 | Status: SHIPPED | OUTPATIENT
Start: 2025-05-12 | End: 2026-05-12

## 2025-05-12 RX ORDER — BLOOD-GLUCOSE METER
EACH MISCELLANEOUS
COMMUNITY
Start: 2025-04-10

## 2025-05-12 NOTE — PROGRESS NOTES
Mary Magana is a 44 y.o. female seen today for follow-up on her type 2 diabetes.  She reports that the Januvia is working well with no side effects in her blood sugars are in the 130s.  She continues to have pelvic pressure especially with urination and a urinalysis has been ordered.  Initial urine dip did show trace leukocytes.  On her lab work the patient did have microalbuminuria and we discussed a trial of low-dose losartan.    Past Medical History:   Diagnosis Date    Diabetes mellitus     Hypertension     Thyroid disease     WPW (Ilana-Parkinson-White syndrome)      Family History   Problem Relation Name Age of Onset    Hypertension Mother      Diabetes Mother      Diabetes Father      Diabetes Sister      Leukemia Daughter       Medications Ordered Prior to Encounter[1]  Immunization History   Administered Date(s) Administered    Influenza - Trivalent - Flucelvax - PF 10/09/2024    Tdap 04/10/2025       Review of Systems   Constitutional:  Negative for fever, malaise/fatigue and weight loss.   Respiratory:  Negative for shortness of breath.    Cardiovascular:  Negative for chest pain and palpitations.   Gastrointestinal:  Negative for nausea and vomiting.   Genitourinary:  Positive for dysuria.   Psychiatric/Behavioral:  Negative for depression.         Vitals:    05/12/25 1607   BP: (!) 158/96   Pulse:    Resp:    Temp:        Physical Exam  Vitals reviewed.   Eyes:      Conjunctiva/sclera: Conjunctivae normal.   Pulmonary:      Effort: Pulmonary effort is normal.   Neurological:      Mental Status: She is alert.   Psychiatric:         Mood and Affect: Mood normal.         Behavior: Behavior normal.         Thought Content: Thought content normal.         Judgment: Judgment normal.          Assessment and Plan  1. Dysuria  -     POCT URINALYSIS W/O SCOPE  -     Urinalysis, Reflex to Urine Culture    2. Microalbuminuria  -     losartan (COZAAR) 25 MG tablet; Take 1 tablet (25 mg total) by mouth  once daily.  Dispense: 30 tablet; Refill: 3    3. Hypertension, unspecified type  -     losartan (COZAAR) 25 MG tablet; Take 1 tablet (25 mg total) by mouth once daily.  Dispense: 30 tablet; Refill: 3             Return to clinic in one-week with a nurse visit for follow-up on her urinalysis as well as her blood pressure.    Health Maintenance Topics with due status: Not Due       Topic Last Completion Date    Mammogram 12/11/2024    Cervical Cancer Screening 02/12/2025    Lipid Panel 03/18/2025    Hemoglobin A1c 03/18/2025    TETANUS VACCINE 04/10/2025    Diabetes Urine Screening 04/10/2025    Foot Exam 04/10/2025    Low Dose Statin 05/12/2025    RSV Vaccine (Age 60+ and Pregnant patients) Not Due              [1]   Current Outpatient Medications on File Prior to Visit   Medication Sig Dispense Refill    atorvastatin (LIPITOR) 10 MG tablet Take 1 tablet (10 mg total) by mouth every evening. 90 tablet 1    baclofen (LIORESAL) 10 MG tablet Take 1 tablet (10 mg total) by mouth nightly as needed (spasm). 30 tablet 0    blood sugar diagnostic Strp To check BG once daily, to use with insurance preferred meter 100 strip 5    blood-glucose meter kit To check BG once daily, to use with insurance preferred meter 1 each 0    buPROPion (WELLBUTRIN XL) 150 MG TB24 tablet Take 150 mg by mouth every morning.      clonazePAM (KLONOPIN) 1 MG tablet Take 1 mg by mouth nightly as needed for Anxiety.      famotidine (PEPCID) 40 MG tablet Take 1 tablet (40 mg total) by mouth once daily. 30 tablet 4    ferrous sulfate (IRON) 325 mg (65 mg iron) Tab tablet Take 1 tablet (325 mg total) by mouth daily with breakfast. 30 tablet 5    glimepiride (AMARYL) 1 MG tablet Take 1 tablet (1 mg total) by mouth every morning. 90 tablet 1    hydrOXYzine pamoate (VISTARIL) 25 MG Cap Take 1 to 2 capsule up to tid prn for severe anxiety 30 capsule 2    levothyroxine (SYNTHROID) 175 MCG tablet Take 1 tablet (175 mcg total) by mouth before breakfast. 30  tablet 11    meloxicam (MOBIC) 7.5 MG tablet Take 1 po bid with plenty of food and water 60 tablet 0    metoprolol tartrate (LOPRESSOR) 25 MG tablet Take 1 tablet (25 mg total) by mouth 2 (two) times daily. 60 tablet 5    MICRO THIN LANCETS 33 gauge Misc CHECK BLOOD SUGAR DAILY      omeprazole (PRILOSEC) 40 MG capsule Take 1 capsule (40 mg total) by mouth once daily. 30 capsule 2    SITagliptin phosphate (JANUVIA) 100 MG Tab Take 1 tablet (100 mg total) by mouth once daily. 30 tablet 5    traMADoL (ULTRAM) 50 mg tablet Take 50 mg by mouth every 6 (six) hours as needed.      TRUE METRIX GLUCOSE METER Misc USE AS DIRECTED TO CHECK BLOOD SUGAR DAILY      lancets Misc To check BG once daily, to use with insurance preferred meter (Patient not taking: Reported on 5/12/2025) 100 each 5    metFORMIN (GLUCOPHAGE) 1000 MG tablet Take 1 tablet (1,000 mg total) by mouth 2 (two) times daily with meals. (Patient not taking: Reported on 5/12/2025) 180 tablet 1    nitrofurantoin, macrocrystal-monohydrate, (MACROBID) 100 MG capsule Take 1 capsule (100 mg total) by mouth 2 (two) times daily. (Patient not taking: Reported on 5/12/2025) 14 capsule 0     Current Facility-Administered Medications on File Prior to Visit   Medication Dose Route Frequency Provider Last Rate Last Admin    influenza (Egg-FREE) (Flucelvax) 45 mcg/0.5 mL IM vaccine (> or = 6 mo) (*contains preservatives) 0.5 mL  0.5 mL Intramuscular 1 time in Clinic/HOD Huber Clayton MD

## 2025-05-12 NOTE — LETTER
AUTHORIZATION FOR RELEASE OF   CONFIDENTIAL INFORMATION    [facility],    We are seeing Mary Magana, date of birth 1980, in the clinic at [facility]. Huber Clayton MD is the patient's PCP. Mary Magana has an outstanding lab/procedure at the time we reviewed her chart. In order to help keep her health information updated, she has authorized us to request the following medical record(s):        (  )  MAMMOGRAM                                      (  )  COLONOSCOPY      (  )  PAP SMEAR                                          (  )  OUTSIDE LAB RESULTS     (  )  DEXA SCAN                                          ( X )  EYE EXAM            (  )  FOOT EXAM                                          (  )  ENTIRE RECORD     (  )  OUTSIDE IMMUNIZATIONS                 (  )  _______________         Please fax records to Nataliia Mayes LPN Care Coordinator at [phone].       If you have any questions, please call [phone].          Patient Name: Mary Magana  : 1980  Patient Phone #: [phone]            Mary Magana  MRN: [id number]  : 1980  Age: 44 y.o.  Sex: female         Patient/Legal Guardian Signature  This signature was collected at 04/10/2025    Mary Magana       _______________________________   Printed Name/Relationship to Patient      Consent for Examination and Treatment: I hereby authorize the providers and employees of [facility] ([facility]) to provide medical treatment/services which includes, but is not limited to, performing and administering tests and diagnostic procedures that are deemed necessary, including, but not limited to, imaging examinations, blood tests and other laboratory procedures as may be required by the hospital, clinic, or may be ordered by my physician(s) or persons working under the general and/or special instructions of my physician(s).      I understand and agree  that this consent covers all authorized persons, including but not limited to physicians, residents, nurse practitioners, physicians' assistants, specialists, consultants, student nurses, and independently contracted physicians, who are called upon by the physician in charge, to carry out the diagnostic procedures and medical or surgical treatment.     I hereby authorize [facility] to retain or dispose of any specimens or tissue, should there be such remaining from any test or procedure.     I hereby authorize and give consent for [facility] providers and employees to take photographs, images or videotapes of such diagnostic, surgical or treatment procedures of Patient as may be required by [facility] or as may be ordered by a physician. I further acknowledge and agree that [facility] may use cameras or other devices for patient monitoring.     I am aware that the practice of medicine is not an exact science, and I acknowledge that no guarantees have been made to me as to the outcome of any tests, procedures or treatment.     Authorization for Release of Information: I understand that my insurance company and/or their agents may need information necessary to make determinations about payment/reimbursement. I hereby provide authorization to release to all insurance companies, their successors, assignees, other parties with whom they may have contracted, or others acting on their behalf, that are involved with payment for any hospital and/or clinic charges incurred by the patient, any information that they request and deem necessary for payment/reimbursement, and/or quality review.  I further authorize the release of my health information to physicians or other health care practitioners on staff who are involved in my health care now and in the future, and to other health care providers, entities, or institutions for the purpose of my continued care and treatment, including referrals.     REGISTRATION AUTHORIZATION  Form No.  20225 (Rev. 3/25/2024)    Page 1 of 3                       Medicare Patient's Certification and Authorization to Release Information and Payment Request:  I certify that the information given by me in applying for payment under Title XVIII of the Social Security Act is correct. I authorize any irvin of medical or other information about me to release to the Social SecurityAdministration, or its intermediaries or carriers, any information needed for this or a related Medicare claim. I request that payment of authorized benefits be made on my behalf.     Assignment of Insurance Benefits:   I hereby authorize any and all insurance companies, health plans, defined   benefit plans, health insurers or any entity that is or may be responsible for payment of my medical expenses to pay all hospital and medical benefits now due, and to become due and payable to me under any hospital benefits, sick benefits, injury benefits or any other benefit for services rendered to me, including Major Medical Benefits, direct to [facility] and all independently contracted physicians. I assign any and all rights that I may have against any and all insurance companies, health plans, defined benefit plans, health insurers or any entity that is or may be responsible for payment of my medical expenses, including, but not limited to any right to appeal a denial of a claim, any right to bring any action, lawsuit, administrative proceeding, or other cause of action on my behalf. I specifically assign my right to pursue litigation against any and all insurance companies, health plans, defined benefit plans, health insurers or any entity that is or may be responsible for payment of my medical expenses based upon a refusal to pay charges.            E. Valuables: It is understood and agreed that [facility] is not liable for the damage to or loss of any money, jewelry,   documents, dentures, eye glasses, hearing aids, prosthetics, or other property of  value.     F. Computer Equipment: I understand and agree that should I choose to use computer equipment owned by [facility] or if I choose to access the Internet via [facility]s network, I do so at my own risk. [facility] is not responsible for any damage to my computer equipment or to any damages of any type that might arise from my loss of equipment or data.     G. Acceptance of Financial Responsibility:  I agree that in consideration of the services and   supplies that have been   or will be furnished to the patient, I am hereby obligated to pay all charges made for or on the account of the patient according to the standard rates (in effect at the time the services and supplies are delivered) established by [facility], including its Patient Financial Assistance Policy to the extent it is applicable. I understand that I am responsible for all charges, or portions thereof, not covered by insurance or other sources. Patient refunds will be distributed only after balances at all [facility] facilities are paid.     H. Communication Authorization:  I hereby authorize [facility] and its representatives, along with any billing service   or  who may work on their behalf, to contact me on   my cell phone and/or home phone using pre- recorded messages, artificial voice messages, automatic telephone dialing devices or other computer assisted technology, or by electronic      mail, text messaging, or by any other form of electronic communication. This includes, but is not limited to, appointment reminders, yearly physical exam reminders, preventive care reminders, patient campaigns, welcome calls, and calls about account balances on my account or any account on which I am listed as a guarantor. I understand I have the right to opt out of these communications at any time.      Relationship  Between  Facility and  Provider:      I understand that some, but not all, providers furnishing services to the patient are not employees  or agents of [facility]. The patient is under the care and supervision of his/her attending physician, and it is the responsibility of the facility and its nursing staff to carry out the instructions of such physicians. It is the responsibility of the patient's physician/designee to obtain the patient's informed consent, when required, for medical or surgical treatment, special diagnostic or therapeutic procedures, or hospital services rendered for the patient under the special instructions of the physician/designee.           REGISTRATION AUTHORIZATION  Form No. 73007 (Rev. 3/25/2024)    Page 2 of 3                       Immunizations: [facility] shares immunization information with state sponsored health departments to help you and your doctor keep track of your immunization records. By signing, you consent to have this information shared with the health department in your state:                                [place] - LINKS ([place] Immunization Network for Kids Statewide)                                [place] - MIIX ([place] Immunization Information eXchange)                                [place] - ImmPRINT (Immunization Patient Registry with Integrated Technology)     TERM: This authorization is valid for this and subsequent care/treatment I receive at [facility] and will remain valid unless/until revoked in writing by me.     [facility]: As used in this document, [facility] means all [facility] owned and managed facilities, including, but not limited to, all health centers, surgery centers, clinics, urgent care centers, and hospitals.         [facility] complies with applicable Federal civil rights laws and does not discriminate on the basis of race, color, national origin, age, disability, or sex.  ATENCIÓN: si habla vee, tiene a beckman disposición servicios gratuitos de asistencia lingüística. Bhavana langley [phone].  [facility] Ý: N?u b?n nói Ti?ng Vi?t, có các d?ch v? h? tr? ngôn ng?  mi?n phí dành cho b?n. G?i s? [phone].        REGISTRATION AUTHORIZATION  Form No. 08818 (Rev. 3/25/2024)   Page 3 of 3

## 2025-05-12 NOTE — PROGRESS NOTES
Population Health Chart Review & Patient Outreach Details    Updates Requested / Reviewed:  [x]  Care Team Updated  [x]  Care Everywhere Updated & Reviewed      Health Maintenance Topics Addressed and Outreach Outcomes / Actions Taken:  Diabetic Eye Exam [x] [place] sent to [facility]

## 2025-05-13 ENCOUNTER — RESULTS FOLLOW-UP (OUTPATIENT)
Dept: FAMILY MEDICINE | Facility: CLINIC | Age: 45
End: 2025-05-13

## 2025-05-13 ENCOUNTER — TELEPHONE (OUTPATIENT)
Dept: FAMILY MEDICINE | Facility: CLINIC | Age: 45
End: 2025-05-13
Payer: COMMERCIAL

## 2025-05-13 DIAGNOSIS — N30.00 ACUTE CYSTITIS WITHOUT HEMATURIA: Primary | ICD-10-CM

## 2025-05-13 RX ORDER — NITROFURANTOIN 25; 75 MG/1; MG/1
100 CAPSULE ORAL 2 TIMES DAILY
Qty: 14 CAPSULE | Refills: 0 | Status: SHIPPED | OUTPATIENT
Start: 2025-05-13

## 2025-05-13 NOTE — TELEPHONE ENCOUNTER
----- Message from Huber Clayton MD sent at 5/13/2025  8:02 AM CDT -----  Patient does have a urinary tract infection and Macrobid has been called in.  She will need a follow up nurse visit in 7 days  ----- Message -----  From: Glenna James  Sent: 5/12/2025   4:13 PM CDT  To: Huber Clayton MD

## 2025-05-19 ENCOUNTER — CLINICAL SUPPORT (OUTPATIENT)
Dept: FAMILY MEDICINE | Facility: CLINIC | Age: 45
End: 2025-05-19
Payer: COMMERCIAL

## 2025-05-19 DIAGNOSIS — N30.00 ACUTE CYSTITIS WITHOUT HEMATURIA: Primary | ICD-10-CM

## 2025-05-19 LAB
BILIRUB UR QL STRIP: NEGATIVE
CLARITY UR: CLEAR
COLOR UR: NORMAL
GLUCOSE UR STRIP-MCNC: NORMAL MG/DL
KETONES UR STRIP-SCNC: NEGATIVE MG/DL
LEUKOCYTE ESTERASE UR QL STRIP: NEGATIVE
NITRITE UR QL STRIP: NEGATIVE
PH UR STRIP: 7 PH UNITS
PROT UR QL STRIP: NEGATIVE
RBC # UR STRIP: NEGATIVE /UL
SP GR UR STRIP: 1.02
UROBILINOGEN UR STRIP-ACNC: NORMAL MG/DL

## 2025-05-19 PROCEDURE — 81003 URINALYSIS AUTO W/O SCOPE: CPT | Mod: QW,,, | Performed by: CLINICAL MEDICAL LABORATORY

## 2025-05-19 NOTE — PROGRESS NOTES
Pt here in clinic as a nurse visit to follow up on her acute cystitis. She reports she has 2 days left of macrobid and she is no longer having urinary symptoms. Dr Clayton consulted. He advised to send out urine without cx and to continue macrobid at this time. Ms. Hernandez was notified and she voiced understanding. Notified her that she would be contacted with urine result as soon as he reviewed the lab, she voiced understanding.

## 2025-05-20 ENCOUNTER — RESULTS FOLLOW-UP (OUTPATIENT)
Dept: FAMILY MEDICINE | Facility: CLINIC | Age: 45
End: 2025-05-20

## 2025-06-04 ENCOUNTER — OFFICE VISIT (OUTPATIENT)
Dept: FAMILY MEDICINE | Facility: CLINIC | Age: 45
End: 2025-06-04
Payer: COMMERCIAL

## 2025-06-04 VITALS
TEMPERATURE: 99 F | SYSTOLIC BLOOD PRESSURE: 139 MMHG | WEIGHT: 250.38 LBS | DIASTOLIC BLOOD PRESSURE: 88 MMHG | OXYGEN SATURATION: 100 % | HEIGHT: 62 IN | BODY MASS INDEX: 46.07 KG/M2 | HEART RATE: 76 BPM | RESPIRATION RATE: 15 BRPM

## 2025-06-04 DIAGNOSIS — K21.9 GERD WITHOUT ESOPHAGITIS: ICD-10-CM

## 2025-06-04 DIAGNOSIS — Z11.3 SCREENING EXAMINATION FOR STI: Primary | ICD-10-CM

## 2025-06-04 DIAGNOSIS — Z72.51 HIGH RISK SEXUAL BEHAVIOR, UNSPECIFIED TYPE: ICD-10-CM

## 2025-06-04 DIAGNOSIS — I10 HYPERTENSION, UNSPECIFIED TYPE: ICD-10-CM

## 2025-06-04 LAB
HBV CORE AB SERPL QL IA: REACTIVE
HBV SURFACE AG SERPL QL IA: NORMAL
HCV AB SER QL: NORMAL
HIV 1+O+2 AB SERPL QL: NORMAL
SYPHILIS AB INTERPRETATION: NORMAL

## 2025-06-04 PROCEDURE — 4010F ACE/ARB THERAPY RXD/TAKEN: CPT | Mod: CPTII,,, | Performed by: NURSE PRACTITIONER

## 2025-06-04 PROCEDURE — 99213 OFFICE O/P EST LOW 20 MIN: CPT | Mod: ,,, | Performed by: NURSE PRACTITIONER

## 2025-06-04 PROCEDURE — 87491 CHLMYD TRACH DNA AMP PROBE: CPT | Mod: ,,, | Performed by: CLINICAL MEDICAL LABORATORY

## 2025-06-04 PROCEDURE — 87340 HEPATITIS B SURFACE AG IA: CPT | Mod: ,,, | Performed by: CLINICAL MEDICAL LABORATORY

## 2025-06-04 PROCEDURE — 87661 TRICHOMONAS VAGINALIS AMPLIF: CPT | Mod: ,,, | Performed by: CLINICAL MEDICAL LABORATORY

## 2025-06-04 PROCEDURE — 3066F NEPHROPATHY DOC TX: CPT | Mod: CPTII,,, | Performed by: NURSE PRACTITIONER

## 2025-06-04 PROCEDURE — 3060F POS MICROALBUMINURIA REV: CPT | Mod: CPTII,,, | Performed by: NURSE PRACTITIONER

## 2025-06-04 PROCEDURE — 3075F SYST BP GE 130 - 139MM HG: CPT | Mod: CPTII,,, | Performed by: NURSE PRACTITIONER

## 2025-06-04 PROCEDURE — 3046F HEMOGLOBIN A1C LEVEL >9.0%: CPT | Mod: CPTII,,, | Performed by: NURSE PRACTITIONER

## 2025-06-04 PROCEDURE — 86803 HEPATITIS C AB TEST: CPT | Mod: ,,, | Performed by: CLINICAL MEDICAL LABORATORY

## 2025-06-04 PROCEDURE — 86704 HEP B CORE ANTIBODY TOTAL: CPT | Mod: ,,, | Performed by: CLINICAL MEDICAL LABORATORY

## 2025-06-04 PROCEDURE — 87591 N.GONORRHOEAE DNA AMP PROB: CPT | Mod: ,,, | Performed by: CLINICAL MEDICAL LABORATORY

## 2025-06-04 PROCEDURE — 86694 HERPES SIMPLEX NES ANTBDY: CPT | Mod: ,,, | Performed by: CLINICAL MEDICAL LABORATORY

## 2025-06-04 PROCEDURE — 1160F RVW MEDS BY RX/DR IN RCRD: CPT | Mod: CPTII,,, | Performed by: NURSE PRACTITIONER

## 2025-06-04 PROCEDURE — 3079F DIAST BP 80-89 MM HG: CPT | Mod: CPTII,,, | Performed by: NURSE PRACTITIONER

## 2025-06-04 PROCEDURE — 86780 TREPONEMA PALLIDUM: CPT | Mod: ,,, | Performed by: CLINICAL MEDICAL LABORATORY

## 2025-06-04 PROCEDURE — 1159F MED LIST DOCD IN RCRD: CPT | Mod: CPTII,,, | Performed by: NURSE PRACTITIONER

## 2025-06-04 PROCEDURE — 87389 HIV-1 AG W/HIV-1&-2 AB AG IA: CPT | Mod: ,,, | Performed by: CLINICAL MEDICAL LABORATORY

## 2025-06-04 PROCEDURE — 3008F BODY MASS INDEX DOCD: CPT | Mod: CPTII,,, | Performed by: NURSE PRACTITIONER

## 2025-06-04 RX ORDER — OMEPRAZOLE 40 MG/1
40 CAPSULE, DELAYED RELEASE ORAL DAILY
Qty: 30 CAPSULE | Refills: 2 | Status: SHIPPED | OUTPATIENT
Start: 2025-06-04

## 2025-06-04 RX ORDER — METOPROLOL TARTRATE 25 MG/1
25 TABLET, FILM COATED ORAL 2 TIMES DAILY
Qty: 60 TABLET | Refills: 5 | Status: SHIPPED | OUTPATIENT
Start: 2025-06-04

## 2025-06-05 LAB
HSV IGM SER QL IA: NEGATIVE
TRICHOMONAS NAT: POSITIVE

## 2025-06-06 LAB
CHLAMYDIA BY PCR: NEGATIVE
N. GONORRHOEAE (GC) BY PCR: NEGATIVE

## 2025-06-09 ENCOUNTER — TELEPHONE (OUTPATIENT)
Dept: FAMILY MEDICINE | Facility: CLINIC | Age: 45
End: 2025-06-09
Payer: COMMERCIAL

## 2025-06-09 NOTE — TELEPHONE ENCOUNTER
----- Message from HENRI Pérez sent at 6/6/2025  4:09 PM CDT -----  Needs OV for f/u  ----- Message -----  From: Huber Clayton MD  Sent: 6/6/2025   8:03 AM CDT  To: HENRI Shane      ----- Message -----  From: Lab, Background User  Sent: 6/4/2025   9:48 PM CDT  To: Huber Clayton MD

## 2025-06-10 ENCOUNTER — OFFICE VISIT (OUTPATIENT)
Dept: FAMILY MEDICINE | Facility: CLINIC | Age: 45
End: 2025-06-10
Payer: COMMERCIAL

## 2025-06-10 VITALS
DIASTOLIC BLOOD PRESSURE: 82 MMHG | HEART RATE: 78 BPM | TEMPERATURE: 99 F | RESPIRATION RATE: 14 BRPM | WEIGHT: 250 LBS | SYSTOLIC BLOOD PRESSURE: 145 MMHG | OXYGEN SATURATION: 99 % | HEIGHT: 62 IN | BODY MASS INDEX: 46.01 KG/M2

## 2025-06-10 DIAGNOSIS — A59.01 VAGINAL TRICHOMONIASIS: Primary | ICD-10-CM

## 2025-06-10 PROCEDURE — 3008F BODY MASS INDEX DOCD: CPT | Mod: CPTII,,, | Performed by: NURSE PRACTITIONER

## 2025-06-10 PROCEDURE — 3066F NEPHROPATHY DOC TX: CPT | Mod: CPTII,,, | Performed by: NURSE PRACTITIONER

## 2025-06-10 PROCEDURE — 99212 OFFICE O/P EST SF 10 MIN: CPT | Mod: ,,, | Performed by: NURSE PRACTITIONER

## 2025-06-10 PROCEDURE — 3046F HEMOGLOBIN A1C LEVEL >9.0%: CPT | Mod: CPTII,,, | Performed by: NURSE PRACTITIONER

## 2025-06-10 PROCEDURE — 3079F DIAST BP 80-89 MM HG: CPT | Mod: CPTII,,, | Performed by: NURSE PRACTITIONER

## 2025-06-10 PROCEDURE — 4010F ACE/ARB THERAPY RXD/TAKEN: CPT | Mod: CPTII,,, | Performed by: NURSE PRACTITIONER

## 2025-06-10 PROCEDURE — 3077F SYST BP >= 140 MM HG: CPT | Mod: CPTII,,, | Performed by: NURSE PRACTITIONER

## 2025-06-10 PROCEDURE — 1159F MED LIST DOCD IN RCRD: CPT | Mod: CPTII,,, | Performed by: NURSE PRACTITIONER

## 2025-06-10 PROCEDURE — 3060F POS MICROALBUMINURIA REV: CPT | Mod: CPTII,,, | Performed by: NURSE PRACTITIONER

## 2025-06-10 PROCEDURE — 1160F RVW MEDS BY RX/DR IN RCRD: CPT | Mod: CPTII,,, | Performed by: NURSE PRACTITIONER

## 2025-06-10 RX ORDER — METRONIDAZOLE 500 MG/1
500 TABLET ORAL EVERY 12 HOURS
Qty: 14 TABLET | Refills: 0 | Status: SHIPPED | OUTPATIENT
Start: 2025-06-10

## 2025-06-10 NOTE — PROGRESS NOTES
[facility]    Chief Complaint      Chief Complaint   Patient presents with    Follow-up       History of Present Illness      Mary Magana is a 44 y.o. female. She  has a past medical history of Diabetes mellitus, Hypertension, Thyroid disease, and WPW (Ilana-Parkinson-White syndrome)., who presents today for f/u to review recent lab work- + for Trichomoniasis.     Past Medical History:  Past Medical History:   Diagnosis Date    Diabetes mellitus     Hypertension     Thyroid disease     WPW (Ilana-Parkinson-White syndrome)        Past Surgical History:   has a past surgical history that includes Cholecystectomy and Tubal ligation.    Social History:  Social History[1]    I personally reviewed all past medical, surgical, and social.     Review of Systems   Constitutional:  Negative for chills and fever.   Gastrointestinal:  Negative for abdominal pain.   Genitourinary:  Negative for dysuria and vaginal discharge.        Medications:  Encounter Medications[2]    Allergies:  Review of patient's allergies indicates:   Allergen Reactions    Anesthesia s/i-40 (propofol) [propofol] Itching     Anesthesia, possibly propofol; made her feel like there were ants all over her       Health Maintenance:  Immunization History   Administered Date(s) Administered    Influenza - Trivalent - Flucelvax - PF 10/09/2024    Tdap 04/10/2025      Health Maintenance   Topic Date Due    Diabetic Eye Exam  Never done    COVID-19 Vaccine (1 - 2024-25 season) Never done    Hemoglobin A1c  06/18/2025    Mammogram  12/11/2025    Lipid Panel  03/18/2026    Diabetes Urine Screening  04/10/2026    Foot Exam  04/10/2026    Low Dose Statin  06/04/2026    Cervical Cancer Screening  02/12/2030    TETANUS VACCINE  04/10/2035    RSV Vaccine (Age 60+ and Pregnant patients) (1 - 1-dose 75+ series) 11/22/2056    Hepatitis C Screening  Completed    HIV Screening  Completed    Influenza Vaccine  Discontinued    Pneumococcal Vaccines (Age  "0-49)  Discontinued        Physical Exam      Vital Signs  Temp: 98.5 °F (36.9 °C)  Temp Source: Oral  Pulse: 78  Resp: 14  SpO2: 99 %  BP: (!) 145/82  Pain Score: 0-No pain  Height and Weight  Height: 5' 2" (157.5 cm)  Weight: 113.4 kg (250 lb)  BSA (Calculated - sq m): 2.23 sq meters  BMI (Calculated): 45.7  Weight in (lb) to have BMI = 25: 136.4]    Physical Exam  Vitals and nursing note reviewed.   Constitutional:       Appearance: Normal appearance. She is well-developed.   HENT:      Head: Normocephalic.      Right Ear: Hearing normal.      Left Ear: Hearing normal.      Nose: Nose normal.   Eyes:      General: Lids are normal.      Conjunctiva/sclera: Conjunctivae normal.   Cardiovascular:      Rate and Rhythm: Normal rate.   Pulmonary:      Effort: Pulmonary effort is normal. No respiratory distress.   Musculoskeletal:         General: Normal range of motion.      Cervical back: Normal range of motion and neck supple.   Skin:     General: Skin is warm and dry.   Neurological:      Mental Status: She is alert and oriented to person, place, and time.      Gait: Gait is intact.   Psychiatric:         Behavior: Behavior is cooperative.          Laboratory:  CBC:  Recent Labs   Lab 12/13/24 2258 03/18/25  1643 03/22/25  0444   WBC 10.39 8.03 9.27   RBC 5.94 H 6.49 H 5.78 H   Hemoglobin 13.2 14.4 13.0   Hematocrit 44.2 48.9 H 43.1   Platelet Count 225 237 228   MCV 74.4 L 75.3 L 74.6 L   MCH 22.2 L 22.2 L 22.5 L   MCHC 29.9 L 29.4 L 30.2 L     CMP:  Recent Labs   Lab 12/13/24 2258 03/18/25  1643 03/22/25  0444   Glucose 162 H 225 H 257 H   Calcium 9.3 9.5 9.0   Albumin 3.7 4.0 3.8   Total Protein 7.9 7.7 7.4   Sodium 134 L 138 135 L   Potassium 4.6 3.9 4.2   CO2 21 L 27 20 L   Chloride 106 103 105   BUN 12 12 16   Alk Phos 113 119 130   ALT 58 H 52 31   AST 64 H 39 29   Bilirubin, Total 0.3 0.7 0.2     LIPIDS:  Recent Labs   Lab 12/01/23  1108 07/02/24  0852 10/09/24  1016 12/30/24  0940 03/18/25  1643   TSH " 7.600 H   < > 11.500 H 6.867 H 1.214   HDL Cholesterol 40  --   --  40 45   Cholesterol 112  --   --  147 144   Triglycerides 120  --   --  112 112   LDL Calculated 48  --   --  85 77   Cholesterol/HDL Ratio (Risk Factor) 2.8  --   --  3.7 3.2   Non-HDL 72  --   --  107 99    < > = values in this interval not displayed.     TSH:  Recent Labs   Lab 10/09/24  1016 12/30/24  0940 03/18/25  1643   TSH 11.500 H 6.867 H 1.214     A1C:  Recent Labs   Lab 09/16/22  1500 03/16/23  0953 07/25/23  1058 12/01/23  1108 04/02/24  1107 07/02/24  0852 10/09/24  1016 12/30/24  0940 03/18/25  1643   Hemoglobin A1C 6.0 6.0 5.8 7.6 H 6.6 7.8 H 7.0 H 7.4 H 9.7 H       Assessment/Plan     Mary Magana is a 44 y.o.female with:     1. Vaginal trichomoniasis  -     metroNIDAZOLE (FLAGYL) 500 MG tablet; Take 1 tablet (500 mg total) by mouth every 12 (twelve) hours.  Dispense: 14 tablet; Refill: 0       Avoid sex for 2 weeks      Total time spent face-to-face and non-face-to-face coordinating care for this encounter was: 10 min    Chronic conditions status updated as per HPI.  Other than changes above, cont current medications and maintain follow up with specialists.  Return to clinic prn if symptoms worsen or fail to improve.    Ronna Boogie, FNP  [facility]         [1]   Social History  Tobacco Use    Smoking status: Every Day     Current packs/day: 0.25     Average packs/day: 0.3 packs/day for 26.4 years (6.6 ttl pk-yrs)     Types: Cigarettes, Vaping with nicotine     Start date: 1999     Last attempt to quit: 2022     Passive exposure: Past    Smokeless tobacco: Never    Tobacco comments:     Pt no longer vapes but does continue to smoke cigarettes daily    Substance Use Topics    Alcohol use: Never    Drug use: Never   [2]   Outpatient Encounter Medications as of 6/10/2025   Medication Sig Dispense Refill    atorvastatin (LIPITOR) 10 MG tablet Take 1 tablet (10 mg total) by mouth every evening. 90 tablet 1    baclofen  (LIORESAL) 10 MG tablet Take 1 tablet (10 mg total) by mouth nightly as needed (spasm). 30 tablet 0    blood sugar diagnostic Strp To check BG once daily, to use with insurance preferred meter 100 strip 5    blood-glucose meter kit To check BG once daily, to use with insurance preferred meter 1 each 0    buPROPion (WELLBUTRIN XL) 150 MG TB24 tablet Take 150 mg by mouth every morning.      clonazePAM (KLONOPIN) 1 MG tablet Take 1 mg by mouth nightly as needed for Anxiety.      ferrous sulfate (IRON) 325 mg (65 mg iron) Tab tablet Take 1 tablet (325 mg total) by mouth daily with breakfast. 30 tablet 5    glimepiride (AMARYL) 1 MG tablet Take 1 tablet (1 mg total) by mouth every morning. 90 tablet 1    hydrOXYzine pamoate (VISTARIL) 25 MG Cap Take 1 to 2 capsule up to tid prn for severe anxiety 30 capsule 2    lancets Misc To check BG once daily, to use with insurance preferred meter 100 each 5    levothyroxine (SYNTHROID) 175 MCG tablet Take 1 tablet (175 mcg total) by mouth before breakfast. 30 tablet 11    losartan (COZAAR) 25 MG tablet Take 1 tablet (25 mg total) by mouth once daily. 30 tablet 3    meloxicam (MOBIC) 7.5 MG tablet Take 1 po bid with plenty of food and water 60 tablet 0    metFORMIN (GLUCOPHAGE) 1000 MG tablet Take 1 tablet (1,000 mg total) by mouth 2 (two) times daily with meals. 180 tablet 1    metoprolol tartrate (LOPRESSOR) 25 MG tablet Take 1 tablet (25 mg total) by mouth 2 (two) times daily. 60 tablet 5    MICRO THIN LANCETS 33 gauge Misc CHECK BLOOD SUGAR DAILY      nitrofurantoin, macrocrystal-monohydrate, (MACROBID) 100 MG capsule Take 1 capsule (100 mg total) by mouth 2 (two) times daily. 14 capsule 0    omeprazole (PRILOSEC) 40 MG capsule Take 1 capsule (40 mg total) by mouth once daily. 30 capsule 2    SITagliptin phosphate (JANUVIA) 100 MG Tab Take 1 tablet (100 mg total) by mouth once daily. 30 tablet 5    traMADoL (ULTRAM) 50 mg tablet Take 50 mg by mouth every 6 (six) hours as needed.       TRUE METRIX GLUCOSE METER Misc USE AS DIRECTED TO CHECK BLOOD SUGAR DAILY      famotidine (PEPCID) 40 MG tablet Take 1 tablet (40 mg total) by mouth once daily. 30 tablet 4    metroNIDAZOLE (FLAGYL) 500 MG tablet Take 1 tablet (500 mg total) by mouth every 12 (twelve) hours. 14 tablet 0     Facility-Administered Encounter Medications as of 6/10/2025   Medication Dose Route Frequency Provider Last Rate Last Admin    influenza (Egg-FREE) (Flucelvax) 45 mcg/0.5 mL IM vaccine (> or = 6 mo) (*contains preservatives) 0.5 mL  0.5 mL Intramuscular 1 time in Clinic/Huber Richmond MD

## 2025-06-19 ENCOUNTER — PATIENT OUTREACH (OUTPATIENT)
Facility: HOSPITAL | Age: 45
End: 2025-06-19
Payer: COMMERCIAL

## 2025-06-19 NOTE — LETTER
AUTHORIZATION FOR RELEASE OF   CONFIDENTIAL INFORMATION    [facility],    We are seeing Mary Magana, date of birth 1980, in the clinic at [facility]. Huber Clayton MD is the patient's PCP. Mary Magana has an outstanding lab/procedure at the time we reviewed her chart. In order to help keep her health information updated, she has authorized us to request the following medical record(s):        (  )  MAMMOGRAM                                      (  )  COLONOSCOPY      (  )  PAP SMEAR                                          (  )  OUTSIDE LAB RESULTS     (  )  DEXA SCAN                                          ( X )  EYE EXAM            (  )  FOOT EXAM                                          (  )  ENTIRE RECORD     (  )  OUTSIDE IMMUNIZATIONS                 (  )  _______________         Please fax records to Nataliia Mayes LPN Care Coordinator at [phone].       If you have any questions, please call [phone].          Patient Name: Mary Magana  : 1980  Patient Phone #: [phone]            Mary Magana  MRN: [id number]  : 1980  Age: 44 y.o.  Sex: female         Patient/Legal Guardian Signature  This signature was collected at 04/10/2025    Mary Magana       _______________________________   Printed Name/Relationship to Patient      Consent for Examination and Treatment: I hereby authorize the providers and employees of [facility] ([facility]) to provide medical treatment/services which includes, but is not limited to, performing and administering tests and diagnostic procedures that are deemed necessary, including, but not limited to, imaging examinations, blood tests and other laboratory procedures as may be required by the hospital, clinic, or may be ordered by my physician(s) or persons working under the general and/or special instructions of my physician(s).      I understand and agree  that this consent covers all authorized persons, including but not limited to physicians, residents, nurse practitioners, physicians' assistants, specialists, consultants, student nurses, and independently contracted physicians, who are called upon by the physician in charge, to carry out the diagnostic procedures and medical or surgical treatment.     I hereby authorize [facility] to retain or dispose of any specimens or tissue, should there be such remaining from any test or procedure.     I hereby authorize and give consent for [facility] providers and employees to take photographs, images or videotapes of such diagnostic, surgical or treatment procedures of Patient as may be required by [facility] or as may be ordered by a physician. I further acknowledge and agree that [facility] may use cameras or other devices for patient monitoring.     I am aware that the practice of medicine is not an exact science, and I acknowledge that no guarantees have been made to me as to the outcome of any tests, procedures or treatment.     Authorization for Release of Information: I understand that my insurance company and/or their agents may need information necessary to make determinations about payment/reimbursement. I hereby provide authorization to release to all insurance companies, their successors, assignees, other parties with whom they may have contracted, or others acting on their behalf, that are involved with payment for any hospital and/or clinic charges incurred by the patient, any information that they request and deem necessary for payment/reimbursement, and/or quality review.  I further authorize the release of my health information to physicians or other health care practitioners on staff who are involved in my health care now and in the future, and to other health care providers, entities, or institutions for the purpose of my continued care and treatment, including referrals.     REGISTRATION AUTHORIZATION  Form No.  20225 (Rev. 3/25/2024)    Page 1 of 3                       Medicare Patient's Certification and Authorization to Release Information and Payment Request:  I certify that the information given by me in applying for payment under Title XVIII of the Social Security Act is correct. I authorize any irvin of medical or other information about me to release to the Social SecurityAdministration, or its intermediaries or carriers, any information needed for this or a related Medicare claim. I request that payment of authorized benefits be made on my behalf.     Assignment of Insurance Benefits:   I hereby authorize any and all insurance companies, health plans, defined   benefit plans, health insurers or any entity that is or may be responsible for payment of my medical expenses to pay all hospital and medical benefits now due, and to become due and payable to me under any hospital benefits, sick benefits, injury benefits or any other benefit for services rendered to me, including Major Medical Benefits, direct to [facility] and all independently contracted physicians. I assign any and all rights that I may have against any and all insurance companies, health plans, defined benefit plans, health insurers or any entity that is or may be responsible for payment of my medical expenses, including, but not limited to any right to appeal a denial of a claim, any right to bring any action, lawsuit, administrative proceeding, or other cause of action on my behalf. I specifically assign my right to pursue litigation against any and all insurance companies, health plans, defined benefit plans, health insurers or any entity that is or may be responsible for payment of my medical expenses based upon a refusal to pay charges.            E. Valuables: It is understood and agreed that [facility] is not liable for the damage to or loss of any money, jewelry,   documents, dentures, eye glasses, hearing aids, prosthetics, or other property of  value.     F. Computer Equipment: I understand and agree that should I choose to use computer equipment owned by [facility] or if I choose to access the Internet via [facility]s network, I do so at my own risk. [facility] is not responsible for any damage to my computer equipment or to any damages of any type that might arise from my loss of equipment or data.     G. Acceptance of Financial Responsibility:  I agree that in consideration of the services and   supplies that have been   or will be furnished to the patient, I am hereby obligated to pay all charges made for or on the account of the patient according to the standard rates (in effect at the time the services and supplies are delivered) established by [facility], including its Patient Financial Assistance Policy to the extent it is applicable. I understand that I am responsible for all charges, or portions thereof, not covered by insurance or other sources. Patient refunds will be distributed only after balances at all [facility] facilities are paid.     H. Communication Authorization:  I hereby authorize [facility] and its representatives, along with any billing service   or  who may work on their behalf, to contact me on   my cell phone and/or home phone using pre- recorded messages, artificial voice messages, automatic telephone dialing devices or other computer assisted technology, or by electronic      mail, text messaging, or by any other form of electronic communication. This includes, but is not limited to, appointment reminders, yearly physical exam reminders, preventive care reminders, patient campaigns, welcome calls, and calls about account balances on my account or any account on which I am listed as a guarantor. I understand I have the right to opt out of these communications at any time.      Relationship  Between  Facility and  Provider:      I understand that some, but not all, providers furnishing services to the patient are not employees  or agents of [facility]. The patient is under the care and supervision of his/her attending physician, and it is the responsibility of the facility and its nursing staff to carry out the instructions of such physicians. It is the responsibility of the patient's physician/designee to obtain the patient's informed consent, when required, for medical or surgical treatment, special diagnostic or therapeutic procedures, or hospital services rendered for the patient under the special instructions of the physician/designee.           REGISTRATION AUTHORIZATION  Form No. 10754 (Rev. 3/25/2024)    Page 2 of 3                       Immunizations: [facility] shares immunization information with state sponsored health departments to help you and your doctor keep track of your immunization records. By signing, you consent to have this information shared with the health department in your state:                                [place] - LINKS ([place] Immunization Network for Kids Statewide)                                [place] - MIIX ([place] Immunization Information eXchange)                                [place] - ImmPRINT (Immunization Patient Registry with Integrated Technology)     TERM: This authorization is valid for this and subsequent care/treatment I receive at [facility] and will remain valid unless/until revoked in writing by me.     [facility]: As used in this document, [facility] means all [facility] owned and managed facilities, including, but not limited to, all health centers, surgery centers, clinics, urgent care centers, and hospitals.         [facility] complies with applicable Federal civil rights laws and does not discriminate on the basis of race, color, national origin, age, disability, or sex.  ATENCIÓN: si habla vee, tiene a beckman disposición servicios gratuitos de asistencia lingüística. Bhavana langley [phone].  [facility] Ý: N?u b?n nói Ti?ng Vi?t, có các d?ch v? h? tr? ngôn ng?  mi?n phí dành cho b?n. G?i s? [phone].        REGISTRATION AUTHORIZATION  Form No. 92859 (Rev. 3/25/2024)   Page 3 of 3

## 2025-06-19 NOTE — PROGRESS NOTES
Diabetic Eye Exam [x] [place] sent to [facility].              Population Health Chart Review & Patient Outreach Details    Updates Requested / Reviewed:  []  Care Team Updated  [x]  Care Everywhere Updated & Reviewed      Health Maintenance Topics Addressed and Outreach Outcomes / Actions Taken:

## 2025-07-23 PROBLEM — M54.12 CERVICAL RADICULOPATHY: Chronic | Status: ACTIVE | Noted: 2025-01-27

## 2025-07-31 ENCOUNTER — PATIENT MESSAGE (OUTPATIENT)
Facility: HOSPITAL | Age: 45
End: 2025-07-31

## 2025-08-04 ENCOUNTER — PATIENT MESSAGE (OUTPATIENT)
Facility: HOSPITAL | Age: 45
End: 2025-08-04

## 2025-08-06 ENCOUNTER — PATIENT OUTREACH (OUTPATIENT)
Facility: HOSPITAL | Age: 45
End: 2025-08-06

## 2025-08-06 VITALS — DIASTOLIC BLOOD PRESSURE: 80 MMHG | SYSTOLIC BLOOD PRESSURE: 137 MMHG

## 2025-08-06 NOTE — PROGRESS NOTES
Population Health Chart Review & Patient Outreach Details        Health Maintenance Topics Addressed and Outreach Outcomes / Actions Taken:  Diabetic Eye Exam  Blood Pressure Control   [x] [place] sent to [facility]  Remote bp updated

## 2025-08-06 NOTE — LETTER
AUTHORIZATION FOR RELEASE OF   CONFIDENTIAL INFORMATION    Dear [facility],    We are seeing Mary Magana, date of birth 1980, in the clinic at [facility]. Huber Clayton MD is the patient's PCP. Mary Magana has an outstanding lab/procedure at the time we reviewed her chart. In order to help keep her health information updated, she has authorized us to request the following medical record(s): patient sates she had eye exam done last year       (  )  MAMMOGRAM                                      (  )  COLONOSCOPY      (  )  PAP SMEAR                                          (  )  OUTSIDE LAB RESULTS     (  )  DEXA SCAN                                          ( X )  EYE EXAM            (  )  FOOT EXAM                                          (  )  ENTIRE RECORD     (  )  OUTSIDE IMMUNIZATIONS                 (  )  _______________         Please fax records to Nataliia Mayes LPN Care Coordinator at [phone].       If you have any questions, please call [phone].          Patient Name: Mary Magana  : 1980  Patient Phone #: [phone]            Mary Magana  MRN: [id number]  : 1980  Age: 44 y.o.  Sex: female         Patient/Legal Guardian Signature  This signature was collected at 04/10/2025    Mary Magana       _______________________________   Printed Name/Relationship to Patient      Consent for Examination and Treatment: I hereby authorize the providers and employees of [facility] ([facility]) to provide medical treatment/services which includes, but is not limited to, performing and administering tests and diagnostic procedures that are deemed necessary, including, but not limited to, imaging examinations, blood tests and other laboratory procedures as may be required by the hospital, clinic, or may be ordered by my physician(s) or persons working under the general and/or special instructions of  my physician(s).      I understand and agree that this consent covers all authorized persons, including but not limited to physicians, residents, nurse practitioners, physicians' assistants, specialists, consultants, student nurses, and independently contracted physicians, who are called upon by the physician in charge, to carry out the diagnostic procedures and medical or surgical treatment.     I hereby authorize [facility] to retain or dispose of any specimens or tissue, should there be such remaining from any test or procedure.     I hereby authorize and give consent for [facility] providers and employees to take photographs, images or videotapes of such diagnostic, surgical or treatment procedures of Patient as may be required by [facility] or as may be ordered by a physician. I further acknowledge and agree that [facility] may use cameras or other devices for patient monitoring.     I am aware that the practice of medicine is not an exact science, and I acknowledge that no guarantees have been made to me as to the outcome of any tests, procedures or treatment.     Authorization for Release of Information: I understand that my insurance company and/or their agents may need information necessary to make determinations about payment/reimbursement. I hereby provide authorization to release to all insurance companies, their successors, assignees, other parties with whom they may have contracted, or others acting on their behalf, that are involved with payment for any hospital and/or clinic charges incurred by the patient, any information that they request and deem necessary for payment/reimbursement, and/or quality review.  I further authorize the release of my health information to physicians or other health care practitioners on staff who are involved in my health care now and in the future, and to other health care providers, entities, or institutions for the purpose of my continued care and treatment, including  referrals.     REGISTRATION AUTHORIZATION  Form No. 02616 (Rev. 3/25/2024)    Page 1 of 3                       Medicare Patient's Certification and Authorization to Release Information and Payment Request:  I certify that the information given by me in applying for payment under Title XVIII of the Social Security Act is correct. I authorize any irvin of medical or other information about me to release to the [facility], or its intermediaries or carriers, any information needed for this or a related Medicare claim. I request that payment of authorized benefits be made on my behalf.     Assignment of Insurance Benefits:   I hereby authorize any and all insurance companies, health plans, defined   benefit plans, health insurers or any entity that is or may be responsible for payment of my medical expenses to pay all hospital and medical benefits now due, and to become due and payable to me under any hospital benefits, sick benefits, injury benefits or any other benefit for services rendered to me, including Major Medical Benefits, direct to [facility] and all independently contracted physicians. I assign any and all rights that I may have against any and all insurance companies, health plans, defined benefit plans, health insurers or any entity that is or may be responsible for payment of my medical expenses, including, but not limited to any right to appeal a denial of a claim, any right to bring any action, lawsuit, administrative proceeding, or other cause of action on my behalf. I specifically assign my right to pursue litigation against any and all insurance companies, health plans, defined benefit plans, health insurers or any entity that is or may be responsible for payment of my medical expenses based upon a refusal to pay charges.            E. Valuables: It is understood and agreed that [facility] is not liable for the damage to or loss of any money, jewelry,   documents, dentures, eye glasses,  hearing aids, prosthetics, or other property of value.     F. Computer Equipment: I understand and agree that should I choose to use computer equipment owned by [facility] or if I choose to access the Internet via [facility]s network, I do so at my own risk. [facility] is not responsible for any damage to my computer equipment or to any damages of any type that might arise from my loss of equipment or data.     G. Acceptance of Financial Responsibility:  I agree that in consideration of the services and   supplies that have been   or will be furnished to the patient, I am hereby obligated to pay all charges made for or on the account of the patient according to the standard rates (in effect at the time the services and supplies are delivered) established by [facility], including its Patient Financial Assistance Policy to the extent it is applicable. I understand that I am responsible for all charges, or portions thereof, not covered by insurance or other sources. Patient refunds will be distributed only after balances at all [facility] facilities are paid.     H. Communication Authorization:  I hereby authorize [facility] and its representatives, along with any billing service   or  who may work on their behalf, to contact me on   my cell phone and/or home phone using pre- recorded messages, artificial voice messages, automatic telephone dialing devices or other computer assisted technology, or by electronic      mail, text messaging, or by any other form of electronic communication. This includes, but is not limited to, appointment reminders, yearly physical exam reminders, preventive care reminders, patient campaigns, welcome calls, and calls about account balances on my account or any account on which I am listed as a guarantor. I understand I have the right to opt out of these communications at any time.      Relationship  Between  Facility and  Provider:      I understand that some, but not all, providers  furnishing services to the patient are not employees or agents of [facility]. The patient is under the care and supervision of his/her attending physician, and it is the responsibility of the facility and its nursing staff to carry out the instructions of such physicians. It is the responsibility of the patient's physician/designee to obtain the patient's informed consent, when required, for medical or surgical treatment, special diagnostic or therapeutic procedures, or hospital services rendered for the patient under the special instructions of the physician/designee.           REGISTRATION AUTHORIZATION  Form No. 29716 (Rev. 3/25/2024)    Page 2 of 3                       Immunizations: [facility] shares immunization information with state sponsored health departments to help you and your doctor keep track of your immunization records. By signing, you consent to have this information shared with the health department in your state:                                [place] - LINKS ([place] Immunization Network for Kids Statewide)                                [place] - MIIX ([place] Immunization Information eXchange)                                [place] - ImmPRINT (Immunization Patient Registry with Integrated Technology)     TERM: This authorization is valid for this and subsequent care/treatment I receive at [facility] and will remain valid unless/until revoked in writing by me.     [facility]: As used in this document, [facility] means all [facility] owned and managed facilities, including, but not limited to, all health centers, surgery centers, clinics, urgent care centers, and hospitals.         [facility] complies with applicable Federal civil rights laws and does not discriminate on the basis of race, color, national origin, age, disability, or sex.  ATENCIÓN: si habla vee, tiene a beckman disposición servicios gratuitos de asistencia lingüística. Bhavana langley [phone].  CHÚ Ý:  N?u b?n nói Ti?ng Vi?t, có các d?ch v? h? tr? ngôn ng? mi?n phí dành cho b?n. G?i s? [phone].        REGISTRATION AUTHORIZATION  Form No. 90537 (Rev. 3/25/2024)   Page 3 of 3

## 2025-09-03 ENCOUNTER — PATIENT OUTREACH (OUTPATIENT)
Facility: HOSPITAL | Age: 45
End: 2025-09-03
Payer: COMMERCIAL

## 2025-09-04 ENCOUNTER — HOSPITAL ENCOUNTER (OUTPATIENT)
Dept: RADIOLOGY | Facility: HOSPITAL | Age: 45
Discharge: HOME | End: 2025-09-04
Attending: PHYSICIAN ASSISTANT
Payer: COMMERCIAL

## 2025-09-04 ENCOUNTER — OFFICE VISIT (OUTPATIENT)
Dept: PAIN MEDICINE | Facility: CLINIC | Age: 45
End: 2025-09-04
Payer: COMMERCIAL

## 2025-09-04 VITALS
RESPIRATION RATE: 18 BRPM | SYSTOLIC BLOOD PRESSURE: 165 MMHG | WEIGHT: 250 LBS | HEIGHT: 62 IN | BODY MASS INDEX: 46.01 KG/M2 | DIASTOLIC BLOOD PRESSURE: 99 MMHG | HEART RATE: 75 BPM

## 2025-09-04 DIAGNOSIS — M54.16 LUMBAR RADICULOPATHY, CHRONIC: Chronic | ICD-10-CM

## 2025-09-04 DIAGNOSIS — M53.3 DISORDER OF SACRUM: ICD-10-CM

## 2025-09-04 DIAGNOSIS — M54.12 RADICULOPATHY, CERVICAL REGION: Chronic | ICD-10-CM

## 2025-09-04 DIAGNOSIS — M54.9 DORSALGIA, UNSPECIFIED: ICD-10-CM

## 2025-09-04 DIAGNOSIS — M54.16 LUMBAR RADICULOPATHY, CHRONIC: Primary | Chronic | ICD-10-CM

## 2025-09-04 PROCEDURE — 72202 X-RAY EXAM SI JOINTS 3/> VWS: CPT | Mod: 26,,, | Performed by: RADIOLOGY

## 2025-09-04 PROCEDURE — 72100 X-RAY EXAM L-S SPINE 2/3 VWS: CPT | Mod: TC

## 2025-09-04 PROCEDURE — 99214 OFFICE O/P EST MOD 30 MIN: CPT | Mod: S$PBB,,, | Performed by: PHYSICIAN ASSISTANT

## 2025-09-04 PROCEDURE — 72100 X-RAY EXAM L-S SPINE 2/3 VWS: CPT | Mod: 26,,, | Performed by: RADIOLOGY

## 2025-09-04 PROCEDURE — 73521 X-RAY EXAM HIPS BI 2 VIEWS: CPT | Mod: 26,,, | Performed by: RADIOLOGY

## 2025-09-04 PROCEDURE — 99999 PR PBB SHADOW E&M-EST. PATIENT-LVL V: CPT | Mod: PBBFAC,,, | Performed by: PHYSICIAN ASSISTANT

## 2025-09-04 PROCEDURE — 99215 OFFICE O/P EST HI 40 MIN: CPT | Mod: PBBFAC | Performed by: PHYSICIAN ASSISTANT

## 2025-09-04 PROCEDURE — 72202 X-RAY EXAM SI JOINTS 3/> VWS: CPT | Mod: TC

## 2025-09-04 PROCEDURE — 73521 X-RAY EXAM HIPS BI 2 VIEWS: CPT | Mod: TC
